# Patient Record
Sex: MALE | Race: WHITE | HISPANIC OR LATINO | Employment: FULL TIME | URBAN - METROPOLITAN AREA
[De-identification: names, ages, dates, MRNs, and addresses within clinical notes are randomized per-mention and may not be internally consistent; named-entity substitution may affect disease eponyms.]

---

## 2017-12-28 ENCOUNTER — APPOINTMENT (OUTPATIENT)
Dept: ADMISSIONS | Facility: MEDICAL CENTER | Age: 52
End: 2017-12-28
Attending: ORTHOPAEDIC SURGERY
Payer: COMMERCIAL

## 2017-12-28 RX ORDER — IBUPROFEN 200 MG
200 TABLET ORAL EVERY 6 HOURS PRN
Status: ON HOLD | COMMUNITY
End: 2018-01-08

## 2017-12-28 RX ORDER — CHLORAL HYDRATE 500 MG
1000 CAPSULE ORAL DAILY
COMMUNITY

## 2017-12-28 RX ORDER — ACETAMINOPHEN 325 MG/1
650 TABLET ORAL EVERY 4 HOURS PRN
Status: ON HOLD | COMMUNITY
End: 2018-01-08

## 2018-01-08 ENCOUNTER — HOSPITAL ENCOUNTER (OUTPATIENT)
Facility: MEDICAL CENTER | Age: 53
End: 2018-01-08
Attending: ORTHOPAEDIC SURGERY | Admitting: ORTHOPAEDIC SURGERY
Payer: COMMERCIAL

## 2018-01-08 VITALS
HEART RATE: 72 BPM | WEIGHT: 211.86 LBS | TEMPERATURE: 97.2 F | OXYGEN SATURATION: 90 % | RESPIRATION RATE: 14 BRPM | BODY MASS INDEX: 28.7 KG/M2 | HEIGHT: 72 IN

## 2018-01-08 PROCEDURE — 700111 HCHG RX REV CODE 636 W/ 250 OVERRIDE (IP)

## 2018-01-08 PROCEDURE — 160029 HCHG SURGERY MINUTES - 1ST 30 MINS LEVEL 4: Performed by: ORTHOPAEDIC SURGERY

## 2018-01-08 PROCEDURE — 160025 RECOVERY II MINUTES (STATS): Performed by: ORTHOPAEDIC SURGERY

## 2018-01-08 PROCEDURE — 502581 HCHG PACK, SHOULDER ARTHROSCOPY: Performed by: ORTHOPAEDIC SURGERY

## 2018-01-08 PROCEDURE — 160009 HCHG ANES TIME/MIN: Performed by: ORTHOPAEDIC SURGERY

## 2018-01-08 PROCEDURE — 700105 HCHG RX REV CODE 258: Performed by: ORTHOPAEDIC SURGERY

## 2018-01-08 PROCEDURE — 501162 HCHG PROBE, VULCAN: Performed by: ORTHOPAEDIC SURGERY

## 2018-01-08 PROCEDURE — 160002 HCHG RECOVERY MINUTES (STAT): Performed by: ORTHOPAEDIC SURGERY

## 2018-01-08 PROCEDURE — 700102 HCHG RX REV CODE 250 W/ 637 OVERRIDE(OP)

## 2018-01-08 PROCEDURE — 502000 HCHG MISC OR IMPLANTS RC 0278: Performed by: ORTHOPAEDIC SURGERY

## 2018-01-08 PROCEDURE — 160048 HCHG OR STATISTICAL LEVEL 1-5: Performed by: ORTHOPAEDIC SURGERY

## 2018-01-08 PROCEDURE — 700101 HCHG RX REV CODE 250

## 2018-01-08 PROCEDURE — 501838 HCHG SUTURE GENERAL: Performed by: ORTHOPAEDIC SURGERY

## 2018-01-08 PROCEDURE — C1713 ANCHOR/SCREW BN/BN,TIS/BN: HCPCS | Performed by: ORTHOPAEDIC SURGERY

## 2018-01-08 PROCEDURE — 500152 HCHG CANNULA, TIB TUNNEL: Performed by: ORTHOPAEDIC SURGERY

## 2018-01-08 PROCEDURE — A9270 NON-COVERED ITEM OR SERVICE: HCPCS | Performed by: ORTHOPAEDIC SURGERY

## 2018-01-08 PROCEDURE — 500862 HCHG NEEDLE, SCORPION: Performed by: ORTHOPAEDIC SURGERY

## 2018-01-08 PROCEDURE — 160046 HCHG PACU - 1ST 60 MINS PHASE II: Performed by: ORTHOPAEDIC SURGERY

## 2018-01-08 PROCEDURE — 700102 HCHG RX REV CODE 250 W/ 637 OVERRIDE(OP): Performed by: ORTHOPAEDIC SURGERY

## 2018-01-08 PROCEDURE — 160036 HCHG PACU - EA ADDL 30 MINS PHASE I: Performed by: ORTHOPAEDIC SURGERY

## 2018-01-08 PROCEDURE — 700111 HCHG RX REV CODE 636 W/ 250 OVERRIDE (IP): Performed by: ORTHOPAEDIC SURGERY

## 2018-01-08 PROCEDURE — 302135 SEQUENTIAL COMPRESSION MACHINE: Performed by: ORTHOPAEDIC SURGERY

## 2018-01-08 PROCEDURE — A9270 NON-COVERED ITEM OR SERVICE: HCPCS

## 2018-01-08 PROCEDURE — 160035 HCHG PACU - 1ST 60 MINS PHASE I: Performed by: ORTHOPAEDIC SURGERY

## 2018-01-08 PROCEDURE — 160041 HCHG SURGERY MINUTES - EA ADDL 1 MIN LEVEL 4: Performed by: ORTHOPAEDIC SURGERY

## 2018-01-08 DEVICE — ANCHOR SUTURE ICONIX 2 WITH #2 FORCE FIBER 2.3MM (5EA/BX): Type: IMPLANTABLE DEVICE | Status: FUNCTIONAL

## 2018-01-08 DEVICE — ANCHOR KNOTLESS REELX STT 4.5MM (5EA/BX): Type: IMPLANTABLE DEVICE | Status: FUNCTIONAL

## 2018-01-08 DEVICE — ANCHOR ICONIX 1-#2 FORCEFIBER 1.4MM (5EA/BX): Type: IMPLANTABLE DEVICE | Status: FUNCTIONAL

## 2018-01-08 RX ORDER — DIPHENHYDRAMINE HYDROCHLORIDE 50 MG/ML
25 INJECTION INTRAMUSCULAR; INTRAVENOUS EVERY 6 HOURS PRN
Status: DISCONTINUED | OUTPATIENT
Start: 2018-01-08 | End: 2018-01-08 | Stop reason: HOSPADM

## 2018-01-08 RX ORDER — OXYCODONE HYDROCHLORIDE AND ACETAMINOPHEN 5; 325 MG/1; MG/1
1 TABLET ORAL EVERY 4 HOURS PRN
Status: DISCONTINUED | OUTPATIENT
Start: 2018-01-08 | End: 2018-01-08 | Stop reason: HOSPADM

## 2018-01-08 RX ORDER — SCOLOPAMINE TRANSDERMAL SYSTEM 1 MG/1
1 PATCH, EXTENDED RELEASE TRANSDERMAL
Status: DISCONTINUED | OUTPATIENT
Start: 2018-01-08 | End: 2018-01-08 | Stop reason: HOSPADM

## 2018-01-08 RX ORDER — ONDANSETRON 2 MG/ML
4 INJECTION INTRAMUSCULAR; INTRAVENOUS EVERY 4 HOURS PRN
Status: DISCONTINUED | OUTPATIENT
Start: 2018-01-08 | End: 2018-01-08 | Stop reason: HOSPADM

## 2018-01-08 RX ORDER — CEFAZOLIN SODIUM 1 G/3ML
INJECTION, POWDER, FOR SOLUTION INTRAMUSCULAR; INTRAVENOUS
Status: DISCONTINUED | OUTPATIENT
Start: 2018-01-08 | End: 2018-01-08 | Stop reason: HOSPADM

## 2018-01-08 RX ORDER — EPINEPHRINE 0.1 MG/ML
SYRINGE (ML) INJECTION
Status: DISCONTINUED | OUTPATIENT
Start: 2018-01-08 | End: 2018-01-08 | Stop reason: HOSPADM

## 2018-01-08 RX ORDER — OXYCODONE HCL 5 MG/5 ML
SOLUTION, ORAL ORAL
Status: COMPLETED
Start: 2018-01-08 | End: 2018-01-08

## 2018-01-08 RX ORDER — OXYCODONE HYDROCHLORIDE AND ACETAMINOPHEN 5; 325 MG/1; MG/1
0.5 TABLET ORAL EVERY 4 HOURS PRN
Status: DISCONTINUED | OUTPATIENT
Start: 2018-01-08 | End: 2018-01-08 | Stop reason: HOSPADM

## 2018-01-08 RX ORDER — HALOPERIDOL 5 MG/ML
1 INJECTION INTRAMUSCULAR EVERY 6 HOURS PRN
Status: DISCONTINUED | OUTPATIENT
Start: 2018-01-08 | End: 2018-01-08 | Stop reason: HOSPADM

## 2018-01-08 RX ORDER — DEXAMETHASONE SODIUM PHOSPHATE 4 MG/ML
4 INJECTION, SOLUTION INTRA-ARTICULAR; INTRALESIONAL; INTRAMUSCULAR; INTRAVENOUS; SOFT TISSUE
Status: DISCONTINUED | OUTPATIENT
Start: 2018-01-08 | End: 2018-01-08 | Stop reason: HOSPADM

## 2018-01-08 RX ORDER — SODIUM CHLORIDE, SODIUM LACTATE, POTASSIUM CHLORIDE, CALCIUM CHLORIDE 600; 310; 30; 20 MG/100ML; MG/100ML; MG/100ML; MG/100ML
1000 INJECTION, SOLUTION INTRAVENOUS
Status: DISCONTINUED | OUTPATIENT
Start: 2018-01-08 | End: 2018-01-08 | Stop reason: HOSPADM

## 2018-01-08 RX ADMIN — FENTANYL CITRATE 25 MCG: 50 INJECTION, SOLUTION INTRAMUSCULAR; INTRAVENOUS at 12:18

## 2018-01-08 RX ADMIN — SODIUM CHLORIDE, POTASSIUM CHLORIDE, SODIUM LACTATE AND CALCIUM CHLORIDE 1000 ML: 600; 310; 30; 20 INJECTION, SOLUTION INTRAVENOUS at 08:20

## 2018-01-08 RX ADMIN — OXYCODONE HYDROCHLORIDE 10 MG: 5 SOLUTION ORAL at 12:19

## 2018-01-08 RX ADMIN — FENTANYL CITRATE 25 MCG: 50 INJECTION, SOLUTION INTRAMUSCULAR; INTRAVENOUS at 12:42

## 2018-01-08 RX ADMIN — FENTANYL CITRATE 50 MCG: 50 INJECTION, SOLUTION INTRAMUSCULAR; INTRAVENOUS at 12:55

## 2018-01-08 ASSESSMENT — PAIN SCALES - GENERAL
PAINLEVEL_OUTOF10: 6
PAINLEVEL_OUTOF10: 3
PAINLEVEL_OUTOF10: 5
PAINLEVEL_OUTOF10: 6
PAINLEVEL_OUTOF10: 3
PAINLEVEL_OUTOF10: 3

## 2018-01-08 NOTE — OR NURSING
1350- Pt in stage 2, getting dressed with assist.  When pt was sat up, bloody drainage leaked from anterior axilla of R shoulder dressing.  Reinforced dressing with abd and tape under arm.  Dr Martinez notified, no new orders received.  Shadowing on shoulder dressing noted and marked in pacu. Report to Malena CLAYTON.

## 2018-01-08 NOTE — OR SURGEON
Immediate Post OP Note    PreOp Diagnosis: R RCT, biceps lesion    PostOp Diagnosis: same    Procedure(s):  SHOULDER ARTHROSCOPY W/ ROTATOR CUFF REPAIR- WITH EXTENSIVE DEBRIDEMENT - Wound Class: Clean  SHOULDER DECOMPRESSION ARTHROSCOPIC- SUBACROMIAL - Wound Class: Clean  SHOULDER ARTHROSCOPY W/ BICIPITAL TENODESIS REPAIR - Wound Class: Clean    Surgeon(s):  Rl Martinez M.D.    Anesthesiologist/Type of Anesthesia:  Anesthesiologist: Eduardo Jennings M.D./General    Surgical Staff:  Circulator: Elizabeth Aj R.N.  Relief Circulator: Reva Chavira R.N.  Relief Scrub: Collins Avila  Scrub Person: Adilene Robles Reyes First Assist: Mildred Rivas R.N.    Specimens:  * No specimens in log *    Estimated Blood Loss: min    Findings: above    Complications: none        1/8/2018 11:51 AM Rl Martinez

## 2018-01-08 NOTE — OR NURSING
1350 pt cares assumed.  VSS. Spouse in,  DC instr reviewed. All questions answered.  Pt c/o R thumb pain.  Is able to move right thumb easily. Cap refill wnl to nail beds.  1425 Dr. Martinez in to see pt & address shoulder dressing & pt's c/o R thumb pain.  Pt states pain level to thumb is tolerable at 3/10. DC'd to home in good cond at 1430.

## 2018-01-08 NOTE — DISCHARGE INSTRUCTIONS
ACTIVITY: Rest and take it easy for the first 24 hours.  A responsible adult is recommended to remain with you during that time.  It is normal to feel sleepy.  We encourage you to not do anything that requires balance, judgment or coordination.    MILD FLU-LIKE SYMPTOMS ARE NORMAL. YOU MAY EXPERIENCE GENERALIZED MUSCLE ACHES, THROAT IRRITATION, HEADACHE AND/OR SOME NAUSEA.    FOR 24 HOURS DO NOT:  Drive, operate machinery or run household appliances.  Drink beer or alcoholic beverages.   Make important decisions or sign legal documents.    SPECIAL INSTRUCTIONS: *Keep arm in immobilizer as instructed by Dr Martinez. Ice 20 mins on, 20 mins off. Hand wrist and fingers several times per hour. Sleep upright, greater then 30 degrees with pillow under right arm for comfort.,  **    DIET: To avoid nausea, slowly advance diet as tolerated, avoiding spicy or greasy foods for the first day.  Add more substantial food to your diet according to your physician's instructions.  Babies can be fed formula or breast milk as soon as they are hungry.  INCREASE FLUIDS AND FIBER TO AVOID CONSTIPATION.    SURGICAL DRESSING/BATHING: *Reinforce as needed. Do not bath until instructed per physician.**    FOLLOW-UP APPOINTMENT:  A follow-up appointment was arranged with your doctor on the Wednesday the 10th and on the 19th.**    You should CALL YOUR PHYSICIAN if you develop:  Fever greater than 101 degrees F.  Pain not relieved by medication, or persistent nausea or vomiting.  Excessive bleeding (blood soaking through dressing) or unexpected drainage from the wound.  Extreme redness or swelling around the incision site, drainage of pus or foul smelling drainage.  Inability to urinate or empty your bladder within 8 hours.  Problems with breathing or chest pain.    You should call 911 if you develop problems with breathing or chest pain.  If you are unable to contact your doctor or surgical center, you should go to the nearest emergency room or  urgent care center.  Physician's telephone #: *[phone]**    If any questions arise, call your doctor.  If your doctor is not available, please feel free to call the Surgical Center at {Surgical Dept Numbers:[id number]}.  The Center is open Monday through Friday from 7AM to 7PM.  You can also call the HEALTH HOTLINE open 24 hours/day, 7 days/week and speak to a nurse at [phone], or toll free at [phone].    A registered nurse may call you a few days after your surgery to see how you are doing after your procedure.    MEDICATIONS: Resume taking daily medication.  Take prescribed pain medication with food.  If no medication is prescribed, you may take non-aspirin pain medication if needed.  PAIN MEDICATION CAN BE VERY CONSTIPATING.  Take a stool softener or laxative such as senokot, pericolace, or milk of magnesia if needed.    Prescription given for *and will be picked up from pharmacy **.  Last pain medication given at *1220**.    If your physician has prescribed pain medication that includes Acetaminophen (Tylenol), do not take additional Acetaminophen (Tylenol) while taking the prescribed medication.    Depression / Suicide Risk    As you are discharged from this [facility] facility, it is important to learn how to keep safe from harming yourself.    Recognize the warning signs:  · Abrupt changes in personality, positive or negative- including increase in energy   · Giving away possessions  · Change in eating patterns- significant weight changes-  positive or negative  · Change in sleeping patterns- unable to sleep or sleeping all the time   · Unwillingness or inability to communicate  · Depression  · Unusual sadness, discouragement and loneliness  · Talk of wanting to die  · Neglect of personal appearance   · Rebelliousness- reckless behavior  · Withdrawal from people/activities they love  · Confusion- inability to concentrate     If you or a loved one observes any of these behaviors or has  concerns about self-harm, here's what you can do:  · Talk about it- your feelings and reasons for harming yourself  · Remove any means that you might use to hurt yourself (examples: pills, rope, extension cords, firearm)  · Get professional help from the community (Mental Health, Substance Abuse, psychological counseling)  · Do not be alone:Call your Safe Contact- someone whom you trust who will be there for you.  · Call your local CRISIS HOTLINE 229-9234 or 827-924-0526  · Call your local Children's Mobile Crisis Response Team [place] (133) 852-5469 or www."[employer]  · Call the toll free National Suicide Prevention Hotlines   · National Suicide Prevention Lifeline 025-171-YOAD (8404)  [place] [employer] Line Network 800-SUICIDE (084-8563)    Peripheral Nerve Block Discharge Instructions from Same Day Surgery and Inpatient :    ·   What to Expect - Upper Extremity  · You may experience numbness and weakness in {ARM LOCATION PNB:550173}  on the same side as your surgery  · This is normal. For some people, this may be an unpleasant sensation. Be very careful with your numb limb  · Ask for help when you need it  Shoulder Surgery Side Effects  · In addition to numbness and weakness you may experience other symptoms  · Other nerves that are close to those nerves injected can also be affected by local anesthesia  · You may experience a hoarseness in your voice  · Your breathing may feel different  · You may also notice drooping of your eyelid, pupil constriction, and decreased sweating, on the side of your surgery  · All of these side effects are normal and will resolve when the local anesthetic wears off   Prevent Injury  · Protect the limb like a baby  · Beware of exposing your limb to extreme heat or cold or trauma  · The limb may be injured without you noticing because it is numb  · Keep the limb elevated whenever possible  · Do not sleep on the limb  · Change the position of the limb regularly  · Avoid  "putting pressure on your surgical limb  Pain Control  · The initial block on the day of surgery will make your extremity feel \"numb\"  · Any consecutive injection including prior to discharge from the hospital will make your extremity feel \"numb\"  · You may feel an aching or burning when the local anesthesia starts to wear off  · Take pain pills as prescribed by your surgeon  · Call your surgeon or anesthesiologist if you do not have adequate pain control  ·   "

## 2018-01-08 NOTE — OR NURSING
1300 Additional medication given per MAR. Pt reports slight discomfort to anterior aspect of shoulder. Tolerating sips of water without nausea. 1320 Pain tolerable. Pt meets criteria and transfer to stage 2. 1233 Pt transported at this time.

## 2018-01-08 NOTE — OP REPORT
DATE OF SERVICE:  01/08/2018    PREOPERATIVE DIAGNOSIS:  Right rotator cuff tear.    POSTOPERATIVE DIAGNOSES:  1.  Right large rotator cuff tear.  2.  Partial biceps tendon tear.    PROCEDURE:  1.  Right shoulder arthroscopy with extensive debridement of the anterior,   superior and posterior labrum and synovium.  2.  Arthroscopic rotator cuff repair of subscapularis, supraspinatus and   infraspinatus.  3.  Arthroscopic biceps tenodesis.  4.  Arthroscopic subacromial decompression.    ASSISTANT:  Wally Gibson MD    ANESTHESIA:  General.    COMPLICATIONS:  None.    BLOOD LOSS:  Minimal.    INDICATION:  The patient is a 52-year-old gentleman who sustained a right   shoulder injury a few months ago.  He has had persistent pain and an MRI is   showing large rotator cuff tear and superior labral lesion.  He is indicated   for arthroscopic management.    FINDINGS:  Exam under anesthesia revealed full range of motion with no   evidence of instability.  Arthroscopic examination of the glenohumeral joint   revealed the articular surfaces to be intact.  There was a retracted tear of   the superior so a portion of the subscapularis.  There was a rotator cuff tear   propagating from the lesser tuberosity and the anterior greater tuberosity   into the substance of the rotator cuff back into the infraspinatus.  There was   partial tearing of the biceps tendon.  There was anterior, superior and   posterior synovitis.  Examination of the subacromial space revealed a large   rotator cuff tear.  There was a focal anterior acromial spur.    DESCRIPTION OF PROCEDURE:  Following placement of a regional block and general   anesthesia, time-out was performed confirming patient, site, and procedure.    Two grams of Ancef IV were ordered and administered.  The patient was   positioned in the lateral decubitus position with the right shoulder up.    Right shoulder and upper extremity were prepped and draped in the usual   fashion and  suspended from 8 pounds longitudinal traction.  Standard   posterior, anterior, and lateral portals were established and diagnostic   arthroscopy was performed with findings as above.  A shaver was used to   debride the anterior, superior and posterior synovitis.  Fraying of the labrum   was debrided.  Partial tearing of the biceps tendon was debrided.  Next,   subacromial space was entered and bursa was debrided with the shaver.  Viewing   was then done looking down the anterior subacromial space from the anterior   portal and a working cannula was placed anterolaterally.  Bursa was debrided   to expose the pectoralis tendon and the biceps groove.  The biceps groove was   opened with the shaver and the floor of the groove was freshened with a rasp   and shaver.  A small atonic anchor was placed in the biceps groove just at the   superior edge of the pectoralis tendon.  One limb was passed in a cinch   stitch through the biceps tendon and the other was passed through the   pectoralis tendon.  A second anchor was placed just proximal to this in the   biceps groove and from the other side, one limb was passed in the cinch stitch   through the tendon.  Both sutures were tied completing the fixation of the   biceps tendon.  The joint was reentered and the biceps was transected at its   attachment to the superior labrum.  Again, viewing from the subacromial space,   the biceps was transected above the level fixation and the biceps stump was   removed.    Next, the joint was reentered and the footprint on the superior lesser   tuberosity was debrided down to bleeding bone.  A working canula was placed   anteriorly slingshot was used to pass the tape through the superior   subscapularis, tissue and this was repaired anatomically to the superior   lesser tuberosity with a ReelX anchor.    Next, the subacromial space was entered.  A working cannula was placed   anterolaterally at the base of the tear site the ReelX anchor  double loop of   suture was placed at the medial footprint at the anterior greater tuberosity   and one limb of each tape was passed through the anterior leaf of the cuff and   the other through the posterolateral leaf.  The sutures were tied, repairing   the cuff to the footprint on the anterior greater tuberosity.  This left a   side tear extending obliquely posteriorly into the infraspinatus.  This was   repaired side with 2 small tapes which were passed in simple fashion with the   scorpion and slingshot these were tied then completed a good solid repair.    Next, the coracoacromial ligament was released from the anterior acromion.    The anterior acromial spur was resected with the shaver leaving a smooth flat   undersurface of the acromion.  Remaining debris and bursa was debrided with   the shaver.  The arthroscope was withdrawn.  Portals were closed with nylon   sutures.  Sterile dressing was applied followed by an UltraSling.  The patient   was awakened and extubated in the operating room and transferred to recovery   room in stable condition.       ____________________________________     MD ROWAN Sarkar / BAILEY    DD:  01/08/2018 11:49:16  DT:  01/08/2018 12:39:37    D#:  [id number]  Job#:  [id number]

## 2018-01-08 NOTE — OR NURSING
1151 PT RECEIVED IN PACU, REPORT RECEIVED.  VSS, RESP SPONT, EVEN, NON LABORED.   1212 PT REPORTS RIGHT SHOULDER PAIN 6/10. MEDICATE FOR PAIN SEE MAR. 1242 VSS. PT REPORTS PAIN 6/10, MEDICATE FOR PAIN SEE MAR. 1255 1255 VSS. REPORT GIVEN TO EVAN CLAYTON TO ASSUME CARE OF THIS PT.

## 2020-01-16 ENCOUNTER — APPOINTMENT (RX ONLY)
Dept: URBAN - METROPOLITAN AREA CLINIC 20 | Facility: CLINIC | Age: 55
Setting detail: DERMATOLOGY
End: 2020-01-16

## 2020-01-16 DIAGNOSIS — L81.4 OTHER MELANIN HYPERPIGMENTATION: ICD-10-CM

## 2020-01-16 DIAGNOSIS — Z85.828 PERSONAL HISTORY OF OTHER MALIGNANT NEOPLASM OF SKIN: ICD-10-CM

## 2020-01-16 DIAGNOSIS — L82.1 OTHER SEBORRHEIC KERATOSIS: ICD-10-CM

## 2020-01-16 DIAGNOSIS — L82.0 INFLAMED SEBORRHEIC KERATOSIS: ICD-10-CM

## 2020-01-16 DIAGNOSIS — D18.0 HEMANGIOMA: ICD-10-CM

## 2020-01-16 DIAGNOSIS — D22 MELANOCYTIC NEVI: ICD-10-CM

## 2020-01-16 DIAGNOSIS — Z71.89 OTHER SPECIFIED COUNSELING: ICD-10-CM

## 2020-01-16 DIAGNOSIS — L57.0 ACTINIC KERATOSIS: ICD-10-CM

## 2020-01-16 PROBLEM — D22.5 MELANOCYTIC NEVI OF TRUNK: Status: ACTIVE | Noted: 2020-01-16

## 2020-01-16 PROBLEM — D18.01 HEMANGIOMA OF SKIN AND SUBCUTANEOUS TISSUE: Status: ACTIVE | Noted: 2020-01-16

## 2020-01-16 PROCEDURE — ? DEFER

## 2020-01-16 PROCEDURE — ? DIAGNOSIS COMMENT

## 2020-01-16 PROCEDURE — ? COUNSELING

## 2020-01-16 PROCEDURE — 17110 DESTRUCTION B9 LES UP TO 14: CPT

## 2020-01-16 PROCEDURE — ? LIQUID NITROGEN

## 2020-01-16 PROCEDURE — 17000 DESTRUCT PREMALG LESION: CPT | Mod: 59

## 2020-01-16 PROCEDURE — 99202 OFFICE O/P NEW SF 15 MIN: CPT | Mod: 25

## 2020-01-16 ASSESSMENT — LOCATION SIMPLE DESCRIPTION DERM
LOCATION SIMPLE: RIGHT UPPER BACK
LOCATION SIMPLE: SCALP
LOCATION SIMPLE: RIGHT ANTERIOR NECK
LOCATION SIMPLE: CHEST

## 2020-01-16 ASSESSMENT — LOCATION DETAILED DESCRIPTION DERM
LOCATION DETAILED: RIGHT SUPERIOR PARIETAL SCALP
LOCATION DETAILED: RIGHT SUPERIOR UPPER BACK
LOCATION DETAILED: RIGHT SUPERIOR MEDIAL UPPER BACK
LOCATION DETAILED: RIGHT MEDIAL SUPERIOR CHEST
LOCATION DETAILED: RIGHT CENTRAL PARIETAL SCALP
LOCATION DETAILED: RIGHT CLAVICULAR NECK

## 2020-01-16 ASSESSMENT — LOCATION ZONE DERM
LOCATION ZONE: TRUNK
LOCATION ZONE: NECK
LOCATION ZONE: SCALP

## 2020-01-16 NOTE — PROCEDURE: DIAGNOSIS COMMENT
Detail Level: Simple
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

## 2020-01-16 NOTE — HPI: SKIN LESION
Is This A New Presentation, Or A Follow-Up?: Skin Lesion
What Type Of Note Output Would You Prefer (Optional)?: Standard Output
How Severe Is Your Skin Lesion?: mild
Has Your Skin Lesion Been Treated?: been treated
Additional History: Patient saw Dr. Sheehan and he froze spots off scalp on 01/14/2020. Patient still came in to get spots evaluated
When Was It Treated?: 01/14/2020

## 2020-01-16 NOTE — PROCEDURE: DEFER
Detail Level: Detailed
Instructions (Optional): Discussed redlight for AKs on scalp. Gave patient handout about redlight. Will obtain prior auth with insurance and will contact patient to schedule once we receive prior authorization.
Introduction Text (Please End With A Colon): The following procedure was deferred:
Procedure To Be Performed At Next Visit: PDT Red Light

## 2020-01-16 NOTE — PROCEDURE: COUNSELING
Detail Level: Zone
Detail Level: Detailed
Patient Specific Counseling (Will Not Stick From Patient To Patient): Discussed with patient the importance of moisturizers.

## 2020-01-17 ENCOUNTER — HOSPITAL ENCOUNTER (OUTPATIENT)
Dept: HOSPITAL 8 - RAD | Age: 55
Discharge: HOME | End: 2020-01-17
Attending: FAMILY MEDICINE
Payer: COMMERCIAL

## 2020-01-17 DIAGNOSIS — I72.9: Primary | ICD-10-CM

## 2020-01-17 DIAGNOSIS — R51: ICD-10-CM

## 2020-01-17 PROCEDURE — A9575 INJ GADOTERATE MEGLUMI 0.1ML: HCPCS

## 2020-01-17 PROCEDURE — 70544 MR ANGIOGRAPHY HEAD W/O DYE: CPT

## 2020-01-17 PROCEDURE — 70553 MRI BRAIN STEM W/O & W/DYE: CPT

## 2020-06-23 ENCOUNTER — APPOINTMENT (RX ONLY)
Dept: URBAN - METROPOLITAN AREA CLINIC 22 | Facility: CLINIC | Age: 55
Setting detail: DERMATOLOGY
End: 2020-06-23

## 2020-06-23 DIAGNOSIS — L81.4 OTHER MELANIN HYPERPIGMENTATION: ICD-10-CM

## 2020-06-23 DIAGNOSIS — L90.5 SCAR CONDITIONS AND FIBROSIS OF SKIN: ICD-10-CM

## 2020-06-23 DIAGNOSIS — D22 MELANOCYTIC NEVI: ICD-10-CM

## 2020-06-23 DIAGNOSIS — L57.0 ACTINIC KERATOSIS: ICD-10-CM

## 2020-06-23 DIAGNOSIS — Z85.828 PERSONAL HISTORY OF OTHER MALIGNANT NEOPLASM OF SKIN: ICD-10-CM

## 2020-06-23 PROBLEM — L30.9 DERMATITIS, UNSPECIFIED: Status: ACTIVE | Noted: 2020-06-23

## 2020-06-23 PROBLEM — D22.5 MELANOCYTIC NEVI OF TRUNK: Status: ACTIVE | Noted: 2020-06-23

## 2020-06-23 PROCEDURE — 99214 OFFICE O/P EST MOD 30 MIN: CPT | Mod: 25

## 2020-06-23 PROCEDURE — ? ADDITIONAL NOTES

## 2020-06-23 PROCEDURE — ? LIQUID NITROGEN

## 2020-06-23 PROCEDURE — ? PHOTO-DOCUMENTATION

## 2020-06-23 PROCEDURE — 17003 DESTRUCT PREMALG LES 2-14: CPT

## 2020-06-23 PROCEDURE — 17000 DESTRUCT PREMALG LESION: CPT

## 2020-06-23 PROCEDURE — ? COUNSELING

## 2020-06-23 ASSESSMENT — LOCATION SIMPLE DESCRIPTION DERM
LOCATION SIMPLE: RIGHT FOREHEAD
LOCATION SIMPLE: RIGHT UPPER BACK
LOCATION SIMPLE: RIGHT SCALP
LOCATION SIMPLE: LEFT SCALP
LOCATION SIMPLE: LEFT BREAST
LOCATION SIMPLE: RIGHT ANTERIOR NECK
LOCATION SIMPLE: SCALP
LOCATION SIMPLE: LEFT EAR

## 2020-06-23 ASSESSMENT — LOCATION ZONE DERM
LOCATION ZONE: EAR
LOCATION ZONE: NECK
LOCATION ZONE: TRUNK
LOCATION ZONE: FACE
LOCATION ZONE: SCALP

## 2020-06-23 ASSESSMENT — LOCATION DETAILED DESCRIPTION DERM
LOCATION DETAILED: LEFT SUPERIOR HELIX
LOCATION DETAILED: LEFT INFRAMAMMARY CREASE (INNER QUADRANT)
LOCATION DETAILED: RIGHT CENTRAL FRONTAL SCALP
LOCATION DETAILED: RIGHT CLAVICULAR NECK
LOCATION DETAILED: LEFT SUPERIOR PARIETAL SCALP
LOCATION DETAILED: LEFT MEDIAL FRONTAL SCALP
LOCATION DETAILED: RIGHT INFERIOR FOREHEAD
LOCATION DETAILED: RIGHT CENTRAL PARIETAL SCALP
LOCATION DETAILED: RIGHT SUPERIOR UPPER BACK

## 2020-06-23 NOTE — PROCEDURE: ADDITIONAL NOTES
Detail Level: Detailed
Additional Notes: If does not resolve within 3-4 weeks will re evaluate for possible biospy

## 2021-01-05 ENCOUNTER — APPOINTMENT (RX ONLY)
Dept: URBAN - METROPOLITAN AREA CLINIC 22 | Facility: CLINIC | Age: 56
Setting detail: DERMATOLOGY
End: 2021-01-05

## 2021-01-05 DIAGNOSIS — Z85.828 PERSONAL HISTORY OF OTHER MALIGNANT NEOPLASM OF SKIN: ICD-10-CM

## 2021-01-05 DIAGNOSIS — L81.4 OTHER MELANIN HYPERPIGMENTATION: ICD-10-CM

## 2021-01-05 DIAGNOSIS — L82.1 OTHER SEBORRHEIC KERATOSIS: ICD-10-CM

## 2021-01-05 DIAGNOSIS — D22 MELANOCYTIC NEVI: ICD-10-CM

## 2021-01-05 PROBLEM — D48.5 NEOPLASM OF UNCERTAIN BEHAVIOR OF SKIN: Status: ACTIVE | Noted: 2021-01-05

## 2021-01-05 PROBLEM — D22.5 MELANOCYTIC NEVI OF TRUNK: Status: ACTIVE | Noted: 2021-01-05

## 2021-01-05 PROCEDURE — 11102 TANGNTL BX SKIN SINGLE LES: CPT

## 2021-01-05 PROCEDURE — 99213 OFFICE O/P EST LOW 20 MIN: CPT | Mod: 25

## 2021-01-05 PROCEDURE — ? SUNSCREEN TREATMENT REGIMEN

## 2021-01-05 PROCEDURE — ? BIOPSY BY SHAVE METHOD

## 2021-01-05 PROCEDURE — ? COUNSELING

## 2021-01-05 ASSESSMENT — LOCATION SIMPLE DESCRIPTION DERM
LOCATION SIMPLE: UPPER BACK
LOCATION SIMPLE: RIGHT UPPER BACK
LOCATION SIMPLE: RIGHT ANTERIOR NECK
LOCATION SIMPLE: LEFT FOREARM
LOCATION SIMPLE: LEFT FOREHEAD
LOCATION SIMPLE: RIGHT FOREARM
LOCATION SIMPLE: CHEST

## 2021-01-05 ASSESSMENT — LOCATION DETAILED DESCRIPTION DERM
LOCATION DETAILED: LEFT VENTRAL PROXIMAL FOREARM
LOCATION DETAILED: LEFT INFERIOR MEDIAL FOREHEAD
LOCATION DETAILED: RIGHT CLAVICULAR NECK
LOCATION DETAILED: RIGHT VENTRAL PROXIMAL FOREARM
LOCATION DETAILED: RIGHT MEDIAL UPPER BACK
LOCATION DETAILED: STERNAL NOTCH
LOCATION DETAILED: SUPERIOR THORACIC SPINE

## 2021-01-05 ASSESSMENT — LOCATION ZONE DERM
LOCATION ZONE: FACE
LOCATION ZONE: NECK
LOCATION ZONE: TRUNK
LOCATION ZONE: ARM

## 2021-07-06 ENCOUNTER — APPOINTMENT (RX ONLY)
Dept: URBAN - METROPOLITAN AREA CLINIC 22 | Facility: CLINIC | Age: 56
Setting detail: DERMATOLOGY
End: 2021-07-06

## 2021-07-06 DIAGNOSIS — L57.0 ACTINIC KERATOSIS: ICD-10-CM

## 2021-07-06 DIAGNOSIS — L73.9 FOLLICULAR DISORDER, UNSPECIFIED: ICD-10-CM

## 2021-07-06 DIAGNOSIS — Z85.828 PERSONAL HISTORY OF OTHER MALIGNANT NEOPLASM OF SKIN: ICD-10-CM

## 2021-07-06 DIAGNOSIS — L738 OTHER SPECIFIED DISEASES OF HAIR AND HAIR FOLLICLES: ICD-10-CM

## 2021-07-06 DIAGNOSIS — D22 MELANOCYTIC NEVI: ICD-10-CM

## 2021-07-06 DIAGNOSIS — L663 OTHER SPECIFIED DISEASES OF HAIR AND HAIR FOLLICLES: ICD-10-CM

## 2021-07-06 DIAGNOSIS — L81.4 OTHER MELANIN HYPERPIGMENTATION: ICD-10-CM

## 2021-07-06 PROBLEM — D22.4 MELANOCYTIC NEVI OF SCALP AND NECK: Status: ACTIVE | Noted: 2021-07-06

## 2021-07-06 PROBLEM — D22.5 MELANOCYTIC NEVI OF TRUNK: Status: ACTIVE | Noted: 2021-07-06

## 2021-07-06 PROBLEM — L02.821 FURUNCLE OF HEAD [ANY PART, EXCEPT FACE]: Status: ACTIVE | Noted: 2021-07-06

## 2021-07-06 PROCEDURE — ? SUNSCREEN TREATMENT REGIMEN

## 2021-07-06 PROCEDURE — ? COUNSELING

## 2021-07-06 PROCEDURE — ? TREATMENT REGIMEN

## 2021-07-06 PROCEDURE — ? LIQUID NITROGEN

## 2021-07-06 PROCEDURE — ? BIOPSY BY SHAVE METHOD

## 2021-07-06 PROCEDURE — 99213 OFFICE O/P EST LOW 20 MIN: CPT | Mod: 25

## 2021-07-06 PROCEDURE — 17000 DESTRUCT PREMALG LESION: CPT | Mod: 59

## 2021-07-06 PROCEDURE — 11102 TANGNTL BX SKIN SINGLE LES: CPT

## 2021-07-06 PROCEDURE — 17003 DESTRUCT PREMALG LES 2-14: CPT

## 2021-07-06 ASSESSMENT — LOCATION DETAILED DESCRIPTION DERM
LOCATION DETAILED: STERNAL NOTCH
LOCATION DETAILED: LEFT INFERIOR POSTERIOR NECK
LOCATION DETAILED: LEFT MEDIAL FRONTAL SCALP
LOCATION DETAILED: LEFT INFERIOR POSTERIOR NECK
LOCATION DETAILED: RIGHT CLAVICULAR NECK
LOCATION DETAILED: MID-FRONTAL SCALP
LOCATION DETAILED: RIGHT VENTRAL PROXIMAL FOREARM
LOCATION DETAILED: LEFT INFERIOR MEDIAL FOREHEAD
LOCATION DETAILED: SUPERIOR THORACIC SPINE
LOCATION DETAILED: RIGHT SUPERIOR PARIETAL SCALP
LOCATION DETAILED: LEFT VENTRAL PROXIMAL FOREARM
LOCATION DETAILED: LEFT SUPERIOR PARIETAL SCALP

## 2021-07-06 ASSESSMENT — LOCATION SIMPLE DESCRIPTION DERM
LOCATION SIMPLE: RIGHT FOREARM
LOCATION SIMPLE: LEFT FOREARM
LOCATION SIMPLE: ANTERIOR SCALP
LOCATION SIMPLE: RIGHT ANTERIOR NECK
LOCATION SIMPLE: UPPER BACK
LOCATION SIMPLE: LEFT SCALP
LOCATION SIMPLE: LEFT FOREHEAD
LOCATION SIMPLE: CHEST
LOCATION SIMPLE: SCALP
LOCATION SIMPLE: POSTERIOR NECK
LOCATION SIMPLE: POSTERIOR NECK

## 2021-07-06 ASSESSMENT — LOCATION ZONE DERM
LOCATION ZONE: ARM
LOCATION ZONE: NECK
LOCATION ZONE: TRUNK
LOCATION ZONE: FACE
LOCATION ZONE: SCALP
LOCATION ZONE: NECK

## 2021-07-06 NOTE — PROCEDURE: LIQUID NITROGEN
Render Note In Bullet Format When Appropriate: No
Detail Level: Detailed
Post-Care Instructions: I reviewed with the patient in detail post-care instructions. Patient is to wear sunprotection, and avoid picking at any of the treated lesions. Pt may apply Vaseline to crusted or scabbing areas.
Number Of Freeze-Thaw Cycles: 2 freeze-thaw cycles
Consent: The patient's consent was obtained including but not limited to risks of crusting, scabbing, blistering, scarring, darker or lighter pigmentary change, recurrence, incomplete removal and infection.
Duration Of Freeze Thaw-Cycle (Seconds): 0
normal...

## 2021-07-06 NOTE — HPI: EVALUATION OF SKIN LESION(S)
What Type Of Note Output Would You Prefer (Optional)?: Standard Output
Have Your Spot(S) Been Treated In The Past?: has not been treated
Hpi Title: Evaluation of Skin Lesions
Additional History: Patient interested in discussing removal of two moles on back of scalp/neck.

## 2022-03-15 ENCOUNTER — APPOINTMENT (RX ONLY)
Dept: URBAN - METROPOLITAN AREA CLINIC 22 | Facility: CLINIC | Age: 57
Setting detail: DERMATOLOGY
End: 2022-03-15

## 2022-03-15 DIAGNOSIS — L82.1 OTHER SEBORRHEIC KERATOSIS: ICD-10-CM

## 2022-03-15 DIAGNOSIS — D22 MELANOCYTIC NEVI: ICD-10-CM

## 2022-03-15 DIAGNOSIS — Z85.828 PERSONAL HISTORY OF OTHER MALIGNANT NEOPLASM OF SKIN: ICD-10-CM

## 2022-03-15 DIAGNOSIS — L81.4 OTHER MELANIN HYPERPIGMENTATION: ICD-10-CM

## 2022-03-15 PROBLEM — D22.5 MELANOCYTIC NEVI OF TRUNK: Status: ACTIVE | Noted: 2022-03-15

## 2022-03-15 PROCEDURE — 99213 OFFICE O/P EST LOW 20 MIN: CPT

## 2022-03-15 PROCEDURE — ? COUNSELING

## 2022-03-15 PROCEDURE — ? SUNSCREEN TREATMENT REGIMEN

## 2022-03-15 ASSESSMENT — LOCATION DETAILED DESCRIPTION DERM
LOCATION DETAILED: RIGHT CLAVICULAR SKIN
LOCATION DETAILED: RIGHT POSTERIOR SHOULDER
LOCATION DETAILED: SUPERIOR THORACIC SPINE
LOCATION DETAILED: RIGHT MEDIAL UPPER BACK
LOCATION DETAILED: RIGHT CLAVICULAR NECK
LOCATION DETAILED: LEFT LATERAL TRAPEZIAL NECK
LOCATION DETAILED: LEFT VENTRAL PROXIMAL FOREARM
LOCATION DETAILED: RIGHT VENTRAL PROXIMAL FOREARM
LOCATION DETAILED: STERNAL NOTCH
LOCATION DETAILED: LEFT INFERIOR MEDIAL FOREHEAD

## 2022-03-15 ASSESSMENT — LOCATION ZONE DERM
LOCATION ZONE: NECK
LOCATION ZONE: TRUNK
LOCATION ZONE: ARM
LOCATION ZONE: FACE

## 2022-03-15 ASSESSMENT — LOCATION SIMPLE DESCRIPTION DERM
LOCATION SIMPLE: RIGHT ANTERIOR NECK
LOCATION SIMPLE: RIGHT CLAVICULAR SKIN
LOCATION SIMPLE: CHEST
LOCATION SIMPLE: UPPER BACK
LOCATION SIMPLE: RIGHT FOREARM
LOCATION SIMPLE: POSTERIOR NECK
LOCATION SIMPLE: LEFT FOREHEAD
LOCATION SIMPLE: RIGHT SHOULDER
LOCATION SIMPLE: LEFT FOREARM
LOCATION SIMPLE: RIGHT UPPER BACK

## 2022-06-26 ENCOUNTER — APPOINTMENT (OUTPATIENT)
Dept: RADIOLOGY | Facility: MEDICAL CENTER | Age: 57
DRG: 023 | End: 2022-06-26
Attending: EMERGENCY MEDICINE
Payer: COMMERCIAL

## 2022-06-26 ENCOUNTER — HOSPITAL ENCOUNTER (INPATIENT)
Facility: MEDICAL CENTER | Age: 57
LOS: 11 days | DRG: 023 | End: 2022-07-08
Attending: EMERGENCY MEDICINE | Admitting: STUDENT IN AN ORGANIZED HEALTH CARE EDUCATION/TRAINING PROGRAM
Payer: COMMERCIAL

## 2022-06-26 DIAGNOSIS — R42 VERTIGO: ICD-10-CM

## 2022-06-26 DIAGNOSIS — R11.0 NAUSEA: ICD-10-CM

## 2022-06-26 DIAGNOSIS — I63.9 CEREBROVASCULAR ACCIDENT (CVA), UNSPECIFIED MECHANISM (HCC): ICD-10-CM

## 2022-06-26 DIAGNOSIS — I63.541 ACUTE STROKE DUE TO OCCLUSION OF RIGHT CEREBELLAR ARTERY (HCC): ICD-10-CM

## 2022-06-26 LAB
ABO GROUP BLD: NORMAL
ALBUMIN SERPL BCP-MCNC: 4.3 G/DL (ref 3.2–4.9)
ALBUMIN/GLOB SERPL: 1.7 G/DL
ALP SERPL-CCNC: 70 U/L (ref 30–99)
ALT SERPL-CCNC: 18 U/L (ref 2–50)
ANION GAP SERPL CALC-SCNC: 14 MMOL/L (ref 7–16)
APTT PPP: 22.8 SEC (ref 24.7–36)
AST SERPL-CCNC: 16 U/L (ref 12–45)
BASOPHILS # BLD AUTO: 0.5 % (ref 0–1.8)
BASOPHILS # BLD: 0.03 K/UL (ref 0–0.12)
BILIRUB SERPL-MCNC: 0.2 MG/DL (ref 0.1–1.5)
BLD GP AB SCN SERPL QL: NORMAL
BUN SERPL-MCNC: 25 MG/DL (ref 8–22)
CALCIUM SERPL-MCNC: 8.7 MG/DL (ref 8.5–10.5)
CHLORIDE SERPL-SCNC: 110 MMOL/L (ref 96–112)
CO2 SERPL-SCNC: 20 MMOL/L (ref 20–33)
CREAT SERPL-MCNC: 0.97 MG/DL (ref 0.5–1.4)
EKG IMPRESSION: NORMAL
EOSINOPHIL # BLD AUTO: 0.1 K/UL (ref 0–0.51)
EOSINOPHIL NFR BLD: 1.7 % (ref 0–6.9)
ERYTHROCYTE [DISTWIDTH] IN BLOOD BY AUTOMATED COUNT: 43.3 FL (ref 35.9–50)
GFR SERPLBLD CREATININE-BSD FMLA CKD-EPI: 92 ML/MIN/1.73 M 2
GLOBULIN SER CALC-MCNC: 2.5 G/DL (ref 1.9–3.5)
GLUCOSE SERPL-MCNC: 125 MG/DL (ref 65–99)
HCT VFR BLD AUTO: 46.3 % (ref 42–52)
HGB BLD-MCNC: 15.2 G/DL (ref 14–18)
IMM GRANULOCYTES # BLD AUTO: 0.02 K/UL (ref 0–0.11)
IMM GRANULOCYTES NFR BLD AUTO: 0.3 % (ref 0–0.9)
INR PPP: 1.03 (ref 0.87–1.13)
LYMPHOCYTES # BLD AUTO: 2.24 K/UL (ref 1–4.8)
LYMPHOCYTES NFR BLD: 37.1 % (ref 22–41)
MCH RBC QN AUTO: 26.6 PG (ref 27–33)
MCHC RBC AUTO-ENTMCNC: 32.8 G/DL (ref 33.7–35.3)
MCV RBC AUTO: 80.9 FL (ref 81.4–97.8)
MONOCYTES # BLD AUTO: 0.71 K/UL (ref 0–0.85)
MONOCYTES NFR BLD AUTO: 11.8 % (ref 0–13.4)
NEUTROPHILS # BLD AUTO: 2.93 K/UL (ref 1.82–7.42)
NEUTROPHILS NFR BLD: 48.6 % (ref 44–72)
NRBC # BLD AUTO: 0 K/UL
NRBC BLD-RTO: 0 /100 WBC
PLATELET # BLD AUTO: 261 K/UL (ref 164–446)
PMV BLD AUTO: 9.9 FL (ref 9–12.9)
POTASSIUM SERPL-SCNC: 3.5 MMOL/L (ref 3.6–5.5)
PROT SERPL-MCNC: 6.8 G/DL (ref 6–8.2)
PROTHROMBIN TIME: 13.2 SEC (ref 12–14.6)
RBC # BLD AUTO: 5.72 M/UL (ref 4.7–6.1)
RH BLD: NORMAL
SODIUM SERPL-SCNC: 144 MMOL/L (ref 135–145)
TROPONIN T SERPL-MCNC: <6 NG/L (ref 6–19)
WBC # BLD AUTO: 6 K/UL (ref 4.8–10.8)

## 2022-06-26 PROCEDURE — 700102 HCHG RX REV CODE 250 W/ 637 OVERRIDE(OP): Performed by: STUDENT IN AN ORGANIZED HEALTH CARE EDUCATION/TRAINING PROGRAM

## 2022-06-26 PROCEDURE — A9270 NON-COVERED ITEM OR SERVICE: HCPCS | Performed by: STUDENT IN AN ORGANIZED HEALTH CARE EDUCATION/TRAINING PROGRAM

## 2022-06-26 PROCEDURE — 86850 RBC ANTIBODY SCREEN: CPT

## 2022-06-26 PROCEDURE — 85610 PROTHROMBIN TIME: CPT

## 2022-06-26 PROCEDURE — 36415 COLL VENOUS BLD VENIPUNCTURE: CPT

## 2022-06-26 PROCEDURE — 700105 HCHG RX REV CODE 258: Performed by: EMERGENCY MEDICINE

## 2022-06-26 PROCEDURE — 85025 COMPLETE CBC W/AUTO DIFF WBC: CPT

## 2022-06-26 PROCEDURE — 94760 N-INVAS EAR/PLS OXIMETRY 1: CPT

## 2022-06-26 PROCEDURE — 99220 PR INITIAL OBSERVATION CARE,LEVL III: CPT | Performed by: STUDENT IN AN ORGANIZED HEALTH CARE EDUCATION/TRAINING PROGRAM

## 2022-06-26 PROCEDURE — 96374 THER/PROPH/DIAG INJ IV PUSH: CPT

## 2022-06-26 PROCEDURE — 86901 BLOOD TYPING SEROLOGIC RH(D): CPT

## 2022-06-26 PROCEDURE — 700111 HCHG RX REV CODE 636 W/ 250 OVERRIDE (IP): Performed by: STUDENT IN AN ORGANIZED HEALTH CARE EDUCATION/TRAINING PROGRAM

## 2022-06-26 PROCEDURE — 70450 CT HEAD/BRAIN W/O DYE: CPT

## 2022-06-26 PROCEDURE — 84484 ASSAY OF TROPONIN QUANT: CPT

## 2022-06-26 PROCEDURE — 80053 COMPREHEN METABOLIC PANEL: CPT

## 2022-06-26 PROCEDURE — 93005 ELECTROCARDIOGRAM TRACING: CPT | Performed by: EMERGENCY MEDICINE

## 2022-06-26 PROCEDURE — 700105 HCHG RX REV CODE 258: Performed by: STUDENT IN AN ORGANIZED HEALTH CARE EDUCATION/TRAINING PROGRAM

## 2022-06-26 PROCEDURE — 96375 TX/PRO/DX INJ NEW DRUG ADDON: CPT

## 2022-06-26 PROCEDURE — 700111 HCHG RX REV CODE 636 W/ 250 OVERRIDE (IP): Performed by: EMERGENCY MEDICINE

## 2022-06-26 PROCEDURE — 99285 EMERGENCY DEPT VISIT HI MDM: CPT

## 2022-06-26 PROCEDURE — 86900 BLOOD TYPING SEROLOGIC ABO: CPT

## 2022-06-26 PROCEDURE — G0378 HOSPITAL OBSERVATION PER HR: HCPCS

## 2022-06-26 PROCEDURE — 85730 THROMBOPLASTIN TIME PARTIAL: CPT

## 2022-06-26 RX ORDER — PROMETHAZINE HYDROCHLORIDE 25 MG/1
12.5-25 SUPPOSITORY RECTAL EVERY 4 HOURS PRN
Status: DISCONTINUED | OUTPATIENT
Start: 2022-06-26 | End: 2022-06-26

## 2022-06-26 RX ORDER — DIAZEPAM 5 MG/ML
5 INJECTION, SOLUTION INTRAMUSCULAR; INTRAVENOUS ONCE
Status: COMPLETED | OUTPATIENT
Start: 2022-06-26 | End: 2022-06-26

## 2022-06-26 RX ORDER — PROCHLORPERAZINE EDISYLATE 5 MG/ML
5-10 INJECTION INTRAMUSCULAR; INTRAVENOUS EVERY 4 HOURS PRN
Status: DISCONTINUED | OUTPATIENT
Start: 2022-06-26 | End: 2022-07-08 | Stop reason: HOSPADM

## 2022-06-26 RX ORDER — ONDANSETRON 2 MG/ML
4 INJECTION INTRAMUSCULAR; INTRAVENOUS ONCE
Status: COMPLETED | OUTPATIENT
Start: 2022-06-26 | End: 2022-06-26

## 2022-06-26 RX ORDER — ONDANSETRON 2 MG/ML
4 INJECTION INTRAMUSCULAR; INTRAVENOUS EVERY 4 HOURS PRN
Status: DISCONTINUED | OUTPATIENT
Start: 2022-06-26 | End: 2022-07-08 | Stop reason: HOSPADM

## 2022-06-26 RX ORDER — GINSENG 100 MG
50 CAPSULE ORAL DAILY
COMMUNITY
End: 2022-08-29

## 2022-06-26 RX ORDER — BISACODYL 10 MG
10 SUPPOSITORY, RECTAL RECTAL
Status: DISCONTINUED | OUTPATIENT
Start: 2022-06-26 | End: 2022-06-27

## 2022-06-26 RX ORDER — SODIUM CHLORIDE 9 MG/ML
INJECTION, SOLUTION INTRAVENOUS CONTINUOUS
Status: DISCONTINUED | OUTPATIENT
Start: 2022-06-26 | End: 2022-06-27

## 2022-06-26 RX ORDER — ONDANSETRON 4 MG/1
4 TABLET, ORALLY DISINTEGRATING ORAL EVERY 4 HOURS PRN
Status: DISCONTINUED | OUTPATIENT
Start: 2022-06-26 | End: 2022-06-27

## 2022-06-26 RX ORDER — PROMETHAZINE HYDROCHLORIDE 25 MG/1
12.5-25 TABLET ORAL EVERY 4 HOURS PRN
Status: DISCONTINUED | OUTPATIENT
Start: 2022-06-26 | End: 2022-06-26

## 2022-06-26 RX ORDER — AMOXICILLIN 250 MG
2 CAPSULE ORAL 2 TIMES DAILY
Status: DISCONTINUED | OUTPATIENT
Start: 2022-06-27 | End: 2022-06-27

## 2022-06-26 RX ORDER — SODIUM CHLORIDE 9 MG/ML
1000 INJECTION, SOLUTION INTRAVENOUS ONCE
Status: COMPLETED | OUTPATIENT
Start: 2022-06-26 | End: 2022-06-26

## 2022-06-26 RX ORDER — MECLIZINE HYDROCHLORIDE 25 MG/1
25 TABLET ORAL ONCE
Status: COMPLETED | OUTPATIENT
Start: 2022-06-26 | End: 2022-06-26

## 2022-06-26 RX ORDER — POLYETHYLENE GLYCOL 3350 17 G/17G
1 POWDER, FOR SOLUTION ORAL
Status: DISCONTINUED | OUTPATIENT
Start: 2022-06-26 | End: 2022-06-27

## 2022-06-26 RX ORDER — CALCIUM POLYCARBOPHIL 625 MG 625 MG/1
625 TABLET ORAL DAILY
COMMUNITY
End: 2022-08-29

## 2022-06-26 RX ORDER — ACETAMINOPHEN 325 MG/1
650 TABLET ORAL EVERY 6 HOURS PRN
Status: DISCONTINUED | OUTPATIENT
Start: 2022-06-26 | End: 2022-06-27

## 2022-06-26 RX ADMIN — DIAZEPAM 5 MG: 5 INJECTION, SOLUTION INTRAMUSCULAR; INTRAVENOUS at 16:00

## 2022-06-26 RX ADMIN — MECLIZINE HYDROCHLORIDE 25 MG: 25 TABLET ORAL at 21:46

## 2022-06-26 RX ADMIN — PROCHLORPERAZINE EDISYLATE 10 MG: 5 INJECTION INTRAMUSCULAR; INTRAVENOUS at 20:07

## 2022-06-26 RX ADMIN — ONDANSETRON HYDROCHLORIDE 4 MG: 2 SOLUTION INTRAMUSCULAR; INTRAVENOUS at 17:13

## 2022-06-26 RX ADMIN — SODIUM CHLORIDE 1000 ML: 9 INJECTION, SOLUTION INTRAVENOUS at 15:44

## 2022-06-26 RX ADMIN — SODIUM CHLORIDE 1000 ML: 9 INJECTION, SOLUTION INTRAVENOUS at 17:45

## 2022-06-26 RX ADMIN — SODIUM CHLORIDE: 9 INJECTION, SOLUTION INTRAVENOUS at 19:27

## 2022-06-26 ASSESSMENT — ENCOUNTER SYMPTOMS
SHORTNESS OF BREATH: 0
WEIGHT LOSS: 0
SENSORY CHANGE: 0
NAUSEA: 1
FALLS: 0
DIZZINESS: 1
DIAPHORESIS: 0
EYE PAIN: 0
CHILLS: 0
WEAKNESS: 1
BACK PAIN: 0
FEVER: 0
FOCAL WEAKNESS: 0
HEADACHES: 0
SEIZURES: 0
PALPITATIONS: 0
ABDOMINAL PAIN: 0
LOSS OF CONSCIOUSNESS: 0
VOMITING: 1
BLURRED VISION: 0
INSOMNIA: 0
COUGH: 0

## 2022-06-26 ASSESSMENT — LIFESTYLE VARIABLES: SUBSTANCE_ABUSE: 1

## 2022-06-26 NOTE — ED TRIAGE NOTES
"Chief Complaint   Patient presents with   • Dizziness     Onset today at 1430   • Weakness   • Headache     Onset today at 1430    • Numbness     Numbness and tingling present to REKHA arms and legs   • Nausea     Pt BIB [facility] from home. At approx. 1430 today, pt stood up from chair and the room began spinning so severely pt had to sit back into chair. Pt then developed 9/10 head and neck pain and REKHA upper and lower extremity numbness and tingling. Pt was nauseas enroute and received 4 mg IV zofran. Per EMS, pt has REKHA nystagmus. Pt is able to move all extremities, 5/5 strength, no facial droop noted. Blood glucose en route was 94.    BP (!) 180/91   Pulse 78   Temp 36.1 °C (97 °F) (Temporal)   Resp 15   Ht 1.803 m (5' 11\")   Wt 95.3 kg (210 lb)   SpO2 95%   BMI 29.29 kg/m²       "

## 2022-06-26 NOTE — ED PROVIDER NOTES
"ED Provider Note    CHIEF COMPLAINT  Chief Complaint   Patient presents with   • Dizziness     Onset today at 1430   • Weakness   • Headache     Onset today at 1430    • Numbness     Numbness and tingling present to REKHA arms and legs   • Nausea       HPI  Alistair Putnam is a 57 y.o. male who presents for evaluation of an acute severe headache associated with dizziness described as a spinning-like sensation.  He has been nauseated but denies any vomiting yet.  The patient states that symptoms are worsened with head movement and improved if he lies still.  He attempted to stand up, he became so dizzy that he had to quickly sit back down.  No chest pain or shortness of breath.  No medical problems.  He has never experienced symptoms like this in the past.  No blood thinners.  No focal weakness numbness or tingling but describes generalized tingling across the entire body.    REVIEW OF SYSTEMS  Negative for fever, rash, chest pain, dyspnea, abdominal pain, nausea, vomiting, diarrhea, focal weakness, focal numbness, focal tingling, back pain. All other systems are negative.     PAST MEDICAL HISTORY  Past Medical History:   Diagnosis Date   • Heart murmur 12/2017    \"As a child\"   • Pain 12/2017    right shoulder pain       FAMILY HISTORY  No family history on file.    SOCIAL HISTORY  Social History     Tobacco Use   • Smoking status: Never Smoker   • Smokeless tobacco: Never Used   Substance Use Topics   • Alcohol use: Yes     Comment: 2 per month   • Drug use: No       SURGICAL HISTORY  Past Surgical History:   Procedure Laterality Date   • SHOULDER ARTHROSCOPY W/ ROTATOR CUFF REPAIR Right 1/8/2018    Procedure: SHOULDER ARTHROSCOPY W/ ROTATOR CUFF REPAIR- WITH EXTENSIVE DEBRIDEMENT;  Surgeon: Rl Martinez M.D.;  Location: SURGERY [facility];  Service: Orthopedics   • SHOULDER DECOMPRESSION ARTHROSCOPIC Right 1/8/2018    Procedure: SHOULDER DECOMPRESSION ARTHROSCOPIC- SUBACROMIAL;  Surgeon: Rl REBOLLAR" "MIGUEL Martinez;  Location: SURGERY [facility];  Service: Orthopedics   • SHOULDER ARTHROSCOPY W/ BICIPITAL TENODESIS REPAIR Right 1/8/2018    Procedure: SHOULDER ARTHROSCOPY W/ BICIPITAL TENODESIS REPAIR;  Surgeon: Rl Martinez M.D.;  Location: SURGERY [facility];  Service: Orthopedics   • SINUS OBLITERATION FRONTAL  2010   • KNEE ARTHROSCOPY Right 1989   • TONSILLECTOMY  1976   • TIBIA ORIF Right 1976    with skin graft       CURRENT MEDICATIONS  I personally reviewed the medication list in the charting documentation.     ALLERGIES  No Known Allergies    MEDICAL RECORD  I have reviewed patient's medical record and pertinent results are listed above.      PHYSICAL EXAM  VITAL SIGNS: BP (!) 180/91   Pulse 78   Temp 36.1 °C (97 °F) (Temporal)   Resp 15   Ht 1.803 m (5' 11\")   Wt 95.3 kg (210 lb)   SpO2 95%   BMI 29.29 kg/m²    Constitutional: Well appearing patient in no acute distress.  Awake and alert, not toxic nor ill in appearance.  HENT: Normocephalic, no obvious evidence of acute trauma.  Eyes: No scleral icterus. Normal conjunctiva.  bilateral fatigable nystagmus.  Pupils are equal and reactive.  Neck: Comfortable movement without any obvious restriction in the range of motion.  Cardiovascular: Upon ascultation I appreciate a regular heart rhythm and a normal rate.   Thorax & Lungs: Normal nonlabored respirations.  Upon application of the stethoscope for auscultation I find there to be no associated chest wall tenderness.  I appreciate no wheezing, rhonchi or rales. There is normal air movement.    Abdomen: The abdomen is not visibly distended. Upon palpation, I find it to be without tenderness.  No mass appreciated.  Skin: The exposed portions of skin reveal no obvious rash or other abnormalities.  Extremities/Musculoskeletal: No obvious sign of acute trauma. No asymmetric calf tenderness or edema.   Neurologic: Alert & oriented.  Normal and symmetric upper and lower extremity motor and " sensory function bilaterally.  Psychiatric: Normal affect appropriate for the clinical situation.    DIAGNOSTIC STUDIES / PROCEDURES    LABS/EKGs  Results for orders placed or performed during the hospital encounter of 06/26/22   CBC WITH DIFFERENTIAL   Result Value Ref Range    WBC 6.0 4.8 - 10.8 K/uL    RBC 5.72 4.70 - 6.10 M/uL    Hemoglobin 15.2 14.0 - 18.0 g/dL    Hematocrit 46.3 42.0 - 52.0 %    MCV 80.9 (L) 81.4 - 97.8 fL    MCH 26.6 (L) 27.0 - 33.0 pg    MCHC 32.8 (L) 33.7 - 35.3 g/dL    RDW 43.3 35.9 - 50.0 fL    Platelet Count 261 164 - 446 K/uL    MPV 9.9 9.0 - 12.9 fL    Neutrophils-Polys 48.60 44.00 - 72.00 %    Lymphocytes 37.10 22.00 - 41.00 %    Monocytes 11.80 0.00 - 13.40 %    Eosinophils 1.70 0.00 - 6.90 %    Basophils 0.50 0.00 - 1.80 %    Immature Granulocytes 0.30 0.00 - 0.90 %    Nucleated RBC 0.00 /100 WBC    Neutrophils (Absolute) 2.93 1.82 - 7.42 K/uL    Lymphs (Absolute) 2.24 1.00 - 4.80 K/uL    Monos (Absolute) 0.71 0.00 - 0.85 K/uL    Eos (Absolute) 0.10 0.00 - 0.51 K/uL    Baso (Absolute) 0.03 0.00 - 0.12 K/uL    Immature Granulocytes (abs) 0.02 0.00 - 0.11 K/uL    NRBC (Absolute) 0.00 K/uL   COMP METABOLIC PANEL   Result Value Ref Range    Sodium 144 135 - 145 mmol/L    Potassium 3.5 (L) 3.6 - 5.5 mmol/L    Chloride 110 96 - 112 mmol/L    Co2 20 20 - 33 mmol/L    Anion Gap 14.0 7.0 - 16.0    Glucose 125 (H) 65 - 99 mg/dL    Bun 25 (H) 8 - 22 mg/dL    Creatinine 0.97 0.50 - 1.40 mg/dL    Calcium 8.7 8.5 - 10.5 mg/dL    AST(SGOT) 16 12 - 45 U/L    ALT(SGPT) 18 2 - 50 U/L    Alkaline Phosphatase 70 30 - 99 U/L    Total Bilirubin 0.2 0.1 - 1.5 mg/dL    Albumin 4.3 3.2 - 4.9 g/dL    Total Protein 6.8 6.0 - 8.2 g/dL    Globulin 2.5 1.9 - 3.5 g/dL    A-G Ratio 1.7 g/dL   PROTHROMBIN TIME   Result Value Ref Range    PT 13.2 12.0 - 14.6 sec    INR 1.03 0.87 - 1.13   APTT   Result Value Ref Range    APTT 22.8 (L) 24.7 - 36.0 sec   COD (ADULT)   Result Value Ref Range    ABO Grouping Only O      Rh Grouping Only POS     Antibody Screen-Cod NEG    TROPONIN   Result Value Ref Range    Troponin T <6 6 - 19 ng/L   ESTIMATED GFR   Result Value Ref Range    GFR (CKD-EPI) 92 >60 mL/min/1.73 m 2   EKG (NOW)   Result Value Ref Range    Report       [facility] Emergency Dept.    Test Date:  2022  Pt Name:    DESTINEE LATIF             Department: ER  MRN:        [id number]                      Room:        16  Gender:     Male                         Technician: [id number]  :        1965                   Requested By:SHABBIR ROSADO  Order #:    [id number]                    Reading MD: SHABBIR ROSADO MD    Measurements  Intervals                                Axis  Rate:       72                           P:          17  ID:         [id number]                          QRS:        -2  QRSD:       91                           T:          22  QT:         427  QTc:        457    Interpretive Statements  12 Lead EKG interpreted by me to show: -- Rate 72 -- Rhythm: Normal sinus  rhythm  -- Axis: Normal -- ID and QRS Intervals: Normal -- T waves: No acute changes  --  ST segments: No acute changes -- Ectopy: None. My impression of this EKG:  Does  not indicate acute ischemia at this time.  Electronically Sig keyonna On 2022 17:33:00 PDT by SHABBIR ROSADO MD          RADIOLOGY  CT-HEAD W/O   Final Result      Head CT without contrast within normal limits. No evidence of acute cerebral infarction, hemorrhage or mass lesion.               COURSE & MEDICAL DECISION MAKING  I have reviewed any medical record information, laboratory studies and radiographic results as noted above.    Encounter Summary: This is a very pleasant 57 y.o. male who unfortunately required evaluation in the emergency department today with acute onset of headache associated with vertigo and nystagmus on exam, no other focal neurologic complaints or findings on exam.  Given the history of acute onset of dizziness with  exacerbation with any sort of movement I do have a high suspicion for peripheral vertigo however given the onset of headache I will obtain a head CT to exclude intracranial hemorrhage.  Blood work will be obtained to evaluate for electrolyte abnormalities and anemia as well as coagulopathy.  He is will be medicated with Valium and IV fluids.  He will be reevaluated ------- upon reevaluation he is not feeling any better.  Head CT is unremarkable.  Blood work is unremarkable.  At this point, without any improvement I am concerned about the possibility of a central etiology of his vertigo he will be admitted to the hospital for further evaluation and care.      DISPOSITION: Admit in guarded condition      FINAL IMPRESSION  1. Vertigo    2. Nausea           This dictation was created using voice recognition software. The accuracy of the dictation is limited to the abilities of the software. I expect there may be some errors of grammar and possibly content. The nursing notes were reviewed and certain aspects of this information were incorporated into this note.    Electronically signed by: Mamadou Reese M.D., 6/26/2022 3:58 PM

## 2022-06-26 NOTE — ED NOTES
Pt is A&Ox4 and awake in bed. Pt placed on cardiac monitor, pulse ox, and automatic BP. Pt sustaining belove 87% on RA. Placed pt on 2L NC. SR in the 70s.

## 2022-06-26 NOTE — ED NOTES
ERP paged overhead for possible stroke. Dr. Reese to bedside to assess patient. No stroke activated.

## 2022-06-27 ENCOUNTER — APPOINTMENT (OUTPATIENT)
Dept: RADIOLOGY | Facility: MEDICAL CENTER | Age: 57
DRG: 023 | End: 2022-06-27
Attending: STUDENT IN AN ORGANIZED HEALTH CARE EDUCATION/TRAINING PROGRAM
Payer: COMMERCIAL

## 2022-06-27 ENCOUNTER — APPOINTMENT (OUTPATIENT)
Dept: CARDIOLOGY | Facility: MEDICAL CENTER | Age: 57
DRG: 023 | End: 2022-06-27
Attending: STUDENT IN AN ORGANIZED HEALTH CARE EDUCATION/TRAINING PROGRAM
Payer: COMMERCIAL

## 2022-06-27 ENCOUNTER — APPOINTMENT (OUTPATIENT)
Dept: RADIOLOGY | Facility: MEDICAL CENTER | Age: 57
DRG: 023 | End: 2022-06-27
Attending: INTERNAL MEDICINE
Payer: COMMERCIAL

## 2022-06-27 PROBLEM — I63.9 STROKE OF UNKNOWN ETIOLOGY (HCC): Status: ACTIVE | Noted: 2022-06-27

## 2022-06-27 PROBLEM — E87.6 HYPOKALEMIA: Status: ACTIVE | Noted: 2022-06-27

## 2022-06-27 PROBLEM — I63.541 ACUTE STROKE DUE TO OCCLUSION OF RIGHT CEREBELLAR ARTERY (HCC): Status: ACTIVE | Noted: 2022-06-27

## 2022-06-27 LAB
CHOLEST SERPL-MCNC: 186 MG/DL (ref 100–199)
EST. AVERAGE GLUCOSE BLD GHB EST-MCNC: 131 MG/DL
HBA1C MFR BLD: 6.2 % (ref 4–5.6)
HDLC SERPL-MCNC: 41 MG/DL
LDLC SERPL CALC-MCNC: 129 MG/DL
SODIUM SERPL-SCNC: 138 MMOL/L (ref 135–145)
TRIGL SERPL-MCNC: 79 MG/DL (ref 0–149)

## 2022-06-27 PROCEDURE — 36556 INSERT NON-TUNNEL CV CATH: CPT | Mod: RT | Performed by: INTERNAL MEDICINE

## 2022-06-27 PROCEDURE — 84295 ASSAY OF SERUM SODIUM: CPT | Mod: 91

## 2022-06-27 PROCEDURE — 700105 HCHG RX REV CODE 258: Performed by: INTERNAL MEDICINE

## 2022-06-27 PROCEDURE — 700111 HCHG RX REV CODE 636 W/ 250 OVERRIDE (IP): Performed by: STUDENT IN AN ORGANIZED HEALTH CARE EDUCATION/TRAINING PROGRAM

## 2022-06-27 PROCEDURE — 83036 HEMOGLOBIN GLYCOSYLATED A1C: CPT

## 2022-06-27 PROCEDURE — 61107 TDH PNXR IMPLT VENTR CATH: CPT

## 2022-06-27 PROCEDURE — 770022 HCHG ROOM/CARE - ICU (200)

## 2022-06-27 PROCEDURE — 71045 X-RAY EXAM CHEST 1 VIEW: CPT

## 2022-06-27 PROCEDURE — 700117 HCHG RX CONTRAST REV CODE 255: Performed by: STUDENT IN AN ORGANIZED HEALTH CARE EDUCATION/TRAINING PROGRAM

## 2022-06-27 PROCEDURE — 80061 LIPID PANEL: CPT

## 2022-06-27 PROCEDURE — 70496 CT ANGIOGRAPHY HEAD: CPT

## 2022-06-27 PROCEDURE — A9270 NON-COVERED ITEM OR SERVICE: HCPCS | Performed by: STUDENT IN AN ORGANIZED HEALTH CARE EDUCATION/TRAINING PROGRAM

## 2022-06-27 PROCEDURE — 99292 CRITICAL CARE ADDL 30 MIN: CPT | Mod: 25 | Performed by: INTERNAL MEDICINE

## 2022-06-27 PROCEDURE — 99226 PR SUBSEQUENT OBSERVATION CARE,LEVEL III: CPT | Performed by: STUDENT IN AN ORGANIZED HEALTH CARE EDUCATION/TRAINING PROGRAM

## 2022-06-27 PROCEDURE — 02HV33Z INSERTION OF INFUSION DEVICE INTO SUPERIOR VENA CAVA, PERCUTANEOUS APPROACH: ICD-10-PCS | Performed by: INTERNAL MEDICINE

## 2022-06-27 PROCEDURE — 99221 1ST HOSP IP/OBS SF/LOW 40: CPT | Mod: FS | Performed by: NURSE PRACTITIONER

## 2022-06-27 PROCEDURE — 70498 CT ANGIOGRAPHY NECK: CPT

## 2022-06-27 PROCEDURE — 700101 HCHG RX REV CODE 250: Performed by: NURSE PRACTITIONER

## 2022-06-27 PROCEDURE — 700102 HCHG RX REV CODE 250 W/ 637 OVERRIDE(OP): Performed by: STUDENT IN AN ORGANIZED HEALTH CARE EDUCATION/TRAINING PROGRAM

## 2022-06-27 PROCEDURE — 700105 HCHG RX REV CODE 258: Performed by: STUDENT IN AN ORGANIZED HEALTH CARE EDUCATION/TRAINING PROGRAM

## 2022-06-27 PROCEDURE — 96376 TX/PRO/DX INJ SAME DRUG ADON: CPT

## 2022-06-27 PROCEDURE — 70551 MRI BRAIN STEM W/O DYE: CPT

## 2022-06-27 PROCEDURE — 700111 HCHG RX REV CODE 636 W/ 250 OVERRIDE (IP): Performed by: INTERNAL MEDICINE

## 2022-06-27 PROCEDURE — C1729 CATH, DRAINAGE: HCPCS

## 2022-06-27 PROCEDURE — 86140 C-REACTIVE PROTEIN: CPT

## 2022-06-27 PROCEDURE — 99291 CRITICAL CARE FIRST HOUR: CPT | Mod: 25 | Performed by: INTERNAL MEDICINE

## 2022-06-27 PROCEDURE — 009630Z DRAINAGE OF CEREBRAL VENTRICLE WITH DRAINAGE DEVICE, PERCUTANEOUS APPROACH: ICD-10-PCS | Performed by: NEUROLOGICAL SURGERY

## 2022-06-27 RX ORDER — HYDROMORPHONE HYDROCHLORIDE 1 MG/ML
0.5 INJECTION, SOLUTION INTRAMUSCULAR; INTRAVENOUS; SUBCUTANEOUS
Status: DISCONTINUED | OUTPATIENT
Start: 2022-06-27 | End: 2022-07-05

## 2022-06-27 RX ORDER — ASPIRIN 81 MG/1
324 TABLET, CHEWABLE ORAL DAILY
Status: DISCONTINUED | OUTPATIENT
Start: 2022-06-27 | End: 2022-06-27

## 2022-06-27 RX ORDER — 3% SODIUM CHLORIDE 3 G/100ML
INJECTION, SOLUTION INTRAVENOUS CONTINUOUS
Status: DISCONTINUED | OUTPATIENT
Start: 2022-06-27 | End: 2022-06-28

## 2022-06-27 RX ORDER — HYDRALAZINE HYDROCHLORIDE 20 MG/ML
20 INJECTION INTRAMUSCULAR; INTRAVENOUS EVERY 4 HOURS PRN
Status: DISCONTINUED | OUTPATIENT
Start: 2022-06-27 | End: 2022-06-28

## 2022-06-27 RX ORDER — LABETALOL HYDROCHLORIDE 5 MG/ML
10-20 INJECTION, SOLUTION INTRAVENOUS
Status: DISCONTINUED | OUTPATIENT
Start: 2022-06-27 | End: 2022-06-28

## 2022-06-27 RX ORDER — ASPIRIN 300 MG/1
300 SUPPOSITORY RECTAL DAILY
Status: DISCONTINUED | OUTPATIENT
Start: 2022-06-27 | End: 2022-06-27

## 2022-06-27 RX ORDER — POLYETHYLENE GLYCOL 3350 17 G/17G
1 POWDER, FOR SOLUTION ORAL
Status: DISCONTINUED | OUTPATIENT
Start: 2022-06-27 | End: 2022-07-01

## 2022-06-27 RX ORDER — ASPIRIN 325 MG
325 TABLET ORAL DAILY
Status: DISCONTINUED | OUTPATIENT
Start: 2022-06-27 | End: 2022-06-27

## 2022-06-27 RX ORDER — ONDANSETRON 4 MG/1
4 TABLET, ORALLY DISINTEGRATING ORAL EVERY 4 HOURS PRN
Status: DISCONTINUED | OUTPATIENT
Start: 2022-06-27 | End: 2022-07-01

## 2022-06-27 RX ORDER — MIDAZOLAM HYDROCHLORIDE 1 MG/ML
INJECTION INTRAMUSCULAR; INTRAVENOUS
Status: ACTIVE
Start: 2022-06-27 | End: 2022-06-28

## 2022-06-27 RX ORDER — BISACODYL 10 MG
10 SUPPOSITORY, RECTAL RECTAL
Status: DISCONTINUED | OUTPATIENT
Start: 2022-06-27 | End: 2022-07-01

## 2022-06-27 RX ORDER — AMOXICILLIN 250 MG
2 CAPSULE ORAL 2 TIMES DAILY
Status: DISCONTINUED | OUTPATIENT
Start: 2022-06-28 | End: 2022-07-01

## 2022-06-27 RX ORDER — ACETAMINOPHEN 325 MG/1
650 TABLET ORAL EVERY 6 HOURS PRN
Status: DISCONTINUED | OUTPATIENT
Start: 2022-06-27 | End: 2022-07-01

## 2022-06-27 RX ADMIN — SODIUM CHLORIDE: 3 INJECTION, SOLUTION INTRAVENOUS at 16:20

## 2022-06-27 RX ADMIN — SODIUM CHLORIDE 200 MEQ: 4 INJECTION, SOLUTION, CONCENTRATE INTRAVENOUS at 20:11

## 2022-06-27 RX ADMIN — HYDRALAZINE HYDROCHLORIDE 20 MG: 20 INJECTION INTRAMUSCULAR; INTRAVENOUS at 18:01

## 2022-06-27 RX ADMIN — ACETAMINOPHEN 650 MG: 325 TABLET ORAL at 13:39

## 2022-06-27 RX ADMIN — SODIUM CHLORIDE: 9 INJECTION, SOLUTION INTRAVENOUS at 13:40

## 2022-06-27 RX ADMIN — IOHEXOL 100 ML: 350 INJECTION, SOLUTION INTRAVENOUS at 16:15

## 2022-06-27 RX ADMIN — PROCHLORPERAZINE EDISYLATE 5 MG: 5 INJECTION INTRAMUSCULAR; INTRAVENOUS at 10:34

## 2022-06-27 RX ADMIN — ACETAMINOPHEN 650 MG: 325 TABLET ORAL at 05:28

## 2022-06-27 RX ADMIN — HYDROMORPHONE HYDROCHLORIDE 0.5 MG: 1 INJECTION, SOLUTION INTRAMUSCULAR; INTRAVENOUS; SUBCUTANEOUS at 18:05

## 2022-06-27 ASSESSMENT — ENCOUNTER SYMPTOMS
FEVER: 0
CHILLS: 0
PHOTOPHOBIA: 0
BACK PAIN: 0
SORE THROAT: 0
FALLS: 0
INSOMNIA: 0
BLURRED VISION: 0
FOCAL WEAKNESS: 0
BRUISES/BLEEDS EASILY: 0
EYE PAIN: 0
SPEECH CHANGE: 0
COUGH: 0
SHORTNESS OF BREATH: 0
PALPITATIONS: 0
NAUSEA: 1
DOUBLE VISION: 0
DEPRESSION: 0
DIZZINESS: 1
SENSORY CHANGE: 0
NERVOUS/ANXIOUS: 0
DIARRHEA: 0
ABDOMINAL PAIN: 0
VOMITING: 1
LOSS OF CONSCIOUSNESS: 0
NECK PAIN: 1
MYALGIAS: 0
HEADACHES: 1
SEIZURES: 0

## 2022-06-27 ASSESSMENT — LIFESTYLE VARIABLES
AVERAGE NUMBER OF DAYS PER WEEK YOU HAVE A DRINK CONTAINING ALCOHOL: 1
EVER FELT BAD OR GUILTY ABOUT YOUR DRINKING: NO
ALCOHOL_USE: YES
HAVE PEOPLE ANNOYED YOU BY CRITICIZING YOUR DRINKING: NO
DOES PATIENT WANT TO STOP DRINKING: NO
TOTAL SCORE: 0
EVER HAD A DRINK FIRST THING IN THE MORNING TO STEADY YOUR NERVES TO GET RID OF A HANGOVER: NO
CONSUMPTION TOTAL: NEGATIVE
SUBSTANCE_ABUSE: 0
TOTAL SCORE: 0
ON A TYPICAL DAY WHEN YOU DRINK ALCOHOL HOW MANY DRINKS DO YOU HAVE: 1
HOW MANY TIMES IN THE PAST YEAR HAVE YOU HAD 5 OR MORE DRINKS IN A DAY: 0
TOTAL SCORE: 0
HAVE YOU EVER FELT YOU SHOULD CUT DOWN ON YOUR DRINKING: NO

## 2022-06-27 ASSESSMENT — PATIENT HEALTH QUESTIONNAIRE - PHQ9
SUM OF ALL RESPONSES TO PHQ9 QUESTIONS 1 AND 2: 0
SUM OF ALL RESPONSES TO PHQ9 QUESTIONS 1 AND 2: 0
1. LITTLE INTEREST OR PLEASURE IN DOING THINGS: NOT AT ALL
2. FEELING DOWN, DEPRESSED, IRRITABLE, OR HOPELESS: NOT AT ALL
1. LITTLE INTEREST OR PLEASURE IN DOING THINGS: NOT AT ALL

## 2022-06-27 NOTE — ED NOTES
Pt states nausea has gotten slightly better and is now ready to take oral medication. Pt medicated as per MAR.

## 2022-06-27 NOTE — PROGRESS NOTES
[facility] Medicine Daily Progress Note    Date of Service  6/27/2022    Chief Complaint  Alistair Putnam is a 57 y.o. male admitted 6/26/2022 with dizziness.    Hospital Course  Alistair Putnam is a 57 y.o. male who presented 6/26/2022 with dizziness. Patient presented with dizziness onset this afternoon. Patient reports he was getting up from chair, then drank water, and had sudden onset of dizziness. Patient felt room was spinning, and patient had imbalance and had to sit down. He reports nausea and some mild vomiting. Family at bedside reports patient was having horizontal nystagmus to the right for about 15 minutes, which had improved when EMS arrived. Patient denies ever having dizziness like this before. Denies recent illness. He and family had COVID 1 month ago with resolved symptoms. He had physical last week which was normal other than mildly elevated blood pressure. Patient denies alcohol and illicit drug use. He smoked marijuana last night, smokes <2 times per month, never had any dizziness symptoms. Patient reports some generalized weakness, otherwise denies any focal neurologic deficits such as weakness, numbness/tingling, vision changes.  In the ED, BP initially 180/91, improved to 130's at bedside with IV fluids, zofran, and valium. CBC and CMP fairly unremarkable. K 3.5. CT head shows no acute abnormalities.    Interval Problem Update  Admitted last night for dizziness.  Suspected benign positional vertigo.  Patient reports his dizziness continues, mostly when lying flat now. Given persistent dizziness, nausea, will order MRI to rule out structural etiology of symptoms. No other neurologic deficits noted.  MRI brain ordered, dispo pending.  If MRI brain is negative for acute abnormalities, anticipate discharge home when symptoms under control, today vs next 24 hours.    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  Neurology consulted. Recommend transfer to ICU for q2h neurochecks and hyperosmolar Na, goal Na >150. Can stop all anticoagulation, no permissive HTN needed. CTA head and neck ordered. Discussed with ICU intensivist Dr Elizabeth Valdivia who will see patient expeditiously.      I have discussed this patient's plan of care and discharge plan at IDT rounds today with Case Management, Nursing, Nursing leadership, and other members of the IDT team.    Consultants/Specialty  none    Code Status  Full Code    Disposition  Patient is not medically cleared for discharge.   Anticipate discharge to to home with close outpatient follow-up.  I have placed the appropriate orders for post-discharge needs.    Review of Systems  Review of Systems   Constitutional: Negative for chills and fever.   Eyes: Negative for blurred vision and pain.   Respiratory: Negative for cough and shortness of breath.    Cardiovascular: Negative for chest pain, palpitations and leg swelling.   Gastrointestinal: Positive for nausea and vomiting. Negative for abdominal pain.   Genitourinary: Negative for dysuria and urgency.   Musculoskeletal: Negative for back pain and falls.   Skin: Negative for itching and rash.   Neurological: Positive for dizziness and headaches. Negative for sensory change, focal weakness, seizures and loss of consciousness.   Psychiatric/Behavioral: Negative for substance abuse. The patient does not have insomnia.         Physical Exam  Temp:  [36.1 °C (97 °F)-37.4 °C (99.3 °F)] 36.8 °C (98.2 °F)  Pulse:  [65-93] 85  Resp:  [12-22] 16  BP: (128-180)/(58-91) 143/58  SpO2:  [90 %-98 %] 95 %    Physical Exam  Constitutional:       General: He is not in acute distress.     Appearance: He is diaphoretic. He is not ill-appearing.   HENT:      Head: Normocephalic and atraumatic.      Right Ear: External ear normal.      Left Ear: External ear normal.      Mouth/Throat:      Pharynx: No oropharyngeal exudate or posterior oropharyngeal  erythema.   Eyes:      Extraocular Movements: Extraocular movements intact.      Pupils: Pupils are equal, round, and reactive to light.   Cardiovascular:      Rate and Rhythm: Normal rate and regular rhythm.      Pulses: Normal pulses.      Heart sounds: Normal heart sounds.   Pulmonary:      Effort: Pulmonary effort is normal. No respiratory distress.      Breath sounds: Normal breath sounds. No wheezing or rales.   Abdominal:      General: Bowel sounds are normal. There is no distension.      Palpations: Abdomen is soft.      Tenderness: There is no abdominal tenderness. There is no guarding or rebound.   Musculoskeletal:         General: No swelling or tenderness.      Cervical back: Normal range of motion and neck supple.   Skin:     General: Skin is warm.   Neurological:      General: No focal deficit present.      Mental Status: He is oriented to person, place, and time.      Cranial Nerves: No cranial nerve deficit.      Sensory: No sensory deficit.      Motor: No weakness.      Coordination: Coordination normal.   Psychiatric:         Mood and Affect: Mood normal.         Behavior: Behavior normal.         Fluids  No intake or output data in the 24 hours ending 06/27/22 1207    Laboratory  Recent Labs     06/26/22  1535   WBC 6.0   RBC 5.72   HEMOGLOBIN 15.2   HEMATOCRIT 46.3   MCV 80.9*   MCH 26.6*   MCHC 32.8*   RDW 43.3   PLATELETCT 261   MPV 9.9     Recent Labs     06/26/22  1535   SODIUM 144   POTASSIUM 3.5*   CHLORIDE 110   CO2 20   GLUCOSE 125*   BUN 25*   CREATININE 0.97   CALCIUM 8.7     Recent Labs     06/26/22  1535   APTT 22.8*   INR 1.03               Imaging  CT-HEAD W/O   Final Result      Head CT without contrast within normal limits. No evidence of acute cerebral infarction, hemorrhage or mass lesion.         MR-BRAIN-W/O    (Results Pending)        Assessment/Plan  * Dizziness- (present on admission)  Assessment & Plan  Presents for dizziness, room spinning sensation  Likely related to  benign paroxysmal positional vertigo  Dizziness elicited with head movement to left  CT head normal  Supportive treatment, IV fluids, avoid trigger of left facing head position  Can try Epley's maneuver    Patient with persistent dizziness despite overnight supportive treatment  MRI brain ordered       VTE prophylaxis: SCDs/TEDs    I have performed a physical exam and reviewed and updated ROS and Plan today (6/27/2022). In review of yesterday's note (6/26/2022), there are no changes except as documented above.

## 2022-06-27 NOTE — ED NOTES
Assumed care of the patient. Patient is calm and in room with wife at bedside. Respirations even and unlabored. He appears in no acute distress. Hospital bed requested for patient comfort. Pt to be admitted. Awaiting bed placement.

## 2022-06-27 NOTE — ED NOTES
Pt medicated per MAR for headache  Resting back in bed, NAD, wife and pt deny any other needs at this time

## 2022-06-27 NOTE — ED NOTES
Pt in Rt lateral position, A&Ox4, resp even & unlabored, speech clear,  Denies dizziness, currently.  Reports nausea & HA.  IV 18G LT wrist; NS infusing at 125mL via dial-a-flow; site patent.  Per spouse, sx started yesterday afternoon while getting ready for family backyard swimming & dinner event.  Spouse at BS.

## 2022-06-27 NOTE — ASSESSMENT & PLAN NOTE
Was admitted for work-up of this  Due to stroke  Now largely improved other than when patient stands or walks

## 2022-06-27 NOTE — PROGRESS NOTES
4 Eyes Skin Assessment Completed by FORREST Chavis and FORREST Hall.    Head WDL  Ears WDL  Nose WDL  Mouth WDL  Neck WDL  Breast/Chest WDL  Shoulder Blades WDL  Spine WDL  (R) Arm/Elbow/Hand WDL  (L) Arm/Elbow/Hand WDL  Abdomen WDL  Groin WDL  Scrotum/Coccyx/Buttocks WDL  (R) Leg WDL  (L) Leg WDL  (R) Heel/Foot/Toe WDL  (L) Heel/Foot/Toe WDL          Devices In Places Pulse Ox      Interventions In Place N/A    Possible Skin Injury No    Pictures Uploaded Into Epic N/A  Wound Consult Placed N/A  RN Wound Prevention Protocol Ordered No

## 2022-06-27 NOTE — ED NOTES
Med rec updated and complete. Allergies reviewed.  Confirmed name and date of birth.      Interviewed family ( wife ) at bedside.      Home pharmacy [facility] [phone]

## 2022-06-27 NOTE — ED NOTES
Patient remains calm and in room with wife at bedside. He appears to be resting. Respirations even and unlabored. He appears in no acute distress Pt to be admitted He continues to await bed placement.

## 2022-06-27 NOTE — ED NOTES
Pt resting, provided some water and new urinal  Continues to report dizziness, no changes from prior

## 2022-06-27 NOTE — CONSULTS
"Critical Care Consultation    Date of consult: 6/27/2022    Referring Physician  Steve Miguel M.D.    Reason for Consultation  Critical care management for right cerebellar stroke with developing obstructive hydrocephalus    History of Presenting Illness  Mr. Putnam is a 57 year old male with no past medical history and is not a smoker who presented to the ER yesterday with complaints of sudden onset of dizziness yesterday afternoon.  The patient reports that he was in his usual state of health when he got up to get a glass of water when he had sudden onset of dizziness as if the \"room was spinning\".  He reported that he had balance issues and had to sit down.  He had nausea and vomiting.  No tinnitus.  No unilateral numbness, tingling, or weakness to extremities.  Family witness that the patient had horizontal nystagmus for about 15 minutes.  EMS witnessed this as well.  He was brought to the ER and admitted.  He underwent an MRI brain today which revealed a large right cerebellar stroke and was transferred to the ICU for ongoing care.    Code Status  Full Code    Review of Systems  Review of Systems   Constitutional: Positive for malaise/fatigue. Negative for chills and fever.   HENT: Negative for congestion and sore throat.    Eyes: Negative for blurred vision, double vision and photophobia.   Respiratory: Negative for cough and shortness of breath.    Cardiovascular: Negative for chest pain and leg swelling.   Gastrointestinal: Positive for nausea and vomiting. Negative for abdominal pain and diarrhea.   Genitourinary: Negative for frequency and urgency.   Musculoskeletal: Positive for neck pain. Negative for back pain and myalgias.   Skin: Negative for rash.   Neurological: Positive for dizziness and headaches. Negative for sensory change, speech change and focal weakness.   Endo/Heme/Allergies: Does not bruise/bleed easily.   Psychiatric/Behavioral: Negative for depression. The patient is not " nervous/anxious.        Past Medical History   has a past medical history of Heart murmur (12/2017) and Pain (12/2017).    Surgical History   has a past surgical history that includes knee arthroscopy (Right, 1989); tonsillectomy (1976); orif, fracture, tibia (Right, 1976); sinus obliteration frontal (2010); shoulder arthroscopy w/ rotator cuff repair (Right, 1/8/2018); shoulder decompression arthroscopic (Right, 1/8/2018); and shoulder arthroscopy w/ bicipital tenodesis repair (Right, 1/8/2018).    Family History  family history is not on file.    Social History   reports that he has never smoked. He has never used smokeless tobacco. He reports current alcohol use. He reports that he does not use drugs.    Medications  Home Medications     Reviewed by Shayan Kay (Pharmacy Tech) on 06/26/22 at 5026  Med List Status: Complete   Medication Last Dose Status   calcium polycarbophil (FIBERCON) 625 MG Tab 6/26/2022 Active   multivitamin (THERAGRAN) Tab 6/26/2022 Active   Omega-3 Fatty Acids (FISH OIL) 1000 MG Cap capsule 6/26/2022 Active   Zinc 50 MG Tab 6/26/2022 Active              Current Facility-Administered Medications   Medication Dose Route Frequency Provider Last Rate Last Admin   • senna-docusate (PERICOLACE or SENOKOT S) 8.6-50 MG per tablet 2 Tablet  2 Tablet Oral BID Steve Miguel M.D.        And   • polyethylene glycol/lytes (MIRALAX) PACKET 1 Packet  1 Packet Oral QDAY PRN Steve Miguel M.D.        And   • magnesium hydroxide (MILK OF MAGNESIA) suspension 30 mL  30 mL Oral QDAY PRN Steve Miguel M.D.        And   • bisacodyl (DULCOLAX) suppository 10 mg  10 mg Rectal QDAY PRN Steve Miguel M.D.       • acetaminophen (Tylenol) tablet 650 mg  650 mg Oral Q6HRS PRN Steve Miguel M.D.   650 mg at 06/27/22 1339   • ondansetron (ZOFRAN) syringe/vial injection 4 mg  4 mg Intravenous Q4HRS PRN Steve Miguel M.D.       • ondansetron (ZOFRAN ODT) dispertab 4 mg  4 mg Oral Q4HRS PRN Steve Miguel M.D.       •  prochlorperazine (COMPAZINE) injection 5-10 mg  5-10 mg Intravenous Q4HRS PRN Steve Miguel M.D.   5 mg at 06/27/22 1034   • NS infusion   Intravenous Continuous Steve Miguel M.D. 125 mL/hr at 06/27/22 1340 New Bag at 06/27/22 1340       Allergies  No Known Allergies    Vital Signs last 24 hours  Temp:  [36.1 °C (97 °F)-37.4 °C (99.3 °F)] 36.8 °C (98.2 °F)  Pulse:  [65-93] 85  Resp:  [12-22] 16  BP: (128-180)/(58-91) 143/58  SpO2:  [90 %-98 %] 95 %    Physical Exam  Physical Exam  Vitals and nursing note reviewed.   Constitutional:       Appearance: He is ill-appearing. He is not toxic-appearing.   HENT:      Head: Normocephalic and atraumatic.      Right Ear: External ear normal.      Left Ear: External ear normal.      Nose: Nose normal. No rhinorrhea.      Mouth/Throat:      Mouth: Mucous membranes are moist.      Pharynx: Oropharynx is clear. No oropharyngeal exudate.   Eyes:      General: No scleral icterus.     Conjunctiva/sclera: Conjunctivae normal.      Pupils: Pupils are equal, round, and reactive to light.      Comments: (+)nystagmus   Cardiovascular:      Rate and Rhythm: Normal rate and regular rhythm.      Pulses: Normal pulses.      Heart sounds: Normal heart sounds. No murmur heard.  Pulmonary:      Breath sounds: Normal breath sounds. No wheezing.   Chest:      Chest wall: No tenderness.   Abdominal:      General: Bowel sounds are normal. There is no distension.      Palpations: Abdomen is soft.      Tenderness: There is no abdominal tenderness. There is no guarding or rebound.   Musculoskeletal:         General: Normal range of motion.      Cervical back: Normal range of motion and neck supple.      Right lower leg: No edema.      Left lower leg: No edema.   Lymphadenopathy:      Cervical: No cervical adenopathy.   Skin:     General: Skin is warm and dry.      Capillary Refill: Capillary refill takes less than 2 seconds.      Findings: No rash.   Neurological:      Mental Status: He is alert and  oriented to person, place, and time.      Cranial Nerves: No cranial nerve deficit.      Sensory: No sensory deficit.      Motor: No weakness.   Psychiatric:         Mood and Affect: Mood normal.         Thought Content: Thought content normal.      Comments: Appears quite ill due to feeling poorly         Fluids  No intake or output data in the 24 hours ending 06/27/22 1436    Laboratory  Recent Results (from the past 48 hour(s))   CBC WITH DIFFERENTIAL    Collection Time: 06/26/22  3:35 PM   Result Value Ref Range    WBC 6.0 4.8 - 10.8 K/uL    RBC 5.72 4.70 - 6.10 M/uL    Hemoglobin 15.2 14.0 - 18.0 g/dL    Hematocrit 46.3 42.0 - 52.0 %    MCV 80.9 (L) 81.4 - 97.8 fL    MCH 26.6 (L) 27.0 - 33.0 pg    MCHC 32.8 (L) 33.7 - 35.3 g/dL    RDW 43.3 35.9 - 50.0 fL    Platelet Count 261 164 - 446 K/uL    MPV 9.9 9.0 - 12.9 fL    Neutrophils-Polys 48.60 44.00 - 72.00 %    Lymphocytes 37.10 22.00 - 41.00 %    Monocytes 11.80 0.00 - 13.40 %    Eosinophils 1.70 0.00 - 6.90 %    Basophils 0.50 0.00 - 1.80 %    Immature Granulocytes 0.30 0.00 - 0.90 %    Nucleated RBC 0.00 /100 WBC    Neutrophils (Absolute) 2.93 1.82 - 7.42 K/uL    Lymphs (Absolute) 2.24 1.00 - 4.80 K/uL    Monos (Absolute) 0.71 0.00 - 0.85 K/uL    Eos (Absolute) 0.10 0.00 - 0.51 K/uL    Baso (Absolute) 0.03 0.00 - 0.12 K/uL    Immature Granulocytes (abs) 0.02 0.00 - 0.11 K/uL    NRBC (Absolute) 0.00 K/uL   COMP METABOLIC PANEL    Collection Time: 06/26/22  3:35 PM   Result Value Ref Range    Sodium 144 135 - 145 mmol/L    Potassium 3.5 (L) 3.6 - 5.5 mmol/L    Chloride 110 96 - 112 mmol/L    Co2 20 20 - 33 mmol/L    Anion Gap 14.0 7.0 - 16.0    Glucose 125 (H) 65 - 99 mg/dL    Bun 25 (H) 8 - 22 mg/dL    Creatinine 0.97 0.50 - 1.40 mg/dL    Calcium 8.7 8.5 - 10.5 mg/dL    AST(SGOT) 16 12 - 45 U/L    ALT(SGPT) 18 2 - 50 U/L    Alkaline Phosphatase 70 30 - 99 U/L    Total Bilirubin 0.2 0.1 - 1.5 mg/dL    Albumin 4.3 3.2 - 4.9 g/dL    Total Protein 6.8 6.0 - 8.2  g/dL    Globulin 2.5 1.9 - 3.5 g/dL    A-G Ratio 1.7 g/dL   PROTHROMBIN TIME    Collection Time: 22  3:35 PM   Result Value Ref Range    PT 13.2 12.0 - 14.6 sec    INR 1.03 0.87 - 1.13   APTT    Collection Time: 22  3:35 PM   Result Value Ref Range    APTT 22.8 (L) 24.7 - 36.0 sec   COD (ADULT)    Collection Time: 22  3:35 PM   Result Value Ref Range    ABO Grouping Only O     Rh Grouping Only POS     Antibody Screen-Cod NEG    TROPONIN    Collection Time: 22  3:35 PM   Result Value Ref Range    Troponin T <6 6 - 19 ng/L   ESTIMATED GFR    Collection Time: 22  3:35 PM   Result Value Ref Range    GFR (CKD-EPI) 92 >60 mL/min/1.73 m 2   EKG (NOW)    Collection Time: 22  4:31 PM   Result Value Ref Range    Report       [facility] Emergency Dept.    Test Date:  2022  Pt Name:    DESTINEE LATIF             Department: ER  MRN:        [id number]                      Room:       [facility]  Gender:     Male                         Technician: [id number]  :        1965                   Requested By:SHABBIR ROSADO  Order #:    [id number]                    Reading MD: SHABBIR ROSADO MD    Measurements  Intervals                                Axis  Rate:       72                           P:          17  ME:         157                          QRS:        -2  QRSD:       91                           T:          22  QT:         427  QTc:        457    Interpretive Statements  12 Lead EKG interpreted by me to show: -- Rate 72 -- Rhythm: Normal sinus  rhythm  -- Axis: Normal -- ME and QRS Intervals: Normal -- T waves: No acute changes  --  ST segments: No acute changes -- Ectopy: None. My impression of this EKG:  Does  not indicate acute ischemia at this time.  Electronically Sig keyonna On 2022 17:33:00 PDT by SHABBIR ROSADO MD         Imaging  MRI brain:  1.  Large acute right posterior inferior cerebellar artery territory infarct with associated  edema/swelling and localized mass effect, partially effacing the fourth ventricle. The lateral ventricles are now mildly dilated suggesting developing obstructive hydrocephalus.  2.  Small area of petechial hemorrhage within the infarct without other significant acute intracranial hemorrhage.  3.  FLAIR hyperintensity in the right vertebral artery suggesting sluggish flow, less likely thrombus formation.    CT head:  Head CT without contrast within normal limits. No evidence of acute cerebral infarction, hemorrhage or mass lesion.     CTA head:  1. No hemodynamically significant narrowing of the major intracranial vessels.     CTA neck:  1. No evidence of flow-limiting stenosis in the cervical carotid or cervical vertebral arteries.    Assessment/Plan  Hypokalemia- (present on admission)  Assessment & Plan  Replete to > 4    Acute stroke due to occlusion of right cerebellar artery (HCC)- (present on admission)  Assessment & Plan  Transfer to ICU for neuro checks every 2 hours   23% bolus and cont 3% saline at 30cc/hr with goal Na > 150  Neurology following  High intensity statin  PT/OT/SLP  SBP goals < 160  NSG consult for developing obstructive hydrocephalus       Discussed patient condition and risk of morbidity and/or mortality with Hospitalist, Family, RN, RT, Pharmacy, Charge nurse / hot rounds, Patient and neurology.      The patient remains critically ill requiring neurological checks every 2 hours as well as monitoring mental status with need for urgent intubation.  I have assessed and reassessed the blood pressure, hemodynamics, cardiovascular status. This patient remains at high risk for worsening cardiopulmonary dysfunction and death without the above critical care interventions.    Critical care time = 105 minutes in directly providing and coordinating critical care and extensive data review.  No time overlap and excludes procedures.

## 2022-06-27 NOTE — ASSESSMENT & PLAN NOTE
Presented with vertigo, found to have large right PICA stroke with partial effacement of fourth ventricle   S/P EVD placement on 6/27/2022  SBP < 140  Salt tabs + Florinef, off hypertonic saline now  EVD clamped today, neurosurgery following   Statin  ASA

## 2022-06-27 NOTE — H&P
Hospital Medicine History & Physical Note    Date of Service  6/26/2022    Primary Care Physician  Rl Musa M.D.      Code Status  Full Code    Chief Complaint  Chief Complaint   Patient presents with   • Dizziness     Onset today at 1430   • Weakness   • Headache     Onset today at 1430    • Numbness     Numbness and tingling present to REKHA arms and legs   • Nausea       History of Presenting Illness  Alistair Putnam is a 57 y.o. male who presented 6/26/2022 with dizziness. Patient presented with dizziness onset this afternoon. Patient reports he was getting up from chair, then drank water, and had sudden onset of dizziness. Patient felt room was spinning, and patient had imbalance and had to sit down. He reports nausea and some mild vomiting. Family at bedside reports patient was having horizontal nystagmus to the right for about 15 minutes, which had improved when EMS arrived. Patient denies ever having dizziness like this before. Denies recent illness. He and family had COVID 1 month ago with resolved symptoms. He had physical last week which was normal other than mildly elevated blood pressure. Patient denies alcohol and illicit drug use. He smoked marijuana last night, smokes <2 times per month, never had any dizziness symptoms. Patient reports some generalized weakness, otherwise denies any focal neurologic deficits such as weakness, numbness/tingling, vision changes.  In the ED, BP initially 180/91, improved to 130's at bedside with IV fluids, zofran, and valium. CBC and CMP fairly unremarkable. K 3.5. CT head shows no acute abnormalities.    I discussed the plan of care with patient and family.    Review of Systems  Review of Systems   Constitutional: Negative for chills, diaphoresis, fever, malaise/fatigue and weight loss.   Eyes: Negative for blurred vision and pain.   Respiratory: Negative for cough and shortness of breath.    Cardiovascular: Negative for chest pain, palpitations and leg  swelling.   Gastrointestinal: Positive for nausea and vomiting. Negative for abdominal pain.   Genitourinary: Negative for dysuria and urgency.   Musculoskeletal: Negative for back pain and falls.   Skin: Negative for itching and rash.   Neurological: Positive for dizziness and weakness. Negative for sensory change, focal weakness, seizures, loss of consciousness and headaches.   Psychiatric/Behavioral: Positive for substance abuse. The patient does not have insomnia.        Past Medical History   has a past medical history of Heart murmur (12/2017) and Pain (12/2017).    Surgical History   has a past surgical history that includes knee arthroscopy (Right, 1989); tonsillectomy (1976); orif, fracture, tibia (Right, 1976); sinus obliteration frontal (2010); shoulder arthroscopy w/ rotator cuff repair (Right, 1/8/2018); shoulder decompression arthroscopic (Right, 1/8/2018); and shoulder arthroscopy w/ bicipital tenodesis repair (Right, 1/8/2018).     Family History  family history is not on file.   Family history reviewed with patient. There is no family history that is pertinent to the chief complaint.     Social History   reports that he has never smoked. He has never used smokeless tobacco. He reports current alcohol use. He reports that he does not use drugs.    Allergies  No Known Allergies    Medications  Prior to Admission Medications   Prescriptions Last Dose Informant Patient Reported? Taking?   Omega-3 Fatty Acids (FISH OIL) 1000 MG Cap capsule 6/26/2022 at 0900 Patient Yes No   Sig: Take 1,000 mg by mouth every day.   Zinc 50 MG Tab 6/26/2022 at 0900 Patient Yes Yes   Sig: Take 50 mg by mouth every day.   calcium polycarbophil (FIBERCON) 625 MG Tab 6/26/2022 at 0900 Patient Yes Yes   Sig: Take 625 mg by mouth every day.   multivitamin (THERAGRAN) Tab 6/26/2022 at 0900 Patient Yes Yes   Sig: Take 1 Tablet by mouth every day.      Facility-Administered Medications: None       Physical Exam  Temp:  [36.1 °C (97  °F)] 36.1 °C (97 °F)  Pulse:  [68-78] 75  Resp:  [14-16] 15  BP: (153-180)/(72-91) 156/79  SpO2:  [92 %-95 %] 94 %  Blood Pressure: (!) 156/79   Temperature: 36.1 °C (97 °F)   Pulse: 75   Respiration: 15   Pulse Oximetry: 94 %       Physical Exam  Constitutional:       General: He is not in acute distress.     Appearance: He is not ill-appearing.   HENT:      Head: Normocephalic and atraumatic.      Right Ear: External ear normal.      Left Ear: External ear normal.      Mouth/Throat:      Pharynx: No oropharyngeal exudate or posterior oropharyngeal erythema.   Eyes:      Extraocular Movements: Extraocular movements intact.      Pupils: Pupils are equal, round, and reactive to light.   Cardiovascular:      Rate and Rhythm: Normal rate and regular rhythm.      Pulses: Normal pulses.      Heart sounds: Normal heart sounds.   Pulmonary:      Effort: Pulmonary effort is normal. No respiratory distress.      Breath sounds: Normal breath sounds. No wheezing or rales.   Abdominal:      General: Bowel sounds are normal. There is no distension.      Palpations: Abdomen is soft.      Tenderness: There is no abdominal tenderness. There is no guarding or rebound.   Musculoskeletal:         General: No swelling or tenderness.      Cervical back: Normal range of motion and neck supple.   Skin:     General: Skin is warm and dry.   Neurological:      General: No focal deficit present.      Mental Status: He is oriented to person, place, and time.      Cranial Nerves: No cranial nerve deficit.      Sensory: No sensory deficit.      Motor: No weakness.      Coordination: Coordination normal.      Comments: Reports dizziness when turned from head in right leaning position to center/left position   Psychiatric:         Mood and Affect: Mood normal.         Behavior: Behavior normal.         Laboratory:  Recent Labs     06/26/22  1535   WBC 6.0   RBC 5.72   HEMOGLOBIN 15.2   HEMATOCRIT 46.3   MCV 80.9*   MCH 26.6*   MCHC 32.8*   RDW  43.3   PLATELETCT 261   MPV 9.9     Recent Labs     06/26/22  1535   SODIUM 144   POTASSIUM 3.5*   CHLORIDE 110   CO2 20   GLUCOSE 125*   BUN 25*   CREATININE 0.97   CALCIUM 8.7     Recent Labs     06/26/22  1535   ALTSGPT 18   ASTSGOT 16   ALKPHOSPHAT 70   TBILIRUBIN 0.2   GLUCOSE 125*     Recent Labs     06/26/22  1535   APTT 22.8*   INR 1.03     No results for input(s): NTPROBNP in the last 72 hours.      Recent Labs     06/26/22  1535   TROPONINT <6       Imaging:  CT-HEAD W/O   Final Result      Head CT without contrast within normal limits. No evidence of acute cerebral infarction, hemorrhage or mass lesion.             EKG:  I have personally reviewed the images and compared with prior images.    Assessment/Plan:  Justification for Admission Status  I anticipate this patient is appropriate for observation status at this time because treatment of likely benign positional vertigo will likely take less than 2 midnights.    * Dizziness- (present on admission)  Assessment & Plan  Onset this afternoon, room spinning sensation  Likely related to benign paroxysmal positional vertigo  Dizziness ilicited with head movement to left  CT head normal  Supportive treatment, IV fluids, avoid trigger of left facing head position  Can try Epley's maneuver  Admit to observation, continue neuro checks q4h  If new neurologic deficits occur or dizziness does not dissipate, can consider MRI brain to rule out structural etiology  Anticipate discharge home tomorrow morning if stable      VTE prophylaxis: SCDs/TEDs

## 2022-06-27 NOTE — DISCHARGE PLANNING
[facility] Transitional Care Coordination    Referral from:  Dr. Miguel  Insurance Provider on Facesheet: [employer]  Potential Rehab Diagnosis:  R/O Stroke    Chart review indicates patient may have on going medical management and may have therapy needs to possibly meet inpatient rehab facility criteria with the goal of returning to community.    D/C support:  Spouse    Physiatry consultation pended while waiting for additional information.  Concern for stroke.  Work-up ongoing, MR brain, therapy evals pending.  TCC will monitor.  Please reach out sooner if PMR consult requested for medical management.      Last Covid test:       Thank you for the referral.

## 2022-06-28 ENCOUNTER — APPOINTMENT (OUTPATIENT)
Dept: RADIOLOGY | Facility: MEDICAL CENTER | Age: 57
DRG: 023 | End: 2022-06-28
Attending: NURSE PRACTITIONER
Payer: COMMERCIAL

## 2022-06-28 ENCOUNTER — APPOINTMENT (OUTPATIENT)
Dept: CARDIOLOGY | Facility: MEDICAL CENTER | Age: 57
DRG: 023 | End: 2022-06-28
Attending: STUDENT IN AN ORGANIZED HEALTH CARE EDUCATION/TRAINING PROGRAM
Payer: COMMERCIAL

## 2022-06-28 LAB
ABO + RH BLD: NORMAL
ANION GAP SERPL CALC-SCNC: 10 MMOL/L (ref 7–16)
BASOPHILS # BLD AUTO: 0.1 % (ref 0–1.8)
BASOPHILS # BLD: 0.01 K/UL (ref 0–0.12)
BUN SERPL-MCNC: 15 MG/DL (ref 8–22)
CALCIUM SERPL-MCNC: 8.3 MG/DL (ref 8.5–10.5)
CHLORIDE SERPL-SCNC: 107 MMOL/L (ref 96–112)
CO2 SERPL-SCNC: 23 MMOL/L (ref 20–33)
CREAT SERPL-MCNC: 0.61 MG/DL (ref 0.5–1.4)
CRP SERPL HS-MCNC: 0.56 MG/DL (ref 0–0.75)
CRP SERPL HS-MCNC: 1.37 MG/DL (ref 0–0.75)
EOSINOPHIL # BLD AUTO: 0 K/UL (ref 0–0.51)
EOSINOPHIL NFR BLD: 0 % (ref 0–6.9)
ERYTHROCYTE [DISTWIDTH] IN BLOOD BY AUTOMATED COUNT: 45.5 FL (ref 35.9–50)
GFR SERPLBLD CREATININE-BSD FMLA CKD-EPI: 113 ML/MIN/1.73 M 2
GLUCOSE SERPL-MCNC: 116 MG/DL (ref 65–99)
HCT VFR BLD AUTO: 44.2 % (ref 42–52)
HGB BLD-MCNC: 14.4 G/DL (ref 14–18)
IMM GRANULOCYTES # BLD AUTO: 0.08 K/UL (ref 0–0.11)
IMM GRANULOCYTES NFR BLD AUTO: 0.6 % (ref 0–0.9)
LV EJECT FRACT  99904: 65
LV EJECT FRACT MOD 2C 99903: 67.05
LV EJECT FRACT MOD 4C 99902: 73.56
LV EJECT FRACT MOD BP 99901: 68.69
LYMPHOCYTES # BLD AUTO: 0.9 K/UL (ref 1–4.8)
LYMPHOCYTES NFR BLD: 7 % (ref 22–41)
MCH RBC QN AUTO: 26.5 PG (ref 27–33)
MCHC RBC AUTO-ENTMCNC: 32.6 G/DL (ref 33.7–35.3)
MCV RBC AUTO: 81.3 FL (ref 81.4–97.8)
MONOCYTES # BLD AUTO: 1.14 K/UL (ref 0–0.85)
MONOCYTES NFR BLD AUTO: 8.9 % (ref 0–13.4)
NEUTROPHILS # BLD AUTO: 10.65 K/UL (ref 1.82–7.42)
NEUTROPHILS NFR BLD: 83.4 % (ref 44–72)
NRBC # BLD AUTO: 0 K/UL
NRBC BLD-RTO: 0 /100 WBC
PLATELET # BLD AUTO: 273 K/UL (ref 164–446)
PMV BLD AUTO: 9.4 FL (ref 9–12.9)
POTASSIUM SERPL-SCNC: 3.6 MMOL/L (ref 3.6–5.5)
PREALB SERPL-MCNC: 21.2 MG/DL (ref 18–38)
RBC # BLD AUTO: 5.44 M/UL (ref 4.7–6.1)
SODIUM SERPL-SCNC: 140 MMOL/L (ref 135–145)
SODIUM SERPL-SCNC: 140 MMOL/L (ref 135–145)
SODIUM SERPL-SCNC: 144 MMOL/L (ref 135–145)
SODIUM SERPL-SCNC: 144 MMOL/L (ref 135–145)
WBC # BLD AUTO: 12.8 K/UL (ref 4.8–10.8)

## 2022-06-28 PROCEDURE — 700101 HCHG RX REV CODE 250: Performed by: NURSE PRACTITIONER

## 2022-06-28 PROCEDURE — 700105 HCHG RX REV CODE 258: Performed by: STUDENT IN AN ORGANIZED HEALTH CARE EDUCATION/TRAINING PROGRAM

## 2022-06-28 PROCEDURE — 700102 HCHG RX REV CODE 250 W/ 637 OVERRIDE(OP): Performed by: INTERNAL MEDICINE

## 2022-06-28 PROCEDURE — 99233 SBSQ HOSP IP/OBS HIGH 50: CPT | Performed by: NURSE PRACTITIONER

## 2022-06-28 PROCEDURE — 84134 ASSAY OF PREALBUMIN: CPT

## 2022-06-28 PROCEDURE — 93306 TTE W/DOPPLER COMPLETE: CPT | Mod: 26 | Performed by: INTERNAL MEDICINE

## 2022-06-28 PROCEDURE — 700111 HCHG RX REV CODE 636 W/ 250 OVERRIDE (IP): Performed by: INTERNAL MEDICINE

## 2022-06-28 PROCEDURE — 70450 CT HEAD/BRAIN W/O DYE: CPT

## 2022-06-28 PROCEDURE — A9270 NON-COVERED ITEM OR SERVICE: HCPCS | Performed by: INTERNAL MEDICINE

## 2022-06-28 PROCEDURE — 700102 HCHG RX REV CODE 250 W/ 637 OVERRIDE(OP): Performed by: STUDENT IN AN ORGANIZED HEALTH CARE EDUCATION/TRAINING PROGRAM

## 2022-06-28 PROCEDURE — 700105 HCHG RX REV CODE 258: Performed by: INTERNAL MEDICINE

## 2022-06-28 PROCEDURE — A9270 NON-COVERED ITEM OR SERVICE: HCPCS | Performed by: NURSE PRACTITIONER

## 2022-06-28 PROCEDURE — 99291 CRITICAL CARE FIRST HOUR: CPT | Performed by: INTERNAL MEDICINE

## 2022-06-28 PROCEDURE — 86140 C-REACTIVE PROTEIN: CPT

## 2022-06-28 PROCEDURE — 93306 TTE W/DOPPLER COMPLETE: CPT

## 2022-06-28 PROCEDURE — 92610 EVALUATE SWALLOWING FUNCTION: CPT

## 2022-06-28 PROCEDURE — A9270 NON-COVERED ITEM OR SERVICE: HCPCS | Performed by: STUDENT IN AN ORGANIZED HEALTH CARE EDUCATION/TRAINING PROGRAM

## 2022-06-28 PROCEDURE — 84295 ASSAY OF SERUM SODIUM: CPT | Mod: 91

## 2022-06-28 PROCEDURE — 700102 HCHG RX REV CODE 250 W/ 637 OVERRIDE(OP): Performed by: NURSE PRACTITIONER

## 2022-06-28 PROCEDURE — 92612 ENDOSCOPY SWALLOW (FEES) VID: CPT

## 2022-06-28 PROCEDURE — 80048 BASIC METABOLIC PNL TOTAL CA: CPT

## 2022-06-28 PROCEDURE — 700101 HCHG RX REV CODE 250: Performed by: INTERNAL MEDICINE

## 2022-06-28 PROCEDURE — 85025 COMPLETE CBC W/AUTO DIFF WBC: CPT

## 2022-06-28 PROCEDURE — 302136 NUTRITION PUMP: Performed by: INTERNAL MEDICINE

## 2022-06-28 PROCEDURE — 770022 HCHG ROOM/CARE - ICU (200)

## 2022-06-28 RX ORDER — AMLODIPINE BESYLATE 5 MG/1
5 TABLET ORAL
Status: DISCONTINUED | OUTPATIENT
Start: 2022-06-28 | End: 2022-06-29

## 2022-06-28 RX ORDER — HYDRALAZINE HYDROCHLORIDE 20 MG/ML
20 INJECTION INTRAMUSCULAR; INTRAVENOUS EVERY 4 HOURS PRN
Status: DISCONTINUED | OUTPATIENT
Start: 2022-06-28 | End: 2022-07-08 | Stop reason: HOSPADM

## 2022-06-28 RX ORDER — SODIUM CHLORIDE 1 G/1
1 TABLET ORAL
Status: DISCONTINUED | OUTPATIENT
Start: 2022-06-28 | End: 2022-06-29

## 2022-06-28 RX ORDER — ATORVASTATIN CALCIUM 40 MG/1
80 TABLET, FILM COATED ORAL EVERY EVENING
Status: DISCONTINUED | OUTPATIENT
Start: 2022-06-28 | End: 2022-07-01

## 2022-06-28 RX ORDER — 3% SODIUM CHLORIDE 3 G/100ML
500 INJECTION, SOLUTION INTRAVENOUS CONTINUOUS
Status: DISCONTINUED | OUTPATIENT
Start: 2022-06-28 | End: 2022-07-01

## 2022-06-28 RX ORDER — LABETALOL HYDROCHLORIDE 5 MG/ML
10-20 INJECTION, SOLUTION INTRAVENOUS
Status: DISCONTINUED | OUTPATIENT
Start: 2022-06-28 | End: 2022-07-08 | Stop reason: HOSPADM

## 2022-06-28 RX ADMIN — NICARDIPINE HYDROCHLORIDE 5 MG/HR: 25 INJECTION, SOLUTION INTRAVENOUS at 18:38

## 2022-06-28 RX ADMIN — HYDRALAZINE HYDROCHLORIDE 20 MG: 20 INJECTION INTRAMUSCULAR; INTRAVENOUS at 13:04

## 2022-06-28 RX ADMIN — AMLODIPINE BESYLATE 5 MG: 5 TABLET ORAL at 17:11

## 2022-06-28 RX ADMIN — SODIUM CHLORIDE 1 G: 1 TABLET ORAL at 17:12

## 2022-06-28 RX ADMIN — SODIUM CHLORIDE 200 MEQ: 4 INJECTION, SOLUTION, CONCENTRATE INTRAVENOUS at 08:05

## 2022-06-28 RX ADMIN — HYDRALAZINE HYDROCHLORIDE 20 MG: 20 INJECTION INTRAMUSCULAR; INTRAVENOUS at 08:19

## 2022-06-28 RX ADMIN — LABETALOL HYDROCHLORIDE 10 MG: 5 INJECTION, SOLUTION INTRAVENOUS at 16:34

## 2022-06-28 RX ADMIN — SENNOSIDES AND DOCUSATE SODIUM 2 TABLET: 50; 8.6 TABLET ORAL at 17:11

## 2022-06-28 RX ADMIN — SODIUM CHLORIDE 500 ML: 3 INJECTION, SOLUTION INTRAVENOUS at 08:09

## 2022-06-28 RX ADMIN — NICARDIPINE HYDROCHLORIDE 7.5 MG/HR: 25 INJECTION, SOLUTION INTRAVENOUS at 22:00

## 2022-06-28 RX ADMIN — ACETAMINOPHEN 650 MG: 325 TABLET ORAL at 11:23

## 2022-06-28 RX ADMIN — LABETALOL HYDROCHLORIDE 10 MG: 5 INJECTION, SOLUTION INTRAVENOUS at 11:43

## 2022-06-28 RX ADMIN — ATORVASTATIN CALCIUM 80 MG: 40 TABLET, FILM COATED ORAL at 17:11

## 2022-06-28 RX ADMIN — LABETALOL HYDROCHLORIDE 10 MG: 5 INJECTION, SOLUTION INTRAVENOUS at 15:43

## 2022-06-28 RX ADMIN — SODIUM CHLORIDE 1 G: 1 TABLET ORAL at 11:24

## 2022-06-28 RX ADMIN — POTASSIUM BICARBONATE 50 MEQ: 978 TABLET, EFFERVESCENT ORAL at 11:24

## 2022-06-28 RX ADMIN — SODIUM CHLORIDE 500 ML: 3 INJECTION, SOLUTION INTRAVENOUS at 18:31

## 2022-06-28 ASSESSMENT — ENCOUNTER SYMPTOMS
NAUSEA: 0
HEMOPTYSIS: 0
VOMITING: 0
FEVER: 0
CHILLS: 0
DEPRESSION: 0
BLURRED VISION: 0
MYALGIAS: 0
SPUTUM PRODUCTION: 0
NECK PAIN: 0
DOUBLE VISION: 0
FOCAL WEAKNESS: 0
ORTHOPNEA: 0
PALPITATIONS: 0
SPEECH CHANGE: 0
ABDOMINAL PAIN: 0
COUGH: 0
PHOTOPHOBIA: 0
SENSORY CHANGE: 0

## 2022-06-28 NOTE — PROGRESS NOTES
Cortrak Placement    Tube Team verified patient name and medical record number prior to tube placement.  Cortrak tube (43 inches, 10 Croatian) placed at 60 cm in left nare.  Per Cortrak picture, tube appears to be in the stomach.  Nursing Instructions: Awaiting KUB to confirm placement before use for medications or feeding. Once placement confirmed, flush tube with 30 ml of water, and then remove and save stylet, in patient medication drawer.

## 2022-06-28 NOTE — PROGRESS NOTES
[facility] Progress Note      Admit Date: 6/26/2022    Resident(s): Trung Gonzalez M.D.   Attending:  KEVYN COVINGTON/ Dr. Victor     Patient ID:    Name:  Alistair Putnam   YOB: 1965  Age:  57 y.o.  male   MRN:  [id number]    Hospital Course (carried forward and updated):  Alistair Putnam is a 57 y.o. male with no significant past medical history (on no medication at home) who presented to the emergency department on 6/26/2022 with vertigo.  CT head on presentation was unremarkable.  Patient was initially admitted to the floor, was transferred to ICU after MRI showing  large right PICA Stroke with partial effacement of fourth ventricle. Patient was evaluated by neurosurgery and EVD was placed on 6/27 due to concern for early obstructive hydrocephalus.    Consultants:  Critical Care  Neurosurgery  Neurology    Interval Events:    No overnight events  Alert and oriented x2, able to follow commands, dysarthric.Denies dizziness, numbness or weakness.    EVD at 0,draining 5-10 cc/hr.  Afebrile, HR 80-90s, -160, on 2 L O2  Blood work notable for mild leukocytosis, no clinical signs of infection.  CMP with mild hypokalemia -repleted A1c 6.2, LDL to 129.  Continue nightly neuro checks per neuro surgery recommendation.  Goal SBP <140 currently on prn antihypertensives.  Will consider starting nicardipine ggt  if blood pressure is elevated above goal.  No medication at home.  Consider starting calcium channel blocker.  Goal sodium 150-155, on 3% hypertonic saline. Na 140 s/p 23% bolus x2.  Start salt tablets.  Echocardiogram pending  Monitor and replete electrolytes as indicated.  Neurosurgery following,           Vitals Range last 24h:  Temp:  [37 °C (98.6 °F)-37.3 °C (99.2 °F)] 37 °C (98.6 °F)  Pulse:  [] 77  Resp:  [15-44] 20  BP: (123-181)/(60-93) 162/85  SpO2:  [92 %-97 %] 96 %      Intake/Output Summary (Last 24 hours) at 6/28/2022 1148  Last data filed at 6/28/2022 0900  Gross per  24 hour   Intake 545 ml   Output 582 ml   Net -37 ml        Review of Systems   Constitutional: Positive for malaise/fatigue. Negative for chills and fever.   HENT: Negative for congestion, ear discharge and nosebleeds.    Eyes: Negative for blurred vision, double vision and photophobia.   Respiratory: Negative for cough, hemoptysis and sputum production.    Cardiovascular: Negative for chest pain, palpitations and orthopnea.   Gastrointestinal: Negative for abdominal pain, nausea and vomiting.   Genitourinary: Negative for dysuria, frequency and urgency.   Musculoskeletal: Negative for myalgias and neck pain.   Neurological: Negative for sensory change, speech change and focal weakness.   Psychiatric/Behavioral: Negative for depression.       PHYSICAL EXAM:  Vitals:    06/28/22 0600 06/28/22 0700 06/28/22 0800 06/28/22 0818   BP: (!) 161/93 (!) 145/81 (!) 160/82 (!) 162/85   Pulse: 80 83 77 77   Resp: (!) 21 15 19 20   Temp:       TempSrc:       SpO2: 94% 96% 96% 96%   Weight:       Height:        Body mass index is 29.29 kg/m².    O2 therapy: Pulse Oximetry: 96 %, O2 (LPM): 2, O2 Delivery Device: Silicone Nasal Cannula    Date 06/28/22 0700 - 06/29/22 0659   Shift 9144-0711 9715-0690 0693-0451 24 Hour Total   INTAKE   I.V. 40   40     Volume (mL) (3% sodium chloride (HYPERTONIC SALINE) 500mL infusion 500 mL) 40   40   NG/GT 0   0     Intake (mL) (Enteral Tube 06/27/22 Left nare) 0   0   Shift Total 40   40   OUTPUT   Urine 200   200     Urine Void (mL) 200   200   Drains 28   28     Output (mL) (ICP/Ventriculostomy Right Other (Comment)) 28   28   Shift Total 228   228   NET -188   -188        Physical Exam  Constitutional:       General: He is not in acute distress.     Appearance: He is not ill-appearing.   HENT:      Head:      Comments: EVD in place     Nose: Nose normal.      Mouth/Throat:      Mouth: Mucous membranes are moist.   Eyes:      Extraocular Movements: Extraocular movements intact.    Cardiovascular:      Rate and Rhythm: Normal rate and regular rhythm.      Pulses: Normal pulses.      Heart sounds: Normal heart sounds. No murmur heard.    No friction rub. No gallop.   Pulmonary:      Effort: Pulmonary effort is normal. No respiratory distress.      Breath sounds: No wheezing.   Abdominal:      General: Abdomen is flat. Bowel sounds are normal. There is no distension.      Palpations: Abdomen is soft.      Tenderness: There is no abdominal tenderness.   Musculoskeletal:      Right lower leg: No edema.      Left lower leg: No edema.   Skin:     General: Skin is warm.   Neurological:      Mental Status: He is alert.      Comments: Dysarthric, facial droop   Motor strength 5/5, sensation intact              Recent Labs     06/26/22  1535 06/27/22  1503 06/27/22  2347 06/28/22  0600   SODIUM 144 138 140 140   POTASSIUM 3.5*  --   --  3.6   CHLORIDE 110  --   --  107   CO2 20  --   --  23   BUN 25*  --   --  15   CREATININE 0.97  --   --  0.61   CALCIUM 8.7  --   --  8.3*     Recent Labs     06/26/22  1535 06/28/22  0600   ALTSGPT 18  --    ASTSGOT 16  --    ALKPHOSPHAT 70  --    TBILIRUBIN 0.2  --    PREALBUMIN  --  21.2   GLUCOSE 125* 116*     Recent Labs     06/26/22  1535 06/28/22  0600   RBC 5.72 5.44   HEMOGLOBIN 15.2 14.4   HEMATOCRIT 46.3 44.2   PLATELETCT 261 273   PROTHROMBTM 13.2  --    APTT 22.8*  --    INR 1.03  --      Recent Labs     06/26/22  1535 06/28/22  0600   WBC 6.0 12.8*   NEUTSPOLYS 48.60 83.40*   LYMPHOCYTES 37.10 7.00*   MONOCYTES 11.80 8.90   EOSINOPHILS 1.70 0.00   BASOPHILS 0.50 0.10   ASTSGOT 16  --    ALTSGPT 18  --    ALKPHOSPHAT 70  --    TBILIRUBIN 0.2  --        Meds:  • 3% sodium chloride  500 mL 500 mL (06/28/22 0809)   • atorvastatin  80 mg     • hydrALAZINE  20 mg     • labetalol  10-20 mg     • MD Alert...Adult ICU Electrolyte Replacement per Pharmacy       • sodium chloride  1 g     • HYDROmorphone  0.5 mg     • Pharmacy  1 Each     • acetaminophen  650 mg      • magnesium hydroxide  30 mL      And   • senna-docusate  2 Tablet      And   • polyethylene glycol/lytes  1 Packet      And   • bisacodyl  10 mg     • ondansetron  4 mg     • ondansetron  4 mg     • prochlorperazine  5-10 mg          Procedures:  EVD placement on 6/27     Imaging:  CT-HEAD W/O   Final Result         1.  Postprocedural changes of ventriculostomy placement with minimal hyperdense hemorrhage tracking along the ventriculostomy tract.   2.  Small anterior foci of right pneumocephalus.   3.  Low-density evolving stroke within the right cerebellar hemisphere effacing the fourth ventricle.   4.  Atherosclerosis         DX-ABDOMEN FOR TUBE PLACEMENT   Final Result      1.  Feeding tube tip overlies the proximal stomach.      DX-CHEST-LIMITED (1 VIEW)   Final Result      1.  Right IJ catheter tip projects over the cavoatrial junction without pneumothorax.   2.  Enlarged cardiac silhouette with mild vascular congestion.   3.  There is bibasilar atelectasis.      CT-CTA HEAD WITH & W/O-POST PROCESS   Final Result         1. No hemodynamically significant narrowing of the major intracranial vessels.      CT-CTA NECK WITH & W/O-POST PROCESSING   Final Result      1. No evidence of flow-limiting stenosis in the cervical carotid or cervical vertebral arteries.      MR-BRAIN-W/O   Final Result      1.  Large acute right posterior inferior cerebellar artery territory infarct with associated edema/swelling and localized mass effect, partially effacing the fourth ventricle. The lateral ventricles are now mildly dilated suggesting developing    obstructive hydrocephalus.   2.  Small area of petechial hemorrhage within the infarct without other significant acute intracranial hemorrhage.   3.  FLAIR hyperintensity in the right vertebral artery suggesting sluggish flow, less likely thrombus formation.      These findings were discussed with MONIQUE KNOX on 6/27/2022 1:49 PM.      CT-HEAD W/O   Final Result      Head CT  without contrast within normal limits. No evidence of acute cerebral infarction, hemorrhage or mass lesion.         EC-ECHOCARDIOGRAM COMPLETE W/O CONT    (Results Pending)       ASSESSEMENT and PLAN:    Hypokalemia- (present on admission)  Assessment & Plan  Replete to > 4    Acute stroke due to occlusion of right cerebellar artery (HCC)- (present on admission)  Assessment & Plan  Presented with vertigo, found to have large right PICA stroke with partial effacement of fourth ventricle   Status post EVD placement on 6/27/2022   Neurology and neurosurgery following   Continue frequent neuro checks  SBP goal less than 160, continue as needed labetalol/hydralazine  s/p 23% bolus, cont 3% saline with goal Na > 150, start salt tablets  High intensity statin  PT/OT/SLP              CODE STATUS: Full code  Quality Measures:  Feeding: Diet per SLP  Analgesia: Dilaudid  Sedation: None Tylenol,  Thromboprophylaxis: Held/SCD  Head of bed: >30 degrees  Ulcer prophylaxis: Not indicated  Glycemic control: Not indicated   bowel care: bowel regimen as needed  Indwelling lines: Right IJ, PIV   deescalation of antibiotics: None      Trung Gonzalez M.D.

## 2022-06-28 NOTE — PROGRESS NOTES
"Neurology Progress Note  [facility]    Referring Physician: Elizabeth Valdivia M.D.    Chief Complaint   Patient presents with   • Dizziness     Onset today at 1430   • Weakness   • Headache     Onset today at 1430    • Numbness     Numbness and tingling present to REKHA arms and legs   • Nausea       HPI: Refer to initial documented Neurology H&P, as detailed in the patient's chart.    Interval History 6/28/2022: Dr. Nielsen, Neurosurgery, consulted yesterday and placed a right frontal EVD for ICP monitoring and drainage given obstructive hydrocephalus. ICP 1-6 mm Hg, EVD output 5-10ml/hr with level at 0 mm Hg. Hypertonic therapy with 23% NaCl bolus given yesterday evening and 3% NaCl infusion started for Na goal 150-155, Na 140 this AM. -160s overnight. Patient is currenlty sittiing up in bed, awake, alert, fully oriented, and following commands. Persistent dysarthria, nystagmus, and RUE ataxia noted with report of improved nausea, vertigo, and headache now 4/10. Denies vision loss, aphasia, abnormal movements, LOC, memory loss, or confusion.     Past Medical History:   Past Medical History:   Diagnosis Date   • Heart murmur 12/2017    \"As a child\"   • Pain 12/2017    right shoulder pain        FHx:  Family History   Problem Relation Age of Onset   • Cancer Mother         SHx:  Social History     Socioeconomic History   • Marital status:      Spouse name: Not on file   • Number of children: Not on file   • Years of education: Not on file   • Highest education level: Not on file   Occupational History   • Not on file   Tobacco Use   • Smoking status: Never Smoker   • Smokeless tobacco: Never Used   Substance and Sexual Activity   • Alcohol use: Yes     Comment: 2 per month   • Drug use: Yes     Types: Marijuana, Inhaled     Comment: Infrequently, approximately 1-2 times per month   • Sexual activity: Not on file   Other Topics Concern   • Not on file   Social History Narrative   • Not on file "     Social Determinants of Health     Financial Resource Strain: Not on file   Food Insecurity: Not on file   Transportation Needs: Not on file   Physical Activity: Not on file   Stress: Not on file   Social Connections: Not on file   Intimate Partner Violence: Not on file   Housing Stability: Not on file        Medications:    Current Facility-Administered Medications:   •  3% sodium chloride (HYPERTONIC SALINE) 500mL infusion 500 mL, 500 mL, Intravenous, Continuous, Wally Alvarez M.D., Last Rate: 50 mL/hr at 06/28/22 0809, 500 mL at 06/28/22 0809  •  atorvastatin (LIPITOR) tablet 80 mg, 80 mg, Enteral Tube, Q EVENING, DAIJA Arguelles  •  hydrALAZINE (APRESOLINE) injection 20 mg, 20 mg, Intravenous, Q4HRS PRN, Steve Victor M.D.  •  labetalol (NORMODYNE/TRANDATE) injection 10-20 mg, 10-20 mg, Intravenous, Q2HRS PRN, Steve Victor M.D.  •  MD Alert...ICU Electrolyte Replacement per Pharmacy, , Other, PHARMACY TO DOSE, Steve Victor M.D.  •  potassium bicarbonate (KLYTE) effervescent tablet 50 mEq, 50 mEq, Enteral Tube, Once, Steve Victor M.D.  •  sodium chloride (SALT) tablet 1 g, 1 g, Oral, TID WITH MEALS, Trung Gonzalez M.D.  •  HYDROmorphone (Dilaudid) injection 0.5 mg, 0.5 mg, Intravenous, Q3HRS PRN, Elizabeth Valdivia M.D., 0.5 mg at 06/27/22 1805  •  Pharmacy Consult: Enteral tube insertion - review meds/change route/product selection, 1 Each, Other, PHARMACY TO DOSE, Elizabeth Valdivia M.D.  •  acetaminophen (Tylenol) tablet 650 mg, 650 mg, Enteral Tube, Q6HRS PRN, Elizabeth Valdivia M.D.  •  senna-docusate (PERICOLACE or SENOKOT S) 8.6-50 MG per tablet 2 Tablet, 2 Tablet, Enteral Tube, BID **AND** polyethylene glycol/lytes (MIRALAX) PACKET 1 Packet, 1 Packet, Enteral Tube, QDAY PRN **AND** magnesium hydroxide (MILK OF MAGNESIA) suspension 30 mL, 30 mL, Enteral Tube, QDAY PRN **AND** bisacodyl (DULCOLAX) suppository 10 mg, 10 mg, Rectal, QDAY PRN, Elizabeth Valdivia M.D.  •   ondansetron (ZOFRAN ODT) dispertab 4 mg, 4 mg, Enteral Tube, Q4HRS PRN, Elizabeth Valdivia M.D.  •  ondansetron (ZOFRAN) syringe/vial injection 4 mg, 4 mg, Intravenous, Q4HRS PRN, Steve Miguel M.D.  •  prochlorperazine (COMPAZINE) injection 5-10 mg, 5-10 mg, Intravenous, Q4HRS PRN, Steve Miguel M.D., 5 mg at 06/27/22 1034    Allergies:  No Known Allergies     Review of systems: In addition to what is detailed in the interval history above, all other systems reviewed and are negative.    Physical Examination:   Vitals:    06/28/22 0600 06/28/22 0700 06/28/22 0800 06/28/22 0818   BP: (!) 161/93 (!) 145/81 (!) 160/82 (!) 162/85   Pulse: 80 83 77 77   Resp: (!) 21 15 19 20   Temp:       TempSrc:       SpO2: 94% 96% 96% 96%   Weight:       Height:         General: Patient in no acute distress, pleasant and cooperative.  HEENT: Normocephalic, no signs of acute trauma.   Neck: Supple, no meningeal signs or carotid bruits. There is normal range of motion. No tenderness on exam.   Chest: Regular and unlabored breaths on RA. No cough.   CV: RRR.   Skin: No signs of acute rashes or trauma.   Musculoskeletal: Joints exhibit full range of motion, without any pain to palpation. There are no signs of joint or muscle swelling. There is no tenderness to deep palpation of muscles.   Psychiatric: No hallucinatory behavior. Denies symptoms of depression or suicidal ideation. Mood and affect appear normal on exam.     NEUROLOGICAL EXAM:  Mental status, orientation: Awake, alert, following commands, and fully oriented.   Speech and language: Speech is mildly dysarthric and fluent. The patient is able to name, repeat, and comprehend.   Memory: There is intact recollection of recent and remote events.   Cranial nerve exam: Pupils are equal, 3-4 mm and briskly reactive to light. Visual fields are full with no report of diplopia or vertigo. There is bidirectional horizontal and vertical nystagmus on primary or secondary gaze. Intact full  EOM in all directions of gaze. Face appears asymmetric with minor right nasolabial fold flattening. Sensation in the face is intact to light touch. Uvula is midline. Palate elevates symmetrically. Tongue is midline and without any signs of tongue biting or fasciculations. Sternocleidomastoid muscles exhibit is normal strength bilaterally. Shoulder shrug is intact bilaterally.   Motor exam: Strength is 5/5 in all extremities. Tone is normal. No abnormal movements were seen on exam.   Sensory exam Reveals normal sense of light touch and pinprick in all extremities.   Deep tendon reflexes:  Plantar responses are flexor. There is no clonus.   Coordination: Ataxia noted to RUE with finger-nose-finger. Normal heel-down-shin tests to BLE.  Gait: Deferred per patient preference.      NIH Stroke Scale  6/28/22 0845     1a. Level of Consciousness (Alert, drowsy, etc): 0= Alert     1b. LOC Questions (Month, age): 0= Answers both correctly     1c. LOC Commands (Open/close eyes make fist/let go): 0= Obeys both correctly     2.   Best Gaze (Eyes open - patient follows examiner's finger on face): 0= Normal     3.   Visual Fields (introduce visual stimulus/threat to patient's field quadrants): 0= No visual loss     4.   Facial Paresis (Show teeth, raise eyebrows and squeeze eyes shut): 1= Minor               5a. Motor Arm - Left (Elevate arm to 90 degrees if patient is sitting, 45 degrees if  supine): 0= No drift     5b. Motor Arm - Right (Elevate arm to 90 degrees if patient is sitting, 45 degrees if supine): 0= No drift     6a. Motor Leg - Left (Elevate leg 30 degrees with patient supine): 0= No drift     6b. Motor Leg - Right  (Elevate leg 30 degrees with patient supine): 0= No drift     7.   Limb Ataxia (Finger-nose, heel down shin): 1= Present in 1 limb     8.   Sensory (Pin prick to face, arm, trunk and leg - compare side to side): 0= Normal     9.  Best Language (Name item, describe a picture and read sentences): 0= No  aphasia     10. Dysarthria (Evaluate speech clarity by patient repeating listed words): 1= Mild to moderate slurring     11. Extinction and Inattention (Use information from prior testing to identify neglect or  double simultaneous stimuli testing): 0= No neglect     Total NIH Score: 3      Ancillary Data Reviewed:    Labs:  Lab Results   Component Value Date/Time    PROTHROMBTM 13.2 06/26/2022 03:35 PM    INR 1.03 06/26/2022 03:35 PM      Lab Results   Component Value Date/Time    WBC 12.8 (H) 06/28/2022 06:00 AM    RBC 5.44 06/28/2022 06:00 AM    HEMOGLOBIN 14.4 06/28/2022 06:00 AM    HEMATOCRIT 44.2 06/28/2022 06:00 AM    MCV 81.3 (L) 06/28/2022 06:00 AM    MCH 26.5 (L) 06/28/2022 06:00 AM    MCHC 32.6 (L) 06/28/2022 06:00 AM    MPV 9.4 06/28/2022 06:00 AM    NEUTSPOLYS 83.40 (H) 06/28/2022 06:00 AM    LYMPHOCYTES 7.00 (L) 06/28/2022 06:00 AM    MONOCYTES 8.90 06/28/2022 06:00 AM    EOSINOPHILS 0.00 06/28/2022 06:00 AM    BASOPHILS 0.10 06/28/2022 06:00 AM      Lab Results   Component Value Date/Time    SODIUM 140 06/28/2022 06:00 AM    POTASSIUM 3.6 06/28/2022 06:00 AM    CHLORIDE 107 06/28/2022 06:00 AM    CO2 23 06/28/2022 06:00 AM    GLUCOSE 116 (H) 06/28/2022 06:00 AM    BUN 15 06/28/2022 06:00 AM    CREATININE 0.61 06/28/2022 06:00 AM      Lab Results   Component Value Date/Time    CHOLSTRLTOT 186 06/27/2022 04:26 PM     (H) 06/27/2022 04:26 PM    HDL 41 06/27/2022 04:26 PM    TRIGLYCERIDE 79 06/27/2022 04:26 PM       Lab Results   Component Value Date/Time    ALKPHOSPHAT 70 06/26/2022 03:35 PM    ASTSGOT 16 06/26/2022 03:35 PM    ALTSGPT 18 06/26/2022 03:35 PM    TBILIRUBIN 0.2 06/26/2022 03:35 PM        Imaging/Testing:    I interpreted and/or reviewed the patient's neuroimaging    CT-HEAD W/O   Final Result         1.  Postprocedural changes of ventriculostomy placement with minimal hyperdense hemorrhage tracking along the ventriculostomy tract.   2.  Small anterior foci of right pneumocephalus.    3.  Low-density evolving stroke within the right cerebellar hemisphere effacing the fourth ventricle.   4.  Atherosclerosis         DX-ABDOMEN FOR TUBE PLACEMENT   Final Result      1.  Feeding tube tip overlies the proximal stomach.      DX-CHEST-LIMITED (1 VIEW)   Final Result      1.  Right IJ catheter tip projects over the cavoatrial junction without pneumothorax.   2.  Enlarged cardiac silhouette with mild vascular congestion.   3.  There is bibasilar atelectasis.      CT-CTA HEAD WITH & W/O-POST PROCESS   Final Result         1. No hemodynamically significant narrowing of the major intracranial vessels.      CT-CTA NECK WITH & W/O-POST PROCESSING   Final Result      1. No evidence of flow-limiting stenosis in the cervical carotid or cervical vertebral arteries.      MR-BRAIN-W/O   Final Result      1.  Large acute right posterior inferior cerebellar artery territory infarct with associated edema/swelling and localized mass effect, partially effacing the fourth ventricle. The lateral ventricles are now mildly dilated suggesting developing    obstructive hydrocephalus.   2.  Small area of petechial hemorrhage within the infarct without other significant acute intracranial hemorrhage.   3.  FLAIR hyperintensity in the right vertebral artery suggesting sluggish flow, less likely thrombus formation.      These findings were discussed with MONIQUE KNOX on 6/27/2022 1:49 PM.      CT-HEAD W/O   Final Result      Head CT without contrast within normal limits. No evidence of acute cerebral infarction, hemorrhage or mass lesion.         EC-ECHOCARDIOGRAM COMPLETE W/O CONT    (Results Pending)       Assessment and Plan:    Alistair Putnam is a 57 y.o. male with relevant history of hyperlipidemia, hypertension, heart murmur (details limited) who presented to [facility] on 6/26/22 for dizziness, vertigo, headache, and nausea. CT head wo contrast 6/26/22 showed no acute hemorrhage, no large territory  infarct, and no mass. MRI brain wo contrast 6/27/28 revealed a large acute infarct of the right posterior inferior cerebellar artery (PICA) territory with associated cytotoxic edema and mass effect with partial effacement of the fourth ventricle with mild lateral ventricle dilation suggestive of obstructive hydrocephalus and small petechial hemorrhagic transformation. Neurology was consulted 6/27/22 for acute stroke management. CTA head and neck showed no high grade stenosis or large vessel occlusion.      Etiology at this time is presumably emboli to the right PICA with suspicion of cardioembolic given his relatively clear cervical vasculature, pending further stroke diagnostics with TTE and cardiac event monitoring. The patient remains in the ICU for close neurological monitoring and hypertonic therapy as he is at high risk for decompensation from malignant cerebral edema given his large cerebellar infarct with cytotoxic edema and mass effect causing effacement of the fourth ventricle with obstructive hydrocephalus. Dr. Nielsen, Neurosurgery, consulted yesterday and placed a right frontal EVD and following for possible need for decompressive suboccipital craniectomy. CT head this AM appears stable with expected post-op changes from EVD placement. Neurological exam appears somewhat improved with patient now alert with resolution of vertigo and diplopia, but with persistent dysarthria, ataxia of the RUE, nystagmus (NIHSS 3).     Plan:     -Q2h and PRN neuro assessment. VS per nursing/unit protocol.   -Normotensive BP goal 110-140/60-80. Antihypertensives per primary team.   -Hypertonic therapy for sodium goal 150-155. Q6hr serum sodium. Na 140 this morning s/p 23% bolus yesterday and 3% at 50ml/hr vishalenlty              -Recommend a 2nd 23% bolus STAT- given 08:05   -Continued hypertonic 3% NaCl infusion per protocol   -Telemetry; currently SR. Screen for Afib/arrhythmia.   -Obtain TTE with bubble study  -Outpatient  extended cardiac event monitoring for Afib (eg. Zio patch). Placement prior to discharge.   -Continue to hold ASA given potential need for decompressive suboccipital hemicrani  -Atorvastatin 80 mg PO q HS for LDL goal <70.    -BG goal . Note hemoglobin A1c 6.2, within goal <7%   -PT/OT/SLP eval and treat.   -Counseled patient at length regarding life style and risk factor modification for secondary stroke prevention.   -DVT PPX: SCDs     The evaluation of the patient, and recommended management, was discussed with Dr. Sanz, Dr. Gonzalez, and bedside RN. I have performed a physical exam and reviewed and updated ROS and Plan today (6/28/2022). In review of yesterday's note (6/27/2022), there are no changes except as documented above.    Bayron Salgado, MSN [facility]  Nurse Practitioner  [facility]  (t) [phone]

## 2022-06-28 NOTE — CONSULTS
"Neurology Initial Consult H&P  [facility]rology    Referring Physician: Elizabeth Valdivia M.D.    Chief Complaint   Patient presents with   • Dizziness     Onset today at 1430   • Weakness   • Headache     Onset today at 1430    • Numbness     Numbness and tingling present to REKHA arms and legs   • Nausea       History of Present Illness: Alistair Putnam is a 57 y.o. male with history of hyperlipidemia, hypertension, heart murmur (details limited) who presented to [facility] on 6/26/22 for dizziness, vertigo, headache, and nausea and consulted for acute stroke. The patient was in his normal state of health yesterday when at 14:00 he became pale and had acute onset of dizziness, vertigo, and headache while talking with his wife. He attempted to stand but felt imbalanced so he sat back down. His daughter noted he had nystagmus as well. EMS was notified and the patient was brought to [facility] ER for evaluation. In the ER, BP was initially elevated 180/91 and FSBG 94 mg/dL. CT head wo contrast 6/26/22 showed no acute hemorrhage, no large territory infarct, and no mass.     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 Neurology was consulted today for acute stroke management. The patient was transferred to ICU for serial neurological assessments and hypertonic therapy. Patient reports bifrontal headache 8/10 in severity, double vision, persistent vertigo and dizziness, and slurred speech. Otherwise denies vision loss,     Past medical history:   Past Medical History:   Diagnosis Date   • Heart murmur 12/2017    \"As a child\"   • Pain 12/2017    right shoulder pain       Past surgical history: "   Past Surgical History:   Procedure Laterality Date   • SHOULDER ARTHROSCOPY W/ ROTATOR CUFF REPAIR Right 1/8/2018    Procedure: SHOULDER ARTHROSCOPY W/ ROTATOR CUFF REPAIR- WITH EXTENSIVE DEBRIDEMENT;  Surgeon: Rl Martinez M.D.;  Location: SURGERY [facility];  Service: Orthopedics   • SHOULDER DECOMPRESSION ARTHROSCOPIC Right 1/8/2018    Procedure: SHOULDER DECOMPRESSION ARTHROSCOPIC- SUBACROMIAL;  Surgeon: Rl Martinez M.D.;  Location: SURGERY [facility];  Service: Orthopedics   • SHOULDER ARTHROSCOPY W/ BICIPITAL TENODESIS REPAIR Right 1/8/2018    Procedure: SHOULDER ARTHROSCOPY W/ BICIPITAL TENODESIS REPAIR;  Surgeon: Rl Martinez M.D.;  Location: SURGERY [facility];  Service: Orthopedics   • SINUS OBLITERATION FRONTAL  2010   • KNEE ARTHROSCOPY Right 1989   • TONSILLECTOMY  1976   • TIBIA ORIF Right 1976    with skin graft       Family history:   No family history on file.    Social history:   Social History     Socioeconomic History   • Marital status:      Spouse name: Not on file   • Number of children: Not on file   • Years of education: Not on file   • Highest education level: Not on file   Occupational History   • Not on file   Tobacco Use   • Smoking status: Never Smoker   • Smokeless tobacco: Never Used   Substance and Sexual Activity   • Alcohol use: Yes     Comment: 2 per month   • Drug use: No   • Sexual activity: Not on file   Other Topics Concern   • Not on file   Social History Narrative   • Not on file     Social Determinants of Health     Financial Resource Strain: Not on file   Food Insecurity: Not on file   Transportation Needs: Not on file   Physical Activity: Not on file   Stress: Not on file   Social Connections: Not on file   Intimate Partner Violence: Not on file   Housing Stability: Not on file       Allergies:  No Known Allergies    Medications:    Current Facility-Administered Medications:   •  3% sodium chloride (HYPERTONIC SALINE) 500mL infusion, ,  Intravenous, Continuous, Elizabeth Valdivia M.D., Last Rate: 30 mL/hr at 06/27/22 1620, New Bag at 06/27/22 1620  •  senna-docusate (PERICOLACE or SENOKOT S) 8.6-50 MG per tablet 2 Tablet, 2 Tablet, Oral, BID **AND** polyethylene glycol/lytes (MIRALAX) PACKET 1 Packet, 1 Packet, Oral, QDAY PRN **AND** magnesium hydroxide (MILK OF MAGNESIA) suspension 30 mL, 30 mL, Oral, QDAY PRN **AND** bisacodyl (DULCOLAX) suppository 10 mg, 10 mg, Rectal, QDAY PRN, Steve Miguel M.D.  •  acetaminophen (Tylenol) tablet 650 mg, 650 mg, Oral, Q6HRS PRN, Steve Miguel M.D., 650 mg at 06/27/22 1339  •  ondansetron (ZOFRAN) syringe/vial injection 4 mg, 4 mg, Intravenous, Q4HRS PRN, Steve Miguel M.D.  •  ondansetron (ZOFRAN ODT) dispertab 4 mg, 4 mg, Oral, Q4HRS PRN, Steve Miguel M.D.  •  prochlorperazine (COMPAZINE) injection 5-10 mg, 5-10 mg, Intravenous, Q4HRS PRN, Steve Miguel M.D., 5 mg at 06/27/22 1034    Review of systems: In addition to what is detailed in the HPI above, all other systems reviewed and are negative.      Physical Examination:     Vitals:    06/27/22 1050 06/27/22 1458 06/27/22 1605 06/27/22 1609   BP: (!) 143/58 (!) 158/73 (!) 180/84 (!) 181/86   Pulse: 85 82 86 87   Resp: 16 16 (!) 31 (!) 25   Temp: 36.8 °C (98.2 °F) 37.3 °C (99.2 °F)     TempSrc: Temporal Temporal     SpO2: 95% 92% 97% 95%   Weight:       Height:           General: Patient in no acute distress, pleasant and cooperative.  HEENT: Normocephalic, no signs of acute trauma.   Neck: Supple, no meningeal signs or carotid bruits. There is normal range of motion. No tenderness on exam.   Chest: Regular and unlabored breaths on RA. No cough.   CV: RRR.   Skin: No signs of acute rashes or trauma.   Musculoskeletal: Joints exhibit full range of motion, without any pain to palpation. There are no signs of joint or muscle swelling. There is no tenderness to deep palpation of muscles.   Psychiatric: No hallucinatory behavior. Denies symptoms of depression or  suicidal ideation. Mood and affect appear normal on exam.     NEUROLOGICAL EXAM:  Mental status, orientation: Awake, alert, following commands, and fully oriented.   Speech and language: Speech is moderately dysarthric and fluent. The patient is able to name, repeat, and comprehend.   Memory: There is intact recollection of recent and remote events.   Cranial nerve exam: Pupils are unequal with right 4mm and left 3 mm, briskly reactive to light. Visual fields are intact by confrontation. There is bidirectional horizontal and vertical nystagmus on primary or secondary gaze. Intact full EOM in all directions of gaze. Face appears asymmetric with minor right nasolabial fold flattening. Sensation in the face is intact to light touch. Uvula is midline. Palate elevates symmetrically. Tongue is midline and without any signs of tongue biting or fasciculations. Sternocleidomastoid muscles exhibit is normal strength bilaterally. Shoulder shrug is intact bilaterally.   Motor exam: Strength is 5/5 in all extremities. Tone is normal. No abnormal movements were seen on exam.   Sensory exam Reveals normal sense of light touch and pinprick in all extremities.   Deep tendon reflexes:  Plantar responses are flexor. There is no clonus.   Coordination: Ataxia noted to RUE with finger-nose-finger Normal heel-down-shin tests to BLE.  Gait: Deferred per patient preference.     NIH Stroke Scale  6/27/22 1700    1a. Level of Consciousness (Alert, drowsy, etc): 0= Alert    1b. LOC Questions (Month, age): 0= Answers both correctly    1c. LOC Commands (Open/close eyes make fist/let go): 0= Obeys both correctly    2.   Best Gaze (Eyes open - patient follows examiner's finger on face): 0= Normal    3.   Visual Fields (introduce visual stimulus/threat to patient's field quadrants): 0= No visual loss    4.   Facial Paresis (Show teeth, raise eyebrows and squeeze eyes shut): 1= Minor     5a. Motor Arm - Left (Elevate arm to 90 degrees if patient is  sitting, 45 degrees if  supine): 0= No drift    5b. Motor Arm - Right (Elevate arm to 90 degrees if patient is sitting, 45 degrees if supine): 0= No drift    6a. Motor Leg - Left (Elevate leg 30 degrees with patient supine): 0= No drift    6b. Motor Leg - Right  (Elevate leg 30 degrees with patient supine): 0= No drift    7.   Limb Ataxia (Finger-nose, heel down shin): 1= Present in 1 limb    8.   Sensory (Pin prick to face, arm, trunk and leg - compare side to side): 0= Normal    9.  Best Language (Name item, describe a picture and read sentences): 0= No aphasia    10. Dysarthria (Evaluate speech clarity by patient repeating listed words): 1= Mild to moderate slurring    11. Extinction and Inattention (Use information from prior testing to identify neglect or  double simultaneous stimuli testing): 0= No neglect    Total NIH Score: 3      Pre-stroke Modified Ross Scale (MRS): 0 = No symptoms        ANCILLARY DATA REVIEWED:     Labs:  Lab Results   Component Value Date/Time    PROTHROMBTM 13.2 06/26/2022 03:35 PM    INR 1.03 06/26/2022 03:35 PM      Lab Results   Component Value Date/Time    WBC 6.0 06/26/2022 03:35 PM    RBC 5.72 06/26/2022 03:35 PM    HEMOGLOBIN 15.2 06/26/2022 03:35 PM    HEMATOCRIT 46.3 06/26/2022 03:35 PM    MCV 80.9 (L) 06/26/2022 03:35 PM    MCH 26.6 (L) 06/26/2022 03:35 PM    MCHC 32.8 (L) 06/26/2022 03:35 PM    MPV 9.9 06/26/2022 03:35 PM    NEUTSPOLYS 48.60 06/26/2022 03:35 PM    LYMPHOCYTES 37.10 06/26/2022 03:35 PM    MONOCYTES 11.80 06/26/2022 03:35 PM    EOSINOPHILS 1.70 06/26/2022 03:35 PM    BASOPHILS 0.50 06/26/2022 03:35 PM      Lab Results   Component Value Date/Time    SODIUM 138 06/27/2022 03:03 PM    POTASSIUM 3.5 (L) 06/26/2022 03:35 PM    CHLORIDE 110 06/26/2022 03:35 PM    CO2 20 06/26/2022 03:35 PM    GLUCOSE 125 (H) 06/26/2022 03:35 PM    BUN 25 (H) 06/26/2022 03:35 PM    CREATININE 0.97 06/26/2022 03:35 PM      No results found for: CHOLSTRLTOT, LDL, HDL, TRIGLYCERIDE     Lab Results   Component Value Date/Time    ALKPHOSPHAT 70 06/26/2022 03:35 PM    ASTSGOT 16 06/26/2022 03:35 PM    ALTSGPT 18 06/26/2022 03:35 PM    TBILIRUBIN 0.2 06/26/2022 03:35 PM        Imaging/Testing:    I interpreted and/or reviewed the patient's neuroimaging    CT-CTA HEAD WITH & W/O-POST PROCESS   Final Result         1. No hemodynamically significant narrowing of the major intracranial vessels.      CT-CTA NECK WITH & W/O-POST PROCESSING   Final Result      1. No evidence of flow-limiting stenosis in the cervical carotid or cervical vertebral arteries.      MR-BRAIN-W/O   Final Result      1.  Large acute right posterior inferior cerebellar artery territory infarct with associated edema/swelling and localized mass effect, partially effacing the fourth ventricle. The lateral ventricles are now mildly dilated suggesting developing    obstructive hydrocephalus.   2.  Small area of petechial hemorrhage within the infarct without other significant acute intracranial hemorrhage.   3.  FLAIR hyperintensity in the right vertebral artery suggesting sluggish flow, less likely thrombus formation.      These findings were discussed with MONIQUE KNOX on 6/27/2022 1:49 PM.      CT-HEAD W/O   Final Result      Head CT without contrast within normal limits. No evidence of acute cerebral infarction, hemorrhage or mass lesion.         EC-ECHOCARDIOGRAM COMPLETE W/O CONT    (Results Pending)     Presumed Mechanism by TOAST:  Embolic    Assessment and Plan:    Alistair Putnam is a 57 y.o. male with relevant history of hyperlipidemia, hypertension, heart murmur (details limited) who presented to [facility] on 6/26/22 for dizziness, vertigo, headache, and nausea. CT head wo contrast 6/26/22 showed no acute hemorrhage, no large territory infarct, and no mass. MRI brain wo contrast completed today revealed a large acute infarct of the right posterior inferior cerebellar artery (PICA) territory with associated  cytotoxic edema and mass effect with partial effacement of the fourth ventricle with mild lateral ventricle dilation suggestive of obstructive hydrocephalus and small petechial hemorrhagic transformation. Neurology was consulted today for acute stroke management. Neurological exam is significant for dysarthria, ataxia of the RUE, nystagmus, vertigo, diplopia, and nausea; however, patient remains alert at this time (NIHSS 3). CTA head and neck showed no high grade stenosis or large vessel occlusion.     Etiology at this time is presumably emboli to the right PICA with suspicion of cardioembolic given his relatively clear cervical vasculature.    The patient was transferred to the ICU for closer neurological monitoring and hypertonic saline as he is at high risk for decompensation from malignant cerebral edema given his large cerebellar infarct with cytotoxic edema and mass effect causing effacement of the fourth ventricle with obstructive hydrocephalus.     Plan:    -Q2h and PRN neuro assessment. VS per nursing/unit protocol.   -Permissive HTN not indicated given no LVO or high grade stenosis. Normotensive BP goal 110-130/60-80.    -Antihypertensives per primary team.   -Hypertonic 3% saline for sodium goal 150-155   -Recommend CVC placement for 23% bolus and continued hypertonic 3% infusion per protocol   -Telemetry; currently SR. Screen for Afib/arrhythmia.   -Obtain TTE with bubble study  -Outpatient extended cardiac event monitoring for Afib (eg. Zio patch). Placement prior to discharge.   -Hold ASA today given petechial hemorrhagic transformation and hemicrani watch for possible suboccipital crani  -Atorvastatin 40 mg PO q HS for LDL goal <70. Check lipid panel.   -BG goal . Note hemoglobin A1c 6.2, within goal <7%   -PT/OT/SLP eval and treat.   -Counseled patient at length regarding life style and risk factor modification for secondary stroke prevention.   -DVT PPX: SCDs     The evaluation of the patient,  and recommended management, was discussed with Dr. Sanz, Dr. Valdivia, and bedside RN.     Bayron Salgado, MSN [facility]  Nurse Practitioner  [facility]  t) [phone]

## 2022-06-28 NOTE — DISCHARGE PLANNING
Stroke, s/p EVD placement. Physiatry consult pended, awaiting therapy evals when appropriate. TCC will follow.

## 2022-06-28 NOTE — OP REPORT
DATE OF SERVICE:  06/27/2022     PREOPERATIVE DIAGNOSIS:  Early hydrocephalus, right PICA stroke.     POSTOPERATIVE DIAGNOSIS:  Early hydrocephalus, right PICA stroke.     PROCEDURE PERFORMED:  Right frontal external ventricular drain.     SURGEON:  Ned Nielsen III, MD     ASSISTANT:  None.     ESTIMATED BLOOD LOSS:  Less than 5 mL.     FINDINGS:  ICP less than 12, clear CSF noted.     COMPLICATIONS:  None.     DRAINS:  Left EVD at 0, drain less than 10 mL per hour to prevent upward   herniation, close neuro checks.     DISPOSITION:  To remain in the ICU.     HISTORY OF PRESENT ILLNESS:  This 57-year-old man who is 16 or 17 hours out   from a right PICA stroke with dysarthria.  He has intermittent somnolence.    There is slight increase in the size of the ventricles on a repeat CAT scan   without hemorrhagic transformation.  I deemed him a candidate for EVD   placement.  I explained the risks to the family including pain, infection,   bleeding, CSF leak, pericardial symptoms, neurologic deficit and the need for   shunt in the future.  He understood the risks and agreed to consent.     SUMMARY OF OPERATIVE PROCEDURE:  The patient was already in the ICU and had   been given sedation earlier for procedure and so, it was well controlled.  We   did local anesthesia as well to the scalp, we flaco standard incision 9 cm back   from the glabella in the midpupillary line on the right.  Preoperative   antibiotics were given.  Proper timeout was performed.  The patient was   prepped and draped in sterile fashion.     A small short linear incision was made and held back with self-retaining   retractor.  We did twist drill ann hole, punctured the dura and then passed   an antibiotic-impregnated catheter to 6.5 cm at the skull.  It was tunneled   out laterally and secured to skin with stitch.  Clear CSF was noted, pressure   about 12.  This was secured on the skin at multiple points.  We closed the   wound after irrigating it  out with a running 3-0 locking nylon.  Sterile   dressings were applied.     There were no complications.  Needle and sponge count correct at the end of   the case.        ______________________________  MD KOBE Vazquez III/COLE    DD:  06/27/2022 21:53  DT:  06/27/2022 22:30    Job#:  [id number]

## 2022-06-28 NOTE — THERAPY
Speech Language Pathology   Clinical Swallow Evaluation     Patient Name: Alistair Putnam  AGE:  57 y.o., SEX:  male  Medical Record #: [id number]  Today's Date: 6/28/2022     Precautions  Precautions: Swallow Precautions ( See Comments), Nasogastric Tube  Comments: EVD, dysarthric    HPI:  Alistair Putnam is a 57 y.o. male with relevant history of hyperlipidemia, hypertension, heart murmur (details limited) who presented to [facility] on 6/26/22 for dizziness, vertigo, headache, and nausea. CT head wo contrast 6/26/22 showed no acute hemorrhage, no large territory infarct, and no mass. 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 Neurology was consulted today for acute stroke management. Neurological exam is significant for dysarthria, ataxia of the RUE, nystagmus, vertigo, diplopia, and nausea; however, patient remains alert at this time (NIHSS 3). CTA head and neck showed no high grade stenosis or large vessel occlusion.      Etiology at this time is presumably emboli to the right PICA with suspicion of cardioembolic given his relatively clear cervical vasculature.    The patient was transferred to the ICU for closer neurological monitoring and hypertonic saline as he is at high risk for decompensation from malignant cerebral edema given his large cerebellar infarct with cytotoxic edema and mass effect causing effacement of the fourth ventricle with obstructive hydrocephalus.        PMHx:    SE EMR. No prior SLP per EMR.     Level of Consciousness: Alert  Affect/Behavior: Appropriate, Calm  Follows Directives: Yes  Orientation: Self, Situation  Hearing: Functional hearing  Vision:  dble  "vision that improves when pt closes when of his eyes.       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 Pt is indep.     Oral Mechanism Evaluation  Facial Symmetry: slight asymmetry with right< left. Per neuro APN notes right mildly weaker.  Facial Sensation: Equal  Labial Observations: WFL  Lingual Observations: Midline  Dentition: Good  Comments:      Voice  Quality: muffled and mildly hoarse quality.   Intensity: Appropriate  Cough: Perceptually weak  Comments:      Current Method of Nutrition   NPO until cleared by speech pathology      Subjective  Pt stating no intake x2 days and that he is hungry. Pt and family state frequent coughing during intake premorbidly r/t mucous and increased since nasal septum surgery.        Assessment  Positioning: Pearl's (60-90 degrees)  Bolus Administration: SLP and Patient  Oxygen Requirements: nasal canula  Factor(s) Affecting Performance: Pt has EVD drain that nurs needs to adjust when pt's position is changed.     Swallowing Trials  Ice: WFL  Thin Liquid (TN0): Impaired  Mildly Thick Liquid (MT2): Not tested  Liquidised (LQ3): WFL      Comments:  Pt awake and alert. Per nurs, pt passed nurs swallowing screen and diet was ordered but not started r/t changes in medical status. EVD placed. NG placed but tube feeding not started with improved speech and wakefulness this AM. Moderate dysarthria with imprecise articulation and hoarse and muffled voice. Pt with coughing episode post sips of thin water from the cup. Pt denying any swallowing difficulty on the first swallow but stating second swallow difficult because \"there isn't anything to swallow.\"       Clinical Impressions  Signs and symptoms of possible penetration and or aspiration with cup sip thins. Pt also with weak cued cough. 6/27 chest demonstrating mild cardiovascular congestion and bibasilar atelectasis. Per EMR, pt with large right acute posterior cerebellum artery " infarct. Related to pt's current medical status and s/s of dysarthria and coughing post sips thins, FEES is recommended and will be completed this PM.        Recommendations  1.  NPO  2.  Instrumentation: FEES-to be completed at approx 1:15 today.      06/28/22 1217   Patient / Family Goals   Goal #1 Outcome Goal not met   Short Term Goals   Short Term Goal # 1 Pt will follow swallow directives during prefeeding and FEES with min repetition.   Goal Outcome # 1 Progressing as expected

## 2022-06-28 NOTE — PROGRESS NOTES
"[facility] Critical Care Medicine Progress Note    Brief HPI/problem list:  Mr. Putnam is a 57 year old male with no past medical history and is not a smoker who presented to the ER yesterday with complaints of sudden onset of dizziness yesterday afternoon.  The patient reports that he was in his usual state of health when he got up to get a glass of water when he had sudden onset of dizziness as if the \"room was spinning\".  He reported that he had balance issues and had to sit down.  He had nausea and vomiting.  No tinnitus.  No unilateral numbness, tingling, or weakness to extremities.  Family witness that the patient had horizontal nystagmus for about 15 minutes.  EMS witnessed this as well.  He was brought to the ER and admitted.  He underwent an MRI brain today which revealed a large right cerebellar stroke and was transferred to the ICU for ongoing care. (Dr Valdivia HPI 6/27)      6/27 - admit, s/p EVD at night  6/28 - Neuro better, HA    Daily exam:  A&O 3-4, some dysarthria, brainstem reflexes intact, fairly equal strength bilaterally, NH 3, lungs clear, RRR, abdomen benign    Daily data review:  Na 140,  Follow-up CT head with EVD and changes consistent with evolution of right cerebellar hemisphere stroke with effacement of the fourth ventricle    Case reviewed with speech therapist, patient partially failing simple swallow eval, FEES study pending for later today    TEAM Rounds:  Neuro: ICP 1-6, output 5-10, CCP , 3% saline 50cc/hr  HR: SR, 80-90s  SBP: 130-150s  Tmax: 99.2, WBC 12.8  GI: NPO, swallow eval pending  UOP: adequate  Lines: R IJ CVC, PIVs  Resp: 2 lpm NC, CXR 6/27 clear  Vte: SCDs, no heparins with EVD  PPI/H2: none  Antibx: NA    Assessment:  CVA right cerebellum with fourth ventricle effacement, R PICA  Hydrocephalus status post EVD 6/27  Swallow dysfunction  Hypokalemia  History of heart murmur  History of multiple orthopedic procedures    Plan of care:  Actively titrating 3% saline " infusion  S/p 23% bolus 200 mEq x2 repeat as needed  Salt tabs, start 1 g 3 times daily  Consider Florinef  Swallow eval-F EES  ECHO pending  IS mobilize when okay with neurosurgery  Statin high-dose  Optimize electrolytes    Case reviewed with RN, Charge RN, RT, Clinical pharmacist, speech therapy, neurology and [employer] Gold Resident    Critical care time provided was 40 minutes. This excludes all separate billable procedures.     Please see [facility]/NP notes for additional documentation    Steve Victor MD  RCC

## 2022-06-28 NOTE — THERAPY
Speech Language Pathology   Fiberoptic Endoscopic Evaluation of Swallow     Patient Name: Alistair Putnam  AGE:  57 y.o., SEX:  male  Medical Record #: [id number]  Today's Date: 6/28/2022     Precautions  Precautions: Swallow Precautions ( See Comments), Nasogastric Tube  Comments: EVD, dysarthric  HPI:  Alistair Putnam is a 57 y.o. male with relevant history of hyperlipidemia, hypertension, heart murmur (details limited) who presented to [facility] on 6/26/22 for dizziness, vertigo, headache, and nausea. CT head wo contrast 6/26/22 showed no acute hemorrhage, no large territory infarct, and no mass. 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 Neurology was consulted today for acute stroke management. Neurological exam is significant for dysarthria, ataxia of the RUE, nystagmus, vertigo, diplopia, and nausea; however, patient remains alert at this time (NIHSS 3). CTA head and neck showed no high grade stenosis or large vessel occlusion.      Etiology at this time is presumably emboli to the right PICA with suspicion of cardioembolic given his relatively clear cervical vasculature.    The patient was transferred to the ICU for closer neurological monitoring and hypertonic saline as he is at high risk for decompensation from malignant cerebral edema given his large cerebellar infarct with cytotoxic edema and mass effect causing effacement of the fourth ventricle with obstructive hydrocephalus.            Current Method of Nutrition   NPO until cleared by speech pathology      Pertinent Information:  Affect/Behavior: Appropriate, Calm  Oxygen Requirements:    L Nasal Cannula  Cortrak: in situ  to L nare  Dentition: Good  Factor(s) Affecting Performance: Has EVD drain that needs to be positioned when pt moves.     Discussed the risks, benefits, and alternatives of the FEES procedure. Patient/family acknowledged and agreed to proceed.     Assessment  Flexible Endoscopic Evaluation of Swallowing (FEES) completed at bedside today. The endoscope was passed transnasally via left nare to evaluate the anatomy and physiology of swallowing. Pt tolerated the procedure with no apparent distress.    Anatomic Findings: mild amount of thick diffuse white secretions. L arytenoid moving greater than right  Vocal Fold Motion: Bilateral movement  Secretion Management: Excess secretions-mild. Pt with weak cued cough that does not clear secretions.   PO trials: ice chips, thin liquids, mildly thick liquids, liquidized, puree      Consistency PAS Score Timing Comments   Ice Chips 1 N/A    Thin Liquid 2 During swallow Deep penetration to right aeryepiglottic fold silent small amount.   Mildly Thick Liquid 2 During swallow High silent penetration to mid to upper epiglottis    Liquidized 2 During swallow High silent penetration to upper epiglottis small amount   Puree 1 N/A                              1     No contrast enters airway  2     Contrast enters the airway, remains above the vocal folds, and is ejected from the airway (not seen in the airway at the end of the swallow).  3     Contrast enters the airway, remains above the vocal folds, and is not ejected from the airway (is seen in the airway after the swallow).  4     Contrast enters the airway, contacts the vocal folds, and is ejected from the airway.  5     Contrast enters the airway, contacts the vocal folds, and is not ejected from the airway  6     Contrast enters the airway, crosses the plane of the vocal folds, and is ejected from the airway.  7     Contrast enters the airway, crosses the plane of the vocal folds, and is not ejected from the airway despite  effort.  8     Contrast enters the airway, crosses the plane of the vocal folds, is not ejected from the airway and there is no response to aspiration.        Oral phase:  Suspect loss of bolus control with MT2 with additional MT2 to vallecula after the first swallow.       Pharyngeal phase:  Intermittent silent high, mid, and low silent penetration, slight amount, on all textures. Deep penetration with thin milk from the cup. Decreased clearance of mild amount of thick secretions following ice and thin liquids. Pt with mild to mild-moderate pharyngeal residue on all assessed textures with decreased clearance with second and effortful swallow. Increased residue with MT2 when compared to thins and pudding when compared to applesauce. Pt demonstrating weak tongue base retraction, weak pharyngeal contraction, and decreased sensation when penetration occurs. Intermittent slight to mild PE segment residue with slight silent penetration.         Clinical Impressions  The pt presents with a mild-moderate oropharyngeal swallow, likely acute related to acute CVA. Swallow safety is impaired; swallow efficiency is impaired. Risk for aspiration PNA is moderate. Risk for malnutrition/dehydration is moderate. Non-oral nutrition is indicated at this time.. Swallow prognosis is excellent given pt follows all directives and is motivated to restart PO. Pt also has a supportive family. Pt appears to be a excellent candidate for exercise-based and behavioral swallow rehabilitation.         Recommendations  1. NPO and Cortrack feeding to start. Pt at the level for 5-10 single ice chips with RN every hour as tolerated with dble swallow and sitting at 90 degrees.   2.  Swallowing Instructions & Precautions:   Supervision: 1:1 feeding with constant supervision for limited ice  Positionin degrees for limited ice.  Medication: Non Oral   Strategies: Small bites/sips, Alternate bites and sips, Slow rate of intake, Liquids by teaspoon  only=WITH SLP ONLY DURING PREFEEDING.   Oral Care: Q4h     06/28/22 1401   Short Term Goals   Goal Outcome  # 4 Goal not met

## 2022-06-28 NOTE — PROGRESS NOTES
Exam even more alert this am, dysarthric but symmetrically follows commands. EVD at 0, draining 5-10cc/hr. CT this am shows slightly improved vent size. Will follow exam closely, will discuss patient with my partner Dr Zaragoza who is covering for me for the next two days.

## 2022-06-28 NOTE — THERAPY
Physical Therapy Contact Note    Patient Name: Alistair Putnam  Age:  57 y.o., Sex:  male  Medical Record #: [id number]  Today's Date: 6/28/2022 06/28/22 0832   Interdisciplinary Plan of Care Collaboration   Collaboration Comments PT consult received.  Per chart review pt with new EVD placed last night.  RN and neurology APRN recommend HOLD today due to EVD.  Will follow up as able/appropriate when pt cleared for OOB mobility     Karey Harris, PT, DPT

## 2022-06-28 NOTE — CARE PLAN
Problem: Pain - Standard  Goal: Alleviation of pain or a reduction in pain to the patient’s comfort goal  Outcome: Progressing     Problem: Knowledge Deficit - Standard  Goal: Patient and family/care givers will demonstrate understanding of plan of care, disease process/condition, diagnostic tests and medications  Outcome: Progressing     Problem: Fall Risk  Goal: Patient will remain free from falls  Outcome: Progressing     Problem: Optimal Care of the Stroke Patient  Goal: Optimal emergency care for the stroke patient  Outcome: Progressing  Goal: Optimal acute care for the stroke patient  Outcome: Progressing     Problem: Knowledge Deficit - Stroke Education  Goal: Patient's knowledge of stroke and risk factors will improve  Outcome: Progressing     Problem: Psychosocial - Patient Condition  Goal: Patient's ability to verbalize feelings about condition will improve  Outcome: Progressing  Goal: Patient's ability to re-evaluate and adapt role responsibilities will improve  Outcome: Progressing     Problem: Discharge Planning - Stroke  Goal: Ensure Stroke Core Measures are met prior to discharge  Outcome: Progressing  Goal: Patient’s continuum of care needs will be met  Outcome: Progressing     Problem: Neuro Status  Goal: Neuro status will remain stable or improve  Outcome: Progressing     Problem: Hemodynamic Monitoring  Goal: Patient's hemodynamics, fluid balance and neurologic status will be stable or improve  Outcome: Progressing     Problem: Respiratory - Stroke Patient  Goal: Patient will achieve/maintain optimum respiratory rate/effort  Outcome: Progressing     Problem: Dysphagia  Goal: Dysphagia will improve  Outcome: Progressing     Problem: Risk for Aspiration  Goal: Patient's risk for aspiration will be absent or decrease  Outcome: Progressing     Problem: Urinary Elimination  Goal: Establish and maintain regular urinary output  Outcome: Progressing     Problem: Bowel Elimination  Goal: Establish and  maintain regular bowel function  Outcome: Progressing     Problem: Mobility - Stroke  Goal: Patient's capacity to carry out activities will improve  Outcome: Progressing  Goal: Spasticity will be prevented or improved  Outcome: Progressing  Goal: Subluxation will be prevented or improved  Outcome: Progressing     Problem: Self Care  Goal: Patient will have the ability to perform ADLs independently or with assistance (bathe, groom, dress, toilet and feed)  Outcome: Progressing   The patient is Watcher - Medium risk of patient condition declining or worsening    Shift Goals  Clinical Goals: stable/ improved neuro  Patient Goals: rest, feel better    Progress made toward(s) clinical / shift goals:  .      Patient is not progressing towards the following goals:

## 2022-06-28 NOTE — CONSULTS
DATE OF SERVICE:  06/27/2022        NEUROSURGERY INPATIENT CONSULTATION     CHIEF COMPLAINT:  Right PICA stroke, early hydrocephalus.     HISTORY OF PRESENT ILLNESS:  A 57-year-old right-handed man who about 16 hours   ago had developed dysarthria and dizziness.  He became dysarthric.  He has   been moving all 4 extremities with intermittent sleepiness.  He is admitted   with a right PICA stroke.  It was not intervened on. No aspirin has been   started.  An MRI of the brain shows a right PICA territory, some effacement of   the fourth ventricle.  The CTA obtained later showed a slight increase in the   size of the ventricles indicating perhaps early obstructive hydrocephalus.    He is mostly maintained on his exam.  He had a core tract placed about 2 to   2-1/2 hours ago, some Dilaudid given, a little bit sleepy.       PAST MEDICAL HISTORY:  Is otherwise negative except for heart murmur.  He had   an elevated blood pressure.  It is controlled with nicardipine, is currently   139/40.     PAST SURGICAL HISTORY:  Include knee surgery, frontal sinus surgery, shoulder   arthroscopy and tendon repair.     SOCIAL HISTORY:  He is a nonsmoker, social drinker.  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  His family is at his bedside.  No allergies.     PHYSICAL EXAMINATION:    GENERAL:  He is sleepy, but he opens his eyes to voice.  He follows commands.    There is nystagmus bilaterally, more to the right. There may be a slight left   gaze preference.  His pupils are symmetric.  He knows his name, he knows   where he is.  He knows the year.  He knows his wife's name.  NEUROLOGIC:  He moves his arms symmetrically.  Follows commands briskly with   good sensation of face, arms, legs.     ASSESSMENT AND PLAN:  The patient has right PICA stroke.  It does occupy   significant amount of territory.  There is fullness of the cerebellum.  He   does have mild prominence of the ventricles.  I placed a right frontal   external ventricular drain.  We  will continue to watch him very closely.  He   may eventually need a posterior fossa craniectomy, but he is maintaining his   exam.  Family was asked to continue to watch him rather than go right to   surgery.  We did discuss that possibility and that may occur over the next 1-3   days. The risks and benefits of the EVD were explained including pain,   infection, bleeding, CSF leak, pericardial symptoms, need for other procedures   and a shunt eventually. It is unlikely that he will need a shunt, he does not   have any intraventricular decompression or hemorrhagic transformation at this   point.  I will expertise his care, I placed EVD and watch him very closely.              ______________________________  Ned Nielsen III, MD    ECP/SUP    DD:  06/27/2022 21:51  DT:  06/27/2022 23:29    Job#:  [id number]

## 2022-06-28 NOTE — OR SURGEON
Immediate Post OP Note    PreOp Diagnosis: R PICA stroke, early hydrocephalus      PostOp Diagnosis: same    R Frontal EVD      Anesthesiologist/Type of Anesthesia:  Anesthesia staff cannot be found from this context./* No surgery found *    Surgical Staff:  DIEGO Nielsen    Specimens removed if any:  None    Estimated Blood Loss: < 5 cc    Findings: ICP < 12 on insertion, clear    Complications: none        6/27/2022 9:46 PM Ned Nielsen III, M.D.

## 2022-06-28 NOTE — PROGRESS NOTES
Dr. Nielsen called at change of shift. Notified RN that he will be placing an EVD. Wife called and updated. Will come back to Unit to sign consent.

## 2022-06-28 NOTE — PROCEDURES
Central Line Insertion    Date/Time: 6/27/2022 6:34 PM  Performed by: Elizabeth Valdivia M.D.  Authorized by: Elizabeth Valdivia M.D.     Consent:     Consent obtained:  Verbal    Consent given by:  Patient    Risks discussed:  Incorrect placement and pneumothorax    Alternatives discussed:  No treatment  Pre-procedure details:     Hand hygiene: Hand hygiene performed prior to insertion      Sterile barrier technique: All elements of maximal sterile technique followed      Skin preparation:  ChloraPrep    Skin preparation agent: Skin preparation agent completely dried prior to procedure    Sedation:     Sedation type:  Anxiolysis (Dilaudid 0.5mg IV)  Anesthesia:     Anesthesia method:  Local infiltration    Local anesthetic:  Lidocaine 1% w/o epi  Procedure details:     Location:  R internal jugular    Patient position:  Flat    Procedural supplies:  Triple lumen    Catheter size:  7.5 Fr    Landmarks identified: no      Ultrasound guidance: yes      Sterile ultrasound techniques: Sterile gel and sterile probe covers were used      Number of attempts:  1    Successful placement: yes    Post-procedure details:     Post-procedure:  Dressing applied and line sutured    Assessment:  Blood return through all ports, no pneumothorax on x-ray and placement verified by x-ray    Patient tolerance of procedure:  Tolerated well, no immediate complications  Comments:      Wire removal: After insertion of the catheter via Seldinger technique the guidewire was removed intact and confirmed by the assistant in the room.

## 2022-06-28 NOTE — PROGRESS NOTES
Mild enlargement of vents after R PICA stroke, sleepy but follows commands and oriented x 3 with dysarthria. EVD to protect him, close neuro checks. He is at 18 hours since his stroke, may eventually need posterior fossa craniectomy over the next several days but he is maintaining exam currently. Agree with HOB up, EVD drainage < 10cc/hr, 3% sodium, head Ct in am.

## 2022-06-28 NOTE — PROGRESS NOTES
Cortrak Placement    Tube Team verified patient name and medical record number prior to tube placement.  Cortrak tube (43 inches, 10 Ukrainian) placed at 60 cm in left nare.  Per Cortrak picture, tube appears to be in the stomach.  Nursing Instructions: Awaiting KUB to confirm placement before use for medications or feeding. Once placement confirmed, flush tube with 30 ml of water, and then remove and save stylet, in patient medication drawer.

## 2022-06-28 NOTE — DIETARY
"Nutrition Support Assessment:  Day 1 of admit.  Alistair Putnam is a 57 y.o. male with admitting DX of dizziness and stroke.     Current problem list:  1. Stroke of unknown etiology  2. Acute stroke d/t occlusion of R cerebellar artery  3. Hypokalemia  4. Dizziness     Assessment:  Estimated Nutritional Needs based on:   Height: 180.3 cm (5' 11\")  Weight: 95.3 kg (210 lb) - other healthcare, in ER  Weight to Use in Calculations: 95.3 kg (210 lb 1.6 oz)  Body mass index is 29.29 kg/m²., BMI classification: overweight    Calculation/Equation: MSJ x 1.2 = 2168 kcals  Total Calories / day: 2100 - 2400  (Calories / k - 25)  Total Grams Protein / day: 95 - 114  (Grams Protein / k - 1.2)     Evaluation:   1. Consult received for TF.  2. Gastric cortrak placed and confirmed on KUB for enteral access.  3. Noted pt is A/O x 2.  4. SLP saw earlier today, unknown outcome at this time  5. Skin: No wounds or edema present.  6. Labs: glucose 116, Ca 8.3, A1C 6.2,   7. Meds: lipitor, colace, salt tabs, tylenol prn, zofran prn, bowel protocol, 3% NaCl  8. Last BM: PTA  9. Standard formula indicated.     Malnutrition Risk: Unable to fully assess, pt observed adequately nourished.     Recommendations/Plan:  1. Start TF Fibersource HN at 25 ml/hr and advance per protocol to goal rate of 75 ml/hr to provide 2160 kcals, 97 gm protien, and 1458 ml free water per day.  2. Fluids per MD.  3. Diet upgrades per SLP.     RD following.              "

## 2022-06-29 ENCOUNTER — HOSPITAL ENCOUNTER (INPATIENT)
Facility: REHABILITATION | Age: 57
End: 2022-06-29
Attending: PHYSICAL MEDICINE & REHABILITATION | Admitting: PHYSICAL MEDICINE & REHABILITATION
Payer: OTHER MISCELLANEOUS

## 2022-06-29 LAB
ANION GAP SERPL CALC-SCNC: 10 MMOL/L (ref 7–16)
BASOPHILS # BLD AUTO: 0.2 % (ref 0–1.8)
BASOPHILS # BLD: 0.02 K/UL (ref 0–0.12)
BUN SERPL-MCNC: 15 MG/DL (ref 8–22)
CALCIUM SERPL-MCNC: 8.4 MG/DL (ref 8.5–10.5)
CHLORIDE SERPL-SCNC: 112 MMOL/L (ref 96–112)
CO2 SERPL-SCNC: 22 MMOL/L (ref 20–33)
CREAT SERPL-MCNC: 0.59 MG/DL (ref 0.5–1.4)
EOSINOPHIL # BLD AUTO: 0 K/UL (ref 0–0.51)
EOSINOPHIL NFR BLD: 0 % (ref 0–6.9)
ERYTHROCYTE [DISTWIDTH] IN BLOOD BY AUTOMATED COUNT: 48.6 FL (ref 35.9–50)
GFR SERPLBLD CREATININE-BSD FMLA CKD-EPI: 114 ML/MIN/1.73 M 2
GLUCOSE SERPL-MCNC: 143 MG/DL (ref 65–99)
HCT VFR BLD AUTO: 44.3 % (ref 42–52)
HGB BLD-MCNC: 14.2 G/DL (ref 14–18)
IMM GRANULOCYTES # BLD AUTO: 0.04 K/UL (ref 0–0.11)
IMM GRANULOCYTES NFR BLD AUTO: 0.4 % (ref 0–0.9)
LYMPHOCYTES # BLD AUTO: 1.23 K/UL (ref 1–4.8)
LYMPHOCYTES NFR BLD: 12.1 % (ref 22–41)
MCH RBC QN AUTO: 26.3 PG (ref 27–33)
MCHC RBC AUTO-ENTMCNC: 32.1 G/DL (ref 33.7–35.3)
MCV RBC AUTO: 82.2 FL (ref 81.4–97.8)
MONOCYTES # BLD AUTO: 1.07 K/UL (ref 0–0.85)
MONOCYTES NFR BLD AUTO: 10.6 % (ref 0–13.4)
NEUTROPHILS # BLD AUTO: 7.78 K/UL (ref 1.82–7.42)
NEUTROPHILS NFR BLD: 76.7 % (ref 44–72)
NRBC # BLD AUTO: 0 K/UL
NRBC BLD-RTO: 0 /100 WBC
PLATELET # BLD AUTO: 251 K/UL (ref 164–446)
PMV BLD AUTO: 9.5 FL (ref 9–12.9)
POTASSIUM SERPL-SCNC: 3.4 MMOL/L (ref 3.6–5.5)
RBC # BLD AUTO: 5.39 M/UL (ref 4.7–6.1)
SODIUM SERPL-SCNC: 144 MMOL/L (ref 135–145)
SODIUM SERPL-SCNC: 144 MMOL/L (ref 135–145)
SODIUM SERPL-SCNC: 148 MMOL/L (ref 135–145)
SODIUM SERPL-SCNC: 150 MMOL/L (ref 135–145)
WBC # BLD AUTO: 10.1 K/UL (ref 4.8–10.8)

## 2022-06-29 PROCEDURE — 700102 HCHG RX REV CODE 250 W/ 637 OVERRIDE(OP): Performed by: INTERNAL MEDICINE

## 2022-06-29 PROCEDURE — 700105 HCHG RX REV CODE 258: Performed by: STUDENT IN AN ORGANIZED HEALTH CARE EDUCATION/TRAINING PROGRAM

## 2022-06-29 PROCEDURE — 97163 PT EVAL HIGH COMPLEX 45 MIN: CPT

## 2022-06-29 PROCEDURE — 700101 HCHG RX REV CODE 250: Performed by: INTERNAL MEDICINE

## 2022-06-29 PROCEDURE — 700102 HCHG RX REV CODE 250 W/ 637 OVERRIDE(OP): Performed by: STUDENT IN AN ORGANIZED HEALTH CARE EDUCATION/TRAINING PROGRAM

## 2022-06-29 PROCEDURE — 84295 ASSAY OF SERUM SODIUM: CPT | Mod: 91

## 2022-06-29 PROCEDURE — 700101 HCHG RX REV CODE 250: Performed by: NURSE PRACTITIONER

## 2022-06-29 PROCEDURE — 770022 HCHG ROOM/CARE - ICU (200)

## 2022-06-29 PROCEDURE — 80048 BASIC METABOLIC PNL TOTAL CA: CPT

## 2022-06-29 PROCEDURE — 99291 CRITICAL CARE FIRST HOUR: CPT | Performed by: INTERNAL MEDICINE

## 2022-06-29 PROCEDURE — A9270 NON-COVERED ITEM OR SERVICE: HCPCS | Performed by: STUDENT IN AN ORGANIZED HEALTH CARE EDUCATION/TRAINING PROGRAM

## 2022-06-29 PROCEDURE — 700102 HCHG RX REV CODE 250 W/ 637 OVERRIDE(OP): Performed by: NURSE PRACTITIONER

## 2022-06-29 PROCEDURE — A9270 NON-COVERED ITEM OR SERVICE: HCPCS | Performed by: NURSE PRACTITIONER

## 2022-06-29 PROCEDURE — A9270 NON-COVERED ITEM OR SERVICE: HCPCS | Performed by: INTERNAL MEDICINE

## 2022-06-29 PROCEDURE — 85025 COMPLETE CBC W/AUTO DIFF WBC: CPT

## 2022-06-29 PROCEDURE — 92526 ORAL FUNCTION THERAPY: CPT

## 2022-06-29 PROCEDURE — 700105 HCHG RX REV CODE 258: Performed by: INTERNAL MEDICINE

## 2022-06-29 PROCEDURE — 97166 OT EVAL MOD COMPLEX 45 MIN: CPT

## 2022-06-29 PROCEDURE — 99233 SBSQ HOSP IP/OBS HIGH 50: CPT | Performed by: NURSE PRACTITIONER

## 2022-06-29 RX ORDER — SODIUM CHLORIDE 1 G/1
2 TABLET ORAL
Status: DISCONTINUED | OUTPATIENT
Start: 2022-06-29 | End: 2022-07-01

## 2022-06-29 RX ORDER — FLUDROCORTISONE ACETATE 0.1 MG/1
0.1 TABLET ORAL DAILY
Status: DISCONTINUED | OUTPATIENT
Start: 2022-06-29 | End: 2022-07-01

## 2022-06-29 RX ORDER — AMLODIPINE BESYLATE 10 MG/1
10 TABLET ORAL
Status: DISCONTINUED | OUTPATIENT
Start: 2022-06-30 | End: 2022-07-01

## 2022-06-29 RX ORDER — AMLODIPINE BESYLATE 5 MG/1
5 TABLET ORAL ONCE
Status: COMPLETED | OUTPATIENT
Start: 2022-06-29 | End: 2022-06-29

## 2022-06-29 RX ADMIN — SENNOSIDES AND DOCUSATE SODIUM 2 TABLET: 50; 8.6 TABLET ORAL at 18:04

## 2022-06-29 RX ADMIN — SENNOSIDES AND DOCUSATE SODIUM 2 TABLET: 50; 8.6 TABLET ORAL at 06:12

## 2022-06-29 RX ADMIN — FLUDROCORTISONE ACETATE 0.1 MG: 0.1 TABLET ORAL at 12:26

## 2022-06-29 RX ADMIN — SODIUM CHLORIDE 2 G: 1 TABLET ORAL at 18:04

## 2022-06-29 RX ADMIN — SODIUM CHLORIDE 2 G: 1 TABLET ORAL at 12:14

## 2022-06-29 RX ADMIN — SODIUM CHLORIDE 1 G: 1 TABLET ORAL at 06:12

## 2022-06-29 RX ADMIN — ATORVASTATIN CALCIUM 80 MG: 40 TABLET, FILM COATED ORAL at 18:04

## 2022-06-29 RX ADMIN — AMLODIPINE BESYLATE 5 MG: 5 TABLET ORAL at 09:56

## 2022-06-29 RX ADMIN — SODIUM CHLORIDE 200 MEQ: 4 INJECTION, SOLUTION, CONCENTRATE INTRAVENOUS at 09:09

## 2022-06-29 RX ADMIN — NICARDIPINE HYDROCHLORIDE 5 MG/HR: 25 INJECTION, SOLUTION INTRAVENOUS at 10:27

## 2022-06-29 RX ADMIN — SODIUM CHLORIDE 500 ML: 3 INJECTION, SOLUTION INTRAVENOUS at 20:53

## 2022-06-29 RX ADMIN — NICARDIPINE HYDROCHLORIDE 6 MG/HR: 25 INJECTION, SOLUTION INTRAVENOUS at 01:37

## 2022-06-29 RX ADMIN — AMLODIPINE BESYLATE 5 MG: 5 TABLET ORAL at 06:12

## 2022-06-29 RX ADMIN — NICARDIPINE HYDROCHLORIDE 5 MG/HR: 25 INJECTION, SOLUTION INTRAVENOUS at 21:39

## 2022-06-29 RX ADMIN — NICARDIPINE HYDROCHLORIDE 5 MG/HR: 25 INJECTION, SOLUTION INTRAVENOUS at 16:09

## 2022-06-29 RX ADMIN — POTASSIUM BICARBONATE 50 MEQ: 978 TABLET, EFFERVESCENT ORAL at 09:56

## 2022-06-29 RX ADMIN — SODIUM CHLORIDE 500 ML: 3 INJECTION, SOLUTION INTRAVENOUS at 03:25

## 2022-06-29 RX ADMIN — SODIUM CHLORIDE 500 ML: 3 INJECTION, SOLUTION INTRAVENOUS at 12:11

## 2022-06-29 RX ADMIN — NICARDIPINE HYDROCHLORIDE 6 MG/HR: 25 INJECTION, SOLUTION INTRAVENOUS at 05:37

## 2022-06-29 ASSESSMENT — COGNITIVE AND FUNCTIONAL STATUS - GENERAL
CLIMB 3 TO 5 STEPS WITH RAILING: A LITTLE
DAILY ACTIVITIY SCORE: 19
SUGGESTED CMS G CODE MODIFIER DAILY ACTIVITY: CK
TOILETING: A LITTLE
PERSONAL GROOMING: A LITTLE
TURNING FROM BACK TO SIDE WHILE IN FLAT BAD: A LITTLE
STANDING UP FROM CHAIR USING ARMS: A LITTLE
MOBILITY SCORE: 17
SUGGESTED CMS G CODE MODIFIER MOBILITY: CK
MOVING TO AND FROM BED TO CHAIR: A LOT
DRESSING REGULAR LOWER BODY CLOTHING: A LITTLE
MOVING FROM LYING ON BACK TO SITTING ON SIDE OF FLAT BED: A LITTLE
DRESSING REGULAR UPPER BODY CLOTHING: A LITTLE
HELP NEEDED FOR BATHING: A LITTLE
WALKING IN HOSPITAL ROOM: A LITTLE

## 2022-06-29 ASSESSMENT — ENCOUNTER SYMPTOMS
BLURRED VISION: 0
NAUSEA: 0
FOCAL WEAKNESS: 0
SENSORY CHANGE: 0
ORTHOPNEA: 0
FEVER: 0
PALPITATIONS: 0
HEMOPTYSIS: 0
NECK PAIN: 0
PHOTOPHOBIA: 0
VOMITING: 0
SPUTUM PRODUCTION: 0
DOUBLE VISION: 0
ABDOMINAL PAIN: 0
DEPRESSION: 0
COUGH: 0
CHILLS: 0
SPEECH CHANGE: 0
MYALGIAS: 0

## 2022-06-29 ASSESSMENT — ACTIVITIES OF DAILY LIVING (ADL): TOILETING: INDEPENDENT

## 2022-06-29 ASSESSMENT — GAIT ASSESSMENTS
GAIT LEVEL OF ASSIST: CONTACT GUARD ASSIST
DISTANCE (FEET): 2
DEVIATION: BRADYKINETIC

## 2022-06-29 NOTE — THERAPY
Speech Language Pathology  Daily Treatment     Patient Name: Alistair Putnam  Age:  57 y.o., Sex:  male  Medical Record #: [id number]  Today's Date: 6/29/2022     Precautions  Precautions: Swallow Precautions ( See Comments), Nasogastric Tube  Comments: EVD, dysarthria,    Assessment    Pt up to fabian chair and eager for swallow tx. Speech with mild dysarthria with good vocal intensity with mild hoarse quality. Pt able to complete consistent double swallow during intake of 6 single ice chips and 10-1/2 tsp sips of thin water. Written and verbal educ regarding tongue base retraction and pharyngeal exercises completed. Pt able to complete 3 cycles of 10 repetitions each of falsetto. Pt with 5 cycles of 2 Mary exercises. Pt completed 12 effortful swallows. Per nurs in the late afternoon, pt has been consuming greater than 10 single ice chips each hour. SLP will provide additional educ and determine pt's preference for ice chips at next tx.    Plan  NPO and Cortrack. Single ice chips 10 each hour with staff with double swallow. Prefeeding with SLP with dble swallow and 1/2 tsp amounts of thin water by spoon and 1/2 tsp amounts of applesauce. Re-educ pt and family regarding limited ice chips and determine pt/family preference regarding amount of ice chips.   Continue current treatment plan.    Discharge Recommendations: Recommend post-acute placement for additional speech therapy services prior to discharge home       06/29/22 0946   Charge Group   SLP Swallowing Dysfunction Treatment Swallowing Dysfunction Treatment   Total Treatment Time   SLP Time Spent Yes   Precautions   Precautions Swallow Precautions ( See Comments);Nasogastric Tube   Comments EVD, dysarthria,   Pain 0 - 10 Group   Therapist Pain Assessment Nurse Notified;0   Speech / Dysarthria   Comments mild dysarthria   Voice   Comments mild hoarse quality   Dysphagia    Dysphagia X   Positioning / Behavior Modification Modulate Rate or Bite Size;Multiple  "Swallows;Other (see Comments);Effortful Swallow  (sitting at 90 degrees)   Diet / Liquid Recommendation Pre-Feeding Trials with SLP Only   Nutritional Liquid Intake Rating Scale Nothing by mouth   Nutritional Food Intake Rating Scale Nothing by mouth   Nursing Communication Swallow Precaution Sign Posted at Head of Bed  (for limited ice)   Skilled Intervention Compensatory Strategies;Verbal Cueing;Other (See Comments)  (written and verbal educ regarding swallowing exercises)   Recommended Route of Medication Administration   Medication Administration  Via Gastric Tube   Patient / Family Goals   Patient / Family Goal #1 \"I haven't eaten in 48 hours.\"   Goal #1 Outcome Goal not met   Short Term Goals   Short Term Goal # 2 Pt will consume 5-10 single ice chips with nursing at 90 degrees and dble swallow and without s/s of difficulty.   Goal Outcome # 2  Progressing as expected   Short Term Goal # 3 Pt will complete dble swallow during prefeeding with single ice chips, 1/2 tsp amounts of water, and 1/2 tsp amounts of applesauce with  mod cues and without s/s of difficulty during swallowing.   Goal Outcome  # 3 Progressing as expected   Short Term Goal # 4 Pt will complete effortful swallow, tongue base retraction, and pharyngeal contraction exercises with written instructions and mod cues.   Goal Outcome  # 4 Progressing as expected     "

## 2022-06-29 NOTE — CARE PLAN
Problem: Pain - Standard  Goal: Alleviation of pain or a reduction in pain to the patient’s comfort goal  Outcome: Progressing     Problem: Knowledge Deficit - Standard  Goal: Patient and family/care givers will demonstrate understanding of plan of care, disease process/condition, diagnostic tests and medications  Outcome: Progressing     Problem: Fall Risk  Goal: Patient will remain free from falls  Outcome: Progressing     Problem: Optimal Care of the Stroke Patient  Goal: Optimal emergency care for the stroke patient  Outcome: Progressing  Goal: Optimal acute care for the stroke patient  Outcome: Progressing     Problem: Knowledge Deficit - Stroke Education  Goal: Patient's knowledge of stroke and risk factors will improve  Outcome: Progressing     Problem: Psychosocial - Patient Condition  Goal: Patient's ability to verbalize feelings about condition will improve  Outcome: Progressing  Goal: Patient's ability to re-evaluate and adapt role responsibilities will improve  Outcome: Progressing     Problem: Discharge Planning - Stroke  Goal: Ensure Stroke Core Measures are met prior to discharge  Outcome: Progressing  Goal: Patient’s continuum of care needs will be met  Outcome: Progressing     Problem: Neuro Status  Goal: Neuro status will remain stable or improve  Outcome: Progressing     Problem: Hemodynamic Monitoring  Goal: Patient's hemodynamics, fluid balance and neurologic status will be stable or improve  Outcome: Progressing     Problem: Respiratory - Stroke Patient  Goal: Patient will achieve/maintain optimum respiratory rate/effort  Outcome: Progressing     Problem: Dysphagia  Goal: Dysphagia will improve  Outcome: Progressing     Problem: Risk for Aspiration  Goal: Patient's risk for aspiration will be absent or decrease  Outcome: Progressing     Problem: Urinary Elimination  Goal: Establish and maintain regular urinary output  Outcome: Progressing     Problem: Bowel Elimination  Goal: Establish and  maintain regular bowel function  Outcome: Progressing     Problem: Mobility - Stroke  Goal: Patient's capacity to carry out activities will improve  Outcome: Progressing  Goal: Spasticity will be prevented or improved  Outcome: Progressing  Goal: Subluxation will be prevented or improved  Outcome: Progressing     Problem: Self Care  Goal: Patient will have the ability to perform ADLs independently or with assistance (bathe, groom, dress, toilet and feed)  Outcome: Progressing   The patient is Watcher - Medium risk of patient condition declining or worsening    Shift Goals  Clinical Goals: stable/ improved neuro  Patient Goals: pass FEES    Progress made toward(s) clinical / shift goals:  .      Patient is not progressing towards the following goals:

## 2022-06-29 NOTE — THERAPY
"Occupational Therapy   Initial Evaluation     Patient Name: Alistair Putnam  Age:  57 y.o., Sex:  male  Medical Record #: [id number]  Today's Date: 6/29/2022     Precautions  Precautions: Swallow Precautions ( See Comments), Nasogastric Tube  Comments: EVD, dysarthria,    Assessment  Patient is 57 y.o. male who presents to acute w/ c/o dizziness, found to have large R PICA stroke. POD #2 R frontal EVD placement. PMH includes HLD, HTN, and heart murmur. Pt required CGA/Min A for BADLs and mobility today and demo'd impaired balance, functional mobility, and activity tolerance impacting functional independence. Will continue to follow while in house. Anticipate quick progression while in house.     Plan    Recommend Occupational Therapy 4 times per week until therapy goals are met for the following treatments:  Adaptive Equipment, Neuro Re-Education / Balance, Self Care/Activities of Daily Living, Therapeutic Activities, and Therapeutic Exercises.    DC Equipment Recommendations: (P) Unable to determine at this time  Discharge Recommendations: (P) Recommend post-acute placement for additional occupational therapy services prior to discharge home (however, anticipate quick progression while in house)     Subjective    \"I am a  for Dr[employer]\"     Objective       06/29/22 0851   Charge Group   OT Evaluation OT Evaluation Mod   Total Time Spent   OT Time Spent Yes   OT Evaluation (Minutes) 30   Initial Contact Note    Initial Contact Note Order Received and Verified, Occupational Therapy Evaluation in Progress with Full Report to Follow.   Prior Living Situation   Prior Services Home-Independent   Housing / Facility 2 Story House   Bathroom Set up Bathtub / Shower Combination   Equipment Owned Front-Wheel Walker  (knee scooter)   Lives with - Patient's Self Care Capacity Spouse;Adult Children;Child Less than 18 Years of Age   Comments Reports he has a 15 y/o and 25 y/o dtr at home. 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. Lives in [place]   Prior Level of ADL Function   Self Feeding Independent   Grooming / Hygiene Independent   Bathing Independent   Dressing Independent   Toileting Independent   Prior Level of IADL Function   Medication Management Independent   Laundry Independent   Kitchen Mobility Independent   Finances Independent   Home Management Independent   Shopping Independent   Prior Level Of Mobility Independent Without Device in Community;Independent Without Device in Home   Driving / Transportation Driving Independent   Precautions   Precautions Fall Risk;Swallow Precautions ( See Comments);Nasogastric Tube   Comments EVD, dysarthria   Vitals   O2 (LPM) 2   O2 Delivery Device Silicone Nasal Cannula   Pain 0 - 10 Group   Therapist Pain Assessment Post Activity Pain Same as Prior to Activity;Nurse Notified  (no c/o pain during session)   Cognition    Cognition / Consciousness X   Speech/ Communication Dysarthric;Delayed Responses   Level of Consciousness Alert   Comments pleasent and cooperative, flat affect   Active ROM Upper Body   Active ROM Upper Body  WDL   Strength Upper Body   Upper Body Strength  WDL   Coordination Upper Body   Coordination WDL   Balance Assessment   Sitting Balance (Static) Fair +   Sitting Balance (Dynamic) Fair +   Standing Balance (Static) Fair   Standing Balance (Dynamic) Fair -   Weight Shift Sitting Good   Weight Shift Standing Fair   Comments no AD, no overt LOB   Bed Mobility    Supine to Sit Minimal Assist   Sit to Supine   (NT in chair post)   Scooting Standby Assist   ADL Assessment   Grooming Supervision;Seated   Upper Body Dressing Minimal Assist   Toileting Contact Guard Assist  (standing voiding in urinal)   How much help from another person does the patient currently need...   Putting on and taking off regular lower body clothing? 3   Bathing (including washing, rinsing, and drying)? 3   Toileting, which includes using a toilet, bedpan, or urinal? 3    Putting on and taking off regular upper body clothing? 3   Taking care of personal grooming such as brushing teeth? 3   Eating meals? 4   6 Clicks Daily Activity Score 19   Modified [place] (mRS)   Modified [place] Score 2   Functional Mobility   Sit to Stand Contact Guard Assist   Bed, Chair, Wheelchair Transfer Contact Guard Assist   Transfer Method Stand Step   Mobility Stand step from EOB > Chair w/ no AD   ICU Target Mobility Level   ICU Mobility - Targeted Level Level 3B   Activity Tolerance   Sitting in Chair 10+ min (up post)   Sitting Edge of Bed 10 min   Standing 5 min total   Comments no overt s/s of fatigue   Problem List   Problem List Decreased Active Daily Living Skills;Decreased Homemaking Skills;Decreased Functional Mobility;Decreased Activity Tolerance;Impaired Postural Control / Balance   Anticipated Discharge Equipment and Recommendations   DC Equipment Recommendations Unable to determine at this time   Discharge Recommendations Recommend post-acute placement for additional occupational therapy services prior to discharge home  (however, anticipate quick progression while in house)   Interdisciplinary Plan of Care Collaboration   IDT Collaboration with  Nursing;Physical Therapist   Patient Position at End of Therapy Seated;Chair Alarm On;Tray Table within Reach;Call Light within Reach;Phone within Reach   Collaboration Comments report given   Session Information   Date / Session Number  6/29, 1 (1/4, 7/5)   Priority 3

## 2022-06-29 NOTE — PROGRESS NOTES
"[facility] Critical Care Medicine Progress Note    Brief HPI/problem list:  Mr. Putnam is a 57 year old male with no past medical history and is not a smoker who presented to the ER yesterday with complaints of sudden onset of dizziness yesterday afternoon.  The patient reports that he was in his usual state of health when he got up to get a glass of water when he had sudden onset of dizziness as if the \"room was spinning\".  He reported that he had balance issues and had to sit down.  He had nausea and vomiting.  No tinnitus.  No unilateral numbness, tingling, or weakness to extremities.  Family witness that the patient had horizontal nystagmus for about 15 minutes.  EMS witnessed this as well.  He was brought to the ER and admitted.  He underwent an MRI brain today which revealed a large right cerebellar stroke and was transferred to the ICU for ongoing care. (Dr Valdivia HPI 6/27)    6/27 - admit, s/p EVD at night  6/28 - Neuro better, HA, ECHO ok, failed FEES  6/29 - Speech/vision better, NIH 2-3, EVD pressures/output ok, Nicardipine 5, 3% 60cc    Case reviewed with neurology at length at the bedside    Daily exam: More alert, less dysarthria, oriented x4, brainstem reflexes intact, strength improved, lungs clear, RRR, abdomen benign, EVD in place    Updated family at length at the bedside      TEAM Rounds:  Neuro: ICP 1-6, output 5-10, CCP , 3% saline 50cc/hr  HR: SR, 80s, Cardene 5  SBP: 120-140s  Tmax: 99.0, WBC 10.1  GI: NPO, FEES  UOP: adequate  Lines: R IJ CVC, PIVs  Resp: 2 lpm NC, CXR 6/27 clear, IS 1000  Vte: SCDs, no heparins with EVD  PPI/H2: none  Antibx: NA    Assessment:  CVA right cerebellum with fourth ventricle effacement, R PICA improved  Hydrocephalus status post EVD 6/27-controlled with drainage  Swallow dysfunction-speech following  Hypokalemia  History of heart murmur  History of multiple orthopedic procedures    Plan of care:  Acutely titrating nicardipine for hypertension, BP goal less " than 140  Continue amlodipine to 10  Actively titrating 3% saline infusion  Bolus 23% bolus repeat  Increase salt tabs, start to g 3 times daily  Add Florinef  Follow-up swallow eval when ready  ECHO grossly normal  IS mobilize when okay with neurosurgery  Statin high-dose  Optimize electrolytes  Reduce neurochecks to Q2H    Case reviewed with RN, Charge RN, RT, Clinical pharmacist, speech therapy, neurology and [facility] Gold Resident    Critical care time provided was 40 minutes. This excludes all separate billable procedures.     Please see [facility]/NP notes for additional documentation    Steve Victor MD  [facility]

## 2022-06-29 NOTE — PROGRESS NOTES
"Neurology Progress Note  [facility]    Referring Physician: Steve Victor M.D.    Chief Complaint   Patient presents with   • Dizziness     Onset today at 1430   • Weakness   • Headache     Onset today at 1430    • Numbness     Numbness and tingling present to REKHA arms and legs   • Nausea       HPI: Refer to initial documented Neurology H&P, as detailed in the patient's chart.    Interval History 6/28/2022: Dr. Nielsen, Neurosurgery, consulted yesterday and placed a right frontal EVD for ICP monitoring and drainage given obstructive hydrocephalus. ICP 1-6 mm Hg, EVD output 5-10ml/hr with level at 0 mm Hg. Hypertonic therapy with 23% NaCl bolus given yesterday evening and 3% NaCl infusion started for Na goal 150-155, Na 140 this AM. -160s overnight. Patient is currenlty sittiing up in bed, awake, alert, fully oriented, and following commands. Persistent dysarthria, nystagmus, and RUE ataxia noted with report of improved nausea, vertigo, and headache now 4/10. Denies vision loss, aphasia, abnormal movements, LOC, memory loss, or confusion.     Interval History 6/29/2022: No acute events overnight. Patient is currently sittiing up in chair, awake, alert, fully oriented, and following commands. Persistent mild dysarthria, nystagmus, intermittent diplopia, and RUE ataxia noted with no complaints of nausea or vertigo.  Denies headache, vision loss, aphasia, abnormal movements, LOC, memory loss, or confusion. EVD remains level at 0 mm Hg, ICP 1-11 mm Hg and output 5-14ml/hr overnight. Na 144 despite 3% at 60ml/hr and sodium 1g TID and 23% boluses x2 (6/27/22 and 6/28/22).     Past Medical History:   Past Medical History:   Diagnosis Date   • Heart murmur 12/2017    \"As a child\"   • Pain 12/2017    right shoulder pain        FHx:  Family History   Problem Relation Age of Onset   • Cancer Mother         SHx:  Social History     Socioeconomic History   • Marital status:      Spouse name: Not on " file   • Number of children: Not on file   • Years of education: Not on file   • Highest education level: Not on file   Occupational History   • Not on file   Tobacco Use   • Smoking status: Never Smoker   • Smokeless tobacco: Never Used   Substance and Sexual Activity   • Alcohol use: Yes     Comment: 2 per month   • Drug use: Yes     Types: Marijuana, Inhaled     Comment: Infrequently, approximately 1-2 times per month   • Sexual activity: Not on file   Other Topics Concern   • Not on file   Social History Narrative   • Not on file     Social Determinants of Health     Financial Resource Strain: Not on file   Food Insecurity: Not on file   Transportation Needs: Not on file   Physical Activity: Not on file   Stress: Not on file   Social Connections: Not on file   Intimate Partner Violence: Not on file   Housing Stability: Not on file        Medications:    Current Facility-Administered Medications:   •  [START ON 6/30/2022] amLODIPine (NORVASC) tablet 10 mg, 10 mg, Enteral Tube, Q DAY, Steve Victor M.D.  •  sodium chloride (SALT) tablet 2 g, 2 g, Enteral Tube, TID WITH MEALS, Steve Victor M.D.  •  3% sodium chloride (HYPERTONIC SALINE) 500mL infusion 500 mL, 500 mL, Intravenous, Continuous, Wally Alvarez M.D., Last Rate: 60 mL/hr at 06/29/22 0325, 500 mL at 06/29/22 0325  •  atorvastatin (LIPITOR) tablet 80 mg, 80 mg, Enteral Tube, Q EVENING, CHANEL ArguellesP.RJENNY, 80 mg at 06/28/22 1711  •  hydrALAZINE (APRESOLINE) injection 20 mg, 20 mg, Intravenous, Q4HRS PRN, Steve Victor M.D., 20 mg at 06/28/22 1304  •  labetalol (NORMODYNE/TRANDATE) injection 10-20 mg, 10-20 mg, Intravenous, Q2HRS PRN, Steve Victor M.D., 10 mg at 06/28/22 1634  •  MD Alert...ICU Electrolyte Replacement per Pharmacy, , Other, PHARMACY TO DOSE, Steve Victor M.D.  •  niCARdipine (CARDENE) 25 mg in  mL Infusion, 0-15 mg/hr, Intravenous, Continuous, Steve B Richeson, M.D., Last Rate: 50 mL/hr at 06/29/22  1027, 5 mg/hr at 06/29/22 1027  •  HYDROmorphone (Dilaudid) injection 0.5 mg, 0.5 mg, Intravenous, Q3HRS PRN, Elizabeth Valdivia M.D., 0.5 mg at 06/27/22 1805  •  Pharmacy Consult: Enteral tube insertion - review meds/change route/product selection, 1 Each, Other, PHARMACY TO DOSE, Elizabeth Valdivia M.D.  •  acetaminophen (Tylenol) tablet 650 mg, 650 mg, Enteral Tube, Q6HRS PRN, Elizabeth Valdivia M.D., 650 mg at 06/28/22 1123  •  senna-docusate (PERICOLACE or SENOKOT S) 8.6-50 MG per tablet 2 Tablet, 2 Tablet, Enteral Tube, BID, 2 Tablet at 06/29/22 0612 **AND** polyethylene glycol/lytes (MIRALAX) PACKET 1 Packet, 1 Packet, Enteral Tube, QDAY PRN **AND** magnesium hydroxide (MILK OF MAGNESIA) suspension 30 mL, 30 mL, Enteral Tube, QDAY PRN **AND** bisacodyl (DULCOLAX) suppository 10 mg, 10 mg, Rectal, QDAY PRN, Elizabeth Valdivia M.D.  •  ondansetron (ZOFRAN ODT) dispertab 4 mg, 4 mg, Enteral Tube, Q4HRS PRN, Elizabeth Valdivia M.D.  •  ondansetron (ZOFRAN) syringe/vial injection 4 mg, 4 mg, Intravenous, Q4HRS PRN, Steve Miguel M.D.  •  prochlorperazine (COMPAZINE) injection 5-10 mg, 5-10 mg, Intravenous, Q4HRS PRN, Steve Miguel M.D., 5 mg at 06/27/22 1034    Allergies:  No Known Allergies     Review of systems: In addition to what is detailed in the interval history above, all other systems reviewed and are negative.     Physical Examination:   Vitals:    06/29/22 0630 06/29/22 0645 06/29/22 0700 06/29/22 0715   BP: 119/57 123/59 117/57    Pulse: 85 88 89 92   Resp: (!) 21 (!) 21 20 (!) 32   Temp:       TempSrc:       SpO2: 95% 94% 93% 92%   Weight:       Height:         General: Patient in no acute distress, pleasant and cooperative.  HEENT: Normocephalic, no signs of acute trauma.   Neck: Supple, no meningeal signs or carotid bruits. There is normal range of motion. No tenderness on exam.   Chest: Regular and unlabored breaths on RA. No cough.   CV: RRR.   Skin: No signs of acute rashes or trauma.    Musculoskeletal: Joints exhibit full range of motion, without any pain to palpation. There are no signs of joint or muscle swelling. There is no tenderness to deep palpation of muscles.   Psychiatric: No hallucinatory behavior. Denies symptoms of depression or suicidal ideation. Mood and affect appear normal on exam.      NEUROLOGICAL EXAM:  Mental status, orientation: Awake, alert, following commands, and fully oriented.   Speech and language: Speech is mildly dysarthric and fluent. The patient is able to name, repeat, and comprehend.   Memory: There is intact recollection of recent and remote events.   Cranial nerve exam: Pupils are equal, 3-4 mm and briskly reactive to light. Visual fields are full with report of intermittent diplopia but no vertigo. There is bidirectional horizontal nystagmus on primary or secondary gaze. Intact full EOM in all directions of gaze. Face appears asymmetric with minor right nasolabial fold flattening. Sensation in the face is intact to light touch. Uvula is midline. Palate elevates symmetrically. Tongue is midline and without any signs of tongue biting or fasciculations. Sternocleidomastoid muscles exhibit is normal strength bilaterally. Shoulder shrug is intact bilaterally.   Motor exam: Strength is 5/5 in all extremities. Tone is normal. No abnormal movements were seen on exam.   Sensory exam Reveals normal sense of light touch and pinprick in all extremities.   Deep tendon reflexes:  Plantar responses are flexor. There is no clonus.   Coordination: Ataxia noted to RUE with finger-nose-finger. Normal heel-down-shin tests to BLE.  Gait: Deferred per patient preference.      NIH Stroke Scale  6/29/22 0920     1a. Level of Consciousness (Alert, drowsy, etc): 0= Alert     1b. LOC Questions (Month, age): 0= Answers both correctly     1c. LOC Commands (Open/close eyes make fist/let go): 0= Obeys both correctly     2.   Best Gaze (Eyes open - patient follows examiner's finger on  face): 0= Normal     3.   Visual Fields (introduce visual stimulus/threat to patient's field quadrants): 0= No visual loss     4.   Facial Paresis (Show teeth, raise eyebrows and squeeze eyes shut): 1= Minor               5a. Motor Arm - Left (Elevate arm to 90 degrees if patient is sitting, 45 degrees if  supine): 0= No drift     5b. Motor Arm - Right (Elevate arm to 90 degrees if patient is sitting, 45 degrees if supine): 0= No drift     6a. Motor Leg - Left (Elevate leg 30 degrees with patient supine): 0= No drift     6b. Motor Leg - Right  (Elevate leg 30 degrees with patient supine): 0= No drift     7.   Limb Ataxia (Finger-nose, heel down shin): 1= Present in 1 limb     8.   Sensory (Pin prick to face, arm, trunk and leg - compare side to side): 0= Normal     9.  Best Language (Name item, describe a picture and read sentences): 0= No aphasia     10. Dysarthria (Evaluate speech clarity by patient repeating listed words): 1= Mild to moderate slurring     11. Extinction and Inattention (Use information from prior testing to identify neglect or  double simultaneous stimuli testing): 0= No neglect     Total NIH Score: 3      Ancillary Data Reviewed:    Labs:  Lab Results   Component Value Date/Time    PROTHROMBTM 13.2 06/26/2022 03:35 PM    INR 1.03 06/26/2022 03:35 PM      Lab Results   Component Value Date/Time    WBC 10.1 06/29/2022 05:46 AM    RBC 5.39 06/29/2022 05:46 AM    HEMOGLOBIN 14.2 06/29/2022 05:46 AM    HEMATOCRIT 44.3 06/29/2022 05:46 AM    MCV 82.2 06/29/2022 05:46 AM    MCH 26.3 (L) 06/29/2022 05:46 AM    MCHC 32.1 (L) 06/29/2022 05:46 AM    MPV 9.5 06/29/2022 05:46 AM    NEUTSPOLYS 76.70 (H) 06/29/2022 05:46 AM    LYMPHOCYTES 12.10 (L) 06/29/2022 05:46 AM    MONOCYTES 10.60 06/29/2022 05:46 AM    EOSINOPHILS 0.00 06/29/2022 05:46 AM    BASOPHILS 0.20 06/29/2022 05:46 AM      Lab Results   Component Value Date/Time    SODIUM 144 06/29/2022 05:46 AM    POTASSIUM 3.4 (L) 06/29/2022 05:46 AM     CHLORIDE 112 06/29/2022 05:46 AM    CO2 22 06/29/2022 05:46 AM    GLUCOSE 143 (H) 06/29/2022 05:46 AM    BUN 15 06/29/2022 05:46 AM    CREATININE 0.59 06/29/2022 05:46 AM      Lab Results   Component Value Date/Time    CHOLSTRLTOT 186 06/27/2022 04:26 PM     (H) 06/27/2022 04:26 PM    HDL 41 06/27/2022 04:26 PM    TRIGLYCERIDE 79 06/27/2022 04:26 PM       Lab Results   Component Value Date/Time    ALKPHOSPHAT 70 06/26/2022 03:35 PM    ASTSGOT 16 06/26/2022 03:35 PM    ALTSGPT 18 06/26/2022 03:35 PM    TBILIRUBIN 0.2 06/26/2022 03:35 PM        Imaging/Testing:    I interpreted and/or reviewed the patient's neuroimaging    EC-ECHOCARDIOGRAM COMPLETE W/O CONT   Final Result      CT-HEAD W/O   Final Result         1.  Postprocedural changes of ventriculostomy placement with minimal hyperdense hemorrhage tracking along the ventriculostomy tract.   2.  Small anterior foci of right pneumocephalus.   3.  Low-density evolving stroke within the right cerebellar hemisphere effacing the fourth ventricle.   4.  Atherosclerosis         DX-ABDOMEN FOR TUBE PLACEMENT   Final Result      1.  Feeding tube tip overlies the proximal stomach.      DX-CHEST-LIMITED (1 VIEW)   Final Result      1.  Right IJ catheter tip projects over the cavoatrial junction without pneumothorax.   2.  Enlarged cardiac silhouette with mild vascular congestion.   3.  There is bibasilar atelectasis.      CT-CTA HEAD WITH & W/O-POST PROCESS   Final Result         1. No hemodynamically significant narrowing of the major intracranial vessels.      CT-CTA NECK WITH & W/O-POST PROCESSING   Final Result      1. No evidence of flow-limiting stenosis in the cervical carotid or cervical vertebral arteries.      MR-BRAIN-W/O   Final Result      1.  Large acute right posterior inferior cerebellar artery territory infarct with associated edema/swelling and localized mass effect, partially effacing the fourth ventricle. The lateral ventricles are now mildly  dilated suggesting developing    obstructive hydrocephalus.   2.  Small area of petechial hemorrhage within the infarct without other significant acute intracranial hemorrhage.   3.  FLAIR hyperintensity in the right vertebral artery suggesting sluggish flow, less likely thrombus formation.      These findings were discussed with MONIQUE KNOX on 6/27/2022 1:49 PM.      CT-HEAD W/O   Final Result      Head CT without contrast within normal limits. No evidence of acute cerebral infarction, hemorrhage or mass lesion.             Assessment and Plan:    Alistair Putnam is a 57 y.o. male with relevant history of hyperlipidemia, hypertension, heart murmur (details limited) who presented to [facility] on 6/26/22 for dizziness, vertigo, headache, and nausea. CT head wo contrast 6/26/22 showed no acute hemorrhage, no large territory infarct, and no mass. MRI brain wo contrast 6/27/28 revealed a large acute infarct of the right posterior inferior cerebellar artery (PICA) territory with associated cytotoxic edema and mass effect with partial effacement of the fourth ventricle with mild lateral ventricle dilation suggestive of obstructive hydrocephalus and small petechial hemorrhagic transformation. Neurology was consulted 6/27/22 for acute stroke management. CTA head and neck showed no high grade stenosis or large vessel occlusion. Neurosurgery placed right frontal EVD on 6/27/22 and continue to follow if patient develops worsening hydrocephalus requiring a decompressive suboccipital craniectomy.     Etiology at this time is presumably emboli to the right PICA with suspicion of cardioembolic given his relatively clear cervical vasculature thus will continued telemetry and cardiac event monitoring outpatient. TTE with bubble study appears normal with EF 65%, normal left atrial size/volume index, and no shunt. The patient remains in the ICU for close neurological monitoring and hypertonic therapy as he is at high  risk for decompensation from malignant cerebral edema given his large cerebellar infarct with cytotoxic edema and mass effect causing effacement of the fourth ventricle with obstructive hydrocephalus. Neurological exam is stable with patient now alert with resolution of vertigo, but with persistent dysarthria, intermittent diplopia, ataxia of the RUE, nystagmus (NIHSS 3).     Plan:     -Q2h and PRN neuro assessment. VS per nursing/unit protocol.   -Normotensive BP goal 110-140/60-80. Antihypertensives per primary team.   -Hypertonic therapy for sodium goal 150-155. Q6hr serum sodium. Na 144 this morning s/p 23% bolus yesterday and 3% at 60ml/hr currenlt              -Recommend a 3nd 23% bolus STAT- given 09:09              -Continued hypertonic 3% NaCl infusion per protocol   -NaCl tablets increased to 2g TID PO. Plan to add florinef  -Telemetry; currently SR. Screen for Afib/arrhythmia.   -Outpatient extended cardiac event monitoring for Afib (eg. Zio patch). Placement prior to discharge.   -Continue to hold ASA given potential need for decompressive suboccipital hemicrani  -Atorvastatin 80 mg PO q HS for LDL goal <70.    -BG goal . Note hemoglobin A1c 6.2, within goal <7%   -PT/OT/SLP eval and treat.   -Counseled patient at length regarding life style and risk factor modification for secondary stroke prevention.   -DVT PPX: SCDs only    The evaluation of the patient, and recommended management, was discussed with Dr. Sanz, Dr. Victor, and bedside RN I have performed a physical exam and reviewed and updated ROS and Plan today (6/29/2022). In review of yesterday's note (6/28/2022), there are no changes except as documented above.    Bayron Salgado, MSN [facility]-BC  Nurse Practitioner  [facility]  (t) [phone]

## 2022-06-29 NOTE — DIETARY
Nutrition Services Brief Update:    Problem: Nutritional:  Goal: Nutrition support tolerated and meeting greater than 85% of estimated needs  Outcome: MET    TF Fibersource HN is running at 75 ml/hr, goal rate.

## 2022-06-29 NOTE — THERAPY
"Physical Therapy   Initial Evaluation     Patient Name: Alistair Putnam  Age:  57 y.o., Sex:  male  Medical Record #: [id number]  Today's Date: 6/29/2022     Precautions  Precautions: Swallow Precautions ( See Comments);Nasogastric Tube  Comments: EVD, dysarthria    Assessment  Patient is 57 y.o. 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  Patient now POD #2 R frontal EVD placement.  PMH includes hyperlipidemia, HTN, & heart murmur.  Today patient presented with impaired balance, higher level coordination, activity tolerance, and overall functional mobility.  Patient mobilized with CGA-min A today, required extra time and rest breaks due to headache & feeling \"off.\"  Will continue to follow to progress strength, activity tolerance, and functional mobility.  At this time recommend post acute placement however patient will likely progress to DC with home health with continued mobility and therapy.    Plan    Recommend Physical Therapy 4 times per week until therapy goals are met for the following treatments:  Bed Mobility, Equipment, Gait Training, Neuro Re-Education / Balance, Self Care/Home Evaluation, Stair Training, Therapeutic Activities and Therapeutic Exercises    DC Equipment Recommendations: (TBD with further mobility, likely no AD needed)  Discharge Recommendations: Recommend post-acute placement for additional physical therapy services prior to discharge home (Will likely progress to home with home health with continued therapy & mobility)     Objective     06/29/22 0850   Precautions   Precautions Swallow Precautions ( See Comments);Nasogastric Tube   Comments EVD, dysarthria   Prior Living Situation   Prior Services Home-Independent   Housing / Facility 2 Story House   Steps Into Home 3   Steps In Home (FOS)   Rail Left Rail (Steps in Home)   Equipment Owned Front-Wheel Walker (knee scooter)   Lives with - Patient's Self Care Capacity Spouse;Adult Children;Child Less " than 18 Years of Age   Comments Pt reported he can stay on 1st level of home if needed, no full bathroom on 1st floor   Prior Level of Functional Mobility   Bed Mobility Independent   Transfer Status Independent   Ambulation Independent   Distance Ambulation (Feet) (community distances)   Assistive Devices Used None   Stairs Independent   Cognition    Cognition / Consciousness X   Speech/ Communication Dysarthric   Level of Consciousness Alert   Comments Pleasant & cooperative, flat affect - possibly baseline.  Making jokes with therapists   Active ROM Lower Body    Active ROM Lower Body  WDL   Strength Lower Body   Lower Body Strength  WDL   Comments B LE grossly 4+/5   Sensation Lower Body   Lower Extremity Sensation   WDL   Comments Denied N/T   Coordination Lower Body    Comments Slight high level coordination deficits   Balance Assessment   Sitting Balance (Static) Fair +   Sitting Balance (Dynamic) Fair +   Standing Balance (Static) Fair   Standing Balance (Dynamic) Fair -   Weight Shift Sitting Good   Weight Shift Standing Fair   Comments no AD   Gait Analysis   Gait Level Of Assist Contact Guard Assist   Assistive Device None   Distance (Feet) 2   # of Times Distance was Traveled 1   Deviation Bradykinetic   # of Stairs Climbed 0   Weight Bearing Status No restrictions   Comments steps from EOB > chair   Bed Mobility    Supine to Sit Minimal Assist   Sit to Supine (NT, left up in chair)   Scooting Standby Assist (seated EOB)   Functional Mobility   Sit to Stand Contact Guard Assist   Bed, Chair, Wheelchair Transfer Contact Guard Assist   Transfer Method Stand Step   Mobility supine > EOB > STS > chair   Activity Tolerance   Sitting in Chair Post session   Sitting Edge of Bed 8 min   Standing 5 min   Short Term Goals    Short Term Goal # 1 Pt will perform supine <> sit without bed features with SPV within 6 visits in order to progress toward PLOF   Short Term Goal # 2 Pt will perform STS/functional transfers  with SPV within 6 visits in order to progress OOB mobility   Short Term Goal # 3 Pt will ambulate 200 ft with SPV within 6 visits in order   Short Term Goal # 4 Pt will negotiate FOS with single rail & SPV within 6 visits in order to access home   Anticipated Discharge Equipment and Recommendations   DC Equipment Recommendations (TBD with further mobility, likely no AD needed)   Discharge Recommendations Recommend post-acute placement for additional physical therapy services prior to discharge home  (Will likely progress to home with home health with continued therapy & mobility)

## 2022-06-29 NOTE — PROGRESS NOTES
Neurosurgery Progress Note    Subjective:  57 yr old male presented with CVA.  POD#2 R EVD placement     No acute events overnight  His ICPs have been stable, 4-5  EVD draining 7-14mL/hr    Exam:  A&O x3  PERRL, symmetric  Face symmetric   Cns II-VII grossly intact  Moving all extremities, follows commands  Sensation intact   No drift    BP  Min: 106/56  Max: 166/81  Pulse  Av.4  Min: 85  Max: 100  Resp  Av.4  Min: 8  Max: 32  Temp  Av.1 °C (98.7 °F)  Min: 36.9 °C (98.4 °F)  Max: 37.2 °C (99 °F)  SpO2  Av.7 %  Min: 90 %  Max: 96 %    ICP  Av.9 MM HG  Min: -3 MM HG  Max: 12 MM HG    Recent Labs     22  1535 22  0600 22  0546   WBC 6.0 12.8* 10.1   RBC 5.72 5.44 5.39   HEMOGLOBIN 15.2 14.4 14.2   HEMATOCRIT 46.3 44.2 44.3   MCV 80.9* 81.3* 82.2   MCH 26.6* 26.5* 26.3*   MCHC 32.8* 32.6* 32.1*   RDW 43.3 45.5 48.6   PLATELETCT 261 273 251   MPV 9.9 9.4 9.5     Recent Labs     22  1535 22  1503 22  0600 22  1307 22  1800 22  0100 22  0546   SODIUM 144   < > 140   < > 144 144 144   POTASSIUM 3.5*  --  3.6  --   --   --  3.4*   CHLORIDE 110  --  107  --   --   --  112   CO2 20  --  23  --   --   --  22   GLUCOSE 125*  --  116*  --   --   --  143*   BUN 25*  --  15  --   --   --  15   CREATININE 0.97  --  0.61  --   --   --  0.59   CALCIUM 8.7  --  8.3*  --   --   --  8.4*    < > = values in this interval not displayed.     Recent Labs     22  1535   APTT 22.8*   INR 1.03           Intake/Output                       22 0700 - 22 0659 22 0700 - 2259     [id number] Total 2255-0561 7404-3600 Total                 Intake    I.V.  586.2  1474.8 2060.9  120  -- 120    Cardene Volume -- 765.4 765.4 60 -- 60    Volume (mL) (3% sodium chloride (HYPERTONIC SALINE) 500mL infusion 500 mL) 586.2 709.3 1295.5 60 -- 60    NG/GT  90  500 590  75  -- 75    Intake (mL) (Enteral Tube 22 Left nare) 90 500 590 75  -- 75    Total Intake 676.2 1974.8 2650.9 195 -- 195       Output    Urine  925  1309 2234  --  -- --    Urine Void (mL) 925 1309 2234 -- -- --    Drains  108  84 192  14  -- 14    Output (mL) (ICP/Ventriculostomy Right Other (Comment)) 108 84 192 14 -- 14    Total Output 1033 1393 2426 14 -- 14       Net I/O     -356.8 581.8 224.9 181 -- 181            Intake/Output Summary (Last 24 hours) at 6/29/2022 0939  Last data filed at 6/29/2022 0700  Gross per 24 hour   Intake 2805.92 ml   Output 2212 ml   Net 593.92 ml            • potassium bicarbonate  50 mEq Once   • [START ON 6/30/2022] amLODIPine  10 mg Q DAY   • amLODIPine  5 mg Once   • sodium chloride  2 g TID WITH MEALS   • 3% sodium chloride  500 mL Continuous   • atorvastatin  80 mg Q EVENING   • hydrALAZINE  20 mg Q4HRS PRN   • labetalol  10-20 mg Q2HRS PRN   • MD Alert...Adult ICU Electrolyte Replacement per Pharmacy   PHARMACY TO DOSE   • niCARdipine infusion  0-15 mg/hr Continuous   • HYDROmorphone  0.5 mg Q3HRS PRN   • Pharmacy  1 Each PHARMACY TO DOSE   • acetaminophen  650 mg Q6HRS PRN   • magnesium hydroxide  30 mL QDAY PRN    And   • senna-docusate  2 Tablet BID    And   • polyethylene glycol/lytes  1 Packet QDAY PRN    And   • bisacodyl  10 mg QDAY PRN   • ondansetron  4 mg Q4HRS PRN   • ondansetron  4 mg Q4HRS PRN   • prochlorperazine  5-10 mg Q4HRS PRN       Assessment and Plan:  Hospital day # 2  POD#2 R EVD placement  Continue with EVD at current level  Agree with therapies  Will continue to follow      Chemical prophylactic DVT therapy: Yes - Lovenox 40mg/qd    Start date/time: today

## 2022-06-29 NOTE — DISCHARGE PLANNING
Case Management Discharge Planning    Admission Date: 6/26/2022  GMLOS: 7.1  ALOS: 2    6-Clicks ADL Score: 19  6-Clicks Mobility Score: 17  PT and/or OT Eval ordered: Yes  Post-acute Referrals Ordered: No, PT recommending post acute rehab, but likely home with PT.  Post-acute Choice Obtained: No  Has referral(s) been sent to post-acute provider:  No      Anticipated Discharge Dispo: Discharge Disposition: Discharged to home/self care (01)    DME Needed: No, defer to PT. Patient owns 4WW    Action(s) Taken: Updated Provider/Nurse on Discharge Plan    Escalations Completed: None    Medically Clear: No    Next Steps: CM will continue to assist with discharge planning and barriers.    Barriers to Discharge: Medical clearance, Pending Placement, Pending PT Evaluation and Pending Insurance Authorization    Is the patient up for discharge tomorrow: No

## 2022-06-29 NOTE — PROGRESS NOTES
[facility] ICU Progress Note      Admit Date: 6/26/2022    Resident(s): Jose Peterson M.D.   Attending:  KEVYN COVINGTON/ Dr. Victor     Patient ID:    Name:  Alistair Putnam   YOB: 1965  Age:  57 y.o.  male   MRN:  [id number]    Hospital Course (carried forward and updated):  Alistair Putnam is a 57 y.o. male with no significant past medical history (on no medication at home) who presented to the emergency department on 6/26/2022 with vertigo.  CT head on presentation was unremarkable.  Patient was initially admitted to the floor, was transferred to ICU after MRI showing  large right PICA Stroke with partial effacement of fourth ventricle. Patient was evaluated by neurosurgery and EVD was placed on 6/27 due to concern for early obstructive hydrocephalus.    Consultants:  Critical Care  Neurosurgery  Neurology    Interval Events:    No overnight events  Alert and oriented x2, able to follow commands, dysarthric.Denies dizziness, numbness or weakness.    EVD drained 7-10cc. Did not clamp overnight.   CVP between < 10.   Afebrile, HR 80-90s, -160, on 2 L O2  Continue nightly neuro checks per neuro surgery recommendation.  Goal SBP <140 currently on Cardene gtt. Will start Amlodipine 10mg daily   Goal sodium 150-155, on 3% hypertonic saline. Max rate @ 60cc/hr. Also on Salt tabs. Sodium has been stable at 144. Patient received push of 23% hypertonic saline. Will double dose of salt tabs  Echocardiogram: EF: 65-70%  Monitor and replete electrolytes as indicated.  TF currently at goal; FEES failed yesterday  Neurosurgery following,           Vitals Range last 24h:  Temp:  [36.9 °C (98.4 °F)-37.2 °C (99 °F)] 36.9 °C (98.4 °F)  Pulse:  [] 92  Resp:  [8-32] 32  BP: (106-166)/(55-83) 117/57  SpO2:  [90 %-96 %] 92 %      Intake/Output Summary (Last 24 hours) at 6/29/2022 1105  Last data filed at 6/29/2022 0700  Gross per 24 hour   Intake 2659.75 ml   Output 1943 ml   Net 716.75 ml         Review of Systems   Constitutional: Positive for malaise/fatigue. Negative for chills and fever.   HENT: Negative for congestion, ear discharge and nosebleeds.    Eyes: Negative for blurred vision, double vision and photophobia.   Respiratory: Negative for cough, hemoptysis and sputum production.    Cardiovascular: Negative for chest pain, palpitations and orthopnea.   Gastrointestinal: Negative for abdominal pain, nausea and vomiting.   Genitourinary: Negative for dysuria, frequency and urgency.   Musculoskeletal: Negative for myalgias and neck pain.   Neurological: Negative for sensory change, speech change and focal weakness.   Psychiatric/Behavioral: Negative for depression.       PHYSICAL EXAM:  Vitals:    06/29/22 0630 06/29/22 0645 06/29/22 0700 06/29/22 0715   BP: 119/57 123/59 117/57    Pulse: 85 88 89 92   Resp: (!) 21 (!) 21 20 (!) 32   Temp:       TempSrc:       SpO2: 95% 94% 93% 92%   Weight:       Height:        Body mass index is 29.29 kg/m².    O2 therapy: Pulse Oximetry: 92 %, O2 (LPM): 2, O2 Delivery Device: Silicone Nasal Cannula    Date 06/29/22 0700 - 06/30/22 0659   Shift 9216-8458 1056-8401 0978-6429 24 Hour Total   INTAKE   I.V. 120   120     Cardene Volume 60   60     Volume (mL) (3% sodium chloride (HYPERTONIC SALINE) 500mL infusion 500 mL) 60   60   NG/GT 75   75     Intake (mL) (Enteral Tube 06/27/22 Left nare) 75   75   Shift Total 195   195   OUTPUT   Drains 14   14     Output (mL) (ICP/Ventriculostomy Right Other (Comment)) 14   14   Shift Total 14   14      181        Physical Exam  Constitutional:       General: He is not in acute distress.     Appearance: He is not ill-appearing.   HENT:      Head:      Comments: EVD in place     Nose: Nose normal.      Mouth/Throat:      Mouth: Mucous membranes are moist.   Eyes:      Extraocular Movements: Extraocular movements intact.   Cardiovascular:      Rate and Rhythm: Normal rate and regular rhythm.      Pulses: Normal pulses.       Heart sounds: Normal heart sounds. No murmur heard.    No friction rub. No gallop.   Pulmonary:      Effort: Pulmonary effort is normal. No respiratory distress.      Breath sounds: No wheezing.   Abdominal:      General: Abdomen is flat. Bowel sounds are normal. There is no distension.      Palpations: Abdomen is soft.      Tenderness: There is no abdominal tenderness.   Musculoskeletal:      Right lower leg: No edema.      Left lower leg: No edema.   Skin:     General: Skin is warm.   Neurological:      Mental Status: He is alert.      Comments: Dysarthric, facial droop   Motor strength 5/5, sensation intact              Recent Labs     06/26/22  1535 06/27/22  1503 06/28/22  0600 06/28/22  1307 06/28/22  1800 06/29/22  0100 06/29/22  0546   SODIUM 144   < > 140   < > 144 144 144   POTASSIUM 3.5*  --  3.6  --   --   --  3.4*   CHLORIDE 110  --  107  --   --   --  112   CO2 20  --  23  --   --   --  22   BUN 25*  --  15  --   --   --  15   CREATININE 0.97  --  0.61  --   --   --  0.59   CALCIUM 8.7  --  8.3*  --   --   --  8.4*    < > = values in this interval not displayed.     Recent Labs     06/26/22 1535 06/28/22 0600 06/29/22  0546   ALTSGPT 18  --   --    ASTSGOT 16  --   --    ALKPHOSPHAT 70  --   --    TBILIRUBIN 0.2  --   --    PREALBUMIN  --  21.2  --    GLUCOSE 125* 116* 143*     Recent Labs     06/26/22 1535 06/28/22 0600 06/29/22  0546   RBC 5.72 5.44 5.39   HEMOGLOBIN 15.2 14.4 14.2   HEMATOCRIT 46.3 44.2 44.3   PLATELETCT 261 273 251   PROTHROMBTM 13.2  --   --    APTT 22.8*  --   --    INR 1.03  --   --      Recent Labs     06/26/22 1535 06/28/22 0600 06/29/22  0546   WBC 6.0 12.8* 10.1   NEUTSPOLYS 48.60 83.40* 76.70*   LYMPHOCYTES 37.10 7.00* 12.10*   MONOCYTES 11.80 8.90 10.60   EOSINOPHILS 1.70 0.00 0.00   BASOPHILS 0.50 0.10 0.20   ASTSGOT 16  --   --    ALTSGPT 18  --   --    ALKPHOSPHAT 70  --   --    TBILIRUBIN 0.2  --   --        Meds:  • [START ON 6/30/2022] amLODIPine  10 mg     •  sodium chloride  2 g     • 3% sodium chloride  500 mL 500 mL (06/29/22 9885)   • atorvastatin  80 mg     • hydrALAZINE  20 mg     • labetalol  10-20 mg     • MD Alert...Adult ICU Electrolyte Replacement per Pharmacy       • niCARdipine infusion  0-15 mg/hr 5 mg/hr (06/29/22 1027)   • HYDROmorphone  0.5 mg     • Pharmacy  1 Each     • acetaminophen  650 mg     • magnesium hydroxide  30 mL      And   • senna-docusate  2 Tablet      And   • polyethylene glycol/lytes  1 Packet      And   • bisacodyl  10 mg     • ondansetron  4 mg     • ondansetron  4 mg     • prochlorperazine  5-10 mg          Procedures:  EVD placement on 6/27     Imaging:  EC-ECHOCARDIOGRAM COMPLETE W/O CONT   Final Result      CT-HEAD W/O   Final Result         1.  Postprocedural changes of ventriculostomy placement with minimal hyperdense hemorrhage tracking along the ventriculostomy tract.   2.  Small anterior foci of right pneumocephalus.   3.  Low-density evolving stroke within the right cerebellar hemisphere effacing the fourth ventricle.   4.  Atherosclerosis         DX-ABDOMEN FOR TUBE PLACEMENT   Final Result      1.  Feeding tube tip overlies the proximal stomach.      DX-CHEST-LIMITED (1 VIEW)   Final Result      1.  Right IJ catheter tip projects over the cavoatrial junction without pneumothorax.   2.  Enlarged cardiac silhouette with mild vascular congestion.   3.  There is bibasilar atelectasis.      CT-CTA HEAD WITH & W/O-POST PROCESS   Final Result         1. No hemodynamically significant narrowing of the major intracranial vessels.      CT-CTA NECK WITH & W/O-POST PROCESSING   Final Result      1. No evidence of flow-limiting stenosis in the cervical carotid or cervical vertebral arteries.      MR-BRAIN-W/O   Final Result      1.  Large acute right posterior inferior cerebellar artery territory infarct with associated edema/swelling and localized mass effect, partially effacing the fourth ventricle. The lateral ventricles are now  mildly dilated suggesting developing    obstructive hydrocephalus.   2.  Small area of petechial hemorrhage within the infarct without other significant acute intracranial hemorrhage.   3.  FLAIR hyperintensity in the right vertebral artery suggesting sluggish flow, less likely thrombus formation.      These findings were discussed with MONIQUE KNOX on 6/27/2022 1:49 PM.      CT-HEAD W/O   Final Result      Head CT without contrast within normal limits. No evidence of acute cerebral infarction, hemorrhage or mass lesion.             ASSESSEMENT and PLAN:    Dizziness  Presents for dizziness, room spinning sensation  Likely related to benign paroxysmal positional vertigo  Dizziness elicited with head movement to left  CT head normal  Supportive treatment, IV fluids, avoid trigger of left facing head position  Can try Epley's maneuver    Patient with persistent dizziness and nausea despite overnight supportive treatment  MRI brain ordered    Acute stroke due to occlusion of right cerebellar artery (HCC)  Presented with vertigo, found to have large right PICA stroke with partial effacement of fourth ventricle   Status post EVD placement on 6/27/2022   Neurology and neurosurgery following   Continue frequent neuro checks  SBP goal less than 160, continue as needed labetalol/hydralazine  Scheduled Amlodipine 10mg daily  Wean off Cardene gtt   s/p 23% bolus today , cont 3% saline with goal Na > 150, start salt tablets  Increase salt tab dose to 2G TID.   High intensity statin  PT/OT/SLP        Hypokalemia  Replete to > 4          CODE STATUS: Full code  Quality Measures:  Feeding: Diet per SLP  Analgesia: Dilaudid  Sedation: None Tylenol,  Thromboprophylaxis: Held/SCD  Head of bed: >30 degrees  Ulcer prophylaxis: Not indicated  Glycemic control: Not indicated   bowel care: bowel regimen as needed  Indwelling lines: Right IJ, PIV   deescalation of antibiotics: None      Jose Peterson M.D.

## 2022-06-30 LAB
ANION GAP SERPL CALC-SCNC: 9 MMOL/L (ref 7–16)
BASOPHILS # BLD AUTO: 0.4 % (ref 0–1.8)
BASOPHILS # BLD: 0.03 K/UL (ref 0–0.12)
BUN SERPL-MCNC: 13 MG/DL (ref 8–22)
CALCIUM SERPL-MCNC: 8.4 MG/DL (ref 8.5–10.5)
CHLORIDE SERPL-SCNC: 116 MMOL/L (ref 96–112)
CO2 SERPL-SCNC: 23 MMOL/L (ref 20–33)
CREAT SERPL-MCNC: 0.57 MG/DL (ref 0.5–1.4)
EOSINOPHIL # BLD AUTO: 0.03 K/UL (ref 0–0.51)
EOSINOPHIL NFR BLD: 0.4 % (ref 0–6.9)
ERYTHROCYTE [DISTWIDTH] IN BLOOD BY AUTOMATED COUNT: 49.1 FL (ref 35.9–50)
GFR SERPLBLD CREATININE-BSD FMLA CKD-EPI: 115 ML/MIN/1.73 M 2
GLUCOSE SERPL-MCNC: 140 MG/DL (ref 65–99)
HCT VFR BLD AUTO: 43.2 % (ref 42–52)
HGB BLD-MCNC: 13.7 G/DL (ref 14–18)
IMM GRANULOCYTES # BLD AUTO: 0.03 K/UL (ref 0–0.11)
IMM GRANULOCYTES NFR BLD AUTO: 0.4 % (ref 0–0.9)
LYMPHOCYTES # BLD AUTO: 1.34 K/UL (ref 1–4.8)
LYMPHOCYTES NFR BLD: 17.2 % (ref 22–41)
MCH RBC QN AUTO: 26.1 PG (ref 27–33)
MCHC RBC AUTO-ENTMCNC: 31.7 G/DL (ref 33.7–35.3)
MCV RBC AUTO: 82.3 FL (ref 81.4–97.8)
MONOCYTES # BLD AUTO: 0.85 K/UL (ref 0–0.85)
MONOCYTES NFR BLD AUTO: 10.9 % (ref 0–13.4)
NEUTROPHILS # BLD AUTO: 5.53 K/UL (ref 1.82–7.42)
NEUTROPHILS NFR BLD: 70.7 % (ref 44–72)
NRBC # BLD AUTO: 0 K/UL
NRBC BLD-RTO: 0 /100 WBC
PLATELET # BLD AUTO: 256 K/UL (ref 164–446)
PMV BLD AUTO: 9.4 FL (ref 9–12.9)
POTASSIUM SERPL-SCNC: 3.6 MMOL/L (ref 3.6–5.5)
RBC # BLD AUTO: 5.25 M/UL (ref 4.7–6.1)
SODIUM SERPL-SCNC: 146 MMOL/L (ref 135–145)
SODIUM SERPL-SCNC: 148 MMOL/L (ref 135–145)
SODIUM SERPL-SCNC: 148 MMOL/L (ref 135–145)
SODIUM SERPL-SCNC: 149 MMOL/L (ref 135–145)
WBC # BLD AUTO: 7.8 K/UL (ref 4.8–10.8)

## 2022-06-30 PROCEDURE — A9270 NON-COVERED ITEM OR SERVICE: HCPCS | Performed by: STUDENT IN AN ORGANIZED HEALTH CARE EDUCATION/TRAINING PROGRAM

## 2022-06-30 PROCEDURE — 80048 BASIC METABOLIC PNL TOTAL CA: CPT

## 2022-06-30 PROCEDURE — 700111 HCHG RX REV CODE 636 W/ 250 OVERRIDE (IP): Performed by: INTERNAL MEDICINE

## 2022-06-30 PROCEDURE — 770022 HCHG ROOM/CARE - ICU (200)

## 2022-06-30 PROCEDURE — 700101 HCHG RX REV CODE 250: Performed by: INTERNAL MEDICINE

## 2022-06-30 PROCEDURE — 700102 HCHG RX REV CODE 250 W/ 637 OVERRIDE(OP): Performed by: INTERNAL MEDICINE

## 2022-06-30 PROCEDURE — A9270 NON-COVERED ITEM OR SERVICE: HCPCS | Performed by: INTERNAL MEDICINE

## 2022-06-30 PROCEDURE — 85025 COMPLETE CBC W/AUTO DIFF WBC: CPT

## 2022-06-30 PROCEDURE — 700102 HCHG RX REV CODE 250 W/ 637 OVERRIDE(OP): Performed by: STUDENT IN AN ORGANIZED HEALTH CARE EDUCATION/TRAINING PROGRAM

## 2022-06-30 PROCEDURE — 700105 HCHG RX REV CODE 258: Performed by: STUDENT IN AN ORGANIZED HEALTH CARE EDUCATION/TRAINING PROGRAM

## 2022-06-30 PROCEDURE — 700102 HCHG RX REV CODE 250 W/ 637 OVERRIDE(OP): Performed by: NURSE PRACTITIONER

## 2022-06-30 PROCEDURE — 84295 ASSAY OF SERUM SODIUM: CPT | Mod: 91

## 2022-06-30 PROCEDURE — 99233 SBSQ HOSP IP/OBS HIGH 50: CPT | Performed by: NURSE PRACTITIONER

## 2022-06-30 PROCEDURE — 700105 HCHG RX REV CODE 258: Performed by: INTERNAL MEDICINE

## 2022-06-30 PROCEDURE — 99291 CRITICAL CARE FIRST HOUR: CPT | Performed by: INTERNAL MEDICINE

## 2022-06-30 PROCEDURE — 99223 1ST HOSP IP/OBS HIGH 75: CPT | Performed by: PHYSICAL MEDICINE & REHABILITATION

## 2022-06-30 PROCEDURE — A9270 NON-COVERED ITEM OR SERVICE: HCPCS | Performed by: NURSE PRACTITIONER

## 2022-06-30 RX ORDER — LISINOPRIL 10 MG/1
10 TABLET ORAL
Status: DISCONTINUED | OUTPATIENT
Start: 2022-06-30 | End: 2022-07-01

## 2022-06-30 RX ORDER — ENOXAPARIN SODIUM 100 MG/ML
40 INJECTION SUBCUTANEOUS DAILY
Status: DISCONTINUED | OUTPATIENT
Start: 2022-06-30 | End: 2022-06-30

## 2022-06-30 RX ADMIN — SODIUM CHLORIDE 500 ML: 3 INJECTION, SOLUTION INTRAVENOUS at 22:41

## 2022-06-30 RX ADMIN — NICARDIPINE HYDROCHLORIDE 7.5 MG/HR: 25 INJECTION, SOLUTION INTRAVENOUS at 13:46

## 2022-06-30 RX ADMIN — NICARDIPINE HYDROCHLORIDE 5 MG/HR: 25 INJECTION, SOLUTION INTRAVENOUS at 03:34

## 2022-06-30 RX ADMIN — HYDRALAZINE HYDROCHLORIDE 20 MG: 20 INJECTION INTRAMUSCULAR; INTRAVENOUS at 16:24

## 2022-06-30 RX ADMIN — FLUDROCORTISONE ACETATE 0.1 MG: 0.1 TABLET ORAL at 05:13

## 2022-06-30 RX ADMIN — LISINOPRIL 10 MG: 10 TABLET ORAL at 10:13

## 2022-06-30 RX ADMIN — SODIUM CHLORIDE 2 G: 1 TABLET ORAL at 12:28

## 2022-06-30 RX ADMIN — POTASSIUM BICARBONATE 50 MEQ: 978 TABLET, EFFERVESCENT ORAL at 10:13

## 2022-06-30 RX ADMIN — SENNOSIDES AND DOCUSATE SODIUM 2 TABLET: 50; 8.6 TABLET ORAL at 17:15

## 2022-06-30 RX ADMIN — AMLODIPINE BESYLATE 10 MG: 10 TABLET ORAL at 05:13

## 2022-06-30 RX ADMIN — SODIUM CHLORIDE 2 G: 1 TABLET ORAL at 05:16

## 2022-06-30 RX ADMIN — ATORVASTATIN CALCIUM 80 MG: 40 TABLET, FILM COATED ORAL at 17:15

## 2022-06-30 RX ADMIN — NICARDIPINE HYDROCHLORIDE 7.5 MG/HR: 25 INJECTION, SOLUTION INTRAVENOUS at 16:40

## 2022-06-30 RX ADMIN — SODIUM CHLORIDE 500 ML: 3 INJECTION, SOLUTION INTRAVENOUS at 05:28

## 2022-06-30 RX ADMIN — SODIUM CHLORIDE 2 G: 1 TABLET ORAL at 17:14

## 2022-06-30 RX ADMIN — NICARDIPINE HYDROCHLORIDE 5 MG/HR: 25 INJECTION, SOLUTION INTRAVENOUS at 07:59

## 2022-06-30 RX ADMIN — NICARDIPINE HYDROCHLORIDE 5 MG/HR: 25 INJECTION, SOLUTION INTRAVENOUS at 22:40

## 2022-06-30 RX ADMIN — SODIUM CHLORIDE 500 ML: 3 INJECTION, SOLUTION INTRAVENOUS at 13:55

## 2022-06-30 RX ADMIN — SENNOSIDES AND DOCUSATE SODIUM 2 TABLET: 50; 8.6 TABLET ORAL at 05:13

## 2022-06-30 ASSESSMENT — ENCOUNTER SYMPTOMS
ABDOMINAL PAIN: 0
ORTHOPNEA: 0
FEVER: 0
BLURRED VISION: 0
PALPITATIONS: 0
NAUSEA: 0
DOUBLE VISION: 0
HEMOPTYSIS: 0
VOMITING: 0
CHILLS: 0
MYALGIAS: 0
NECK PAIN: 0
PHOTOPHOBIA: 0
FOCAL WEAKNESS: 0
SPEECH CHANGE: 0
DEPRESSION: 0
COUGH: 0
SENSORY CHANGE: 0
SPUTUM PRODUCTION: 0

## 2022-06-30 NOTE — PROGRESS NOTES
[facility] Progress Note      Admit Date: 6/26/2022    Resident(s): Jose Peterson M.D.   Attending:  KEVYN COVINGTON/ Dr. Victor     Patient ID:    Name:  Alistair Putnam   YOB: 1965  Age:  57 y.o.  male   MRN:  [id number]    Hospital Course (carried forward and updated):  Alistair Putnam is a 57 y.o. male with no significant past medical history (on no medication at home) who presented to the emergency department on 6/26/2022 with vertigo.  CT head on presentation was unremarkable.  Patient was initially admitted to the floor, was transferred to ICU after MRI showing  large right PICA Stroke with partial effacement of fourth ventricle. Patient was evaluated by neurosurgery and EVD was placed on 6/27 due to concern for early obstructive hydrocephalus.    Consultants:  Critical Care  Neurosurgery  Neurology    Interval Events:    No overnight events  Alert and oriented x3, able to follow commands,   EVD drained 4-16cc. Did not clamp overnight.   CVP between 1-8.   Afebrile, HR 80-90s, 's, on 3 L O2  Continue nightly neuro checks per neuro surgery recommendation.  Goal SBP <140 currently on Cardene gtt. Continue Amlodipine 10mg daily and start Lisinopril.   Goal sodium 150-155, on 3% hypertonic saline. Max rate @ 60cc/hr. Also on Salt tabs. Sodium has been stable at 148. Neurology to decide whether to push 23% hypertonic saline. Patient is nearly outside the window for cerebral edema.   Neurosurgery examined patient today; EVD raised to 10cm  TF currently at goal. Will have speech reevaluate patieth today.    Patient received push of 23% hypertonic saline. Will double dose of salt tabs  Echocardiogram: EF: 65-70%  Monitor and replete electrolytes as indicated.  TF currently at goal; FEES failed yesterday            Vitals Range last 24h:  Temp:  [37.2 °C (98.9 °F)-37.6 °C (99.6 °F)] 37.3 °C (99.2 °F)  Pulse:  [] 92  Resp:  [10-49] 25  BP: (120-159)/(62-88) 141/72  SpO2:  [91  %-96 %] 94 %      Intake/Output Summary (Last 24 hours) at 6/30/2022 1043  Last data filed at 6/30/2022 0800  Gross per 24 hour   Intake 3960 ml   Output 3844 ml   Net 116 ml        Review of Systems   Constitutional: Positive for malaise/fatigue. Negative for chills and fever.   HENT: Negative for congestion, ear discharge and nosebleeds.    Eyes: Negative for blurred vision, double vision and photophobia.   Respiratory: Negative for cough, hemoptysis and sputum production.    Cardiovascular: Negative for chest pain, palpitations and orthopnea.   Gastrointestinal: Negative for abdominal pain, nausea and vomiting.   Genitourinary: Negative for dysuria, frequency and urgency.   Musculoskeletal: Negative for myalgias and neck pain.   Neurological: Negative for sensory change, speech change and focal weakness.   Psychiatric/Behavioral: Negative for depression.       PHYSICAL EXAM:  Vitals:    06/30/22 0745 06/30/22 0800 06/30/22 0815 06/30/22 0830   BP: 134/69 (!) 144/76 136/73 (!) 141/72   Pulse: 100 99 89 92   Resp: (!) 22 (!) 28 18 (!) 25   Temp:  37.3 °C (99.2 °F)     TempSrc:  Temporal     SpO2: 93% 94% 93% 94%   Weight:       Height:        Body mass index is 29.29 kg/m².    O2 therapy: Pulse Oximetry: 94 %, O2 (LPM): 2, O2 Delivery Device: Nasal Cannula    Date 06/30/22 0700 - 07/01/22 0659   Shift 5388-5977 2987-8930 5603-3477 24 Hour Total   INTAKE   I.V. 110   110     Cardene Volume 50   50     Volume (mL) (3% sodium chloride (HYPERTONIC SALINE) 500mL infusion 500 mL) 60   60   NG/GT 75   75     Intake (mL) (Enteral Tube 06/27/22 Left nare) 75   75   Shift Total 185   185   OUTPUT   Urine 400   400     Number of Times Voided 1 x   1 x     Urine Void (mL) 400   400   Drains 23   23     Output (mL) (ICP/Ventriculostomy Right Other (Comment)) 23   23   Shift Total 423   423   NET -238   -238        Physical Exam  Constitutional:       General: He is not in acute distress.     Appearance: He is not ill-appearing.    HENT:      Head:      Comments: EVD in place     Nose: Nose normal.      Mouth/Throat:      Mouth: Mucous membranes are moist.   Eyes:      Extraocular Movements: Extraocular movements intact.   Cardiovascular:      Rate and Rhythm: Normal rate and regular rhythm.      Pulses: Normal pulses.      Heart sounds: Normal heart sounds. No murmur heard.    No friction rub. No gallop.   Pulmonary:      Effort: Pulmonary effort is normal. No respiratory distress.      Breath sounds: No wheezing.   Abdominal:      General: Abdomen is flat. Bowel sounds are normal. There is no distension.      Palpations: Abdomen is soft.      Tenderness: There is no abdominal tenderness.   Musculoskeletal:      Right lower leg: No edema.      Left lower leg: No edema.   Skin:     General: Skin is warm.   Neurological:      Mental Status: He is alert.      Comments: Motor strength 5/5, sensation intact              Recent Labs     06/28/22  0600 06/28/22  1307 06/29/22  0546 06/29/22  1221 06/29/22  1810 06/30/22  0034 06/30/22  0519   SODIUM 140   < > 144   < > 150* 148* 148*   POTASSIUM 3.6  --  3.4*  --   --   --  3.6   CHLORIDE 107  --  112  --   --   --  116*   CO2 23  --  22  --   --   --  23   BUN 15  --  15  --   --   --  13   CREATININE 0.61  --  0.59  --   --   --  0.57   CALCIUM 8.3*  --  8.4*  --   --   --  8.4*    < > = values in this interval not displayed.     Recent Labs     06/28/22 0600 06/29/22  0546 06/30/22  0519   PREALBUMIN 21.2  --   --    GLUCOSE 116* 143* 140*     Recent Labs     06/28/22 0600 06/29/22  0546 06/30/22  0519   RBC 5.44 5.39 5.25   HEMOGLOBIN 14.4 14.2 13.7*   HEMATOCRIT 44.2 44.3 43.2   PLATELETCT 273 251 256     Recent Labs     06/28/22 0600 06/29/22  0546 06/30/22  0519   WBC 12.8* 10.1 7.8   NEUTSPOLYS 83.40* 76.70* 70.70   LYMPHOCYTES 7.00* 12.10* 17.20*   MONOCYTES 8.90 10.60 10.90   EOSINOPHILS 0.00 0.00 0.40   BASOPHILS 0.10 0.20 0.40       Meds:  • lisinopril  10 mg     • amLODIPine  10 mg      • sodium chloride  2 g     • fludrocortisone  0.1 mg     • 3% sodium chloride  500 mL 500 mL (06/30/22 0528)   • atorvastatin  80 mg     • hydrALAZINE  20 mg     • labetalol  10-20 mg     • MD Alert...Adult ICU Electrolyte Replacement per Pharmacy       • niCARdipine infusion  0-15 mg/hr 2.5 mg/hr (06/30/22 0902)   • HYDROmorphone  0.5 mg     • Pharmacy  1 Each     • acetaminophen  650 mg     • magnesium hydroxide  30 mL      And   • senna-docusate  2 Tablet      And   • polyethylene glycol/lytes  1 Packet      And   • bisacodyl  10 mg     • ondansetron  4 mg     • ondansetron  4 mg     • prochlorperazine  5-10 mg          Procedures:  EVD placement on 6/27     Imaging:  EC-ECHOCARDIOGRAM COMPLETE W/O CONT   Final Result      CT-HEAD W/O   Final Result         1.  Postprocedural changes of ventriculostomy placement with minimal hyperdense hemorrhage tracking along the ventriculostomy tract.   2.  Small anterior foci of right pneumocephalus.   3.  Low-density evolving stroke within the right cerebellar hemisphere effacing the fourth ventricle.   4.  Atherosclerosis         DX-ABDOMEN FOR TUBE PLACEMENT   Final Result      1.  Feeding tube tip overlies the proximal stomach.      DX-CHEST-LIMITED (1 VIEW)   Final Result      1.  Right IJ catheter tip projects over the cavoatrial junction without pneumothorax.   2.  Enlarged cardiac silhouette with mild vascular congestion.   3.  There is bibasilar atelectasis.      CT-CTA HEAD WITH & W/O-POST PROCESS   Final Result         1. No hemodynamically significant narrowing of the major intracranial vessels.      CT-CTA NECK WITH & W/O-POST PROCESSING   Final Result      1. No evidence of flow-limiting stenosis in the cervical carotid or cervical vertebral arteries.      MR-BRAIN-W/O   Final Result      1.  Large acute right posterior inferior cerebellar artery territory infarct with associated edema/swelling and localized mass effect, partially effacing the fourth  ventricle. The lateral ventricles are now mildly dilated suggesting developing    obstructive hydrocephalus.   2.  Small area of petechial hemorrhage within the infarct without other significant acute intracranial hemorrhage.   3.  FLAIR hyperintensity in the right vertebral artery suggesting sluggish flow, less likely thrombus formation.      These findings were discussed with MONIQUE KNOX on 6/27/2022 1:49 PM.      CT-HEAD W/O   Final Result      Head CT without contrast within normal limits. No evidence of acute cerebral infarction, hemorrhage or mass lesion.             ASSESSEMENT and PLAN:    Dizziness  Presents for dizziness, room spinning sensation  Likely related to benign paroxysmal positional vertigo  Dizziness elicited with head movement to left  CT head normal  Supportive treatment, IV fluids, avoid trigger of left facing head position  Can try Epley's maneuver    Patient with persistent dizziness and nausea despite overnight supportive treatment  MRI brain ordered    Acute stroke due to occlusion of right cerebellar artery (HCC)  Presented with vertigo, found to have large right PICA stroke with partial effacement of fourth ventricle   Status post EVD placement on 6/27/2022   Neurology and neurosurgery following   Continue frequent neuro checks  SBP goal less than 160, continue as needed labetalol/hydralazine  Scheduled Amlodipine 10mg daily, started on Lisinopril today   Wean off Cardene gtt   Sodium nearly at goal. Continue salt tabs and 3% normal saline at 60cc/hour  High intensity statin  PT/OT/SLP        Hypokalemia  Replete to > 4          CODE STATUS: Full code  Quality Measures:  Feeding: Diet per SLP  Analgesia: Dilaudid  Sedation: None Tylenol,  Thromboprophylaxis: Held/SCD  Head of bed: >30 degrees  Ulcer prophylaxis: Not indicated  Glycemic control: Not indicated   bowel care: bowel regimen as needed  Indwelling lines: Right IJ, PIV   deescalation of antibiotics: None      Jose Peterson  M.D.

## 2022-06-30 NOTE — PROGRESS NOTES
"[facility] Critical Care Medicine Progress Note    Brief HPI/problem list:  Mr. Putnam is a 57 year old male with no past medical history and is not a smoker who presented to the ER yesterday with complaints of sudden onset of dizziness yesterday afternoon.  The patient reports that he was in his usual state of health when he got up to get a glass of water when he had sudden onset of dizziness as if the \"room was spinning\".  He reported that he had balance issues and had to sit down.  He had nausea and vomiting.  No tinnitus.  No unilateral numbness, tingling, or weakness to extremities.  Family witness that the patient had horizontal nystagmus for about 15 minutes.  EMS witnessed this as well.  He was brought to the ER and admitted.  He underwent an MRI brain today which revealed a large right cerebellar stroke and was transferred to the ICU for ongoing care. (Dr Valdivia HPI 6/27)    6/27 - admit, s/p EVD at night  6/28 - Neuro better, HA, ECHO ok, failed FEES  6/29 - Speech/vision better, NIH 2-3, EVD pressures/output ok, Nicardipine 5, 3% 60cc  6/30 - Neuro no change, EVD output low, 3% saline 60, neurosurgery raising EVD level to 10 cm today    Case reviewed with neurology at length at the bedside    Daily exam: A&O x4, EVD, mostly NFE      TEAM Rounds:  Neuro: ICP 1-6, output 5-10, CCP , 3% saline 60cc/hr  HR: SR, 80s, Cardene 2.5  SBP:  80-90s  Tmax: 37.6  GI: NPO, FEES today?  UOP: great  Lines: R IJ CVC, PIVs  Resp: 2 lpm NC, CXR 6/27 clear, IS 1700  Vte: SCDs, no heparins with EVD  PPI/H2: none  Antibx: NA    Assessment:  CVA right cerebellum with fourth ventricle effacement, R PICA improved  Hydrocephalus status post EVD 6/27-controlled with drainage  Swallow dysfunction-speech following  Hypokalemia  History of heart murmur  History of multiple orthopedic procedures    Plan of care:   Acutely titrating nicardipine for hypertension, BP goal less than 140  Continue amlodipine to 10  Actively titrating " 3% saline infusion  Bolus 23% bolus repeat needed  Cont salt tabs, start to 2g 3 times daily  Cont Florinef  Add ACE  Follow-up swallow eval when ready  ECHO grossly normal  I-S/mobilize  Statin high-dose  Optimize electrolytes  Continue neurochecks to Q2H    Past peak swelling point hopefully, consider discontinuing 3% or tapering it off tomorrow.  EVD drain raised to 10 cm by neurosurgery    Case reviewed with RN, Charge RN, RT, Clinical pharmacist, speech therapy, neurology and [facility] Resident    Critical care time provided was 40 minutes. This excludes all separate billable procedures.     Please see [facility] notes for additional documentation    Steve Victor MD  [facility]

## 2022-06-30 NOTE — CARE PLAN
"The patient is Watcher - Medium risk of patient condition declining or worsening    Shift Goals  Clinical Goals: Stable or improved neurological status, up to chair  Patient Goals: \"Eat ice chips\"    Progress made toward(s) clinical / shift goals:  Patient maintained intact neurological status and tolerated increased height of drain. Pt tolerated chair position x 8 hours and tolerated well.     Problem: Pain - Standard  Goal: Alleviation of pain or a reduction in pain to the patient’s comfort goal  Outcome: Progressing     Problem: Knowledge Deficit - Standard  Goal: Patient and family/care givers will demonstrate understanding of plan of care, disease process/condition, diagnostic tests and medications  Outcome: Progressing     Problem: Fall Risk  Goal: Patient will remain free from falls  Outcome: Progressing     Problem: Optimal Care of the Stroke Patient  Goal: Optimal emergency care for the stroke patient  Outcome: Progressing  Goal: Optimal acute care for the stroke patient  Outcome: Progressing     Problem: Knowledge Deficit - Stroke Education  Goal: Patient's knowledge of stroke and risk factors will improve  Outcome: Progressing     Problem: Psychosocial - Patient Condition  Goal: Patient's ability to verbalize feelings about condition will improve  Outcome: Progressing  Goal: Patient's ability to re-evaluate and adapt role responsibilities will improve  Outcome: Progressing     Problem: Discharge Planning - Stroke  Goal: Ensure Stroke Core Measures are met prior to discharge  Outcome: Progressing  Goal: Patient’s continuum of care needs will be met  Outcome: Progressing     Problem: Neuro Status  Goal: Neuro status will remain stable or improve  Outcome: Progressing     Problem: Hemodynamic Monitoring  Goal: Patient's hemodynamics, fluid balance and neurologic status will be stable or improve  Outcome: Progressing     Problem: Respiratory - Stroke Patient  Goal: Patient will achieve/maintain optimum " respiratory rate/effort  Outcome: Progressing     Problem: Dysphagia  Goal: Dysphagia will improve  Outcome: Progressing     Problem: Risk for Aspiration  Goal: Patient's risk for aspiration will be absent or decrease  Outcome: Progressing     Problem: Urinary Elimination  Goal: Establish and maintain regular urinary output  Outcome: Progressing     Problem: Bowel Elimination  Goal: Establish and maintain regular bowel function  Outcome: Progressing     Problem: Mobility - Stroke  Goal: Patient's capacity to carry out activities will improve  Outcome: Progressing  Goal: Spasticity will be prevented or improved  Outcome: Progressing  Goal: Subluxation will be prevented or improved  Outcome: Progressing     Problem: Self Care  Goal: Patient will have the ability to perform ADLs independently or with assistance (bathe, groom, dress, toilet and feed)  Outcome: Progressing       Patient is not progressing towards the following goals: Patient continues to require nicardipine for SBP >140 with uptitration of medication. Pt needs reinforcement of supervised consumption of ice chips. Unable to receive speech pathology evaluation today.

## 2022-06-30 NOTE — CARE PLAN
Problem: Pain - Standard  Goal: Alleviation of pain or a reduction in pain to the patient’s comfort goal  Outcome: Progressing     Problem: Knowledge Deficit - Standard  Goal: Patient and family/care givers will demonstrate understanding of plan of care, disease process/condition, diagnostic tests and medications  Outcome: Progressing     Problem: Fall Risk  Goal: Patient will remain free from falls  Outcome: Progressing     Problem: Optimal Care of the Stroke Patient  Goal: Optimal emergency care for the stroke patient  Outcome: Progressing  Goal: Optimal acute care for the stroke patient  Outcome: Progressing     Problem: Knowledge Deficit - Stroke Education  Goal: Patient's knowledge of stroke and risk factors will improve  Outcome: Progressing     Problem: Psychosocial - Patient Condition  Goal: Patient's ability to verbalize feelings about condition will improve  Outcome: Progressing  Goal: Patient's ability to re-evaluate and adapt role responsibilities will improve  Outcome: Progressing     Problem: Discharge Planning - Stroke  Goal: Ensure Stroke Core Measures are met prior to discharge  Outcome: Progressing  Goal: Patient’s continuum of care needs will be met  Outcome: Progressing     Problem: Neuro Status  Goal: Neuro status will remain stable or improve  Outcome: Progressing     Problem: Hemodynamic Monitoring  Goal: Patient's hemodynamics, fluid balance and neurologic status will be stable or improve  Outcome: Progressing     Problem: Respiratory - Stroke Patient  Goal: Patient will achieve/maintain optimum respiratory rate/effort  Outcome: Progressing     Problem: Dysphagia  Goal: Dysphagia will improve  Outcome: Progressing     Problem: Risk for Aspiration  Goal: Patient's risk for aspiration will be absent or decrease  Outcome: Progressing     Problem: Urinary Elimination  Goal: Establish and maintain regular urinary output  Outcome: Progressing     Problem: Bowel Elimination  Goal: Establish and  maintain regular bowel function  Outcome: Progressing     Problem: Mobility - Stroke  Goal: Patient's capacity to carry out activities will improve  Outcome: Progressing  Goal: Spasticity will be prevented or improved  Outcome: Progressing  Goal: Subluxation will be prevented or improved  Outcome: Progressing     Problem: Self Care  Goal: Patient will have the ability to perform ADLs independently or with assistance (bathe, groom, dress, toilet and feed)  Outcome: Progressing   The patient is Watcher - Medium risk of patient condition declining or worsening    Shift Goals  Clinical Goals: stable neuro/  Patient Goals: pass FEES    Progress made toward(s) clinical / shift goals:  .      Patient is not progressing towards the following goals:

## 2022-06-30 NOTE — PROGRESS NOTES
Chief Complaint   Patient presents with   • Dizziness     Onset today at 1430   • Weakness   • Headache     Onset today at 1430    • Numbness     Numbness and tingling present to REKHA arms and legs   • Nausea       Problem List Items Addressed This Visit     * (Principal) Acute stroke due to occlusion of right cerebellar artery (HCC)     Presented with vertigo, found to have large right PICA stroke with partial effacement of fourth ventricle   Status post EVD placement on 6/27/2022   Neurology and neurosurgery following   Continue frequent neuro checks  SBP goal less than 160, continue as needed labetalol/hydralazine  Scheduled Amlodipine 10mg daily  Wean off Cardene gtt   s/p 23% bolus today , cont 3% saline with goal Na > 150, start salt tablets  Increase salt tab dose to 2G TID.   High intensity statin  PT/OT/SLP               Relevant Medications    atorvastatin (LIPITOR) tablet 80 mg    hydrALAZINE (APRESOLINE) injection 20 mg    labetalol (NORMODYNE/TRANDATE) injection 10-20 mg    niCARdipine (CARDENE) 25 mg in  mL Infusion    amLODIPine (NORVASC) tablet 10 mg    Other Relevant Orders    Central Line Insertion (Completed)      Other Visit Diagnoses     Vertigo        Nausea        Cerebrovascular accident (CVA), unspecified mechanism (HCC)        Relevant Medications    atorvastatin (LIPITOR) tablet 80 mg    hydrALAZINE (APRESOLINE) injection 20 mg    labetalol (NORMODYNE/TRANDATE) injection 10-20 mg    niCARdipine (CARDENE) 25 mg in  mL Infusion    amLODIPine (NORVASC) tablet 10 mg    amLODIPine (NORVASC) tablet 5 mg (Completed)    Other Relevant Orders    Referral to Physiatry (PMR)    Referral to Neurology        Neurology Progress Note    Brief History of present illness:  57 y.o. male with relevant history of hyperlipidemia, hypertension, heart murmur (details limited) who presented to [facility] on 6/26/22 for dizziness, vertigo, headache, and nausea. CT head wo contrast 6/26/22 showed  "no acute hemorrhage, no large territory infarct, and no mass. MRI brain wo contrast 6/27/28 revealed a large acute infarct of the right posterior inferior cerebellar artery (PICA) territory with associated cytotoxic edema and mass effect with partial effacement of the fourth ventricle with mild lateral ventricle dilation suggestive of obstructive hydrocephalus and small petechial hemorrhagic transformation. CTA head and neck showed no high grade stenosis or large vessel occlusion. Etiology thus far remains occult. Neurosurgery placed right frontal EVD on 6/27/22 and continue to follow if patient develops worsening hydrocephalus requiring a decompressive suboccipital craniectomy.    Interval, 6/30/22:  Patient sitting up in bed; awake and alert. States that he feels well. Moves all extremities; follows commands; minimal dysarthria. No events overnight per nursing. SBP 130s-140s overnight; EVD with ICPs < 10, 178 mL output over 24 hours. Na goal 150-155; currently 148; 23% NaCl boluses given as needed to achieve goal.     No changes to HPI as was previously documented.     Past medical history:   Past Medical History:   Diagnosis Date   • Heart murmur 12/2017    \"As a child\"   • Pain 12/2017    right shoulder pain       Past surgical history:   Past Surgical History:   Procedure Laterality Date   • SHOULDER ARTHROSCOPY W/ ROTATOR CUFF REPAIR Right 1/8/2018    Procedure: SHOULDER ARTHROSCOPY W/ ROTATOR CUFF REPAIR- WITH EXTENSIVE DEBRIDEMENT;  Surgeon: Rl Martinez M.D.;  Location: [facility];  Service: Orthopedics   • SHOULDER DECOMPRESSION ARTHROSCOPIC Right 1/8/2018    Procedure: SHOULDER DECOMPRESSION ARTHROSCOPIC- SUBACROMIAL;  Surgeon: Rl Martinez M.D.;  Location: [facility];  Service: Orthopedics   • SHOULDER ARTHROSCOPY W/ BICIPITAL TENODESIS REPAIR Right 1/8/2018    Procedure: SHOULDER ARTHROSCOPY W/ BICIPITAL TENODESIS REPAIR;  Surgeon: Rl Martinez M.D.;  Location: " SURG[facility];  Service: Orthopedics   • SINUS OBLITERATION FRONTAL  2010   • KNEE ARTHROSCOPY Right 1989   • TONSILLECTOMY  1976   • ORIF, FRACTURE, TIBIA Right 1976    with skin graft       Family history:   Family History   Problem Relation Age of Onset   • Cancer Mother        Social history:   Social History     Socioeconomic History   • Marital status:      Spouse name: Not on file   • Number of children: Not on file   • Years of education: Not on file   • Highest education level: Not on file   Occupational History   • Not on file   Tobacco Use   • Smoking status: Never Smoker   • Smokeless tobacco: Never Used   Substance and Sexual Activity   • Alcohol use: Yes     Comment: 2 per month   • Drug use: Yes     Types: Marijuana, Inhaled     Comment: Infrequently, approximately 1-2 times per month   • Sexual activity: Not on file   Other Topics Concern   • Not on file   Social History Narrative   • Not on file     Social Determinants of Health     Financial Resource Strain: Not on file   Food Insecurity: Not on file   Transportation Needs: Not on file   Physical Activity: Not on file   Stress: Not on file   Social Connections: Not on file   Intimate Partner Violence: Not on file   Housing Stability: Not on file       Current medications:   Current Facility-Administered Medications   Medication Dose   • potassium bicarbonate (KLYTE) effervescent tablet 50 mEq  50 mEq   • amLODIPine (NORVASC) tablet 10 mg  10 mg   • sodium chloride (SALT) tablet 2 g  2 g   • fludrocortisone (FLORINEF) tablet 0.1 mg  0.1 mg   • 3% sodium chloride (HYPERTONIC SALINE) 500mL infusion 500 mL  500 mL   • atorvastatin (LIPITOR) tablet 80 mg  80 mg   • hydrALAZINE (APRESOLINE) injection 20 mg  20 mg   • labetalol (NORMODYNE/TRANDATE) injection 10-20 mg  10-20 mg   • MD Alert...ICU Electrolyte Replacement per Pharmacy     • niCARdipine (CARDENE) 25 mg in  mL Infusion  0-15 mg/hr   • HYDROmorphone (Dilaudid) injection  0.5 mg  0.5 mg   • Pharmacy Consult: Enteral tube insertion - review meds/change route/product selection  1 Each   • acetaminophen (Tylenol) tablet 650 mg  650 mg   • magnesium hydroxide (MILK OF MAGNESIA) suspension 30 mL  30 mL    And   • senna-docusate (PERICOLACE or SENOKOT S) 8.6-50 MG per tablet 2 Tablet  2 Tablet    And   • polyethylene glycol/lytes (MIRALAX) PACKET 1 Packet  1 Packet    And   • bisacodyl (DULCOLAX) suppository 10 mg  10 mg   • ondansetron (ZOFRAN ODT) dispertab 4 mg  4 mg   • ondansetron (ZOFRAN) syringe/vial injection 4 mg  4 mg   • prochlorperazine (COMPAZINE) injection 5-10 mg  5-10 mg       Medication Allergy:  No Known Allergies      Review of systems:   Constitutional: denies fever, night sweats, weight loss.   Eyes: denies acute vision change, eye pain or secretion.   Ears, Nose, Mouth, Throat: denies nasal secretion, nasal bleeding, difficulty swallowing, hearing loss, tinnitus, vertigo, ear pain, acute dental problems, oral ulcers or lesions.   Endocrine: denies recent weight changes, heat or cold intolerance, polyuria, polydypsia, polyphagia,abnormal hair growth.  Cardiovascular: denies new onset of chest pain, palpitations, syncope, or dyspnea of exertion.  Pulmonary: denies shortness of breath, new onset of cough, hemoptysis, wheezing, chest pain or flu-like symptoms.   GI: denies nausea, vomiting, diarrhea, GI bleeding, change in appetite, abdominal pain, and change in bowel habits.  : denies dysuria, urinary incontinence, hematuria.  Heme/oncology: denies history of easy bruising or bleeding. No history of cancer, DVTor PE.  Allergy/immunology: denies hives/urticaria, or itching.   Dermatologic: denies new rash, or new skin lesions.  Musculoskeletal:denies joint swelling or pain, muscle pain, neck and back pain.   Neurologic: As noted in detail above.   Psychiatric: denies symptoms of depression, anxiety, hallucinations, mood swings or changes, suicidal or homicidal thoughts.        Physical examination:   Vitals:    06/30/22 0745 06/30/22 0800 06/30/22 0815 06/30/22 0830   BP: 134/69 (!) 144/76 136/73 (!) 141/72   Pulse: 100 99 89 92   Resp: (!) 22 (!) 28 18 (!) 25   Temp:  37.3 °C (99.2 °F)     TempSrc:  Temporal     SpO2: 93% 94% 93% 94%   Weight:       Height:           General: Patient in no acute distress  HEENT: Normocephalic, no signs of acute trauma.   Neck: supple, no meningeal signs or carotid bruits. There is normal range of motion. No tenderness on exam.   Chest: clear to auscultation. No cough.   CV: RRR, no murmurs.   Skin: no signs of acute rashes or trauma.   Musculoskeletal: joints exhibit full range of motion, without any pain to palpation. There are no signs of joint or muscle swelling. There is no tenderness to deep palpation of muscles.   Psychiatric: No hallucinatory behavior.       NEUROLOGICAL EXAM:   Mental status, orientation: Awake, alert and fully oriented.   Speech and language: speech is clear and fluent; perhaps very mildly dysarthric. The patient is able to name, repeat and comprehend.   Cranial nerve exam: Pupils are 3-4 mm bilaterally and equally reactive to light. Visual fields are intact by confrontation.There is no nystagmus on primary or secondary gaze. Intact full EOM in all directions of gaze. Slight Left lower facial weakness appreciated. Sensation in the face is intact to light touch. Uvula is midline. Palate elevates symmetrically. Tongue is midline and without any signs of tongue biting or fasciculations. Shoulder shrug is intact bilaterally.   Motor exam: Strength is 5/5 in all extremities. Tone is normal. No abnormal movements were seen on exam.   Sensory exam reveals normal sense of light touch and pinprick in all extremities.   Deep tendon reflexes: Plantar responses are flexor. There is no clonus.   Coordination: shows a normal finger-nose-finger. LL heel shin with mild ataxia.    Gait: Not assessed at this time as patient is a fall  risk.      NIH Stroke Scale    1a Level of Consciousness   1b Orientation Questions   1c Response to Commands   2 Gaze   3 Visual Fields   4 Facial Movement 1  5 Motor Function (arm)   a Left   b Right   6 Motor Function (leg)   a Left   b Right   7 Limb Ataxia 1  8 Sensory   9 Language   10 Articulation 1  11 Extinction/Inattention     Score: 3      ANCILLARY DATA REVIEWED:     Lab Data Review:  Recent Results (from the past 24 hour(s))   SODIUM SERUM (NA)    Collection Time: 06/29/22 12:21 PM   Result Value Ref Range    Sodium 148 (H) 135 - 145 mmol/L   SODIUM SERUM (NA)    Collection Time: 06/29/22  6:10 PM   Result Value Ref Range    Sodium 150 (H) 135 - 145 mmol/L   SODIUM SERUM (NA)    Collection Time: 06/30/22 12:34 AM   Result Value Ref Range    Sodium 148 (H) 135 - 145 mmol/L   CBC WITH DIFFERENTIAL    Collection Time: 06/30/22  5:19 AM   Result Value Ref Range    WBC 7.8 4.8 - 10.8 K/uL    RBC 5.25 4.70 - 6.10 M/uL    Hemoglobin 13.7 (L) 14.0 - 18.0 g/dL    Hematocrit 43.2 42.0 - 52.0 %    MCV 82.3 81.4 - 97.8 fL    MCH 26.1 (L) 27.0 - 33.0 pg    MCHC 31.7 (L) 33.7 - 35.3 g/dL    RDW 49.1 35.9 - 50.0 fL    Platelet Count 256 164 - 446 K/uL    MPV 9.4 9.0 - 12.9 fL    Neutrophils-Polys 70.70 44.00 - 72.00 %    Lymphocytes 17.20 (L) 22.00 - 41.00 %    Monocytes 10.90 0.00 - 13.40 %    Eosinophils 0.40 0.00 - 6.90 %    Basophils 0.40 0.00 - 1.80 %    Immature Granulocytes 0.40 0.00 - 0.90 %    Nucleated RBC 0.00 /100 WBC    Neutrophils (Absolute) 5.53 1.82 - 7.42 K/uL    Lymphs (Absolute) 1.34 1.00 - 4.80 K/uL    Monos (Absolute) 0.85 0.00 - 0.85 K/uL    Eos (Absolute) 0.03 0.00 - 0.51 K/uL    Baso (Absolute) 0.03 0.00 - 0.12 K/uL    Immature Granulocytes (abs) 0.03 0.00 - 0.11 K/uL    NRBC (Absolute) 0.00 K/uL   Basic Metabolic Panel    Collection Time: 06/30/22  5:19 AM   Result Value Ref Range    Sodium 148 (H) 135 - 145 mmol/L    Potassium 3.6 3.6 - 5.5 mmol/L    Chloride 116 (H) 96 - 112 mmol/L    Co2 23  20 - 33 mmol/L    Glucose 140 (H) 65 - 99 mg/dL    Bun 13 8 - 22 mg/dL    Creatinine 0.57 0.50 - 1.40 mg/dL    Calcium 8.4 (L) 8.5 - 10.5 mg/dL    Anion Gap 9.0 7.0 - 16.0   ESTIMATED GFR    Collection Time: 06/30/22  5:19 AM   Result Value Ref Range    GFR (CKD-EPI) 115 >60 mL/min/1.73 m 2       Labs reviewed by me.         Imaging reviewed by me:     EC-ECHOCARDIOGRAM COMPLETE W/O CONT   Final Result      CT-HEAD W/O   Final Result         1.  Postprocedural changes of ventriculostomy placement with minimal hyperdense hemorrhage tracking along the ventriculostomy tract.   2.  Small anterior foci of right pneumocephalus.   3.  Low-density evolving stroke within the right cerebellar hemisphere effacing the fourth ventricle.   4.  Atherosclerosis         DX-ABDOMEN FOR TUBE PLACEMENT   Final Result      1.  Feeding tube tip overlies the proximal stomach.      DX-CHEST-LIMITED (1 VIEW)   Final Result      1.  Right IJ catheter tip projects over the cavoatrial junction without pneumothorax.   2.  Enlarged cardiac silhouette with mild vascular congestion.   3.  There is bibasilar atelectasis.      CT-CTA HEAD WITH & W/O-POST PROCESS   Final Result         1. No hemodynamically significant narrowing of the major intracranial vessels.      CT-CTA NECK WITH & W/O-POST PROCESSING   Final Result      1. No evidence of flow-limiting stenosis in the cervical carotid or cervical vertebral arteries.      MR-BRAIN-W/O   Final Result      1.  Large acute right posterior inferior cerebellar artery territory infarct with associated edema/swelling and localized mass effect, partially effacing the fourth ventricle. The lateral ventricles are now mildly dilated suggesting developing    obstructive hydrocephalus.   2.  Small area of petechial hemorrhage within the infarct without other significant acute intracranial hemorrhage.   3.  FLAIR hyperintensity in the right vertebral artery suggesting sluggish flow, less likely thrombus  formation.      These findings were discussed with MONIQUE KNOX on 6/27/2022 1:49 PM.      CT-HEAD W/O   Final Result      Head CT without contrast within normal limits. No evidence of acute cerebral infarction, hemorrhage or mass lesion.               Presumed mechanism by TOAST:  __Large Artery Atherosclerosis  __Small Vessel (Lacunar)  __Cardioembolic  __Other (Sickle Cell, Vasculitis, Hypercoagulable)  _X_Unknown    Modified Guadalupe Scale (MRS): 0 = No symptoms      ASSESSMENT AND PLAN:  57 y.o. male with relevant history of hyperlipidemia, hypertension, heart murmur (details limited) who presented to [facility] on 6/26/22 for dizziness, vertigo, headache, and nausea; imaging has revealed acute Right PICA ischemic infarct of unclear etiology; CTA head/neck with no LVO nor hemodynamically significant stenosis nor dissection. NIHSS 3. Patient now post-stroke day #4; within peak edema window thus remains on SOC watch with q2h neuro checks; patient has EVD for fluid diversion given effacement of 4th ventricle; reasonable/non excessive CSF output and normal ICPs.     Recommendations/Plan:    -q2h and PRN neuro assessment. VS per nursing/unit protocol. BP goal is normotension, 100-130/60-80. Antihypertensives per primary team. Please notify neurology with changes in exam.    -Telemetry; currently SR. Screen for Afib/arrhythmia. Note TTE with EF 65%; no gross structural abnormalities. If cardiac work up here is unrevealing, will recommend outpatient cardiac monitoring with Zio patch at time of discharge.   -Patient receiving Hypertonic 3% and PO NaCl tabs for goal 150-155; current Na 148; plan to liberalize/normalize Na in coming 1-2 days.   -ASA 81 mg held for now given possibility of OR; tentative plan to restart 7/1/22 or 7/2/22. -Atorvastatin 80 mg PO q HS. Note LDL is 129; goal < 70.   -Recommend aggressive BG management per primary team. Avoid IVF with Dextrose. BG goal 140-180. hemoglobin A1c  6.2.  -PT/OT/SLP eval and treat. Physiatry consult.   -Counseled patient at length regarding life style and risk factor modification for secondary stroke prevention.   -All other medical management per primary team.   -DVT PPX: SCDs. Chemo ppx held for now.      The plan of care above has been discussed with Dr Gideon Sanz. Please call with questions.     ALESSANDRA [email or web address].  [facility]

## 2022-06-30 NOTE — PROGRESS NOTES
Neurosurgery Progress Note    Subjective:  57 yr old male presented with CVA.  POD#3 R EVD placement     No acute events overnight  His ICPs have been stable, 1-8  EVD draining 4-14mL/hr    Exam:  A&O x3  PERRL, symmetric  Face symmetric   Cns II-VII grossly intact  Moving all extremities, follows commands  Sensation intact   No drift    BP  Min: 120/68  Max: 159/88  Pulse  Av.9  Min: 85  Max: 101  Resp  Av.6  Min: 10  Max: 49  Temp  Av.3 °C (99.2 °F)  Min: 37.2 °C (98.9 °F)  Max: 37.6 °C (99.6 °F)  SpO2  Av.3 %  Min: 91 %  Max: 96 %    ICP  Av.7 MM HG  Min: 1 MM HG  Max: 11 MM HG    Recent Labs     22  0600 22  0546 22  0519   WBC 12.8* 10.1 7.8   RBC 5.44 5.39 5.25   HEMOGLOBIN 14.4 14.2 13.7*   HEMATOCRIT 44.2 44.3 43.2   MCV 81.3* 82.2 82.3   MCH 26.5* 26.3* 26.1*   MCHC 32.6* 32.1* 31.7*   RDW 45.5 48.6 49.1   PLATELETCT 273 251 256   MPV 9.4 9.5 9.4     Recent Labs     22  0600 22  1307 22  0546 22  1221 22  1810 22  0034 22  0519   SODIUM 140   < > 144   < > 150* 148* 148*   POTASSIUM 3.6  --  3.4*  --   --   --  3.6   CHLORIDE 107  --  112  --   --   --  116*   CO2   --   --   --  23   GLUCOSE 116*  --  143*  --   --   --  140*   BUN 15  --  15  --   --   --  13   CREATININE 0.61  --  0.59  --   --   --  0.57   CALCIUM 8.3*  --  8.4*  --   --   --  8.4*    < > = values in this interval not displayed.               Intake/Output                       22 - 22 0659 22 - 22 Total  Total                 Intake    I.V.  1332.5  1320 2652.5  110  -- 110    Cardene Volume 612.5 600 1212.5 50 -- 50    Volume (mL) (3% sodium chloride (HYPERTONIC SALINE) 500mL infusion 500 mL)  60 -- 60    NG/GT  975  900 1875  75  -- 75    Intake (mL) (Enteral Tube 22 Left nare)  75 -- 75    IV Piggyback  50  0 50  --  -- --     Volume (mL) (sodium chloride 200 mEq in empty bag 50 mL ivpb) 50 0 50 -- -- --    Enteral  --  75 75  --  -- --    Free Water / Tube Flush -- 75 75 -- -- --    Total Intake 2357.5 2295 4652.5 185 -- 185       Output    Urine  1675 2008 3683  400  -- 400    Number of Times Voided -- -- -- 1 x -- 1 x    Urine Void (mL) 1675 2008 3683 400 -- 400    Drains  106  72 178  23  -- 23    Output (mL) (ICP/Ventriculostomy Right Other (Comment)) 106 72 178 23 -- 23    Total Output 1781 2080 3861 423 -- 423       Net I/O     576.5 215 791.5 -238 -- -238            Intake/Output Summary (Last 24 hours) at 6/30/2022 0947  Last data filed at 6/30/2022 0800  Gross per 24 hour   Intake 4330 ml   Output 4029 ml   Net 301 ml            • potassium bicarbonate  50 mEq Once   • lisinopril  10 mg Q DAY   • amLODIPine  10 mg Q DAY   • sodium chloride  2 g TID WITH MEALS   • fludrocortisone  0.1 mg DAILY   • 3% sodium chloride  500 mL Continuous   • atorvastatin  80 mg Q EVENING   • hydrALAZINE  20 mg Q4HRS PRN   • labetalol  10-20 mg Q2HRS PRN   • MD Alert...Adult ICU Electrolyte Replacement per Pharmacy   PHARMACY TO DOSE   • niCARdipine infusion  0-15 mg/hr Continuous   • HYDROmorphone  0.5 mg Q3HRS PRN   • Pharmacy  1 Each PHARMACY TO DOSE   • acetaminophen  650 mg Q6HRS PRN   • magnesium hydroxide  30 mL QDAY PRN    And   • senna-docusate  2 Tablet BID    And   • polyethylene glycol/lytes  1 Packet QDAY PRN    And   • bisacodyl  10 mg QDAY PRN   • ondansetron  4 mg Q4HRS PRN   • ondansetron  4 mg Q4HRS PRN   • prochlorperazine  5-10 mg Q4HRS PRN       Assessment and Plan:  Hospital day #3  POD#3 R EVD placement  Raise EVD to 10cm today  Will continue to follow      Chemical prophylactic DVT therapy: Yes - Lovenox 40mg/qd    Start date/time: today

## 2022-06-30 NOTE — CONSULTS
"    Physical Medicine and Rehabilitation Consultation              Date of initial consultation: 6/30/2022  Requesting provider: Steve Miguel MD   Consulting provider: Evon Pedroza D.O.  Reason for consultation: assess for acute inpatient rehab appropriateness  LOS: 3 Day(s)    Chief complaint: PICA infarct     HPI: The patient is a 57 y.o.  male with a past medical history of HLD, HTN and \"heart murmur\" ;  who presented on 6/26/2022  3:31 PM with acute onset dizziness, vertigo, headache and nausea.  A CT of the head obtained on 6/22 showed no acute hemorrhage no large territory infarct and no mass.  The brain MRI was obtained on 6/27 which revealed a large acute infarct in the right posterior inferior cerebellar artery territory with associated cytotoxic edema and mass-effect with partial effacement of the fourth ventricle and mild lateral ventricle dilation suggestive of obstructive hydrocephalus and small petechial hemorrhage transformation.  Neurology and neurosurgery were consulted.  Neurosurgery placed a right frontal EVD on 6/27 for treatment of patient's hydrocephalus.  Per neurology, etiology at this time is presumed to be an emboli to the right PICA with suspicion of cardioembolic source.  Echocardiogram was obtained which showed an EF of 65% and no shunt.  Patient remains in the ICU for EVD monitoring and hypertonic therapies.  Functionally, patient has been willing to participate with therapy, he is functioning at a min assist level for upper body dressing, min assist for bed mobility, patient continues to be n.p.o. with cortrack in place. EVD in place as of 6/30.     Patient seen and examined at bedside, family at bedside. Patient able to tolerate getting up to chair, seated in recliner. Continues to have some dizziness, improved, but overall feels well. Denies new changes in numbness/tingling/weakness.     Social Hx:  Patient lives with his spouse in a 2story home, family able to provide assistance " "  3 CINTHIA  At prior level of function patient was independent with mobility and ADLs       Employment: sells  [employer]   Tobacco: Denies  Alcohol: Denies  Drugs: Denies     THERAPY:  Restrictions: Fall risk, EVD placement, swallow precautions  PT: Functional mobility    PT note: Contact-guard assist for 2 feet, limited by ABD, contact-guard assist for sit to stand    OT: ADLs   OT note: Min assist upper body dressing    SLP:    SLP note: N.p.o. with core track    IMAGIN/28 echocardiogram EF 65%, no evidence of shunt      CT HEAD   IMPRESSION:        1.  Postprocedural changes of ventriculostomy placement with minimal hyperdense hemorrhage tracking along the ventriculostomy tract.  2.  Small anterior foci of right pneumocephalus.  3.  Low-density evolving stroke within the right cerebellar hemisphere effacing the fourth ventricle.  4.  Atherosclerosis    PROCEDURES:   R frontal EVD placement performed by Dr. Nielsen     PMH:  Past Medical History:   Diagnosis Date   • Heart murmur 2017    \"As a child\"   • Pain 2017    right shoulder pain       PSH:  Past Surgical History:   Procedure Laterality Date   • SHOULDER ARTHROSCOPY W/ ROTATOR CUFF REPAIR Right 2018    Procedure: SHOULDER ARTHROSCOPY W/ ROTATOR CUFF REPAIR- WITH EXTENSIVE DEBRIDEMENT;  Surgeon: Rl Martinez M.D.;  Location: [facility];  Service: Orthopedics   • SHOULDER DECOMPRESSION ARTHROSCOPIC Right 2018    Procedure: SHOULDER DECOMPRESSION ARTHROSCOPIC- SUBACROMIAL;  Surgeon: Rl Martinez M.D.;  Location: [facility];  Service: Orthopedics   • SHOULDER ARTHROSCOPY W/ BICIPITAL TENODESIS REPAIR Right 2018    Procedure: SHOULDER ARTHROSCOPY W/ BICIPITAL TENODESIS REPAIR;  Surgeon: Rl Martinez M.D.;  Location: [facility];  Service: Orthopedics   • SINUS OBLITERATION FRONTAL     • KNEE ARTHROSCOPY Right    • TONSILLECTOMY     • ORIF, FRACTURE, TIBIA " "Right 1976    with skin graft       FHX:  Family History   Problem Relation Age of Onset   • Cancer Mother        Medications:  Current Facility-Administered Medications   Medication Dose   • amLODIPine (NORVASC) tablet 10 mg  10 mg   • sodium chloride (SALT) tablet 2 g  2 g   • fludrocortisone (FLORINEF) tablet 0.1 mg  0.1 mg   • 3% sodium chloride (HYPERTONIC SALINE) 500mL infusion 500 mL  500 mL   • atorvastatin (LIPITOR) tablet 80 mg  80 mg   • hydrALAZINE (APRESOLINE) injection 20 mg  20 mg   • labetalol (NORMODYNE/TRANDATE) injection 10-20 mg  10-20 mg   • MD Alert...ICU Electrolyte Replacement per Pharmacy     • niCARdipine (CARDENE) 25 mg in  mL Infusion  0-15 mg/hr   • HYDROmorphone (Dilaudid) injection 0.5 mg  0.5 mg   • Pharmacy Consult: Enteral tube insertion - review meds/change route/product selection  1 Each   • acetaminophen (Tylenol) tablet 650 mg  650 mg   • magnesium hydroxide (MILK OF MAGNESIA) suspension 30 mL  30 mL    And   • senna-docusate (PERICOLACE or SENOKOT S) 8.6-50 MG per tablet 2 Tablet  2 Tablet    And   • polyethylene glycol/lytes (MIRALAX) PACKET 1 Packet  1 Packet    And   • bisacodyl (DULCOLAX) suppository 10 mg  10 mg   • ondansetron (ZOFRAN ODT) dispertab 4 mg  4 mg   • ondansetron (ZOFRAN) syringe/vial injection 4 mg  4 mg   • prochlorperazine (COMPAZINE) injection 5-10 mg  5-10 mg       Allergies:  No Known Allergies    Physical Exam:  Vitals: /73   Pulse 91   Temp 37.2 °C (99 °F) (Temporal)   Resp 19   Ht 1.803 m (5' 11\")   Wt 95.3 kg (210 lb)   SpO2 94%   Gen: NAD, seated comfortably in recliner, family at side   Head: + EVD in place   Eyes/ Nose/ Mouth: PERRLA, moist mucous membranes, cortrak in place   Cardio: RRR, good distal perfusion, warm extremities  Pulm: normal respiratory effort, no cyanosis , breathing comfortably on RA   Abd: Soft NTND, negative borborygmi   Ext: No peripheral edema. No calf tenderness. No clubbing.    Mental status:  A&Ox4 " (person, place, date, situation) answers questions appropriately follows commands  Speech: fluent, no aphasia or dysarthria    CRANIAL NERVES:  2,3: visual acuity grossly intact, PERRL  3,4,6: EOMI bilaterally, no nystagmus or diplopia  5: sensation intact to light touch bilaterally and symmetric  7: no facial asymmetry        Motor:      Upper Extremity  Myotome R L   Shoulder flexion C5 4/5 4/5   Elbow flexion C5 5/5 5/5   Wrist extension C6 5/5 5/5   Elbow extension C7 5/5 5/5   Finger flexion C8 5/5 5/5   Finger abduction T1 5/5 5/5     Lower Extremity Myotome R L   Hip flexion L2 4/5 4/5   Knee extension L3 5/5 5/5   Ankle dorsiflexion L4 5/5 5/5   Toe extension L5 5/5 5/5   Ankle plantarflexion S1 5/5 5/5     Sensory:   intact to light touch through out      DTRs: 2+ in bilateral  biceps  Negative Cazares b/l     Coordination:    intact fine motor with fingers bilaterally      Labs: Reviewed and significant for   Recent Labs     06/28/22  0600 06/29/22  0546 06/30/22  0519   RBC 5.44 5.39 5.25   HEMOGLOBIN 14.4 14.2 13.7*   HEMATOCRIT 44.2 44.3 43.2   PLATELETCT 273 251 256     Recent Labs     06/28/22  0600 06/28/22  1307 06/29/22  0546 06/29/22  1221 06/29/22  1810 06/30/22  0034 06/30/22  0519   SODIUM 140   < > 144   < > 150* 148* 148*   POTASSIUM 3.6  --  3.4*  --   --   --  3.6   CHLORIDE 107  --  112  --   --   --  116*   CO2 23  --  22  --   --   --  23   GLUCOSE 116*  --  143*  --   --   --  140*   BUN 15  --  15  --   --   --  13   CREATININE 0.61  --  0.59  --   --   --  0.57   CALCIUM 8.3*  --  8.4*  --   --   --  8.4*    < > = values in this interval not displayed.     Recent Results (from the past 24 hour(s))   SODIUM SERUM (NA)    Collection Time: 06/29/22 12:21 PM   Result Value Ref Range    Sodium 148 (H) 135 - 145 mmol/L   SODIUM SERUM (NA)    Collection Time: 06/29/22  6:10 PM   Result Value Ref Range    Sodium 150 (H) 135 - 145 mmol/L   SODIUM SERUM (NA)    Collection Time: 06/30/22 12:34  AM   Result Value Ref Range    Sodium 148 (H) 135 - 145 mmol/L   CBC WITH DIFFERENTIAL    Collection Time: 06/30/22  5:19 AM   Result Value Ref Range    WBC 7.8 4.8 - 10.8 K/uL    RBC 5.25 4.70 - 6.10 M/uL    Hemoglobin 13.7 (L) 14.0 - 18.0 g/dL    Hematocrit 43.2 42.0 - 52.0 %    MCV 82.3 81.4 - 97.8 fL    MCH 26.1 (L) 27.0 - 33.0 pg    MCHC 31.7 (L) 33.7 - 35.3 g/dL    RDW 49.1 35.9 - 50.0 fL    Platelet Count 256 164 - 446 K/uL    MPV 9.4 9.0 - 12.9 fL    Neutrophils-Polys 70.70 44.00 - 72.00 %    Lymphocytes 17.20 (L) 22.00 - 41.00 %    Monocytes 10.90 0.00 - 13.40 %    Eosinophils 0.40 0.00 - 6.90 %    Basophils 0.40 0.00 - 1.80 %    Immature Granulocytes 0.40 0.00 - 0.90 %    Nucleated RBC 0.00 /100 WBC    Neutrophils (Absolute) 5.53 1.82 - 7.42 K/uL    Lymphs (Absolute) 1.34 1.00 - 4.80 K/uL    Monos (Absolute) 0.85 0.00 - 0.85 K/uL    Eos (Absolute) 0.03 0.00 - 0.51 K/uL    Baso (Absolute) 0.03 0.00 - 0.12 K/uL    Immature Granulocytes (abs) 0.03 0.00 - 0.11 K/uL    NRBC (Absolute) 0.00 K/uL   Basic Metabolic Panel    Collection Time: 06/30/22  5:19 AM   Result Value Ref Range    Sodium 148 (H) 135 - 145 mmol/L    Potassium 3.6 3.6 - 5.5 mmol/L    Chloride 116 (H) 96 - 112 mmol/L    Co2 23 20 - 33 mmol/L    Glucose 140 (H) 65 - 99 mg/dL    Bun 13 8 - 22 mg/dL    Creatinine 0.57 0.50 - 1.40 mg/dL    Calcium 8.4 (L) 8.5 - 10.5 mg/dL    Anion Gap 9.0 7.0 - 16.0   ESTIMATED GFR    Collection Time: 06/30/22  5:19 AM   Result Value Ref Range    GFR (CKD-EPI) 115 >60 mL/min/1.73 m 2         ASSESSMENT:  Patient is a 57 y.o. male admitted with dizziness due to R PICA CVA     [facility] Code / Diagnosis to Support: 0001.4 - Stroke: No Paresis    Rehabilitation: Impaired ADLs and mobility  Patient is a moderate candidate for inpatient rehab based on needs for PT, OT, and speech therapy, see dispo details below.     Barriers to transfer include: Insurance authorization, TCCs to verify disposition, medical clearance and bed  availability     Additional Recommendations:  R PICA CVA  Partial effacement of 4th ventricle,hydrocephalus  - greatest deficits are dysphagia, impaired balance, and dizziness   - Neuro and neurosurgery consulted  - s/p EVD placement on 6/27   - continues to be on hypertonic saline   - continue with PT/OT/SLP,    Dysphagia   - current NPO, failed FEES  - patient has improvement in dizziness, however if continues to fail swallow evals recommend PEG    Dizziness  - occurs with repositioning, did not significantly impact therapy on 6/29   - currently well controlled, but if problematic recommend trial of meclizine     Dispo  - patient is currently functioning below his level of baseline,will need on going rehab  - patient's greatest need at this time is SLP for dysphagia and PT for balance   - if patient becomes too high level with PT and OT, and primarily needs SLP, patient would not qualify for IRF  - at this time recommend aggressive SLP as schedule allows  - currently patient would be appropriate for IRF, but may be too high level by the time his EVD is removed  - PM&R to continue to follow peripherally         Medical Complexity:  R PICA CVA   Hydrocephalus   Hypernatremia   Dysphagia   Impaired balance  Dizziness        DVT PPX: SCDs       Thank you for allowing us to participate in the care of this patient.     Patient was seen for 87 minutes on unit/floor of which > 50% of time was spent on counseling and coordination of care regarding the above, including prognosis, risk reduction, benefits of treatment, and options for next stage of care.    Evon Pedroza D.O.   Physical Medicine and Rehabilitation     Please note that this dictation was created using voice recognition software. I have made every reasonable attempt to correct obvious errors, but there may be errors of grammar and possibly content that I did not discover before finalizing the note.

## 2022-07-01 LAB
ANION GAP SERPL CALC-SCNC: 11 MMOL/L (ref 7–16)
BASOPHILS # BLD AUTO: 0.3 % (ref 0–1.8)
BASOPHILS # BLD: 0.02 K/UL (ref 0–0.12)
BUN SERPL-MCNC: 17 MG/DL (ref 8–22)
CALCIUM SERPL-MCNC: 8.6 MG/DL (ref 8.5–10.5)
CHLORIDE SERPL-SCNC: 116 MMOL/L (ref 96–112)
CO2 SERPL-SCNC: 22 MMOL/L (ref 20–33)
CREAT SERPL-MCNC: 0.54 MG/DL (ref 0.5–1.4)
EOSINOPHIL # BLD AUTO: 0.05 K/UL (ref 0–0.51)
EOSINOPHIL NFR BLD: 0.7 % (ref 0–6.9)
ERYTHROCYTE [DISTWIDTH] IN BLOOD BY AUTOMATED COUNT: 48.4 FL (ref 35.9–50)
GFR SERPLBLD CREATININE-BSD FMLA CKD-EPI: 117 ML/MIN/1.73 M 2
GLUCOSE SERPL-MCNC: 121 MG/DL (ref 65–99)
HCT VFR BLD AUTO: 42.9 % (ref 42–52)
HGB BLD-MCNC: 13.9 G/DL (ref 14–18)
IMM GRANULOCYTES # BLD AUTO: 0.06 K/UL (ref 0–0.11)
IMM GRANULOCYTES NFR BLD AUTO: 0.9 % (ref 0–0.9)
LYMPHOCYTES # BLD AUTO: 1.3 K/UL (ref 1–4.8)
LYMPHOCYTES NFR BLD: 18.7 % (ref 22–41)
MCH RBC QN AUTO: 26.8 PG (ref 27–33)
MCHC RBC AUTO-ENTMCNC: 32.4 G/DL (ref 33.7–35.3)
MCV RBC AUTO: 82.7 FL (ref 81.4–97.8)
MONOCYTES # BLD AUTO: 0.79 K/UL (ref 0–0.85)
MONOCYTES NFR BLD AUTO: 11.4 % (ref 0–13.4)
NEUTROPHILS # BLD AUTO: 4.73 K/UL (ref 1.82–7.42)
NEUTROPHILS NFR BLD: 68 % (ref 44–72)
NRBC # BLD AUTO: 0 K/UL
NRBC BLD-RTO: 0 /100 WBC
PLATELET # BLD AUTO: 268 K/UL (ref 164–446)
PMV BLD AUTO: 9.3 FL (ref 9–12.9)
POTASSIUM SERPL-SCNC: 3.6 MMOL/L (ref 3.6–5.5)
RBC # BLD AUTO: 5.19 M/UL (ref 4.7–6.1)
SODIUM SERPL-SCNC: 142 MMOL/L (ref 135–145)
SODIUM SERPL-SCNC: 144 MMOL/L (ref 135–145)
SODIUM SERPL-SCNC: 146 MMOL/L (ref 135–145)
SODIUM SERPL-SCNC: 149 MMOL/L (ref 135–145)
WBC # BLD AUTO: 7 K/UL (ref 4.8–10.8)

## 2022-07-01 PROCEDURE — 700111 HCHG RX REV CODE 636 W/ 250 OVERRIDE (IP): Performed by: INTERNAL MEDICINE

## 2022-07-01 PROCEDURE — 700102 HCHG RX REV CODE 250 W/ 637 OVERRIDE(OP): Performed by: INTERNAL MEDICINE

## 2022-07-01 PROCEDURE — 92612 ENDOSCOPY SWALLOW (FEES) VID: CPT

## 2022-07-01 PROCEDURE — 770022 HCHG ROOM/CARE - ICU (200)

## 2022-07-01 PROCEDURE — 80048 BASIC METABOLIC PNL TOTAL CA: CPT

## 2022-07-01 PROCEDURE — 92526 ORAL FUNCTION THERAPY: CPT

## 2022-07-01 PROCEDURE — 700105 HCHG RX REV CODE 258: Performed by: INTERNAL MEDICINE

## 2022-07-01 PROCEDURE — 99232 SBSQ HOSP IP/OBS MODERATE 35: CPT | Performed by: NURSE PRACTITIONER

## 2022-07-01 PROCEDURE — 700105 HCHG RX REV CODE 258: Performed by: STUDENT IN AN ORGANIZED HEALTH CARE EDUCATION/TRAINING PROGRAM

## 2022-07-01 PROCEDURE — 85025 COMPLETE CBC W/AUTO DIFF WBC: CPT

## 2022-07-01 PROCEDURE — A9270 NON-COVERED ITEM OR SERVICE: HCPCS | Performed by: INTERNAL MEDICINE

## 2022-07-01 PROCEDURE — 84295 ASSAY OF SERUM SODIUM: CPT | Mod: 91

## 2022-07-01 PROCEDURE — A9270 NON-COVERED ITEM OR SERVICE: HCPCS | Performed by: STUDENT IN AN ORGANIZED HEALTH CARE EDUCATION/TRAINING PROGRAM

## 2022-07-01 PROCEDURE — 99291 CRITICAL CARE FIRST HOUR: CPT | Performed by: INTERNAL MEDICINE

## 2022-07-01 PROCEDURE — 700102 HCHG RX REV CODE 250 W/ 637 OVERRIDE(OP): Performed by: STUDENT IN AN ORGANIZED HEALTH CARE EDUCATION/TRAINING PROGRAM

## 2022-07-01 RX ORDER — ASPIRIN 81 MG/1
81 TABLET, CHEWABLE ORAL DAILY
Status: DISCONTINUED | OUTPATIENT
Start: 2022-07-01 | End: 2022-07-01

## 2022-07-01 RX ORDER — AMOXICILLIN 250 MG
2 CAPSULE ORAL 2 TIMES DAILY
Status: DISCONTINUED | OUTPATIENT
Start: 2022-07-01 | End: 2022-07-08 | Stop reason: HOSPADM

## 2022-07-01 RX ORDER — FLUDROCORTISONE ACETATE 0.1 MG/1
0.1 TABLET ORAL DAILY
Status: DISCONTINUED | OUTPATIENT
Start: 2022-07-02 | End: 2022-07-08 | Stop reason: HOSPADM

## 2022-07-01 RX ORDER — ATORVASTATIN CALCIUM 80 MG/1
80 TABLET, FILM COATED ORAL EVERY EVENING
Status: DISCONTINUED | OUTPATIENT
Start: 2022-07-01 | End: 2022-07-08 | Stop reason: HOSPADM

## 2022-07-01 RX ORDER — 3% SODIUM CHLORIDE 3 G/100ML
500 INJECTION, SOLUTION INTRAVENOUS CONTINUOUS
Status: DISCONTINUED | OUTPATIENT
Start: 2022-07-01 | End: 2022-07-02

## 2022-07-01 RX ORDER — POTASSIUM CHLORIDE 29.8 MG/ML
40 INJECTION INTRAVENOUS ONCE
Status: COMPLETED | OUTPATIENT
Start: 2022-07-01 | End: 2022-07-01

## 2022-07-01 RX ORDER — ONDANSETRON 4 MG/1
4 TABLET, ORALLY DISINTEGRATING ORAL EVERY 4 HOURS PRN
Status: DISCONTINUED | OUTPATIENT
Start: 2022-07-01 | End: 2022-07-08 | Stop reason: HOSPADM

## 2022-07-01 RX ORDER — BISACODYL 10 MG
10 SUPPOSITORY, RECTAL RECTAL
Status: DISCONTINUED | OUTPATIENT
Start: 2022-07-01 | End: 2022-07-08 | Stop reason: HOSPADM

## 2022-07-01 RX ORDER — SODIUM CHLORIDE 1 G/1
2 TABLET ORAL
Status: DISCONTINUED | OUTPATIENT
Start: 2022-07-01 | End: 2022-07-08 | Stop reason: HOSPADM

## 2022-07-01 RX ORDER — AMLODIPINE BESYLATE 5 MG/1
10 TABLET ORAL
Status: DISCONTINUED | OUTPATIENT
Start: 2022-07-02 | End: 2022-07-07

## 2022-07-01 RX ORDER — ENOXAPARIN SODIUM 100 MG/ML
40 INJECTION SUBCUTANEOUS DAILY
Status: DISCONTINUED | OUTPATIENT
Start: 2022-07-01 | End: 2022-07-08 | Stop reason: HOSPADM

## 2022-07-01 RX ORDER — ACETAMINOPHEN 325 MG/1
650 TABLET ORAL EVERY 6 HOURS PRN
Status: DISCONTINUED | OUTPATIENT
Start: 2022-07-01 | End: 2022-07-08 | Stop reason: HOSPADM

## 2022-07-01 RX ORDER — 3% SODIUM CHLORIDE 3 G/100ML
500 INJECTION, SOLUTION INTRAVENOUS CONTINUOUS
Status: DISCONTINUED | OUTPATIENT
Start: 2022-07-01 | End: 2022-07-01

## 2022-07-01 RX ORDER — POLYETHYLENE GLYCOL 3350 17 G/17G
1 POWDER, FOR SOLUTION ORAL
Status: DISCONTINUED | OUTPATIENT
Start: 2022-07-01 | End: 2022-07-08 | Stop reason: HOSPADM

## 2022-07-01 RX ORDER — ASPIRIN 81 MG/1
81 TABLET, CHEWABLE ORAL DAILY
Status: DISCONTINUED | OUTPATIENT
Start: 2022-07-02 | End: 2022-07-08 | Stop reason: HOSPADM

## 2022-07-01 RX ORDER — LISINOPRIL 10 MG/1
10 TABLET ORAL
Status: DISCONTINUED | OUTPATIENT
Start: 2022-07-02 | End: 2022-07-08 | Stop reason: HOSPADM

## 2022-07-01 RX ADMIN — SODIUM CHLORIDE 2 G: 1 TABLET ORAL at 12:48

## 2022-07-01 RX ADMIN — POTASSIUM CHLORIDE 40 MEQ: 29.8 INJECTION, SOLUTION INTRAVENOUS at 13:29

## 2022-07-01 RX ADMIN — ATORVASTATIN CALCIUM 80 MG: 80 TABLET, FILM COATED ORAL at 18:01

## 2022-07-01 RX ADMIN — HYDRALAZINE HYDROCHLORIDE 20 MG: 20 INJECTION INTRAMUSCULAR; INTRAVENOUS at 12:40

## 2022-07-01 RX ADMIN — SENNOSIDES AND DOCUSATE SODIUM 2 TABLET: 50; 8.6 TABLET ORAL at 05:30

## 2022-07-01 RX ADMIN — MAGNESIUM HYDROXIDE 30 ML: 400 SUSPENSION ORAL at 18:03

## 2022-07-01 RX ADMIN — SENNOSIDES AND DOCUSATE SODIUM 2 TABLET: 50; 8.6 TABLET ORAL at 18:01

## 2022-07-01 RX ADMIN — LISINOPRIL 10 MG: 10 TABLET ORAL at 05:29

## 2022-07-01 RX ADMIN — ASPIRIN 81 MG: 81 TABLET, CHEWABLE ORAL at 12:48

## 2022-07-01 RX ADMIN — SODIUM CHLORIDE 500 ML: 3 INJECTION, SOLUTION INTRAVENOUS at 17:56

## 2022-07-01 RX ADMIN — HYDRALAZINE HYDROCHLORIDE 20 MG: 20 INJECTION INTRAMUSCULAR; INTRAVENOUS at 18:33

## 2022-07-01 RX ADMIN — FLUDROCORTISONE ACETATE 0.1 MG: 0.1 TABLET ORAL at 05:30

## 2022-07-01 RX ADMIN — SODIUM CHLORIDE 2 G: 1 TABLET ORAL at 18:01

## 2022-07-01 RX ADMIN — AMLODIPINE BESYLATE 10 MG: 10 TABLET ORAL at 05:30

## 2022-07-01 RX ADMIN — SODIUM CHLORIDE 500 ML: 3 INJECTION, SOLUTION INTRAVENOUS at 19:36

## 2022-07-01 RX ADMIN — ENOXAPARIN SODIUM 40 MG: 40 INJECTION SUBCUTANEOUS at 18:01

## 2022-07-01 RX ADMIN — SODIUM CHLORIDE 500 ML: 3 INJECTION, SOLUTION INTRAVENOUS at 07:40

## 2022-07-01 RX ADMIN — SODIUM CHLORIDE 2 G: 1 TABLET ORAL at 07:40

## 2022-07-01 RX ADMIN — HYDRALAZINE HYDROCHLORIDE 20 MG: 20 INJECTION INTRAMUSCULAR; INTRAVENOUS at 08:05

## 2022-07-01 ASSESSMENT — FIBROSIS 4 INDEX: FIB4 SCORE: 0.82

## 2022-07-01 ASSESSMENT — ENCOUNTER SYMPTOMS
FEVER: 0
DEPRESSION: 0
DOUBLE VISION: 0
NECK PAIN: 0
BLURRED VISION: 0
MYALGIAS: 0
CHILLS: 0
NAUSEA: 0
SENSORY CHANGE: 0
FOCAL WEAKNESS: 0
VOMITING: 0
PALPITATIONS: 0
ABDOMINAL PAIN: 0
SPEECH CHANGE: 0
COUGH: 0
SPUTUM PRODUCTION: 0
HEMOPTYSIS: 0
PHOTOPHOBIA: 0
ORTHOPNEA: 0

## 2022-07-01 ASSESSMENT — PAIN DESCRIPTION - PAIN TYPE
TYPE: ACUTE PAIN

## 2022-07-01 NOTE — THERAPY
Speech Language Pathology   Fiberoptic Endoscopic Evaluation of Swallow     Patient Name: Alistair Putnam  AGE:  57 y.o., SEX:  male  Medical Record #: [id number]  Today's Date: 7/1/2022     Precautions  Precautions: (P) Fall Risk, Swallow Precautions ( See Comments), Nasogastric Tube  Comments: (P) EVD, dysarthria    HPI:  57 y.o. male with relevant history of hyperlipidemia, hypertension, heart murmur (details limited) who presented to [facility] on 6/26/22 for dizziness, vertigo, headache, and nausea. CT head wo contrast 6/26/22 showed no acute hemorrhage, no large territory infarct, and no mass. 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     Current Method of Nutrition   NGT/Cortrak      Pertinent Information:  Affect/Behavior: Appropriate, Cooperative  Oxygen Requirements: Room Air  Cortrak: in situ to L nare  Dentition: Fair  Factor(s) Affecting Performance: None    Discussed the risks, benefits, and alternatives of the FEES procedure. Patient/family acknowledged and agreed to proceed.     Procedure conducted under the supervision of KEREN Xiong.    Assessment  Flexible Endoscopic Evaluation of Swallowing (FEES) completed at bedside today. The endoscope was passed transnasally via right nare to evaluate the anatomy and physiology of swallowing. Pt tolerated the procedure with no apparent distress.    Anatomic Findings: Unremarkable  Vocal Fold Motion: Bilateral movement, more L sided VF movement than R  Secretion Management: WNL  PO trials: ice chips, thin liquids, mildly thick liquids, moderately thick liquids, liquidized, puree, soft & bite sized, regular  solids      Consistency PAS Score Timing Comments   Ice Chips 1 N/A    Thin Liquid 5 During swallow    Mildly Thick Liquid 2 During swallow    Liquidized 1 N/A    Puree 1 N/A    Soft & Bite Sized 1 N/A    Regular 1 N/A    Mixed Consistency 3 During swallow      1     No contrast enters airway  2     Contrast enters the airway, remains above the vocal folds, and is ejected from the airway (not seen in the airway at the end of the swallow).  3     Contrast enters the airway, remains above the vocal folds, and is not ejected from the airway (is seen in the airway after the swallow).  4     Contrast enters the airway, contacts the vocal folds, and is ejected from the airway.  5     Contrast enters the airway, contacts the vocal folds, and is not ejected from the airway  6     Contrast enters the airway, crosses the plane of the vocal folds, and is ejected from the airway.  7     Contrast enters the airway, crosses the plane of the vocal folds, and is not ejected from the airway despite effort.  8     Contrast enters the airway, crosses the plane of the vocal folds, is not ejected from the airway and there is no response to aspiration.        Oral phase:  Premature spillage to the pyriform sinuses with trials of thin liquids, MTL, soft and bite size, and mixed consistencies. Impaired mastication of mixed consistency and pt c/o difficulty with mastication of solid textures.      Pharyngeal phase:    Thin liquids: Suspect penetration during the swallow to the level of VF consistently with cup sip trials. Trace residue remained on VF which cleared with cued cough. Suspect penetration to VF in 1/2 trials of straw sips. Trace residue remained on VF which cleared with cued cough. Trace pharyngeal residue noted after the swallow.    MTL: Suspect penetration during the swallow as seen by blue moving over tip of epiglottis, but no visual stasis in laryngeal vestibule after the swallow. Trace pharyngeal residue noted after the  swallow.    Apple sauce and pudding: Trace pharyngeal residue noted after the swallow but no penetration or aspiration appreciated.     Mixed consistency: Suspect penetration during the swallow as noted by trace residue noted in laryngeal vestibule after the swallow. A cued cough cleared residue.    Pt independently completing double swallow compensatory strategy which improved or eliminated pharyngeal residue. A cued cough was successful for removing residue after the swallow from VF and laryngeal vestibule. Deficits noted by impaired sensation and laryngeal mistiming.    Clinical Impressions  The pt presents with a mild oropharyngeal dysphagia, likely acute related to acute CVA.. Swallow safety is preserved; swallow efficiency is preserved. Risk for aspiration PNA is low. Risk for malnutrition/dehydration is low. A modified diet is indicated at this time.. Swallow prognosis is good given pt motivation and ability to follow directives. Pt appears to be a excellent candidate for exercise-based and behavioral swallow rehabilitation.         Recommendations  1. Soft and bite size/MTL diet. Okay for ice chips and sips of thin water between meals following good oral care. Double swallow strategy for all PO  2.  Swallowing Instructions & Precautions:   Supervision: Assist with meal tray set up, Distant supervision - check on patient 2-3 times per meal  Positioning: Fully upright and midline during oral intake  Medication: Whole with liquid, Whole with puree, As tolerated  Strategies: Small bites/sips, Multiple swallows (x 2) per bite/sip   Oral Care: Q2h      Plan    Recommend Speech Therapy 5 times per week until therapy goals are met for the following treatments:  Dysphagia Training and Patient / Family / Caregiver Education.    Discharge Recommendations: (P) Recommend post-acute placement for additional speech therapy services prior to discharge home       Objective       07/01/22 1227   Initial Contact Note   "  Initial Contact Note  Order Received and Verified, Speech Therapy Evaluation in Progress with Full Report to Follow.   Precautions   Precautions Fall Risk;Swallow Precautions ( See Comments);Nasogastric Tube   Comments EVD, dysarthria   Vitals   O2 (LPM) 0   O2 Delivery Device None - Room Air   Pain 0 - 10 Group   Therapist Pain Assessment Post Activity Pain Same as Prior to Activity;Nurse Notified;0   Prior Living Situation   Lives with - Patient's Self Care Capacity Spouse;Child Less than 18 Years of Age;Adult Children   Prior Level Of Function   Communication Within Functional Limits   Swallow Within Functional Limits   Dentition   (adequate)   Hearing Within Functional Limits for Evaluation   Patient's Primary Language English   FEES Evaluation Prior To Procedure   Respiratory Status Room Air   Dysphagia Symptoms Warranting Video Swallow s/sx of aspiration with PO trials, previous FEES   Procedure Performed While Patient Sitting In Bed   Seated at (Degrees) 90   FEES Laryngeal Adduction Assessment   Breath Holding Adequate   Cough Adequate   Phonation Adequate   Patient / Family Goals   Patient / Family Goal #1 \"I haven't eaten in 48 hours.\"   Goal #1 Outcome Progressing as expected   Short Term Goals   Short Term Goal # 1 Pt will follow swallow directives during prefeeding and FEES with min repetition.   Goal Outcome # 1 Goal met   Short Term Goal # 2 Pt will consume 5-10 single ice chips with nursing at 90 degrees and dble swallow and without s/s of difficulty.   Goal Outcome # 2  Progressing as expected   Short Term Goal # 3 Pt will complete dble swallow during prefeeding with single ice chips, 1/2 tsp amounts of water, and 1/2 tsp amounts of applesauce with  mod cues and without s/s of difficulty during swallowing.   Goal Outcome  # 3 Progressing as expected   Short Term Goal # 4 Pt will complete effortful swallow, tongue base retraction, and pharyngeal contraction exercises with written instructions and " mod cues.   Short Term Goal # 5 NEW 7/1: Pt will consume an SB6/MT2 diet with no overt s/sx of aspiration   Education Group   Education Provided Dysphagia   Dysphagia Patient Response Patient;Family;Acceptance;Explanation;Verbal Demonstration;Reinforcement Needed   Anticipated Discharge Needs   Discharge Recommendations Recommend post-acute placement for additional speech therapy services prior to discharge home   Therapy Recommendations Upon DC Dysphagia Training;Patient / Family / Caregiver Education;Community Re-Integration   Interdisciplinary Plan of Care Collaboration   IDT Collaboration with  Nursing;Family / Caregiver   Patient Position at End of Therapy Seated;In Bed;Tray Table within Reach;Call Light within Reach;Phone within Reach;Family / Friend in Room

## 2022-07-01 NOTE — PROGRESS NOTES
Neurosurgery Progress Note    Subjective:  57 yr old male presented with CVA.  POD#4 R EVD placement   Drain raised to 10cm yesterday    No acute events overnight  His ICPs have been stable, 1-8  EVD draining 4-14mL/hr    Exam:  A&O x3  PERRL, symmetric  Face symmetric   Cns II-VII grossly intact  Moving all extremities, follows commands  Sensation intact   No drift    BP  Min: 121/61  Max: 149/77  Pulse  Av.7  Min: 86  Max: 103  Resp  Av  Min: 8  Max: 39  Temp  Av.8 °C (98.3 °F)  Min: 36.7 °C (98 °F)  Max: 37 °C (98.6 °F)  SpO2  Av.7 %  Min: 89 %  Max: 96 %    ICP  Av.5 MM HG  Min: 3 MM HG  Max: 11 MM HG    Recent Labs     22  0546 22  0519 22  0550   WBC 10.1 7.8 7.0   RBC 5.39 5.25 5.19   HEMOGLOBIN 14.2 13.7* 13.9*   HEMATOCRIT 44.3 43.2 42.9   MCV 82.2 82.3 82.7   MCH 26.3* 26.1* 26.8*   MCHC 32.1* 31.7* 32.4*   RDW 48.6 49.1 48.4   PLATELETCT 251 256 268   MPV 9.5 9.4 9.3     Recent Labs     22  0546 22  1221 22  0519 22  1205 22  1800 22  0010 22  0550   SODIUM 144   < > 148*   < > 149* 146* 149*   POTASSIUM 3.4*  --  3.6  --   --   --  3.6   CHLORIDE 112  --  116*  --   --   --  116*   CO2   --  23  --   --   --  22   GLUCOSE 143*  --  140*  --   --   --  121*   BUN 15  --  13  --   --   --  17   CREATININE 0.59  --  0.57  --   --   --  0.54   CALCIUM 8.4*  --  8.4*  --   --   --  8.6    < > = values in this interval not displayed.               Intake/Output                       22 - 22 0622 - 22 Total  Total                 Intake    P.O.  240  -- 240  --  -- --    P.O. 240 -- 240 -- -- --    I.V.  1230  1281.3 2511.3  --  -- --    Cardene Volume 630 441.3 1071.3 -- -- --    Volume (mL) (3% sodium chloride (HYPERTONIC SALINE) 500mL infusion 500 mL)  -- -- --    NG/GT  900  900 1800  --  -- --    Intake (mL) (Enteral Tube  06/27/22 Left lamont)  -- -- --    Enteral  --  90 90  --  -- --    Free Water / Tube Flush -- 90 90 -- -- --    Total Intake 2370 2271.3 4641.3 -- -- --       Output    Urine  1525  1200 2725  --  -- --    Number of Times Voided 1 x -- 1 x -- -- --    Urine Void (mL) 1525 1200 2725 -- -- --    Drains  107  80 187  --  -- --    Output (mL) (ICP/Ventriculostomy Right Other (Comment)) 107 80 187 -- -- --    Total Output 1632 1280 2912 -- -- --       Net I/O     738 991.3 1729.3 -- -- --            Intake/Output Summary (Last 24 hours) at 7/1/2022 0937  Last data filed at 7/1/2022 0600  Gross per 24 hour   Intake 4221.25 ml   Output 2482 ml   Net 1739.25 ml            • potassium bicarbonate  50 mEq Once   • 3% sodium chloride  500 mL Continuous   • aspirin  81 mg DAILY   • enoxaparin (LOVENOX) injection  40 mg DAILY AT 1800   • lisinopril  10 mg Q DAY   • amLODIPine  10 mg Q DAY   • sodium chloride  2 g TID WITH MEALS   • fludrocortisone  0.1 mg DAILY   • atorvastatin  80 mg Q EVENING   • hydrALAZINE  20 mg Q4HRS PRN   • labetalol  10-20 mg Q2HRS PRN   • MD Alert...Adult ICU Electrolyte Replacement per Pharmacy   PHARMACY TO DOSE   • niCARdipine infusion  0-15 mg/hr Continuous   • HYDROmorphone  0.5 mg Q3HRS PRN   • Pharmacy  1 Each PHARMACY TO DOSE   • acetaminophen  650 mg Q6HRS PRN   • magnesium hydroxide  30 mL QDAY PRN    And   • senna-docusate  2 Tablet BID    And   • polyethylene glycol/lytes  1 Packet QDAY PRN    And   • bisacodyl  10 mg QDAY PRN   • ondansetron  4 mg Q4HRS PRN   • ondansetron  4 mg Q4HRS PRN   • prochlorperazine  5-10 mg Q4HRS PRN       Assessment and Plan:  Hospital day #4  POD#4 R EVD placement  Will continue to follow. Challenge drain as able      Chemical prophylactic DVT therapy: Yes - Lovenox 40mg/qd    Start date/time: today

## 2022-07-01 NOTE — PROGRESS NOTES
Repeat Na of 144 reviewed with Dr. Victor and Dr. Mary. Prior value was 149 and 3% was decreased from 60 to 50 during rounds, see Epic MAR for exact time. Per MDs, keep 3% at 50 ml/hr for now and notify MD with next sodium result.

## 2022-07-01 NOTE — CARE PLAN
The patient is Watcher - Medium risk of patient condition declining or worsening    Shift Goals  Clinical Goals: Stable neuro exam  Patient Goals: Mobilize  Family Goals: PREET    Progress made toward(s) clinical / shift goals:  Neuro exam unchanged, patient mobilized to edge of bed and then to chair this afternoon.     Patient is not progressing towards the following goals:

## 2022-07-01 NOTE — PROGRESS NOTES
"[facility] Critical Care Medicine Progress Note    Brief HPI/problem list:  Mr. Putnam is a 57 year old male with no past medical history and is not a smoker who presented to the ER yesterday with complaints of sudden onset of dizziness yesterday afternoon.  The patient reports that he was in his usual state of health when he got up to get a glass of water when he had sudden onset of dizziness as if the \"room was spinning\".  He reported that he had balance issues and had to sit down.  He had nausea and vomiting.  No tinnitus.  No unilateral numbness, tingling, or weakness to extremities.  Family witness that the patient had horizontal nystagmus for about 15 minutes.  EMS witnessed this as well.  He was brought to the ER and admitted.  He underwent an MRI brain today which revealed a large right cerebellar stroke and was transferred to the ICU for ongoing care. (Dr Valdivia HPI 6/27)    6/27 - admit, s/p EVD at night  6/28 - Neuro better, HA, ECHO ok, failed FEES  6/29 - Speech/vision better, NIH 2-3, EVD pressures/output ok, Nicardipine 5, 3% 60cc  6/30 - Neuro no change, EVD output low, 3% saline 60, neurosurgery raising EVD level to 10 cm today  7/1 - Neuro no change, EVD 10cm, 3% 60, off Nicardipine    Case reviewed with neurology at the bedside at length    Daily exam: A&O x4, neuro exam no delta, lungs clear, WOB low, RRR, no edema, EVD, CVC    TEAM Rounds:  Neuro: ICP 3-11, output 5-10, CCP , 3% saline 60cc/hr  HR: SR, 80s, Cardene off  SBP: 90s  Tmax: 98.6  GI: NPO, FEES pending for today, stefanie  UOP: great  Lines: R IJ CVC, PIVs  Resp: 2 lpm NC, CXR 6/27 clear, IS 1750  Vte: SCDs, no heparins with EVD  PPI/H2: none  Antibx: NA    Assessment:  CVA right cerebellum with fourth ventricle effacement, R PICA improved  Hydrocephalus status post EVD 6/27-controlled with drainage  Hypertension controlled with enteral regimen off nicardipine  Swallow dysfunction/dysphagia-speech following  Hypokalemia  History " of heart murmur  History of multiple orthopedic procedures    Plan of care:   Continue with BP goal less than 140  Off nicardipine for now, resume if necessary  Continue amlodipine to 10, add ACE if necessary  PRN ACE/labetalol/hydralazine   Add ACE next?  And weaning 3% saline 10 cc every 6 hours as tolerated  Continue every 6 hours sodium levels drop sodium no more than 10 mEq per 24 hours   Continue salt tabs, wean them after weaning 3% saline  Cont Florinef, wean last  Follow-up swallow eval, hopefully will pass and we can pull enteral feeding tube  I-S/mobilize-up to chair  Statin high-dose  Optimize electrolytes  Continue neurochecks to Q2H  Bowel care protocols  EVD challenge-clamp versus raise level as per neurosurgery  ASA/loenox okay per neurosurgery    Case reviewed with family, neurology, RN, Charge RN, RT, Clinical pharmacist, speech therapy and [facility] Gold Resident    Critical care time provided was 40 minutes. This excludes all separate billable procedures.     Please see [facility]/NP notes for additional documentation    Steve Victor MD  [facility]

## 2022-07-01 NOTE — THERAPY
Speech Language Pathology  Daily Treatment     Patient Name: Alistair Putnam  Age:  57 y.o., Sex:  male  Medical Record #: [id number]  Today's Date: 7/1/2022     Precautions  Precautions: Fall Risk, Swallow Precautions (See Comments), Nasogastric Tube  Comments: EVD, dysarthria,    Assessment  Patient seen this date for dysphagia tx session. Per RN, patient has been asking for ice chips frequently. Patient currently NPO/TF after FEES on 6/28. Patient awake, alert, pleasant but with flat affect, and cooperative. Patient appears feeling improved, stronger, and reports he has been doing dysphagia exercises frequently. Patient's wife verified same. Patient consumed PO trials of single ice chips, MTL via tsp and cup sip, purees, pudding, and thin liquids via tsp and cup sip. Patient consumed all PO trials with no s/sx of aspiration. Vocal quality remained strong and clear. Sufficient oral containment and no oral residue noted post-swallow. Patient required minimal verbal cues to complete two strong swallows per bite/sip. Patient appears appropriate and is agreeable to repeat FEES today to further assess oropharyngeal swallow function and r/o aspiration. RN and MD aware.     Continue NPO/TF. Still ok for ice chips. SLP to follow for FEES ~11:45 today.     Plan  Continue current treatment plan.    Discharge Recommendations: Recommend post-acute placement for additional speech therapy services prior to discharge home     Objective     07/01/22 1115   Precautions   Precautions Fall Risk;Swallow Precautions ( See Comments);Nasogastric Tube   Vitals   O2 Delivery Device Room air w/o2 available   Pain 0 - 10 Group   Therapist Pain Assessment Post Activity Pain Same as Prior to Activity;0;Nurse Notified   Dysphagia    Positioning / Behavior Modification Self Monitoring;Modulate Rate or Bite Size;Multiple Swallows   Diet / Liquid Recommendation NPO;Pre-Feeding Trials with SLP Only   Nutritional Liquid Intake Rating Scale  "Nothing by mouth   Nutritional Food Intake Rating Scale Nothing by mouth   Nursing Communication Swallow Precaution Sign Posted at Head of Bed   Skilled Intervention Verbal Cueing;Compensatory Strategies   Recommended Route of Medication Administration   Medication Administration  Via Gastric Tube   Patient / Family Goals   Patient / Family Goal #1 \"I haven't eaten in 48 hours.\"   Goal #1 Outcome Goal not met   Short Term Goals   Short Term Goal # 2 Pt will consume 5-10 single ice chips with nursing at 90 degrees and dble swallow and without s/s of difficulty.   Goal Outcome # 2  Progressing as expected   Short Term Goal # 3 Pt will complete dble swallow during prefeeding with single ice chips, 1/2 tsp amounts of water, and 1/2 tsp amounts of applesauce with  mod cues and without s/s of difficulty during swallowing.   Goal Outcome  # 3 Progressing as expected   Short Term Goal # 4 Pt will complete effortful swallow, tongue base retraction, and pharyngeal contraction exercises with written instructions and mod cues.   Goal Outcome  # 4 Progressing as expected   Education Group   Education Provided Dysphagia   Dysphagia Patient Response Patient;Acceptance;Explanation;Verbal Demonstration;Reinforcement Needed;Family   Anticipated Discharge Needs   Discharge Recommendations Recommend post-acute placement for additional speech therapy services prior to discharge home     "

## 2022-07-01 NOTE — DISCHARGE PLANNING
Stroke, s/p EVD placement. Physiatry following. Patient remains NPO w/ cortrak, failed FEEs. TCC will continue to follow for post-acute needs.

## 2022-07-01 NOTE — PROGRESS NOTES
Chief Complaint   Patient presents with   • Dizziness     Onset today at 1430   • Weakness   • Headache     Onset today at 1430    • Numbness     Numbness and tingling present to REKHA arms and legs   • Nausea       Problem List Items Addressed This Visit     * (Principal) Acute stroke due to occlusion of right cerebellar artery (HCC)     Presented with vertigo, found to have large right PICA stroke with partial effacement of fourth ventricle   Status post EVD placement on 6/27/2022   Neurology and neurosurgery following   Continue frequent neuro checks  SBP goal less than 160, continue as needed labetalol/hydralazine  Scheduled Amlodipine 10mg daily, started on Lisinopril today   Wean off Cardene gtt   Sodium nearly at goal. Continue salt tabs and 3% normal saline at 60cc/hour  High intensity statin  PT/OT/SLP               Relevant Medications    atorvastatin (LIPITOR) tablet 80 mg    hydrALAZINE (APRESOLINE) injection 20 mg    labetalol (NORMODYNE/TRANDATE) injection 10-20 mg    niCARdipine (CARDENE) 25 mg in  mL Infusion    amLODIPine (NORVASC) tablet 10 mg    lisinopril (PRINIVIL) tablet 10 mg    enoxaparin (Lovenox) inj 40 mg (Start on 7/1/2022  6:00 PM)    Other Relevant Orders    Central Line Insertion (Completed)      Other Visit Diagnoses     Vertigo        Nausea        Cerebrovascular accident (CVA), unspecified mechanism (HCC)        Relevant Medications    atorvastatin (LIPITOR) tablet 80 mg    hydrALAZINE (APRESOLINE) injection 20 mg    labetalol (NORMODYNE/TRANDATE) injection 10-20 mg    niCARdipine (CARDENE) 25 mg in  mL Infusion    amLODIPine (NORVASC) tablet 10 mg    amLODIPine (NORVASC) tablet 5 mg (Completed)    lisinopril (PRINIVIL) tablet 10 mg    enoxaparin (Lovenox) inj 40 mg (Start on 7/1/2022  6:00 PM)    Other Relevant Orders    Referral to Physiatry (PMR)    Referral to Neurology        Neurology Progress Note    Brief History of present illness:  57 y.o. male with relevant  "history of hyperlipidemia, hypertension, heart murmur (details limited) who presented to [facility] on 6/26/22 for dizziness, vertigo, headache, and nausea. CT head wo contrast 6/26/22 showed no acute hemorrhage, no large territory infarct, and no mass. MRI brain wo contrast 6/27/28 revealed a large acute infarct of the right posterior inferior cerebellar artery (PICA) territory with associated cytotoxic edema and mass effect with partial effacement of the fourth ventricle with mild lateral ventricle dilation suggestive of obstructive hydrocephalus and small petechial hemorrhagic transformation. CTA head and neck showed no high grade stenosis or large vessel occlusion. Etiology thus far remains occult. Neurosurgery placed right frontal EVD on 6/27/22 and continue to follow if patient develops worsening hydrocephalus requiring a decompressive suboccipital craniectomy.    Interval, 6/30/22:  Patient sitting up in bed; awake and alert. States that he feels well. Moves all extremities; follows commands; minimal dysarthria. No events overnight per nursing. SBP 130s-140s overnight; EVD with ICPs < 10, 178 mL output over 24 hours. Na goal 150-155; currently 148; 23% NaCl boluses given as needed to achieve goal.     Interval, 7/1/22:  Patient is sitting up in bed; awake and alert. Feels well; no events overnight. ICPs < 10; EVD with clear, non excessive drainage, 4-14 mL per hour. Plan to initiate ASA today; wean hypertonics for goal normonatremia in coming 1-2 days.     No changes to HPI as was previously documented.     Past medical history:   Past Medical History:   Diagnosis Date   • Heart murmur 12/2017    \"As a child\"   • Pain 12/2017    right shoulder pain       Past surgical history:   Past Surgical History:   Procedure Laterality Date   • SHOULDER ARTHROSCOPY W/ ROTATOR CUFF REPAIR Right 1/8/2018    Procedure: SHOULDER ARTHROSCOPY W/ ROTATOR CUFF REPAIR- WITH EXTENSIVE DEBRIDEMENT;  Surgeon: Rl Martinez, " M.D.;  Location: SURGERY [facility];  Service: Orthopedics   • SHOULDER DECOMPRESSION ARTHROSCOPIC Right 1/8/2018    Procedure: SHOULDER DECOMPRESSION ARTHROSCOPIC- SUBACROMIAL;  Surgeon: Rl Martinez M.D.;  Location: SURGERY [facility];  Service: Orthopedics   • SHOULDER ARTHROSCOPY W/ BICIPITAL TENODESIS REPAIR Right 1/8/2018    Procedure: SHOULDER ARTHROSCOPY W/ BICIPITAL TENODESIS REPAIR;  Surgeon: Rl Martinez M.D.;  Location: SURGERY [facility];  Service: Orthopedics   • SINUS OBLITERATION FRONTAL  2010   • KNEE ARTHROSCOPY Right 1989   • TONSILLECTOMY  1976   • ORIF, FRACTURE, TIBIA Right 1976    with skin graft       Family history:   Family History   Problem Relation Age of Onset   • Cancer Mother        Social history:   Social History     Socioeconomic History   • Marital status:      Spouse name: Not on file   • Number of children: Not on file   • Years of education: Not on file   • Highest education level: Not on file   Occupational History   • Not on file   Tobacco Use   • Smoking status: Never Smoker   • Smokeless tobacco: Never Used   Substance and Sexual Activity   • Alcohol use: Yes     Comment: 2 per month   • Drug use: Yes     Types: Marijuana, Inhaled     Comment: Infrequently, approximately 1-2 times per month   • Sexual activity: Not on file   Other Topics Concern   • Not on file   Social History Narrative   • Not on file     Social Determinants of Health     Financial Resource Strain: Not on file   Food Insecurity: Not on file   Transportation Needs: Not on file   Physical Activity: Not on file   Stress: Not on file   Social Connections: Not on file   Intimate Partner Violence: Not on file   Housing Stability: Not on file       Current medications:   Current Facility-Administered Medications   Medication Dose   • potassium bicarbonate (KLYTE) effervescent tablet 50 mEq  50 mEq   • 3% sodium chloride (HYPERTONIC SALINE) 500mL infusion 500 mL  500 mL   • aspirin  (ASA) chewable tab 81 mg  81 mg   • enoxaparin (Lovenox) inj 40 mg  40 mg   • lisinopril (PRINIVIL) tablet 10 mg  10 mg   • amLODIPine (NORVASC) tablet 10 mg  10 mg   • sodium chloride (SALT) tablet 2 g  2 g   • fludrocortisone (FLORINEF) tablet 0.1 mg  0.1 mg   • atorvastatin (LIPITOR) tablet 80 mg  80 mg   • hydrALAZINE (APRESOLINE) injection 20 mg  20 mg   • labetalol (NORMODYNE/TRANDATE) injection 10-20 mg  10-20 mg   • MD Alert...ICU Electrolyte Replacement per Pharmacy     • niCARdipine (CARDENE) 25 mg in  mL Infusion  0-15 mg/hr   • HYDROmorphone (Dilaudid) injection 0.5 mg  0.5 mg   • Pharmacy Consult: Enteral tube insertion - review meds/change route/product selection  1 Each   • acetaminophen (Tylenol) tablet 650 mg  650 mg   • magnesium hydroxide (MILK OF MAGNESIA) suspension 30 mL  30 mL    And   • senna-docusate (PERICOLACE or SENOKOT S) 8.6-50 MG per tablet 2 Tablet  2 Tablet    And   • polyethylene glycol/lytes (MIRALAX) PACKET 1 Packet  1 Packet    And   • bisacodyl (DULCOLAX) suppository 10 mg  10 mg   • ondansetron (ZOFRAN ODT) dispertab 4 mg  4 mg   • ondansetron (ZOFRAN) syringe/vial injection 4 mg  4 mg   • prochlorperazine (COMPAZINE) injection 5-10 mg  5-10 mg       Medication Allergy:  No Known Allergies      Review of systems:   Constitutional: denies fever, night sweats, weight loss.   Eyes: denies acute vision change, eye pain or secretion.   Ears, Nose, Mouth, Throat: denies nasal secretion, nasal bleeding, difficulty swallowing, hearing loss, tinnitus, vertigo, ear pain, acute dental problems, oral ulcers or lesions.   Endocrine: denies recent weight changes, heat or cold intolerance, polyuria, polydypsia, polyphagia,abnormal hair growth.  Cardiovascular: denies new onset of chest pain, palpitations, syncope, or dyspnea of exertion.  Pulmonary: denies shortness of breath, new onset of cough, hemoptysis, wheezing, chest pain or flu-like symptoms.   GI: denies nausea, vomiting,  diarrhea, GI bleeding, change in appetite, abdominal pain, and change in bowel habits.  : denies dysuria, urinary incontinence, hematuria.  Heme/oncology: denies history of easy bruising or bleeding. No history of cancer, DVTor PE.  Allergy/immunology: denies hives/urticaria, or itching.   Dermatologic: denies new rash, or new skin lesions.  Musculoskeletal:denies joint swelling or pain, muscle pain, neck and back pain.   Neurologic: As noted in detail above.   Psychiatric: denies symptoms of depression, anxiety, hallucinations, mood swings or changes, suicidal or homicidal thoughts.       Physical examination:   Vitals:    07/01/22 0330 07/01/22 0400 07/01/22 0500 07/01/22 0600   BP: (!) 140/75 131/72 139/74 (!) 143/78   Pulse: 95 87 88 92   Resp: (!) 8 20 20 19   Temp:       TempSrc:       SpO2:  93% 93% 94%   Weight:       Height:           General: Patient in no acute distress  HEENT: Normocephalic, no signs of acute trauma.   Neck: supple, no meningeal signs or carotid bruits. There is normal range of motion. No tenderness on exam.   Chest: clear to auscultation. No cough.   CV: RRR, no murmurs.   Skin: no signs of acute rashes or trauma.   Musculoskeletal: joints exhibit full range of motion, without any pain to palpation. There are no signs of joint or muscle swelling. There is no tenderness to deep palpation of muscles.   Psychiatric: No hallucinatory behavior.       NEUROLOGICAL EXAM:   Mental status, orientation: Awake, alert and fully oriented.   Speech and language: speech is clear and fluent; perhaps very mildly dysarthric. The patient is able to name, repeat and comprehend.   Cranial nerve exam: Pupils are 3-4 mm bilaterally and equally reactive to light. Visual fields are intact by confrontation.There is no nystagmus on primary or secondary gaze. Intact full EOM in all directions of gaze. Slight Left lower facial weakness appreciated. Sensation in the face is intact to light touch. Uvula is midline.  Palate elevates symmetrically. Tongue is midline and without any signs of tongue biting or fasciculations. Shoulder shrug is intact bilaterally.   Motor exam: Strength is 5/5 in all extremities. Tone is normal. No abnormal movements were seen on exam.   Sensory exam reveals normal sense of light touch and pinprick in all extremities.   Deep tendon reflexes: Plantar responses are flexor. There is no clonus.   Coordination: shows a normal finger-nose-finger. LL heel shin with mild ataxia.    Gait: Not assessed at this time as patient is a fall risk.    No significant changes to exam as was documented on 6/30/22.       NIH Stroke Scale    1a Level of Consciousness   1b Orientation Questions   1c Response to Commands   2 Gaze   3 Visual Fields   4 Facial Movement 1  5 Motor Function (arm)   a Left   b Right   6 Motor Function (leg)   a Left   b Right   7 Limb Ataxia 1  8 Sensory   9 Language   10 Articulation 1  11 Extinction/Inattention     Score: 3        ANCILLARY DATA REVIEWED:     Lab Data Review:  Recent Results (from the past 24 hour(s))   SODIUM SERUM (NA)    Collection Time: 06/30/22 12:05 PM   Result Value Ref Range    Sodium 146 (H) 135 - 145 mmol/L   SODIUM SERUM (NA)    Collection Time: 06/30/22  6:00 PM   Result Value Ref Range    Sodium 149 (H) 135 - 145 mmol/L   SODIUM SERUM (NA)    Collection Time: 07/01/22 12:10 AM   Result Value Ref Range    Sodium 146 (H) 135 - 145 mmol/L   CBC WITH DIFFERENTIAL    Collection Time: 07/01/22  5:50 AM   Result Value Ref Range    WBC 7.0 4.8 - 10.8 K/uL    RBC 5.19 4.70 - 6.10 M/uL    Hemoglobin 13.9 (L) 14.0 - 18.0 g/dL    Hematocrit 42.9 42.0 - 52.0 %    MCV 82.7 81.4 - 97.8 fL    MCH 26.8 (L) 27.0 - 33.0 pg    MCHC 32.4 (L) 33.7 - 35.3 g/dL    RDW 48.4 35.9 - 50.0 fL    Platelet Count 268 164 - 446 K/uL    MPV 9.3 9.0 - 12.9 fL    Neutrophils-Polys 68.00 44.00 - 72.00 %    Lymphocytes 18.70 (L) 22.00 - 41.00 %    Monocytes 11.40 0.00 - 13.40 %    Eosinophils 0.70  0.00 - 6.90 %    Basophils 0.30 0.00 - 1.80 %    Immature Granulocytes 0.90 0.00 - 0.90 %    Nucleated RBC 0.00 /100 WBC    Neutrophils (Absolute) 4.73 1.82 - 7.42 K/uL    Lymphs (Absolute) 1.30 1.00 - 4.80 K/uL    Monos (Absolute) 0.79 0.00 - 0.85 K/uL    Eos (Absolute) 0.05 0.00 - 0.51 K/uL    Baso (Absolute) 0.02 0.00 - 0.12 K/uL    Immature Granulocytes (abs) 0.06 0.00 - 0.11 K/uL    NRBC (Absolute) 0.00 K/uL   Basic Metabolic Panel    Collection Time: 07/01/22  5:50 AM   Result Value Ref Range    Sodium 149 (H) 135 - 145 mmol/L    Potassium 3.6 3.6 - 5.5 mmol/L    Chloride 116 (H) 96 - 112 mmol/L    Co2 22 20 - 33 mmol/L    Glucose 121 (H) 65 - 99 mg/dL    Bun 17 8 - 22 mg/dL    Creatinine 0.54 0.50 - 1.40 mg/dL    Calcium 8.6 8.5 - 10.5 mg/dL    Anion Gap 11.0 7.0 - 16.0   ESTIMATED GFR    Collection Time: 07/01/22  5:50 AM   Result Value Ref Range    GFR (CKD-EPI) 117 >60 mL/min/1.73 m 2       Labs reviewed by me.         Imaging reviewed by me:     EC-ECHOCARDIOGRAM COMPLETE W/O CONT   Final Result      CT-HEAD W/O   Final Result         1.  Postprocedural changes of ventriculostomy placement with minimal hyperdense hemorrhage tracking along the ventriculostomy tract.   2.  Small anterior foci of right pneumocephalus.   3.  Low-density evolving stroke within the right cerebellar hemisphere effacing the fourth ventricle.   4.  Atherosclerosis         DX-ABDOMEN FOR TUBE PLACEMENT   Final Result      1.  Feeding tube tip overlies the proximal stomach.      DX-CHEST-LIMITED (1 VIEW)   Final Result      1.  Right IJ catheter tip projects over the cavoatrial junction without pneumothorax.   2.  Enlarged cardiac silhouette with mild vascular congestion.   3.  There is bibasilar atelectasis.      CT-CTA HEAD WITH & W/O-POST PROCESS   Final Result         1. No hemodynamically significant narrowing of the major intracranial vessels.      CT-CTA NECK WITH & W/O-POST PROCESSING   Final Result      1. No evidence of  flow-limiting stenosis in the cervical carotid or cervical vertebral arteries.      MR-BRAIN-W/O   Final Result      1.  Large acute right posterior inferior cerebellar artery territory infarct with associated edema/swelling and localized mass effect, partially effacing the fourth ventricle. The lateral ventricles are now mildly dilated suggesting developing    obstructive hydrocephalus.   2.  Small area of petechial hemorrhage within the infarct without other significant acute intracranial hemorrhage.   3.  FLAIR hyperintensity in the right vertebral artery suggesting sluggish flow, less likely thrombus formation.      These findings were discussed with MONIQUE KNOX on 6/27/2022 1:49 PM.      CT-HEAD W/O   Final Result      Head CT without contrast within normal limits. No evidence of acute cerebral infarction, hemorrhage or mass lesion.               Presumed mechanism by TOAST:  __Large Artery Atherosclerosis  __Small Vessel (Lacunar)  __Cardioembolic  __Other (Sickle Cell, Vasculitis, Hypercoagulable)  _X_Unknown    Modified Francoise Scale (MRS): 0 = No symptoms      ASSESSMENT AND PLAN:  57 y.o. male with relevant history of hyperlipidemia, hypertension, heart murmur (details limited) who presented to [facility] on 6/26/22 for dizziness, vertigo, headache, and nausea; imaging has revealed acute Right PICA ischemic infarct of unclear etiology; CTA head/neck with no LVO nor hemodynamically significant stenosis nor dissection. NIHSS 3. Patient now post-stroke day #5; exiting peak edema window; plan to initiate ASA today, DVT chemo ppx; initiate hypertonic wean for goal eunatremia in coming 1-2 days.     Recommendations/Plan:    -q2h and PRN neuro assessment. VS per nursing/unit protocol. BP goal is normotension, 100-130/60-80. Antihypertensives per primary team. Please notify neurology with changes in exam.    -Telemetry; currently SR. Screen for Afib/arrhythmia. Note TTE with EF 65%; no gross structural  abnormalities. If cardiac work up here is unrevealing, will recommend outpatient cardiac monitoring with Zio patch at time of discharge.   - Hypertonic 3% and PO NaCl tabs-- wean underway; normalize Na in coming 1-2 days. Current na is 149.    -Start ASA 81 mg PO q day.   -Atorvastatin 80 mg PO q HS. Note LDL is 129; goal < 70.   -Recommend aggressive BG management per primary team. Avoid IVF with Dextrose. BG goal 140-180. hemoglobin A1c 6.2.  -PT/OT/SLP eval and treat. Physiatry consult.   -All other medical management per primary/ICU team. EVD management per neurosurgery.   -DVT PPX: SCDs. Lovenox.      The plan of care above has been discussed with Dr Bayron Paul. Please call with questions.     ARSALAN Kumar.  [place] [facility]

## 2022-07-01 NOTE — DISCHARGE PLANNING
Case Management Discharge Planning    This CM met with the patient, wife Deanna and daughter, Hilda at the bedside.  Pt lives in a 2 story home with the bedroom upstairs.  There are 2 steps to the front door.  The patient lives with his wife and two of his three children who are 15 and 27 years of age.  There is another daughter who is 33 years old who lives nearby.    Patient was independent and driving prior to this admission.  He has no DME in the home at this time.    Support:  Wife and three children are available to assist the patient once he returns home.  There are also numerous neighbors available to help.      Pharmacy:   [facility], [facility] [employer] [place] [address]    Pt does not currently have an Advanced Directive.  This CM discussed the importance of completing this document and gave a packet to the family.    Admission Date: 6/26/2022  GMLOS: 7.1  ALOS: 4    6-Clicks ADL Score: 19  6-Clicks Mobility Score: 17  PT and/or OT Eval ordered: Yes  Post-acute Referrals Ordered: No. At SNF level however may progress to HH prior to discharge per PT notes.  Post-acute Choice Obtained: No, TBD as we get closer to dc.  Has referral(s) been sent to post-acute provider:  No      Anticipated Discharge Dispo: SNF with Medicare cert in anticipation of skilled care (03)    DME Needed: TBD    Action(s) Taken: DC Assessment Complete (See below) and OTHER, Gave Advanced Directive packet    Escalations Completed: Pending Discharge Destination    Medically Clear: No    Next Steps:   1.  Monitor to determine if pt will SNF vs Inpt Rehab vs HH for PT  2.  Monitor for emerging needs    Barriers to Discharge: Medical clearance    Is the patient up for discharge tomorrow: No   Care Transition Team Assessment    Information Source  Orientation Level: Oriented X4  Information Given By: Spouse, Relative  Informant's Name: Deanna Putnam  Who is responsible for making decisions for patient? : Patient  Name(s) of Primary Decision Maker:  Patient, Deanna Putnam, spouse    Readmission Evaluation  Is this a readmission?: No    Elopement Risk  Legal Hold: No  Ambulatory or Self Mobile in Wheelchair: No-Not an Elopement Risk  Disoriented: No  Psychiatric Symptoms: None  History of Wandering: No  Elopement this Admit: No  Vocalizing Wanting to Leave: No  Displays Behaviors, Body Language Wanting to Leave: No-Not at Risk for Elopement  Elopement Risk: Not at Risk for Elopement    Interdisciplinary Discharge Planning  Does Admitting Nurse Feel This Could be a Complex Discharge?: Yes  Primary Care Physician: Rl Musa  Lives with - Patient's Self Care Capacity: Spouse, Child Less than 18 Years of Age, Adult Children  Patient or legal guardian wants to designate a caregiver: No  Support Systems: Children, Spouse / Significant Other, Friends / Neighbors  Housing / Facility: 2 Story Apartment / Condo  Prior Services: None  Durable Medical Equipment:  (Currently no DME at home)    Discharge Preparedness  What is your plan after discharge?: Uncertain - pending medical team collaboration  What are your discharge supports?: Spouse, Child  Prior Functional Level: Ambulatory, Independent with Activities of Daily Living, Independent with Medication Management, Drives Self  Difficulity with ADLs: None  Difficulity with IADLs: None    Functional Assesment  Prior Functional Level: Ambulatory, Independent with Activities of Daily Living, Independent with Medication Management, Drives Self    Finances  Financial Barriers to Discharge: No  Prescription Coverage: Yes ([employer])    Vision / Hearing Impairment  Vision Impairment : Yes  Right Eye Vision: Impaired, Wears Glasses  Left Eye Vision: Impaired, Wears Glasses  Hearing Impairment : No    Advance Directive  Advance Directive?: None  Advance Directive offered?: AD Booklet given    Domestic Abuse  Have you ever been the victim of abuse or violence?: No  Physical Abuse or Sexual Abuse: No  Verbal Abuse or  Emotional Abuse: No  Possible Abuse/Neglect Reported to:: Not Applicable    Discharge Risks or Barriers  Discharge risks or barriers?: No    Anticipated Discharge Information  Discharge Disposition: D/T to SNF with Medicare cert in anticipation of skilled care (03)

## 2022-07-01 NOTE — PROGRESS NOTES
[facility] ICU Progress Note      Admit Date: 6/26/2022    Resident(s): Yo Mary M.D.   Attending:  KEVYN COVINGTON/ Dr. Victor     Patient ID:    Name:  Alistair Putnam   YOB: 1965  Age:  57 y.o.  male   MRN:  [id number]    Hospital Course (carried forward and updated):  Alistair Putnam is a 57 y.o. male with no significant past medical history (on no medication at home) who presented to the emergency department on 6/26/2022 with vertigo; initial CT head on presentation was unremarkable, and admitted to the floor. 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 Evaluated by neurosurgery; EVD was placed (6/27) due to concern for early obstructive hydrocephalus. Patient being monitored in RICU.    Consultants:  Critical Care  Neurosurgery  Neurology    Interval Events:    No overnight events  Alert and oriented x3, able to follow commands  More awake and interactive today  EVD draining clear fluid. Did not clamp overnight.   Afebrile, HR 80-90s, 's, on 3 L O2  Continue nightly neuro checks per neuro surgery recommendation.  No longer requiring cardene gtt; Goal systolic BP <140  Continue Amlodipine 10mg daily  Continue Lisinopril mg daily   Goal sodium 150-155, on 3% hypertonic saline. Max rate @ 60cc/hr. Also on Salt tabs. Sodium has been stable at ~149. Neurology to decide whether to push 23% hypertonic saline. Patient is nearly outside the window for cerebral edema.   Repleting lytes daily  Neurosurgery following  Restarted DVT ppx Lovenox  EVD raised to 10cm   Patient received push of 23% hypertonic saline. Doubled dose of salt tabs  Echocardiogram: EF: 65-70%  Monitor and replete electrolytes as indicated  TF currently at goal; FEES failed yesterday  Speech and swallow to re-evaluate patient          Vitals Range last 24h:  Temp:  [36.6 °C (97.8 °F)-37 °C (98.6 °F)] 36.6 °C (97.8 °F)  Pulse:  [] 96  Resp:   [8-39] 23  BP: (121-151)/(58-83) 142/76  SpO2:  [89 %-96 %] 94 %      Intake/Output Summary (Last 24 hours) at 7/1/2022 1202  Last data filed at 7/1/2022 1100  Gross per 24 hour   Intake 4070.75 ml   Output 2595 ml   Net 1475.75 ml        Review of Systems   Constitutional: Positive for malaise/fatigue. Negative for chills and fever.   HENT: Negative for congestion, ear discharge and nosebleeds.    Eyes: Negative for blurred vision, double vision and photophobia.   Respiratory: Negative for cough, hemoptysis and sputum production.    Cardiovascular: Negative for chest pain, palpitations and orthopnea.   Gastrointestinal: Negative for abdominal pain, nausea and vomiting.   Genitourinary: Negative for dysuria, frequency and urgency.   Musculoskeletal: Negative for myalgias and neck pain.   Neurological: Negative for sensory change, speech change and focal weakness.   Psychiatric/Behavioral: Negative for depression.       PHYSICAL EXAM:  Vitals:    07/01/22 0800 07/01/22 0900 07/01/22 1000 07/01/22 1100   BP: 136/77 (!) 143/77 137/67 (!) 142/76   Pulse: 89 97 92 96   Resp: 12 14 12 (!) 23   Temp: 36.6 °C (97.8 °F)      TempSrc: Temporal      SpO2: 93% 94% 93% 94%   Weight:       Height:        Body mass index is 30.29 kg/m².    O2 therapy: Pulse Oximetry: 94 %, O2 Delivery Device: Room air w/o2 available    Date 07/01/22 0700 - 07/02/22 0659   Shift 1828-7964 3563-6490 1321-3696 24 Hour Total   INTAKE   I.V. 234.5   234.5     Cardene Volume 0   0     Volume (mL) (3% sodium chloride (HYPERTONIC SALINE) 500mL infusion 500 mL) 222   222     Volume (mL) (3% sodium chloride (HYPERTONIC SALINE) 500mL infusion 500 mL) 12.5   12.5   Other 100   100     Medications (PO/Enteral Liquids) 100   100   NG/   150     Intake (mL) (Enteral Tube 06/27/22 Left nare) 150   150   Shift Total 484.5   484.5   OUTPUT   Urine 925   925     Number of Times Voided 4 x   4 x     Urine Void (mL) 925   925   Drains 41   41     Output (mL)  (ICP/Ventriculostomy Right Other (Comment)) 41   41   Shift Total 966   966   NET -481.5   -481.5        Physical Exam  Constitutional:       General: He is not in acute distress.     Appearance: He is not ill-appearing.   HENT:      Head:      Comments: EVD in place     Nose: Nose normal.      Mouth/Throat:      Mouth: Mucous membranes are moist.   Eyes:      Extraocular Movements: Extraocular movements intact.   Cardiovascular:      Rate and Rhythm: Normal rate and regular rhythm.      Pulses: Normal pulses.      Heart sounds: Normal heart sounds. No murmur heard.    No friction rub. No gallop.   Pulmonary:      Effort: Pulmonary effort is normal. No respiratory distress.      Breath sounds: No wheezing.   Abdominal:      General: Abdomen is flat. Bowel sounds are normal. There is no distension.      Palpations: Abdomen is soft.      Tenderness: There is no abdominal tenderness.   Musculoskeletal:      Right lower leg: No edema.      Left lower leg: No edema.   Skin:     General: Skin is warm.   Neurological:      Mental Status: He is alert.      Comments: Motor strength 5/5, sensation intact              Recent Labs     06/29/22  0546 06/29/22  1221 06/30/22  0519 06/30/22  1205 06/30/22  1800 07/01/22  0010 07/01/22  0550   SODIUM 144   < > 148*   < > 149* 146* 149*   POTASSIUM 3.4*  --  3.6  --   --   --  3.6   CHLORIDE 112  --  116*  --   --   --  116*   CO2 22  --  23  --   --   --  22   BUN 15  --  13  --   --   --  17   CREATININE 0.59  --  0.57  --   --   --  0.54   CALCIUM 8.4*  --  8.4*  --   --   --  8.6    < > = values in this interval not displayed.     Recent Labs     06/29/22  0546 06/30/22  0519 07/01/22  0550   GLUCOSE 143* 140* 121*     Recent Labs     06/29/22  0546 06/30/22  0519 07/01/22  0550   RBC 5.39 5.25 5.19   HEMOGLOBIN 14.2 13.7* 13.9*   HEMATOCRIT 44.3 43.2 42.9   PLATELETCT 251 256 268     Recent Labs     06/29/22  0546 06/30/22  0519 07/01/22  0550   WBC 10.1 7.8 7.0   NEUTSPOLYS  76.70* 70.70 68.00   LYMPHOCYTES 12.10* 17.20* 18.70*   MONOCYTES 10.60 10.90 11.40   EOSINOPHILS 0.00 0.40 0.70   BASOPHILS 0.20 0.40 0.30       Meds:  • potassium bicarbonate  50 mEq     • 3% sodium chloride  500 mL 50 mL/hr at 07/01/22 0945   • aspirin  81 mg     • enoxaparin (LOVENOX) injection  40 mg     • lisinopril  10 mg     • amLODIPine  10 mg     • sodium chloride  2 g     • fludrocortisone  0.1 mg     • atorvastatin  80 mg     • hydrALAZINE  20 mg     • labetalol  10-20 mg     • MD Alert...Adult ICU Electrolyte Replacement per Pharmacy       • niCARdipine infusion  0-15 mg/hr Stopped (07/01/22 0309)   • HYDROmorphone  0.5 mg     • Pharmacy  1 Each     • acetaminophen  650 mg     • magnesium hydroxide  30 mL      And   • senna-docusate  2 Tablet      And   • polyethylene glycol/lytes  1 Packet      And   • bisacodyl  10 mg     • ondansetron  4 mg     • ondansetron  4 mg     • prochlorperazine  5-10 mg          Procedures:  EVD placement on 6/27     Imaging:  EC-ECHOCARDIOGRAM COMPLETE W/O CONT   Final Result      CT-HEAD W/O   Final Result         1.  Postprocedural changes of ventriculostomy placement with minimal hyperdense hemorrhage tracking along the ventriculostomy tract.   2.  Small anterior foci of right pneumocephalus.   3.  Low-density evolving stroke within the right cerebellar hemisphere effacing the fourth ventricle.   4.  Atherosclerosis         DX-ABDOMEN FOR TUBE PLACEMENT   Final Result      1.  Feeding tube tip overlies the proximal stomach.      DX-CHEST-LIMITED (1 VIEW)   Final Result      1.  Right IJ catheter tip projects over the cavoatrial junction without pneumothorax.   2.  Enlarged cardiac silhouette with mild vascular congestion.   3.  There is bibasilar atelectasis.      CT-CTA HEAD WITH & W/O-POST PROCESS   Final Result         1. No hemodynamically significant narrowing of the major intracranial vessels.      CT-CTA NECK WITH & W/O-POST PROCESSING   Final Result      1. No  evidence of flow-limiting stenosis in the cervical carotid or cervical vertebral arteries.      MR-BRAIN-W/O   Final Result      1.  Large acute right posterior inferior cerebellar artery territory infarct with associated edema/swelling and localized mass effect, partially effacing the fourth ventricle. The lateral ventricles are now mildly dilated suggesting developing    obstructive hydrocephalus.   2.  Small area of petechial hemorrhage within the infarct without other significant acute intracranial hemorrhage.   3.  FLAIR hyperintensity in the right vertebral artery suggesting sluggish flow, less likely thrombus formation.      These findings were discussed with MONIQUE KNOX on 6/27/2022 1:49 PM.      CT-HEAD W/O   Final Result      Head CT without contrast within normal limits. No evidence of acute cerebral infarction, hemorrhage or mass lesion.             ASSESSEMENT and PLAN:    Dizziness  Presents for dizziness, room spinning sensation  Likely related to benign paroxysmal positional vertigo  Dizziness elicited with head movement to left  CT head normal  Supportive treatment, IV fluids, avoid trigger of left facing head position  Can try Epley's maneuver    Patient with persistent dizziness and nausea despite overnight supportive treatment  MRI brain ordered    Acute stroke due to occlusion of right cerebellar artery (HCC)  Presented with vertigo, found to have large right PICA stroke with partial effacement of fourth ventricle   Status post EVD placement on 6/27/2022   Neurology and neurosurgery following   Continue frequent neuro checks  SBP goal less than 160, continue PRN labetalol/hydralazine  Scheduled Amlodipine 10mg daily  Scheduled Lisinopril 10mg daily; room to go up on Lisinopril if needed  No longer requiring Cardene gtt  Sodium nearly at goal. Continue salt tabs and 3% normal saline at 60cc/hour  High intensity statin  PT/OT/SLP      Hypokalemia  Replete to > 4          CODE STATUS: Full  code  Quality Measures:  Feeding: Diet per SLP  Analgesia: Dilaudid  Sedation: None Tylenol,  Thromboprophylaxis: Held/SCD  Head of bed: >30 degrees  Ulcer prophylaxis: Not indicated  Glycemic control: Not indicated   bowel care: bowel regimen as needed  Indwelling lines: Right IJ, PIV   deescalation of antibiotics: None      Yo Mary M.D.

## 2022-07-01 NOTE — CARE PLAN
The patient is Watcher - Medium risk of patient condition declining or worsening    Shift Goals  Clinical Goals: Neurostability  Patient Goals: Pass next swallow eval  Family Goals: Rest    Progress made toward(s) clinical / shift goals:   Problem: Optimal Care of the Stroke Patient  Goal: Optimal emergency care for the stroke patient  Outcome: Progressing  Goal: Optimal acute care for the stroke patient  Outcome: Progressing     Problem: Knowledge Deficit - Stroke Education  Goal: Patient's knowledge of stroke and risk factors will improve  Outcome: Progressing

## 2022-07-02 LAB
ANION GAP SERPL CALC-SCNC: 11 MMOL/L (ref 7–16)
BASOPHILS # BLD AUTO: 0.4 % (ref 0–1.8)
BASOPHILS # BLD: 0.03 K/UL (ref 0–0.12)
BUN SERPL-MCNC: 16 MG/DL (ref 8–22)
CALCIUM SERPL-MCNC: 8.4 MG/DL (ref 8.5–10.5)
CHLORIDE SERPL-SCNC: 110 MMOL/L (ref 96–112)
CO2 SERPL-SCNC: 21 MMOL/L (ref 20–33)
CREAT SERPL-MCNC: 0.65 MG/DL (ref 0.5–1.4)
EOSINOPHIL # BLD AUTO: 0.03 K/UL (ref 0–0.51)
EOSINOPHIL NFR BLD: 0.4 % (ref 0–6.9)
ERYTHROCYTE [DISTWIDTH] IN BLOOD BY AUTOMATED COUNT: 47.4 FL (ref 35.9–50)
GFR SERPLBLD CREATININE-BSD FMLA CKD-EPI: 110 ML/MIN/1.73 M 2
GLUCOSE SERPL-MCNC: 107 MG/DL (ref 65–99)
HCT VFR BLD AUTO: 43.3 % (ref 42–52)
HGB BLD-MCNC: 14.1 G/DL (ref 14–18)
IMM GRANULOCYTES # BLD AUTO: 0.03 K/UL (ref 0–0.11)
IMM GRANULOCYTES NFR BLD AUTO: 0.4 % (ref 0–0.9)
LYMPHOCYTES # BLD AUTO: 1.2 K/UL (ref 1–4.8)
LYMPHOCYTES NFR BLD: 16.3 % (ref 22–41)
MCH RBC QN AUTO: 26.6 PG (ref 27–33)
MCHC RBC AUTO-ENTMCNC: 32.6 G/DL (ref 33.7–35.3)
MCV RBC AUTO: 81.7 FL (ref 81.4–97.8)
MONOCYTES # BLD AUTO: 0.73 K/UL (ref 0–0.85)
MONOCYTES NFR BLD AUTO: 9.9 % (ref 0–13.4)
NEUTROPHILS # BLD AUTO: 5.35 K/UL (ref 1.82–7.42)
NEUTROPHILS NFR BLD: 72.6 % (ref 44–72)
NRBC # BLD AUTO: 0 K/UL
NRBC BLD-RTO: 0 /100 WBC
PLATELET # BLD AUTO: 259 K/UL (ref 164–446)
PMV BLD AUTO: 9 FL (ref 9–12.9)
POTASSIUM SERPL-SCNC: 3.8 MMOL/L (ref 3.6–5.5)
RBC # BLD AUTO: 5.3 M/UL (ref 4.7–6.1)
SODIUM SERPL-SCNC: 139 MMOL/L (ref 135–145)
SODIUM SERPL-SCNC: 141 MMOL/L (ref 135–145)
SODIUM SERPL-SCNC: 142 MMOL/L (ref 135–145)
SODIUM SERPL-SCNC: 143 MMOL/L (ref 135–145)
WBC # BLD AUTO: 7.4 K/UL (ref 4.8–10.8)

## 2022-07-02 PROCEDURE — 84295 ASSAY OF SERUM SODIUM: CPT | Mod: 91

## 2022-07-02 PROCEDURE — 700111 HCHG RX REV CODE 636 W/ 250 OVERRIDE (IP): Performed by: INTERNAL MEDICINE

## 2022-07-02 PROCEDURE — A9270 NON-COVERED ITEM OR SERVICE: HCPCS | Performed by: INTERNAL MEDICINE

## 2022-07-02 PROCEDURE — 770022 HCHG ROOM/CARE - ICU (200)

## 2022-07-02 PROCEDURE — 85025 COMPLETE CBC W/AUTO DIFF WBC: CPT

## 2022-07-02 PROCEDURE — 80048 BASIC METABOLIC PNL TOTAL CA: CPT

## 2022-07-02 PROCEDURE — 99233 SBSQ HOSP IP/OBS HIGH 50: CPT | Performed by: PSYCHIATRY & NEUROLOGY

## 2022-07-02 PROCEDURE — 700105 HCHG RX REV CODE 258: Performed by: INTERNAL MEDICINE

## 2022-07-02 PROCEDURE — 700102 HCHG RX REV CODE 250 W/ 637 OVERRIDE(OP): Performed by: INTERNAL MEDICINE

## 2022-07-02 PROCEDURE — 99291 CRITICAL CARE FIRST HOUR: CPT | Performed by: INTERNAL MEDICINE

## 2022-07-02 PROCEDURE — 700105 HCHG RX REV CODE 258: Performed by: STUDENT IN AN ORGANIZED HEALTH CARE EDUCATION/TRAINING PROGRAM

## 2022-07-02 RX ORDER — 3% SODIUM CHLORIDE 3 G/100ML
500 INJECTION, SOLUTION INTRAVENOUS CONTINUOUS
Status: DISCONTINUED | OUTPATIENT
Start: 2022-07-02 | End: 2022-07-02

## 2022-07-02 RX ORDER — 3% SODIUM CHLORIDE 3 G/100ML
500 INJECTION, SOLUTION INTRAVENOUS CONTINUOUS
Status: DISCONTINUED | OUTPATIENT
Start: 2022-07-02 | End: 2022-07-05

## 2022-07-02 RX ADMIN — SODIUM CHLORIDE 500 ML: 3 INJECTION, SOLUTION INTRAVENOUS at 03:26

## 2022-07-02 RX ADMIN — SODIUM CHLORIDE 500 ML: 3 INJECTION, SOLUTION INTRAVENOUS at 13:06

## 2022-07-02 RX ADMIN — ASPIRIN 81 MG 81 MG: 81 TABLET ORAL at 05:19

## 2022-07-02 RX ADMIN — ENOXAPARIN SODIUM 40 MG: 40 INJECTION SUBCUTANEOUS at 17:23

## 2022-07-02 RX ADMIN — SODIUM CHLORIDE 2 G: 1 TABLET ORAL at 11:12

## 2022-07-02 RX ADMIN — POTASSIUM BICARBONATE 25 MEQ: 978 TABLET, EFFERVESCENT ORAL at 09:49

## 2022-07-02 RX ADMIN — FLUDROCORTISONE ACETATE 0.1 MG: 0.1 TABLET ORAL at 05:19

## 2022-07-02 RX ADMIN — SODIUM CHLORIDE 2 G: 1 TABLET ORAL at 06:36

## 2022-07-02 RX ADMIN — SENNOSIDES AND DOCUSATE SODIUM 2 TABLET: 50; 8.6 TABLET ORAL at 05:19

## 2022-07-02 RX ADMIN — HYDRALAZINE HYDROCHLORIDE 20 MG: 20 INJECTION INTRAMUSCULAR; INTRAVENOUS at 02:17

## 2022-07-02 RX ADMIN — SODIUM CHLORIDE 2 G: 1 TABLET ORAL at 17:23

## 2022-07-02 RX ADMIN — LISINOPRIL 10 MG: 10 TABLET ORAL at 05:19

## 2022-07-02 RX ADMIN — SENNOSIDES AND DOCUSATE SODIUM 2 TABLET: 50; 8.6 TABLET ORAL at 17:24

## 2022-07-02 RX ADMIN — ATORVASTATIN CALCIUM 80 MG: 80 TABLET, FILM COATED ORAL at 17:24

## 2022-07-02 RX ADMIN — AMLODIPINE BESYLATE 10 MG: 10 TABLET ORAL at 05:19

## 2022-07-02 ASSESSMENT — ENCOUNTER SYMPTOMS
ORTHOPNEA: 0
NAUSEA: 0
CHILLS: 0
ABDOMINAL PAIN: 0
VOMITING: 0
SPUTUM PRODUCTION: 0
MYALGIAS: 0
FEVER: 0
NECK PAIN: 0
FOCAL WEAKNESS: 0
DEPRESSION: 0
HEMOPTYSIS: 0
COUGH: 0
SENSORY CHANGE: 0
DOUBLE VISION: 0
PHOTOPHOBIA: 0
PALPITATIONS: 0
BLURRED VISION: 0
SPEECH CHANGE: 0

## 2022-07-02 NOTE — PROGRESS NOTES
Neurosurgery Progress Note    Subjective:  57 yr old male presented with CVA.  POD#5 R EVD placement   Drain raised to 10cm on .    No acute events overnight  His ICPs have been stable, 1-8  EVD draining 5-8mL/hr    Exam:  A&O x3  PERRL, symmetric  Face symmetric   Cns II-VII grossly intact  Moving all extremities, follows commands  Sensation intact   No drift    BP  Min: 122/70  Max: 154/88  Pulse  Av.2  Min: 85  Max: 99  Resp  Av.7  Min: 10  Max: 23  Temp  Av.6 °C (97.8 °F)  Min: 36.3 °C (97.3 °F)  Max: 36.8 °C (98.3 °F)  SpO2  Av.8 %  Min: 91 %  Max: 95 %    ICP  Av.9 MM HG  Min: 4 MM HG  Max: 12 MM HG  CPP   Av.7  Min: 77  Max: 100    Recent Labs     22  0519 22  0550 22  0600   WBC 7.8 7.0 7.4   RBC 5.25 5.19 5.30   HEMOGLOBIN 13.7* 13.9* 14.1   HEMATOCRIT 43.2 42.9 43.3   MCV 82.3 82.7 81.7   MCH 26.1* 26.8* 26.6*   MCHC 31.7* 32.4* 32.6*   RDW 49.1 48.4 47.4   PLATELETCT 256 268 259   MPV 9.4 9.3 9.0     Recent Labs     22  0519 22  1205 22  0550 22  1140 22  1805 22  0000 22  0600   SODIUM 148*   < > 149*   < > 142 141 142   POTASSIUM 3.6  --  3.6  --   --   --  3.8   CHLORIDE 116*  --  116*  --   --   --  110   CO2   --   --   --  21   GLUCOSE 140*  --  121*  --   --   --  107*   BUN 13  --  17  --   --   --  16   CREATININE 0.57  --  0.54  --   --   --  0.65   CALCIUM 8.4*  --  8.6  --   --   --  8.4*    < > = values in this interval not displayed.               Intake/Output                       22 - 22 0622 - 22 Total  Total                 Intake    P.O.  600  -- 600  --  -- --    P.O. 600 -- 600 -- -- --    I.V.  734.5  730.8 1465.3  --  -- --    Cardene Volume 0 -- 0 -- -- --    Volume (mL) (potassium chloride in water (KCL) ivpb (ICU ONLY) 40 mEq) 100 -- 100 -- -- --    Volume (mL) (3% sodium chloride (HYPERTONIC  SALINE) 500mL infusion 500 mL) 222 660 882 -- -- --    Volume (mL) (3% sodium chloride (HYPERTONIC SALINE) 500mL infusion 500 mL) 412.5 70.8 483.3 -- -- --    Other  100  -- 100  --  -- --    Medications (PO/Enteral Liquids) 100 -- 100 -- -- --    NG/GT  150  -- 150  --  -- --    Intake (mL) (Enteral Tube 06/27/22 Left nare) 150 -- 150 -- -- --    Total Intake 1584.5 730.8 2315.3 -- -- --       Output    Urine  925  2125 3050  --  -- --    Number of Times Voided 4 x -- 4 x -- -- --    Urine Void (mL) 925 2125 3050 -- -- --    Drains  72  75 147  --  -- --    Output (mL) (ICP/Ventriculostomy Right Other (Comment)) 72 75 147 -- -- --    Total Output 997 2200 3197 -- -- --       Net I/O     587.5 -1469.2 -881.7 -- -- --            Intake/Output Summary (Last 24 hours) at 7/2/2022 0817  Last data filed at 7/2/2022 0600  Gross per 24 hour   Intake 1945.33 ml   Output 2733 ml   Net -787.67 ml            • enoxaparin (LOVENOX) injection  40 mg DAILY AT 1800   • acetaminophen  650 mg Q6HRS PRN   • aspirin  81 mg DAILY   • amLODIPine  10 mg Q DAY   • atorvastatin  80 mg Q EVENING   • lisinopril  10 mg Q DAY   • fludrocortisone  0.1 mg DAILY   • ondansetron  4 mg Q4HRS PRN   • senna-docusate  2 Tablet BID    And   • polyethylene glycol/lytes  1 Packet QDAY PRN    And   • magnesium hydroxide  30 mL QDAY PRN    And   • bisacodyl  10 mg QDAY PRN   • sodium chloride  2 g TID WITH MEALS   • 3% sodium chloride  500 mL Continuous   • hydrALAZINE  20 mg Q4HRS PRN   • labetalol  10-20 mg Q2HRS PRN   • MD Alert...Adult ICU Electrolyte Replacement per Pharmacy   PHARMACY TO DOSE   • niCARdipine infusion  0-15 mg/hr Continuous   • HYDROmorphone  0.5 mg Q3HRS PRN   • ondansetron  4 mg Q4HRS PRN   • prochlorperazine  5-10 mg Q4HRS PRN       Assessment and Plan:  Hospital day #5  POD#5 R EVD placement.  Raised drain to 15 cm on 7/2, and will continue to challenge as able over weekend, possibly remove EVD on 7/4.   Systolic BP goal  <140.  Appreciate intensivists care.   Will continue to follow.      Chemical prophylactic DVT therapy: Yes - Lovenox 40mg/qd    Start date/time: today

## 2022-07-02 NOTE — PROGRESS NOTES
[facility] Progress Note      Admit Date: 6/26/2022    Resident(s): Yo Mary M.D.   Attending:  KEVYN COVINGTON/ Dr. Victor     Patient ID:    Name:  Alistair Putnam   YOB: 1965  Age:  57 y.o.  male   MRN:  [id number]    Hospital Course:  Alistair Putnam is a 57 y.o. male with no significant past medical history (on no medication at home) who presented to the emergency department on 6/26/2022 with vertigo; initial CT head on presentation was unremarkable, and admitted to the floor. 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 Evaluated by neurosurgery; EVD was placed (6/27) due to concern for early obstructive hydrocephalus. Patient being monitored in RICU.    Consultants:  Critical Care  Neurosurgery  Neurology    Interval Events:    No overnight events  Alert and oriented x3, able to follow commands  Requested removal of NJ tube; passed S&S; moist/minced here and there  EVD draining clear fluid  Afebrile, HR 80-90s, 's, on 3 L O2  Continue nightly neuro checks per neuro surgery recommendation.  No longer requiring cardene gtt; Hydralazine PRNx2 o/n; Goal systolic BP <140  Continue Amlodipine 10mg daily  Continue Lisinopril mg daily   Repleting lytes daily  Neurosurgery following  Restarted DVT ppx Lovenox  EVD raised to 10cm  Titrating down 3% Na; nearly outside the window for cerebral edema.  Echocardiogram: EF: 65-70%  Monitor and replete electrolytes as indicated  Speech and swallow passed; removing NGT today  Anticipate potentially clamping EVD soon    Vitals Range last 24h:  Temp:  [36.3 °C (97.3 °F)-36.8 °C (98.3 °F)] 36.7 °C (98.1 °F)  Pulse:  [85-99] 93  Resp:  [10-23] 18  BP: (122-154)/(62-88) 149/82  SpO2:  [91 %-95 %] 93 %      Intake/Output Summary (Last 24 hours) at 7/2/2022 0945  Last data filed at 7/2/2022 0600  Gross per 24 hour   Intake 1843.33 ml   Output 2722 ml   Net -878.67 ml         Review of Systems   Constitutional: Positive for malaise/fatigue. Negative for chills and fever.   HENT: Negative for congestion, ear discharge and nosebleeds.    Eyes: Negative for blurred vision, double vision and photophobia.   Respiratory: Negative for cough, hemoptysis and sputum production.    Cardiovascular: Negative for chest pain, palpitations and orthopnea.   Gastrointestinal: Negative for abdominal pain, nausea and vomiting.   Genitourinary: Negative for dysuria, frequency and urgency.   Musculoskeletal: Negative for myalgias and neck pain.   Neurological: Negative for sensory change, speech change and focal weakness.   Psychiatric/Behavioral: Negative for depression.       PHYSICAL EXAM:  Vitals:    07/02/22 0500 07/02/22 0600 07/02/22 0700 07/02/22 0900   BP: 133/78 (!) 141/81 (!) 143/72 (!) 149/82   Pulse: 89 85 94 93   Resp: 17 17 (!) 10 18   Temp:       TempSrc:       SpO2: 91% 92% 92% 93%   Weight:       Height:        Body mass index is 30.29 kg/m².    O2 therapy: Pulse Oximetry: 93 %, O2 (LPM): 0, O2 Delivery Device: None - Room Air         Physical Exam  Constitutional:       General: He is not in acute distress.     Appearance: He is not ill-appearing.   HENT:      Head:      Comments: EVD in place     Nose: Nose normal.      Mouth/Throat:      Mouth: Mucous membranes are moist.   Eyes:      Extraocular Movements: Extraocular movements intact.   Cardiovascular:      Rate and Rhythm: Normal rate and regular rhythm.      Pulses: Normal pulses.      Heart sounds: Normal heart sounds. No murmur heard.    No friction rub. No gallop.   Pulmonary:      Effort: Pulmonary effort is normal. No respiratory distress.      Breath sounds: No wheezing.   Abdominal:      General: Abdomen is flat. Bowel sounds are normal. There is no distension.      Palpations: Abdomen is soft.      Tenderness: There is no abdominal tenderness.   Musculoskeletal:      Right lower leg: No edema.      Left lower leg: No  edema.   Skin:     General: Skin is warm.   Neurological:      Mental Status: He is alert.      Comments: Motor strength 5/5, sensation intact              Recent Labs     06/30/22  0519 06/30/22  1205 07/01/22  0550 07/01/22  1140 07/01/22  1805 07/02/22  0000 07/02/22  0600   SODIUM 148*   < > 149*   < > 142 141 142   POTASSIUM 3.6  --  3.6  --   --   --  3.8   CHLORIDE 116*  --  116*  --   --   --  110   CO2 23 -- 22  --   --   --  21   BUN 13  --  17  --   --   --  16   CREATININE 0.57  --  0.54  --   --   --  0.65   CALCIUM 8.4*  --  8.6  --   --   --  8.4*    < > = values in this interval not displayed.     Recent Labs     06/30/22  0519 07/01/22  0550 07/02/22  0600   GLUCOSE 140* 121* 107*     Recent Labs     06/30/22  0519 07/01/22  0550 07/02/22  0600   RBC 5.25 5.19 5.30   HEMOGLOBIN 13.7* 13.9* 14.1   HEMATOCRIT 43.2 42.9 43.3   PLATELETCT 256 268 259     Recent Labs     06/30/22  0519 07/01/22  0550 07/02/22  0600   WBC 7.8 7.0 7.4   NEUTSPOLYS 70.70 68.00 72.60*   LYMPHOCYTES 17.20* 18.70* 16.30*   MONOCYTES 10.90 11.40 9.90   EOSINOPHILS 0.40 0.70 0.40   BASOPHILS 0.40 0.30 0.40       Meds:  • 3% sodium chloride  500 mL 50 mL/hr at 07/02/22 0945   • potassium bicarbonate  25 mEq     • enoxaparin (LOVENOX) injection  40 mg     • acetaminophen  650 mg     • aspirin  81 mg     • amLODIPine  10 mg     • atorvastatin  80 mg     • lisinopril  10 mg     • fludrocortisone  0.1 mg     • ondansetron  4 mg     • senna-docusate  2 Tablet      And   • polyethylene glycol/lytes  1 Packet      And   • magnesium hydroxide  30 mL      And   • bisacodyl  10 mg     • sodium chloride  2 g     • hydrALAZINE  20 mg     • labetalol  10-20 mg     • MD Alert...Adult ICU Electrolyte Replacement per Pharmacy       • niCARdipine infusion  0-15 mg/hr Stopped (07/01/22 0308)   • HYDROmorphone  0.5 mg     • ondansetron  4 mg     • prochlorperazine  5-10 mg          Procedures:  EVD placement on 6/27      Imaging:  EC-ECHOCARDIOGRAM COMPLETE W/O CONT   Final Result      CT-HEAD W/O   Final Result         1.  Postprocedural changes of ventriculostomy placement with minimal hyperdense hemorrhage tracking along the ventriculostomy tract.   2.  Small anterior foci of right pneumocephalus.   3.  Low-density evolving stroke within the right cerebellar hemisphere effacing the fourth ventricle.   4.  Atherosclerosis         DX-ABDOMEN FOR TUBE PLACEMENT   Final Result      1.  Feeding tube tip overlies the proximal stomach.      DX-CHEST-LIMITED (1 VIEW)   Final Result      1.  Right IJ catheter tip projects over the cavoatrial junction without pneumothorax.   2.  Enlarged cardiac silhouette with mild vascular congestion.   3.  There is bibasilar atelectasis.      CT-CTA HEAD WITH & W/O-POST PROCESS   Final Result         1. No hemodynamically significant narrowing of the major intracranial vessels.      CT-CTA NECK WITH & W/O-POST PROCESSING   Final Result      1. No evidence of flow-limiting stenosis in the cervical carotid or cervical vertebral arteries.      MR-BRAIN-W/O   Final Result      1.  Large acute right posterior inferior cerebellar artery territory infarct with associated edema/swelling and localized mass effect, partially effacing the fourth ventricle. The lateral ventricles are now mildly dilated suggesting developing    obstructive hydrocephalus.   2.  Small area of petechial hemorrhage within the infarct without other significant acute intracranial hemorrhage.   3.  FLAIR hyperintensity in the right vertebral artery suggesting sluggish flow, less likely thrombus formation.      These findings were discussed with MONIQUE KNOX on 6/27/2022 1:49 PM.      CT-HEAD W/O   Final Result      Head CT without contrast within normal limits. No evidence of acute cerebral infarction, hemorrhage or mass lesion.             ASSESSEMENT and PLAN:    Dizziness  Presents for dizziness, room spinning sensation  Likely  related to benign paroxysmal positional vertigo  Dizziness elicited with head movement to left  CT head normal  Supportive treatment, IV fluids, avoid trigger of left facing head position  Can try Epley's maneuver    Patient with persistent dizziness and nausea despite overnight supportive treatment  MRI brain ordered    Acute stroke due to occlusion of right cerebellar artery (HCC)  Presented with vertigo, found to have large right PICA stroke with partial effacement of fourth ventricle   Status post EVD placement on 6/27/2022; Monitoring EVD; may start clamping soon.  Neurology and neurosurgery following   Continue frequent neuro checks  SBP goal less than 160, continue PRN labetalol/hydralazine  Scheduled Amlodipine 10mg daily  Scheduled Lisinopril 10mg daily; room to go up on Lisinopril if needed  No longer requiring Cardene gtt  Titrating down 3% Na gtt; trending and monitoring serial Na now @55cc/hour  High intensity statin  PT/OT/SLP       Hypokalemia  Replete to > 4      CODE STATUS: Full code  Quality Measures:  Feeding: Diet per SLP  Analgesia: Dilaudid  Sedation: None Tylenol,  Thromboprophylaxis: Held/SCD  Head of bed: >30 degrees  Ulcer prophylaxis: Not indicated  Glycemic control: Not indicated   bowel care: bowel regimen as needed  Indwelling lines: Right IJ, PIV   deescalation of antibiotics: None      Yo Mary M.D.

## 2022-07-02 NOTE — PROGRESS NOTES
Neurology Progress Note  Neurohospitalist Service, [facility]    Referring Physician: Steve Victor M.D.    Chief Complaint   Patient presents with   • Dizziness     Onset today at 1430   • Weakness   • Headache     Onset today at 1430    • Numbness     Numbness and tingling present to REKHA arms and legs   • Nausea       HPI: Refer to initial documented Neurology H&P, as detailed in the patient's chart.    Interval History 7/2: No new events overnight    Review of systems: In addition to what is detailed in the HPI and/or updated in the interval history, all other systems reviewed and are negative.    Past Medical History:    has a past medical history of Heart murmur (12/2017) and Pain (12/2017).    FHx:  family history includes Cancer in his mother.    SHx:   reports that he has never smoked. He has never used smokeless tobacco. He reports current alcohol use. He reports current drug use. Drugs: Marijuana and Inhaled.    Medications:    Current Facility-Administered Medications:   •  enoxaparin (Lovenox) inj 40 mg, 40 mg, Subcutaneous, DAILY AT 1800, Steve Victor M.D., 40 mg at 07/01/22 1801  •  acetaminophen (Tylenol) tablet 650 mg, 650 mg, Oral, Q6HRS PRN, Steve Victor M.D.  •  aspirin (ASA) chewable tab 81 mg, 81 mg, Oral, DAILY, Steve Victor M.D., 81 mg at 07/02/22 0519  •  amLODIPine (NORVASC) tablet 10 mg, 10 mg, Oral, Q DAY, Steve Victor M.D., 10 mg at 07/02/22 0519  •  atorvastatin (LIPITOR) tablet 80 mg, 80 mg, Oral, Q EVENING, Steve Victor M.D., 80 mg at 07/01/22 1801  •  lisinopril (PRINIVIL) tablet 10 mg, 10 mg, Oral, Q DAY, Steve Victor M.D., 10 mg at 07/02/22 0519  •  fludrocortisone (FLORINEF) tablet 0.1 mg, 0.1 mg, Oral, DAILY, Steve Victor M.D., 0.1 mg at 07/02/22 0519  •  ondansetron (ZOFRAN ODT) dispertab 4 mg, 4 mg, Oral, Q4HRS PRN, Steve Victor M.D.  •  senna-docusate (PERICOLACE or SENOKOT S) 8.6-50 MG per tablet 2  Tablet, 2 Tablet, Oral, BID, 2 Tablet at 07/02/22 0519 **AND** polyethylene glycol/lytes (MIRALAX) PACKET 1 Packet, 1 Packet, Oral, QDAY PRN **AND** magnesium hydroxide (MILK OF MAGNESIA) suspension 30 mL, 30 mL, Oral, QDAY PRN, 30 mL at 07/01/22 1803 **AND** bisacodyl (DULCOLAX) suppository 10 mg, 10 mg, Rectal, QDAY PRN, Steve Victor M.D.  •  sodium chloride (SALT) tablet 2 g, 2 g, Oral, TID WITH MEALS, Steve Victor M.D., 2 g at 07/02/22 0636  •  3% sodium chloride (HYPERTONIC SALINE) 500mL infusion 500 mL, 500 mL, Intravenous, Continuous, Fantasma Weaver D.O., Last Rate: 50 mL/hr at 07/02/22 0833, Rate Change at 07/02/22 0833  •  hydrALAZINE (APRESOLINE) injection 20 mg, 20 mg, Intravenous, Q4HRS PRN, Steve Victor M.D., 20 mg at 07/02/22 0217  •  labetalol (NORMODYNE/TRANDATE) injection 10-20 mg, 10-20 mg, Intravenous, Q2HRS PRN, Steve Victor M.D., 10 mg at 06/28/22 1634  •  MD Alert...ICU Electrolyte Replacement per Pharmacy, , Other, PHARMACY TO DOSE, Steve Victor M.D.  •  niCARdipine (CARDENE) 25 mg in  mL Infusion, 0-15 mg/hr, Intravenous, Continuous, Steve Victor M.D., Paused at 07/01/22 0309  •  HYDROmorphone (Dilaudid) injection 0.5 mg, 0.5 mg, Intravenous, Q3HRS PRN, Elizabeth Valdivia M.D., 0.5 mg at 06/27/22 1805  •  ondansetron (ZOFRAN) syringe/vial injection 4 mg, 4 mg, Intravenous, Q4HRS PRN, Steve Miguel M.D.  •  prochlorperazine (COMPAZINE) injection 5-10 mg, 5-10 mg, Intravenous, Q4HRS PRN, Steve Miguel M.D., 5 mg at 06/27/22 1034    Physical Examination:     Vitals:    07/02/22 0400 07/02/22 0500 07/02/22 0600 07/02/22 0700   BP: 138/74 133/78 (!) 141/81 (!) 143/72   Pulse: 91 89 85 94   Resp: 20 17 17 (!) 10   Temp: 36.7 °C (98.1 °F)      TempSrc:       SpO2: 91% 91% 92% 92%   Weight:       Height:               NEUROLOGICAL EXAM:     Patient is awake and alert x4.    Speech is intact.    Cranial 2-12 pupils are equal reactive.  Face symmetrical.  No gaze  preference.  Visual field intact.  Hearing intact.  Vestibular response intact  Motor examinations antigravity in all 4 sustained  Sensation intact in all 4 soft touch  No signs of ataxia on finger-nose testing    Objective Data:    Labs:  Lab Results   Component Value Date/Time    PROTHROMBTM 13.2 06/26/2022 03:35 PM    INR 1.03 06/26/2022 03:35 PM      Lab Results   Component Value Date/Time    WBC 7.4 07/02/2022 06:00 AM    RBC 5.30 07/02/2022 06:00 AM    HEMOGLOBIN 14.1 07/02/2022 06:00 AM    HEMATOCRIT 43.3 07/02/2022 06:00 AM    MCV 81.7 07/02/2022 06:00 AM    MCH 26.6 (L) 07/02/2022 06:00 AM    MCHC 32.6 (L) 07/02/2022 06:00 AM    MPV 9.0 07/02/2022 06:00 AM    NEUTSPOLYS 72.60 (H) 07/02/2022 06:00 AM    LYMPHOCYTES 16.30 (L) 07/02/2022 06:00 AM    MONOCYTES 9.90 07/02/2022 06:00 AM    EOSINOPHILS 0.40 07/02/2022 06:00 AM    BASOPHILS 0.40 07/02/2022 06:00 AM      Lab Results   Component Value Date/Time    SODIUM 142 07/02/2022 06:00 AM    POTASSIUM 3.8 07/02/2022 06:00 AM    CHLORIDE 110 07/02/2022 06:00 AM    CO2 21 07/02/2022 06:00 AM    GLUCOSE 107 (H) 07/02/2022 06:00 AM    BUN 16 07/02/2022 06:00 AM    CREATININE 0.65 07/02/2022 06:00 AM      Lab Results   Component Value Date/Time    CHOLSTRLTOT 186 06/27/2022 04:26 PM     (H) 06/27/2022 04:26 PM    HDL 41 06/27/2022 04:26 PM    TRIGLYCERIDE 79 06/27/2022 04:26 PM       Lab Results   Component Value Date/Time    ALKPHOSPHAT 70 06/26/2022 03:35 PM    ASTSGOT 16 06/26/2022 03:35 PM    ALTSGPT 18 06/26/2022 03:35 PM    TBILIRUBIN 0.2 06/26/2022 03:35 PM        Imaging/Testing:  EC-ECHOCARDIOGRAM COMPLETE W/O CONT   Final Result      CT-HEAD W/O   Final Result         1.  Postprocedural changes of ventriculostomy placement with minimal hyperdense hemorrhage tracking along the ventriculostomy tract.   2.  Small anterior foci of right pneumocephalus.   3.  Low-density evolving stroke within the right cerebellar hemisphere effacing the fourth  ventricle.   4.  Atherosclerosis         DX-ABDOMEN FOR TUBE PLACEMENT   Final Result      1.  Feeding tube tip overlies the proximal stomach.      DX-CHEST-LIMITED (1 VIEW)   Final Result      1.  Right IJ catheter tip projects over the cavoatrial junction without pneumothorax.   2.  Enlarged cardiac silhouette with mild vascular congestion.   3.  There is bibasilar atelectasis.      CT-CTA HEAD WITH & W/O-POST PROCESS   Final Result         1. No hemodynamically significant narrowing of the major intracranial vessels.      CT-CTA NECK WITH & W/O-POST PROCESSING   Final Result      1. No evidence of flow-limiting stenosis in the cervical carotid or cervical vertebral arteries.      MR-BRAIN-W/O   Final Result      1.  Large acute right posterior inferior cerebellar artery territory infarct with associated edema/swelling and localized mass effect, partially effacing the fourth ventricle. The lateral ventricles are now mildly dilated suggesting developing    obstructive hydrocephalus.   2.  Small area of petechial hemorrhage within the infarct without other significant acute intracranial hemorrhage.   3.  FLAIR hyperintensity in the right vertebral artery suggesting sluggish flow, less likely thrombus formation.      These findings were discussed with MONIQUE KNOX on 6/27/2022 1:49 PM.      CT-HEAD W/O   Final Result      Head CT without contrast within normal limits. No evidence of acute cerebral infarction, hemorrhage or mass lesion.               Assessment and Plan:    57-year-old male found to have a large right PICA infarct of unclear etiology.  Status post EVD placement.  EVD pressure 7 stable between 1-8 and draining between 5 to 8 mL/h.  Neurosurgery has raised pressure to 15 cm with a plan to potentially move on Monday.  Hypertonic has been slowly been weaned.  Sodium stable at 142 today    Plan:  1.  Continue aspirin  2.  EVD management per neurosurgery agree with challenge  3.  Slowly weaning hypertonic  saline.  Avoid hyponatremia.  4.  Blood pressure goal keep below 140 systolic goal long-term treatment normotensive  5.  Continue Lipitor current LDL is 129.  Goal is below 70  6.  Maintain normal glycemia  7.  PT and OT once able      Spent 39 minutes of which over half was done at bedside discussing care with the primary team the patient including continuing weaning hypertonic saline trend avoid hyponatremia.    This chart was partially generated using voice recognition technology and sound alike word replacement may be present, best efforts were made to make the chart accurate.    Bayron Paul MD  Board Certified Neurology, ABPN  (t) [phone]

## 2022-07-02 NOTE — PROGRESS NOTES
Discussed possibility of discontinuing cortrak with [facility] Resident. Patient ate 25% of lunch. MD discussed options with patient and at this time the patient and MD are agreeable to leaving cortrak in until morning and assessing po intake this afternoon/evening.

## 2022-07-02 NOTE — PROGRESS NOTES
Doctor Fantasma Weaver notified about patients total urine output of 1550 ml's, as well as the increased urine output. Doctor notified that the urine output at 0000,0100,0200 were over 300 ml's. No new orders received. Doctor said he will review the 0600 labs, specifically the sodium level.

## 2022-07-02 NOTE — PROGRESS NOTES
Dr. Weaver notified via Volate of most recent sodium of 142. Sodium trends today and 3% titrations reviewed with MD. BARROW to place order into epic for rate change to 3% gtt.

## 2022-07-02 NOTE — PROGRESS NOTES
"[facility] Critical Care Medicine Progress Note    Brief HPI/problem list:  Mr. Putnam is a 57 year old male with no past medical history and is not a smoker who presented to the ER yesterday with complaints of sudden onset of dizziness yesterday afternoon.  The patient reports that he was in his usual state of health when he got up to get a glass of water when he had sudden onset of dizziness as if the \"room was spinning\".  He reported that he had balance issues and had to sit down.  He had nausea and vomiting.  No tinnitus.  No unilateral numbness, tingling, or weakness to extremities.  Family witness that the patient had horizontal nystagmus for about 15 minutes.  EMS witnessed this as well.  He was brought to the ER and admitted.  He underwent an MRI brain today which revealed a large right cerebellar stroke and was transferred to the ICU for ongoing care. (Dr Valdivia HPI 6/27)    6/27 - admit, s/p EVD at night  6/28 - Neuro better, HA, ECHO ok, failed FEES  6/29 - Speech/vision better, NIH 2-3, EVD pressures/output ok, Nicardipine 5, 3% 60cc  6/30 - Neuro no change, EVD output low, 3% saline 60, neurosurgery raising EVD level to 10 cm today  7/1 - Neuro no change, EVD 10cm, 3% 60, off Nicardipine, passed swallow  7/2 - Neuro no change, ECD to15 cm a day, 3% 55->50, Na 142    Case reviewed at length with neurology at the bedside and with neurosurgery as well    Daily exam: A&O x4, stim reflexes intact, lungs clear, RRR, no edema, EVD in place, CVC    TEAM Rounds:  Neuro: ICP 3-11, output 5-10, CCP , 3% saline 50cc/hr  HR: SR, 80-90s, Cardene remains off  SBP: 120-140s  Tmax: 98.3, WBC 7.4  GI: Starting p.o., FEES last, cortrak to be removed  UOP: great  Lines: R IJ CVC, PIVs  Resp: RA, CXR 6/27 clear, IS 1750  Vte: SCDs, Lovenox  PPI/H2: none  Antibx: NA    Assessment:  CVA right cerebellum with fourth ventricle effacement, R PICA improved  Hydrocephalus status post EVD 6/27-controlled with " drainage  Hypertension controlled with enteral regimen off nicardipine  Swallow dysfunction/dysphagia-speech following  Hypokalemia  History of heart murmur  History of multiple orthopedic procedures    Plan of care:   Continue with BP goal less than 140  Off nicardipine for now, resume if necessary  Continue amlodipine to 10, add ACE if necessary  PRN ACE/labetalol/hydralazine   Continue weaning 3% saline 10 cc every 6 hours as tolerated  Continue every 6 hours sodium levels  Drop sodium no more than 10 mEq per 24 hours   Continue salt tabs, wean them after weaning 3% saline  Cont Florinef, wean last  Remove enteral feeding tube, passed fees  I-S/mobilize-up to chair  Statin high-dose  Optimize electrolytes  Continue neurochecks to Q2H  Bowel care protocols  Base EVD to 15 cm and possibly clamped tomorrow per NS  ASA/loenox okay per neurosurgery    Case reviewed with patient, family, neurology, neurosurgery, RN, Charge RN, RT, Clinical pharmacist and [facility] Resident    Critical care time provided was 35 minutes. This excludes all separate billable procedures.     Please see [facility]/NP notes for additional documentation    Steve Victor MD  [facility]

## 2022-07-02 NOTE — PROGRESS NOTES
"0815: EVD raised to 15 cm H20 per ROME Ferris. Verified BP goals with ROME Lofton as well to maintain SBP <140.     1030: Called by ROME Ferris who stated he spoke to Dr. Nielsen and Dr. Hardy who wish to maintain EVD at 10 cm H20 over the weekend. EVD lowered back to 10 cm H20 at this time. Dr. Mary notified of this change.     1830: Notified Dr. Lisa of 3% currently running at 50 mL/hr and pt's Na+ at 1800 was 143 from 139 at 1200. Also notified that Dr. Victor's note states to \"continue weaning 3% saline 10 cc every 6 hours as tolerated.\" To decrease 3% gtt to 40 mL/hr per Dr. Lisa.   "

## 2022-07-03 LAB
ANION GAP SERPL CALC-SCNC: 9 MMOL/L (ref 7–16)
BASOPHILS # BLD AUTO: 0.3 % (ref 0–1.8)
BASOPHILS # BLD: 0.02 K/UL (ref 0–0.12)
BUN SERPL-MCNC: 19 MG/DL (ref 8–22)
CALCIUM SERPL-MCNC: 8.3 MG/DL (ref 8.5–10.5)
CHLORIDE SERPL-SCNC: 108 MMOL/L (ref 96–112)
CO2 SERPL-SCNC: 21 MMOL/L (ref 20–33)
CREAT SERPL-MCNC: 0.62 MG/DL (ref 0.5–1.4)
EOSINOPHIL # BLD AUTO: 0.13 K/UL (ref 0–0.51)
EOSINOPHIL NFR BLD: 1.8 % (ref 0–6.9)
ERYTHROCYTE [DISTWIDTH] IN BLOOD BY AUTOMATED COUNT: 46.4 FL (ref 35.9–50)
GFR SERPLBLD CREATININE-BSD FMLA CKD-EPI: 112 ML/MIN/1.73 M 2
GLUCOSE SERPL-MCNC: 88 MG/DL (ref 65–99)
HCT VFR BLD AUTO: 40.8 % (ref 42–52)
HGB BLD-MCNC: 13.3 G/DL (ref 14–18)
IMM GRANULOCYTES # BLD AUTO: 0.06 K/UL (ref 0–0.11)
IMM GRANULOCYTES NFR BLD AUTO: 0.8 % (ref 0–0.9)
LYMPHOCYTES # BLD AUTO: 1.65 K/UL (ref 1–4.8)
LYMPHOCYTES NFR BLD: 22.7 % (ref 22–41)
MCH RBC QN AUTO: 26.8 PG (ref 27–33)
MCHC RBC AUTO-ENTMCNC: 32.6 G/DL (ref 33.7–35.3)
MCV RBC AUTO: 82.1 FL (ref 81.4–97.8)
MONOCYTES # BLD AUTO: 0.81 K/UL (ref 0–0.85)
MONOCYTES NFR BLD AUTO: 11.1 % (ref 0–13.4)
NEUTROPHILS # BLD AUTO: 4.61 K/UL (ref 1.82–7.42)
NEUTROPHILS NFR BLD: 63.3 % (ref 44–72)
NRBC # BLD AUTO: 0 K/UL
NRBC BLD-RTO: 0 /100 WBC
PLATELET # BLD AUTO: 271 K/UL (ref 164–446)
PMV BLD AUTO: 9.5 FL (ref 9–12.9)
POTASSIUM SERPL-SCNC: 4.1 MMOL/L (ref 3.6–5.5)
RBC # BLD AUTO: 4.97 M/UL (ref 4.7–6.1)
SODIUM SERPL-SCNC: 138 MMOL/L (ref 135–145)
SODIUM SERPL-SCNC: 138 MMOL/L (ref 135–145)
SODIUM SERPL-SCNC: 139 MMOL/L (ref 135–145)
SODIUM SERPL-SCNC: 140 MMOL/L (ref 135–145)
WBC # BLD AUTO: 7.3 K/UL (ref 4.8–10.8)

## 2022-07-03 PROCEDURE — 700105 HCHG RX REV CODE 258

## 2022-07-03 PROCEDURE — 85025 COMPLETE CBC W/AUTO DIFF WBC: CPT

## 2022-07-03 PROCEDURE — 84295 ASSAY OF SERUM SODIUM: CPT | Mod: 91

## 2022-07-03 PROCEDURE — 80048 BASIC METABOLIC PNL TOTAL CA: CPT

## 2022-07-03 PROCEDURE — A9270 NON-COVERED ITEM OR SERVICE: HCPCS | Performed by: INTERNAL MEDICINE

## 2022-07-03 PROCEDURE — 99291 CRITICAL CARE FIRST HOUR: CPT | Performed by: INTERNAL MEDICINE

## 2022-07-03 PROCEDURE — 99233 SBSQ HOSP IP/OBS HIGH 50: CPT | Performed by: PSYCHIATRY & NEUROLOGY

## 2022-07-03 PROCEDURE — 700111 HCHG RX REV CODE 636 W/ 250 OVERRIDE (IP): Performed by: INTERNAL MEDICINE

## 2022-07-03 PROCEDURE — 770022 HCHG ROOM/CARE - ICU (200)

## 2022-07-03 PROCEDURE — 700102 HCHG RX REV CODE 250 W/ 637 OVERRIDE(OP): Performed by: INTERNAL MEDICINE

## 2022-07-03 RX ADMIN — SENNOSIDES AND DOCUSATE SODIUM 2 TABLET: 50; 8.6 TABLET ORAL at 17:41

## 2022-07-03 RX ADMIN — SODIUM CHLORIDE 500 ML: 3 INJECTION, SOLUTION INTRAVENOUS at 00:00

## 2022-07-03 RX ADMIN — ATORVASTATIN CALCIUM 80 MG: 80 TABLET, FILM COATED ORAL at 17:41

## 2022-07-03 RX ADMIN — ASPIRIN 81 MG 81 MG: 81 TABLET ORAL at 05:49

## 2022-07-03 RX ADMIN — LISINOPRIL 10 MG: 10 TABLET ORAL at 05:49

## 2022-07-03 RX ADMIN — SODIUM CHLORIDE 2 G: 1 TABLET ORAL at 08:43

## 2022-07-03 RX ADMIN — SODIUM CHLORIDE 500 ML: 3 INJECTION, SOLUTION INTRAVENOUS at 17:42

## 2022-07-03 RX ADMIN — AMLODIPINE BESYLATE 10 MG: 10 TABLET ORAL at 05:50

## 2022-07-03 RX ADMIN — FLUDROCORTISONE ACETATE 0.1 MG: 0.1 TABLET ORAL at 05:50

## 2022-07-03 RX ADMIN — SODIUM CHLORIDE 2 G: 1 TABLET ORAL at 17:41

## 2022-07-03 RX ADMIN — ENOXAPARIN SODIUM 40 MG: 40 INJECTION SUBCUTANEOUS at 17:42

## 2022-07-03 RX ADMIN — SODIUM CHLORIDE 2 G: 1 TABLET ORAL at 12:06

## 2022-07-03 RX ADMIN — SENNOSIDES AND DOCUSATE SODIUM 2 TABLET: 50; 8.6 TABLET ORAL at 05:49

## 2022-07-03 ASSESSMENT — ENCOUNTER SYMPTOMS
DOUBLE VISION: 0
BLURRED VISION: 0
CHILLS: 0
VOMITING: 0
SENSORY CHANGE: 0
HEMOPTYSIS: 0
ORTHOPNEA: 0
PALPITATIONS: 0
DEPRESSION: 0
FOCAL WEAKNESS: 0
COUGH: 0
NAUSEA: 0
MYALGIAS: 0
NECK PAIN: 0
FEVER: 0
SPUTUM PRODUCTION: 0
PHOTOPHOBIA: 0
ABDOMINAL PAIN: 0
SPEECH CHANGE: 0

## 2022-07-03 ASSESSMENT — PAIN DESCRIPTION - PAIN TYPE
TYPE: ACUTE PAIN

## 2022-07-03 NOTE — PROGRESS NOTES
[facility] Progress Note      Admit Date: 6/26/2022    Resident(s): Melissa Avila M.D.   Attending:  KEVYN COVINGTON/ Dr. Victor     Patient ID:    Name:  Alistair Putnam   YOB: 1965  Age:  57 y.o.  male   MRN:  [id number]    Hospital Course:  Alistair Putnam is a 57 y.o. male with no significant past medical history (on no medication at home) who presented to the emergency department on 6/26/2022 with vertigo; initial CT head on presentation was unremarkable, and admitted to the floor. 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 Evaluated by neurosurgery; EVD was placed (6/27) due to concern for early obstructive hydrocephalus. Patient being monitored in RICU.    Consultants:  Critical Care  Neurosurgery  Neurology    Interval Events:  No overnight events  Alert and oriented x3, able to follow commands  Diet as per speech therapy: Soft and Bite sized  EVD draining clear fluid  Afebrile, HR 80-90s, -120s, on Room Air  Continue nightly neuro checks per neuro surgery recommendation.   Hydralazine PRNx2 o/n; Goal systolic BP <140  Continue Amlodipine 10mg daily  Continue Lisinopril mg daily   Neurosurgery following  EVD raised to 10cm until 7/4  Titrating down 3% Na; nearly outside the window for cerebral edema.   Echocardiogram: EF: 65-70%  Anticipate potentially clamping EVD soon    Vitals Range last 24h:  Temp:  [36.2 °C (97.2 °F)-36.8 °C (98.2 °F)] 36.6 °C (97.9 °F)  Pulse:  [] 94  Resp:  [10-46] 15  BP: (112-145)/(68-88) 121/82  SpO2:  [90 %-96 %] 93 %      Intake/Output Summary (Last 24 hours) at 7/3/2022 1256  Last data filed at 7/3/2022 1200  Gross per 24 hour   Intake 1093.67 ml   Output 3015 ml   Net -1921.33 ml        Review of Systems   Constitutional: Positive for malaise/fatigue. Negative for chills and fever.   HENT: Negative for congestion, ear discharge and nosebleeds.    Eyes: Negative for  blurred vision, double vision and photophobia.   Respiratory: Negative for cough, hemoptysis and sputum production.    Cardiovascular: Negative for chest pain, palpitations and orthopnea.   Gastrointestinal: Negative for abdominal pain, nausea and vomiting.   Genitourinary: Negative for dysuria, frequency and urgency.   Musculoskeletal: Negative for myalgias and neck pain.   Neurological: Negative for sensory change, speech change and focal weakness.   Psychiatric/Behavioral: Negative for depression.       PHYSICAL EXAM:  Vitals:    07/03/22 0900 07/03/22 1000 07/03/22 1100 07/03/22 1200   BP: 129/78 130/69 112/68 121/82   Pulse: 90 99 88 94   Resp: 12 (!) 10 (!) 10 15   Temp:    36.6 °C (97.9 °F)   TempSrc:    Temporal   SpO2: 93% 92% 93% 93%   Weight:       Height:        Body mass index is 30.29 kg/m².    O2 therapy: Pulse Oximetry: 93 %, O2 Delivery Device: Room air w/o2 available    Date 07/03/22 0700 - 07/04/22 0659   Shift 3908-7865 8899-1221 4156-3262 24 Hour Total   INTAKE   I.V. 193.7   193.7     Volume (mL) (3% sodium chloride (HYPERTONIC SALINE) 500mL infusion 500 mL) 193.7   193.7   Shift Total 193.7   193.7   OUTPUT   Urine 325   325     Number of Times Voided 1 x   1 x     Urine Void (mL) 325   325   Drains 10   10     Output (mL) (ICP/Ventriculostomy Right Other (Comment)) 10   10   Stool         Number of Times Stooled 0 x   0 x   Shift Total 335   335   NET -141.3   -141.3        Physical Exam  Constitutional:       General: He is not in acute distress.     Appearance: He is not ill-appearing.   HENT:      Head:      Comments: EVD in place     Nose: Nose normal.      Mouth/Throat:      Mouth: Mucous membranes are moist.   Eyes:      Extraocular Movements: Extraocular movements intact.      Pupils: Pupils are equal, round, and reactive to light.   Cardiovascular:      Rate and Rhythm: Normal rate and regular rhythm.      Pulses: Normal pulses.      Heart sounds: Normal heart sounds. No murmur  heard.    No friction rub. No gallop.   Pulmonary:      Effort: Pulmonary effort is normal. No respiratory distress.      Breath sounds: No wheezing.   Abdominal:      General: Abdomen is flat. Bowel sounds are normal. There is no distension.      Palpations: Abdomen is soft.      Tenderness: There is no abdominal tenderness.   Musculoskeletal:      Right lower leg: No edema.      Left lower leg: No edema.   Skin:     General: Skin is warm.   Neurological:      General: No focal deficit present.      Mental Status: He is alert and oriented to person, place, and time.      Cranial Nerves: No cranial nerve deficit.      Motor: No weakness.      Comments: Motor strength 5/5, sensation intact              Recent Labs     07/01/22  0550 07/01/22  1140 07/02/22  0600 07/02/22  1125 07/02/22  2345 07/03/22  0555 07/03/22  1210   SODIUM 149*   < > 142   < > 139 138 138   POTASSIUM 3.6  --  3.8  --   --  4.1  --    CHLORIDE 116*  --  110  --   --  108  --    CO2 22  --  21  --   --  21  --    BUN 17  --  16  --   --  19  --    CREATININE 0.54  --  0.65  --   --  0.62  --    CALCIUM 8.6  --  8.4*  --   --  8.3*  --     < > = values in this interval not displayed.     Recent Labs     07/01/22  0550 07/02/22  0600 07/03/22  0555   GLUCOSE 121* 107* 88     Recent Labs     07/01/22  0550 07/02/22  0600 07/03/22  0555   RBC 5.19 5.30 4.97   HEMOGLOBIN 13.9* 14.1 13.3*   HEMATOCRIT 42.9 43.3 40.8*   PLATELETCT 268 259 271     Recent Labs     07/01/22  0550 07/02/22  0600 07/03/22  0555   WBC 7.0 7.4 7.3   NEUTSPOLYS 68.00 72.60* 63.30   LYMPHOCYTES 18.70* 16.30* 22.70   MONOCYTES 11.40 9.90 11.10   EOSINOPHILS 0.70 0.40 1.80   BASOPHILS 0.30 0.40 0.30       Meds:  • 3% sodium chloride  500 mL 30 mL/hr at 07/03/22 0722   • enoxaparin (LOVENOX) injection  40 mg     • acetaminophen  650 mg     • aspirin  81 mg     • amLODIPine  10 mg     • atorvastatin  80 mg     • lisinopril  10 mg     • fludrocortisone  0.1 mg     • ondansetron  4  mg     • senna-docusate  2 Tablet      And   • polyethylene glycol/lytes  1 Packet      And   • magnesium hydroxide  30 mL      And   • bisacodyl  10 mg     • sodium chloride  2 g     • hydrALAZINE  20 mg     • labetalol  10-20 mg     • MD Alert...Adult ICU Electrolyte Replacement per Pharmacy       • HYDROmorphone  0.5 mg     • ondansetron  4 mg     • prochlorperazine  5-10 mg          Procedures:  EVD placement on 6/27     Imaging:  EC-ECHOCARDIOGRAM COMPLETE W/O CONT   Final Result      CT-HEAD W/O   Final Result         1.  Postprocedural changes of ventriculostomy placement with minimal hyperdense hemorrhage tracking along the ventriculostomy tract.   2.  Small anterior foci of right pneumocephalus.   3.  Low-density evolving stroke within the right cerebellar hemisphere effacing the fourth ventricle.   4.  Atherosclerosis         DX-ABDOMEN FOR TUBE PLACEMENT   Final Result      1.  Feeding tube tip overlies the proximal stomach.      DX-CHEST-LIMITED (1 VIEW)   Final Result      1.  Right IJ catheter tip projects over the cavoatrial junction without pneumothorax.   2.  Enlarged cardiac silhouette with mild vascular congestion.   3.  There is bibasilar atelectasis.      CT-CTA HEAD WITH & W/O-POST PROCESS   Final Result         1. No hemodynamically significant narrowing of the major intracranial vessels.      CT-CTA NECK WITH & W/O-POST PROCESSING   Final Result      1. No evidence of flow-limiting stenosis in the cervical carotid or cervical vertebral arteries.      MR-BRAIN-W/O   Final Result      1.  Large acute right posterior inferior cerebellar artery territory infarct with associated edema/swelling and localized mass effect, partially effacing the fourth ventricle. The lateral ventricles are now mildly dilated suggesting developing    obstructive hydrocephalus.   2.  Small area of petechial hemorrhage within the infarct without other significant acute intracranial hemorrhage.   3.  FLAIR hyperintensity  in the right vertebral artery suggesting sluggish flow, less likely thrombus formation.      These findings were discussed with MONIQUE KNOX on 6/27/2022 1:49 PM.      CT-HEAD W/O   Final Result      Head CT without contrast within normal limits. No evidence of acute cerebral infarction, hemorrhage or mass lesion.             ASSESSEMENT and PLAN:    Acute stroke due to occlusion of right cerebellar artery (HCC)  Presented with vertigo, found to have large right PICA stroke with partial effacement of fourth ventricle   Status post EVD placement on 6/27/2022; Monitoring EVD.   Neurology and neurosurgery following   Continue frequent neuro checks  SBP goal less than 160, continue PRN labetalol/hydralazine  Scheduled Amlodipine 10mg daily  Scheduled Lisinopril 10mg daily; room to go up on Lisinopril if needed  Titrating down 3% Na gtt; trending and monitoring serial Na  Atorvastatin 80 mg  PT/OT/SLP     Dizziness  Presented for dizziness, room spinning sensation. Only having symptoms with standing. Likely related to benign paroxysmal positional vertigo.  Dizziness elicited with head movement to left.   CT head, no acute findings.   Supportive treatment, IV fluids, avoid trigger of left facing head position  Can try Epley's maneuver, in future.       Hypokalemia  Replete to > 4    CODE STATUS: Full code  Quality Measures:  Feeding: Diet per SLP  Analgesia: Dilaudid  Sedation: None Tylenol,  Thromboprophylaxis: Held/SCD  Head of bed: >30 degrees  Ulcer prophylaxis: Not indicated  Glycemic control: Not indicated   bowel care: bowel regimen as needed  Indwelling lines: Right IJ, PIV   deescalation of antibiotics: None      Melissa Avila M.D.

## 2022-07-03 NOTE — CARE PLAN
The patient is Watcher - Medium risk of patient condition declining or worsening    Shift Goals  Clinical Goals: Stable Neuro Exam  Patient Goals: Sleep  Family Goals: Rest    Progress made toward(s) clinical / shift goals:      Problem: Pain - Standard  Goal: Alleviation of pain or a reduction in pain to the patient’s comfort goal  Outcome: Progressing     Problem: Knowledge Deficit - Standard  Goal: Patient and family/care givers will demonstrate understanding of plan of care, disease process/condition, diagnostic tests and medications  Outcome: Progressing     Problem: Psychosocial - Patient Condition  Goal: Patient's ability to verbalize feelings about condition will improve  Outcome: Progressing     Problem: Neuro Status  Goal: Neuro status will remain stable or improve  Outcome: Progressing     Problem: Hemodynamic Monitoring  Goal: Patient's hemodynamics, fluid balance and neurologic status will be stable or improve  Outcome: Progressing     Patient is not progressing towards the following goals:

## 2022-07-03 NOTE — PROGRESS NOTES
2345 Na drawn and sent to labs.   0100 Called lab and per tech due to machine glitch it's still in process.

## 2022-07-03 NOTE — PROGRESS NOTES
Neurology Progress Note  Neurohospitalist Service, [facility]    Referring Physician: Steve Victor M.D.    Chief Complaint   Patient presents with   • Dizziness     Onset today at 1430   • Weakness   • Headache     Onset today at 1430    • Numbness     Numbness and tingling present to REKHA arms and legs   • Nausea       HPI: Refer to initial documented Neurology H&P, as detailed in the patient's chart.    Interval History 7/3: No new events overnight    Review of systems: In addition to what is detailed in the HPI and/or updated in the interval history, all other systems reviewed and are negative.    Past Medical History:    has a past medical history of Heart murmur (12/2017) and Pain (12/2017).    FHx:  family history includes Cancer in his mother.    SHx:   reports that he has never smoked. He has never used smokeless tobacco. He reports current alcohol use. He reports current drug use. Drugs: Marijuana and Inhaled.    Medications:    Current Facility-Administered Medications:   •  3% sodium chloride (HYPERTONIC SALINE) 500mL infusion 500 mL, 500 mL, Intravenous, Continuous, Lester Lisa M.D., Last Rate: 30 mL/hr at 07/03/22 0722, Rate Change at 07/03/22 0722  •  enoxaparin (Lovenox) inj 40 mg, 40 mg, Subcutaneous, DAILY AT 1800, Steve Victor M.D., 40 mg at 07/02/22 1723  •  acetaminophen (Tylenol) tablet 650 mg, 650 mg, Oral, Q6HRS PRN, Steve Victor M.D.  •  aspirin (ASA) chewable tab 81 mg, 81 mg, Oral, DAILY, Steve Victor M.D., 81 mg at 07/03/22 0549  •  amLODIPine (NORVASC) tablet 10 mg, 10 mg, Oral, Q DAY, Steve Victor M.D., 10 mg at 07/03/22 0550  •  atorvastatin (LIPITOR) tablet 80 mg, 80 mg, Oral, Q EVENING, Steve Victor M.D., 80 mg at 07/02/22 1724  •  lisinopril (PRINIVIL) tablet 10 mg, 10 mg, Oral, Q DAY, Steve Victor M.D., 10 mg at 07/03/22 0549  •  fludrocortisone (FLORINEF) tablet 0.1 mg, 0.1 mg, Oral, DAILY, Steve Victor M.D.,  0.1 mg at 07/03/22 0550  •  ondansetron (ZOFRAN ODT) dispertab 4 mg, 4 mg, Oral, Q4HRS PRN, Steve Victor M.D.  •  senna-docusate (PERICOLACE or SENOKOT S) 8.6-50 MG per tablet 2 Tablet, 2 Tablet, Oral, BID, 2 Tablet at 07/03/22 0549 **AND** polyethylene glycol/lytes (MIRALAX) PACKET 1 Packet, 1 Packet, Oral, QDAY PRN **AND** magnesium hydroxide (MILK OF MAGNESIA) suspension 30 mL, 30 mL, Oral, QDAY PRN, 30 mL at 07/01/22 1803 **AND** bisacodyl (DULCOLAX) suppository 10 mg, 10 mg, Rectal, QDAY PRN, Steve Victor M.D.  •  sodium chloride (SALT) tablet 2 g, 2 g, Oral, TID WITH MEALS, Steve Victor M.D., 2 g at 07/03/22 0843  •  hydrALAZINE (APRESOLINE) injection 20 mg, 20 mg, Intravenous, Q4HRS PRN, Steve Victor M.D., 20 mg at 07/02/22 0217  •  labetalol (NORMODYNE/TRANDATE) injection 10-20 mg, 10-20 mg, Intravenous, Q2HRS PRN, Steve Victor M.D., 10 mg at 06/28/22 1634  •  MD Alert...ICU Electrolyte Replacement per Pharmacy, , Other, PHARMACY TO DOSE, Steve Victor M.D.  •  HYDROmorphone (Dilaudid) injection 0.5 mg, 0.5 mg, Intravenous, Q3HRS PRN, Elizabeth Valdivia M.D., 0.5 mg at 06/27/22 1805  •  ondansetron (ZOFRAN) syringe/vial injection 4 mg, 4 mg, Intravenous, Q4HRS PRN, Steve Miguel M.D.  •  prochlorperazine (COMPAZINE) injection 5-10 mg, 5-10 mg, Intravenous, Q4HRS PRN, Steve Miguel M.D., 5 mg at 06/27/22 1034    Physical Examination:     Vitals:    07/03/22 0600 07/03/22 0700 07/03/22 0725 07/03/22 0800   BP: 127/82 (!) 143/88 136/81 136/87   Pulse: 85 89 83 80   Resp: 20 13 (!) 22 18   Temp:    36.7 °C (98 °F)   TempSrc:    Temporal   SpO2: 93% 93%  94%   Weight:       Height:               NEUROLOGICAL EXAM:     Patient is awake and alert x4.    Speech is intact.    Cranial 2-12 pupils are equal reactive.  Face symmetrical.  No gaze preference.  Visual field intact.  Hearing intact.  Vestibular response intact  Motor examinations antigravity in all 4 sustained  Sensation  intact in all 4 soft touch  No signs of ataxia on finger-nose testing    Objective Data:    Labs:  Lab Results   Component Value Date/Time    PROTHROMBTM 13.2 06/26/2022 03:35 PM    INR 1.03 06/26/2022 03:35 PM      Lab Results   Component Value Date/Time    WBC 7.3 07/03/2022 05:55 AM    RBC 4.97 07/03/2022 05:55 AM    HEMOGLOBIN 13.3 (L) 07/03/2022 05:55 AM    HEMATOCRIT 40.8 (L) 07/03/2022 05:55 AM    MCV 82.1 07/03/2022 05:55 AM    MCH 26.8 (L) 07/03/2022 05:55 AM    MCHC 32.6 (L) 07/03/2022 05:55 AM    MPV 9.5 07/03/2022 05:55 AM    NEUTSPOLYS 63.30 07/03/2022 05:55 AM    LYMPHOCYTES 22.70 07/03/2022 05:55 AM    MONOCYTES 11.10 07/03/2022 05:55 AM    EOSINOPHILS 1.80 07/03/2022 05:55 AM    BASOPHILS 0.30 07/03/2022 05:55 AM      Lab Results   Component Value Date/Time    SODIUM 138 07/03/2022 05:55 AM    POTASSIUM 4.1 07/03/2022 05:55 AM    CHLORIDE 108 07/03/2022 05:55 AM    CO2 21 07/03/2022 05:55 AM    GLUCOSE 88 07/03/2022 05:55 AM    BUN 19 07/03/2022 05:55 AM    CREATININE 0.62 07/03/2022 05:55 AM      Lab Results   Component Value Date/Time    CHOLSTRLTOT 186 06/27/2022 04:26 PM     (H) 06/27/2022 04:26 PM    HDL 41 06/27/2022 04:26 PM    TRIGLYCERIDE 79 06/27/2022 04:26 PM       Lab Results   Component Value Date/Time    ALKPHOSPHAT 70 06/26/2022 03:35 PM    ASTSGOT 16 06/26/2022 03:35 PM    ALTSGPT 18 06/26/2022 03:35 PM    TBILIRUBIN 0.2 06/26/2022 03:35 PM        Imaging/Testing:  EC-ECHOCARDIOGRAM COMPLETE W/O CONT   Final Result      CT-HEAD W/O   Final Result         1.  Postprocedural changes of ventriculostomy placement with minimal hyperdense hemorrhage tracking along the ventriculostomy tract.   2.  Small anterior foci of right pneumocephalus.   3.  Low-density evolving stroke within the right cerebellar hemisphere effacing the fourth ventricle.   4.  Atherosclerosis         DX-ABDOMEN FOR TUBE PLACEMENT   Final Result      1.  Feeding tube tip overlies the proximal stomach.       DX-CHEST-LIMITED (1 VIEW)   Final Result      1.  Right IJ catheter tip projects over the cavoatrial junction without pneumothorax.   2.  Enlarged cardiac silhouette with mild vascular congestion.   3.  There is bibasilar atelectasis.      CT-CTA HEAD WITH & W/O-POST PROCESS   Final Result         1. No hemodynamically significant narrowing of the major intracranial vessels.      CT-CTA NECK WITH & W/O-POST PROCESSING   Final Result      1. No evidence of flow-limiting stenosis in the cervical carotid or cervical vertebral arteries.      MR-BRAIN-W/O   Final Result      1.  Large acute right posterior inferior cerebellar artery territory infarct with associated edema/swelling and localized mass effect, partially effacing the fourth ventricle. The lateral ventricles are now mildly dilated suggesting developing    obstructive hydrocephalus.   2.  Small area of petechial hemorrhage within the infarct without other significant acute intracranial hemorrhage.   3.  FLAIR hyperintensity in the right vertebral artery suggesting sluggish flow, less likely thrombus formation.      These findings were discussed with MONIQUE KNOX on 6/27/2022 1:49 PM.      CT-HEAD W/O   Final Result      Head CT without contrast within normal limits. No evidence of acute cerebral infarction, hemorrhage or mass lesion.               Assessment and Plan:    57-year-old male found to have a large right PICA infarct of unclear etiology.  Status post EVD placement.  EVD pressure  between 5-10 and draining between 5 to 10 mL/h.  Slowly weaning off hypertonic saline.  Neurosurgery has EVD placed at 10 cm.  Will reevaluate after we can    Plan:  1.  Continue aspirin  2.  EVD management per neurosurgery agree with challenge  3.  Slowly weaning hypertonic saline.  Avoid hyponatremia.  4.  Blood pressure goal keep below 140 systolic goal long-term treatment normotensive  5.  Continue Lipitor current LDL is 129.  Goal is below 70  6.  Maintain normal  glycemia  7.  PT and OT once able      42 minutes of which over half was done at bedside discussing care with the primary team the patient including continuing weaning hypertonic saline trend avoid hyponatremia.    This chart was partially generated using voice recognition technology and sound alike word replacement may be present, best efforts were made to make the chart accurate.    Bayron Paul MD  Board Certified Neurology, ABPN  (t) [phone]

## 2022-07-03 NOTE — PROGRESS NOTES
"[facility] Critical Care Medicine Progress Note    Brief HPI/problem list:  Mr. Putnam is a 57 year old male with no past medical history and is not a smoker who presented to the ER yesterday with complaints of sudden onset of dizziness yesterday afternoon.  The patient reports that he was in his usual state of health when he got up to get a glass of water when he had sudden onset of dizziness as if the \"room was spinning\".  He reported that he had balance issues and had to sit down.  He had nausea and vomiting.  No tinnitus.  No unilateral numbness, tingling, or weakness to extremities.  Family witness that the patient had horizontal nystagmus for about 15 minutes.  EMS witnessed this as well.  He was brought to the ER and admitted.  He underwent an MRI brain today which revealed a large right cerebellar stroke and was transferred to the ICU for ongoing care. (Dr Valdivia HPI 6/27)    6/27 - admit, s/p EVD at night  6/28 - Neuro better, HA, ECHO ok, failed FEES  6/29 - Speech/vision better, NIH 2-3, EVD pressures/output ok, Nicardipine 5, 3% 60cc  6/30 - Neuro no change, EVD output low, 3% saline 60, neurosurgery raising EVD level to 10 cm today  7/1 - Neuro no change, EVD 10cm, 3% 60, off Nicardipine, passed swallow  7/2 - Neuro no change, ECD to15 cm a day, 3% 55->50, Na 142  7/3 - Neuro exam no change, EVD back to 10 cm per NS, 3% 40cc -> 30cc    Case reviewed at length with neurosurgery and neurology at the bedside again    Daily exam:  A&O x4, brainstem reflexes intact, EVD in place at 10 cm, and NFE, lungs clear, RRR, abdomen benign, no edema, right IJ CVC    TEAM Rounds:  Neuro: ICP 4-8, output 2-5, CCP 77-99, 3% saline 40cc/hr  HR: SR, 80-90s, nicardipine remains off  SBP: 110-130s  Tmax: 98.2, WBC 7.3  GI: Starting p.o., FEES last, cortrak to be removed  UOP: great  Lines: R IJ CVC, PIVs  Resp: RA, CXR 6/27 clear, IS 1750  Vte: SCDs, Lovenox  PPI/H2: none  Antibx: NA    Assessment:  CVA right cerebellum with " fourth ventricle effacement, R PICA improved  Hydrocephalus status post EVD 6/27-controlled with drainage  Hypertension controlled with enteral regimen off nicardipine, no change  Swallow dysfunction/dysphagia-speech following  Hypokalemia improved  History of heart murmur  History of multiple orthopedic procedures    Plan of care:   Continue with BP goal less than 140, may be ready to use 160 goal  Off nicardipine for now, resume if necessary  Continue amlodipine to 10, add ACE if necessary  PRN ACE/labetalol/hydralazine   Continue weaning 3% saline 10 cc every 6 hours as tolerated  Continue every 6 hours sodium levels  Drop sodium no more than 10 mEq per 24 hours   Continue salt tabs, wean them after weaning 3% saline  Cont Florinef, wean last  I-S/mobilize-up to chair, encouraged activity while in bed and in the chair, PT/OT  Continue statin high-dose  Continue optimize electrolytes  Continue neurochecks to Q2H  Bowel care protocols ongoing  EVD to 10 cm, clamp when okay with neurosurgery  ASA/loenox okay per neurosurgery    Case reviewed with patient, family, neurology, neurosurgery, RN, Charge RN, RT, Clinical pharmacist and [facility] Resident    Critical care time provided was 35 minutes. This excludes all separate billable procedures.     Please see [facility] notes for additional documentation    Steve Victor MD  RCC

## 2022-07-03 NOTE — PROGRESS NOTES
Neurosurgery Progress Note    Subjective:  57 yr old male presented with CVA.  POD#6 R EVD placement   Drain raised to 10cm on .    No acute events overnight  His ICPs have been stable, 5-10  EVD draining 4-11mL/hr.  Dr. Nielsen wants the EVD left at 10 cm through the weekend, so was dropped back down from 15 yesterday.     Exam:  A&O x3  PERRL, symmetric  Face symmetric   Cns II-VII grossly intact  Moving all extremities, follows commands  Sensation intact   No drift    BP  Min: 123/76  Max: 146/85  Pulse  Av  Min: 83  Max: 102  Resp  Av.6  Min: 9  Max: 46  Temp  Av.4 °C (97.6 °F)  Min: 36.2 °C (97.2 °F)  Max: 36.8 °C (98.2 °F)  SpO2  Av.7 %  Min: 90 %  Max: 96 %    ICP  Av.7 MM HG  Min: 2 MM HG  Max: 8 MM HG    Recent Labs     22  0550 22  0600 22  0555   WBC 7.0 7.4 7.3   RBC 5.19 5.30 4.97   HEMOGLOBIN 13.9* 14.1 13.3*   HEMATOCRIT 42.9 43.3 40.8*   MCV 82.7 81.7 82.1   MCH 26.8* 26.6* 26.8*   MCHC 32.4* 32.6* 32.6*   RDW 48.4 47.4 46.4   PLATELETCT 268 259 271   MPV 9.3 9.0 9.5     Recent Labs     22  0550 22  1140 22  0600 22  1125 22  1730 22  2345 22  0555   SODIUM 149*   < > 142   < > 143 139 138   POTASSIUM 3.6  --  3.8  --   --   --  4.1   CHLORIDE 116*  --  110  --   --   --  108   CO2 22  --  21  --   --   --  21   GLUCOSE 121*  --  107*  --   --   --  88   BUN 17  --  16  --   --   --  19   CREATININE 0.54  --  0.65  --   --   --  0.62   CALCIUM 8.6  --  8.4*  --   --   --  8.3*    < > = values in this interval not displayed.               Intake/Output                       22 - 22 - 22 Total  Total                 Intake    P.O.  550  -- 550  --  -- --    P.O. 550 -- 550 -- -- --    I.V.  412.5  450 862.5  --  -- --    Volume (mL) (3% sodium chloride (HYPERTONIC SALINE) 500mL infusion 500 mL) 412.5 -- 412.5 -- -- --    Volume  (mL) (3% sodium chloride (HYPERTONIC SALINE) 500mL infusion 500 mL) -- 450 450 -- -- --    Total Intake 962.5 450 1412.5 -- -- --       Output    Urine  1500  1600 3100  --  -- --    Urine Void (mL) 1500 1600 3100 -- -- --    Drains  49  61 110  --  -- --    Output (mL) (ICP/Ventriculostomy Right Other (Comment)) 49 61 110 -- -- --    Stool  --  -- --  --  -- --    Number of Times Stooled 1 x -- 1 x -- -- --    Total Output 1549 1661 3210 -- -- --       Net I/O     -586.5 -1211 -1797.5 -- -- --            Intake/Output Summary (Last 24 hours) at 7/3/2022 0759  Last data filed at 7/3/2022 0600  Gross per 24 hour   Intake 1412.5 ml   Output 3204 ml   Net -1791.5 ml            • 3% sodium chloride  500 mL Continuous   • enoxaparin (LOVENOX) injection  40 mg DAILY AT 1800   • acetaminophen  650 mg Q6HRS PRN   • aspirin  81 mg DAILY   • amLODIPine  10 mg Q DAY   • atorvastatin  80 mg Q EVENING   • lisinopril  10 mg Q DAY   • fludrocortisone  0.1 mg DAILY   • ondansetron  4 mg Q4HRS PRN   • senna-docusate  2 Tablet BID    And   • polyethylene glycol/lytes  1 Packet QDAY PRN    And   • magnesium hydroxide  30 mL QDAY PRN    And   • bisacodyl  10 mg QDAY PRN   • sodium chloride  2 g TID WITH MEALS   • hydrALAZINE  20 mg Q4HRS PRN   • labetalol  10-20 mg Q2HRS PRN   • MD Alert...Adult ICU Electrolyte Replacement per Pharmacy   PHARMACY TO DOSE   • HYDROmorphone  0.5 mg Q3HRS PRN   • ondansetron  4 mg Q4HRS PRN   • prochlorperazine  5-10 mg Q4HRS PRN       Assessment and Plan:  Hospital day #6  POD#6 R EVD placement.  EVD to remain at 10 cm over the weekend, but may possibly either raise this or clamp it tomorrow for possible removal on 7/5.    Systolic BP goal <140.  Appreciate intensivists care.   Will continue to follow.      Chemical prophylactic DVT therapy: Yes - Lovenox 40mg/qd    Start date/time: today

## 2022-07-03 NOTE — PROGRESS NOTES
0720 Notified Dr. Victor of Na result of 138 from 139 at 12 am. 3% currently running at 40 ml/ hr . Dr. Victor ordered to decrease 3% to 30 ml/ hr .

## 2022-07-03 NOTE — PROGRESS NOTES
0143 Notified Dr. Lisa of Na result of 139 from 143 at 6pm. 3% currently running at 40 ml/ hr . Per Dr. Lisa no new orders for now. Continue the same rate.

## 2022-07-04 LAB
ANION GAP SERPL CALC-SCNC: 9 MMOL/L (ref 7–16)
BASOPHILS # BLD AUTO: 0.4 % (ref 0–1.8)
BASOPHILS # BLD: 0.03 K/UL (ref 0–0.12)
BUN SERPL-MCNC: 20 MG/DL (ref 8–22)
CALCIUM SERPL-MCNC: 8.6 MG/DL (ref 8.5–10.5)
CHLORIDE SERPL-SCNC: 105 MMOL/L (ref 96–112)
CO2 SERPL-SCNC: 22 MMOL/L (ref 20–33)
CREAT SERPL-MCNC: 0.61 MG/DL (ref 0.5–1.4)
CRP SERPL HS-MCNC: <0.3 MG/DL (ref 0–0.75)
EOSINOPHIL # BLD AUTO: 0.14 K/UL (ref 0–0.51)
EOSINOPHIL NFR BLD: 1.8 % (ref 0–6.9)
ERYTHROCYTE [DISTWIDTH] IN BLOOD BY AUTOMATED COUNT: 44.3 FL (ref 35.9–50)
GFR SERPLBLD CREATININE-BSD FMLA CKD-EPI: 113 ML/MIN/1.73 M 2
GLUCOSE SERPL-MCNC: 83 MG/DL (ref 65–99)
HCT VFR BLD AUTO: 39.7 % (ref 42–52)
HGB BLD-MCNC: 12.7 G/DL (ref 14–18)
IMM GRANULOCYTES # BLD AUTO: 0.06 K/UL (ref 0–0.11)
IMM GRANULOCYTES NFR BLD AUTO: 0.8 % (ref 0–0.9)
LYMPHOCYTES # BLD AUTO: 1.97 K/UL (ref 1–4.8)
LYMPHOCYTES NFR BLD: 26 % (ref 22–41)
MAGNESIUM SERPL-MCNC: 2 MG/DL (ref 1.5–2.5)
MCH RBC QN AUTO: 26.2 PG (ref 27–33)
MCHC RBC AUTO-ENTMCNC: 32 G/DL (ref 33.7–35.3)
MCV RBC AUTO: 81.9 FL (ref 81.4–97.8)
MONOCYTES # BLD AUTO: 0.84 K/UL (ref 0–0.85)
MONOCYTES NFR BLD AUTO: 11.1 % (ref 0–13.4)
NEUTROPHILS # BLD AUTO: 4.54 K/UL (ref 1.82–7.42)
NEUTROPHILS NFR BLD: 59.9 % (ref 44–72)
NRBC # BLD AUTO: 0 K/UL
NRBC BLD-RTO: 0 /100 WBC
PHOSPHATE SERPL-MCNC: 3 MG/DL (ref 2.5–4.5)
PLATELET # BLD AUTO: 263 K/UL (ref 164–446)
PMV BLD AUTO: 9.2 FL (ref 9–12.9)
POTASSIUM SERPL-SCNC: 3.8 MMOL/L (ref 3.6–5.5)
RBC # BLD AUTO: 4.85 M/UL (ref 4.7–6.1)
SODIUM SERPL-SCNC: 136 MMOL/L (ref 135–145)
SODIUM SERPL-SCNC: 137 MMOL/L (ref 135–145)
SODIUM SERPL-SCNC: 137 MMOL/L (ref 135–145)
SODIUM SERPL-SCNC: 139 MMOL/L (ref 135–145)
WBC # BLD AUTO: 7.6 K/UL (ref 4.8–10.8)

## 2022-07-04 PROCEDURE — 99233 SBSQ HOSP IP/OBS HIGH 50: CPT | Performed by: STUDENT IN AN ORGANIZED HEALTH CARE EDUCATION/TRAINING PROGRAM

## 2022-07-04 PROCEDURE — 84100 ASSAY OF PHOSPHORUS: CPT

## 2022-07-04 PROCEDURE — 700102 HCHG RX REV CODE 250 W/ 637 OVERRIDE(OP): Performed by: INTERNAL MEDICINE

## 2022-07-04 PROCEDURE — 700111 HCHG RX REV CODE 636 W/ 250 OVERRIDE (IP): Performed by: INTERNAL MEDICINE

## 2022-07-04 PROCEDURE — 83735 ASSAY OF MAGNESIUM: CPT

## 2022-07-04 PROCEDURE — 770022 HCHG ROOM/CARE - ICU (200)

## 2022-07-04 PROCEDURE — A9270 NON-COVERED ITEM OR SERVICE: HCPCS | Performed by: INTERNAL MEDICINE

## 2022-07-04 PROCEDURE — 85025 COMPLETE CBC W/AUTO DIFF WBC: CPT

## 2022-07-04 PROCEDURE — 97535 SELF CARE MNGMENT TRAINING: CPT

## 2022-07-04 PROCEDURE — 86140 C-REACTIVE PROTEIN: CPT

## 2022-07-04 PROCEDURE — 99291 CRITICAL CARE FIRST HOUR: CPT | Performed by: INTERNAL MEDICINE

## 2022-07-04 PROCEDURE — 97530 THERAPEUTIC ACTIVITIES: CPT

## 2022-07-04 PROCEDURE — 84295 ASSAY OF SERUM SODIUM: CPT | Mod: 91

## 2022-07-04 PROCEDURE — 80048 BASIC METABOLIC PNL TOTAL CA: CPT

## 2022-07-04 RX ORDER — POTASSIUM CHLORIDE 20 MEQ/1
40 TABLET, EXTENDED RELEASE ORAL ONCE
Status: COMPLETED | OUTPATIENT
Start: 2022-07-04 | End: 2022-07-04

## 2022-07-04 RX ADMIN — LISINOPRIL 10 MG: 10 TABLET ORAL at 05:45

## 2022-07-04 RX ADMIN — ENOXAPARIN SODIUM 40 MG: 40 INJECTION SUBCUTANEOUS at 17:30

## 2022-07-04 RX ADMIN — FLUDROCORTISONE ACETATE 0.1 MG: 0.1 TABLET ORAL at 05:45

## 2022-07-04 RX ADMIN — SODIUM CHLORIDE 2 G: 1 TABLET ORAL at 08:38

## 2022-07-04 RX ADMIN — SODIUM CHLORIDE 2 G: 1 TABLET ORAL at 17:30

## 2022-07-04 RX ADMIN — AMLODIPINE BESYLATE 10 MG: 10 TABLET ORAL at 05:45

## 2022-07-04 RX ADMIN — ATORVASTATIN CALCIUM 80 MG: 80 TABLET, FILM COATED ORAL at 17:30

## 2022-07-04 RX ADMIN — SENNOSIDES AND DOCUSATE SODIUM 2 TABLET: 50; 8.6 TABLET ORAL at 05:46

## 2022-07-04 RX ADMIN — ASPIRIN 81 MG 81 MG: 81 TABLET ORAL at 05:45

## 2022-07-04 RX ADMIN — POTASSIUM CHLORIDE 40 MEQ: 1500 TABLET, EXTENDED RELEASE ORAL at 11:15

## 2022-07-04 RX ADMIN — SODIUM CHLORIDE 2 G: 1 TABLET ORAL at 11:15

## 2022-07-04 RX ADMIN — SENNOSIDES AND DOCUSATE SODIUM 2 TABLET: 50; 8.6 TABLET ORAL at 17:31

## 2022-07-04 ASSESSMENT — PAIN DESCRIPTION - PAIN TYPE
TYPE: ACUTE PAIN

## 2022-07-04 ASSESSMENT — GAIT ASSESSMENTS
DEVIATION: BRADYKINETIC
GAIT LEVEL OF ASSIST: CONTACT GUARD ASSIST
DISTANCE (FEET): 2

## 2022-07-04 ASSESSMENT — COGNITIVE AND FUNCTIONAL STATUS - GENERAL
WALKING IN HOSPITAL ROOM: A LITTLE
TURNING FROM BACK TO SIDE WHILE IN FLAT BAD: A LITTLE
SUGGESTED CMS G CODE MODIFIER MOBILITY: CK
CLIMB 3 TO 5 STEPS WITH RAILING: A LITTLE
MOVING TO AND FROM BED TO CHAIR: A LOT
MOBILITY SCORE: 17
MOVING FROM LYING ON BACK TO SITTING ON SIDE OF FLAT BED: A LITTLE
STANDING UP FROM CHAIR USING ARMS: A LITTLE

## 2022-07-04 ASSESSMENT — PATIENT HEALTH QUESTIONNAIRE - PHQ9
SUM OF ALL RESPONSES TO PHQ9 QUESTIONS 1 AND 2: 0
1. LITTLE INTEREST OR PLEASURE IN DOING THINGS: NOT AT ALL
2. FEELING DOWN, DEPRESSED, IRRITABLE, OR HOPELESS: NOT AT ALL

## 2022-07-04 NOTE — PROGRESS NOTES
Neurosurgery Progress Note    Subjective:  57 yr old male presented with CVA.  POD#7 R EVD placement   Drain raised to 10cm on .    No acute events overnight  His ICPs have been stable, 6-15  EVD draining 0-13 mL/hr.      Exam:  A&O x3  PERRL, symmetric  Face symmetric   Cns II-VII grossly intact  Moving all extremities, follows commands  Sensation intact   No drift    BP  Min: 109/68  Max: 136/83  Pulse  Av.4  Min: 75  Max: 99  Resp  Av  Min: 10  Max: 39  Temp  Av.7 °C (98 °F)  Min: 36.6 °C (97.8 °F)  Max: 36.8 °C (98.2 °F)  SpO2  Av.3 %  Min: 89 %  Max: 94 %    ICP  Av.8 MM HG  Min: 3 MM HG  Max: 15 MM HG    Recent Labs     22  0600 22  0555 22  0607   WBC 7.4 7.3 7.6   RBC 5.30 4.97 4.85   HEMOGLOBIN 14.1 13.3* 12.7*   HEMATOCRIT 43.3 40.8* 39.7*   MCV 81.7 82.1 81.9   MCH 26.6* 26.8* 26.2*   MCHC 32.6* 32.6* 32.0*   RDW 47.4 46.4 44.3   PLATELETCT 259 271 263   MPV 9.0 9.5 9.2     Recent Labs     22  0600 22  1125 22  0555 22  1210 22  1805 22  0010 22  0607   SODIUM 142   < > 138   < > 140 139 136   POTASSIUM 3.8  --  4.1  --   --   --  3.8   CHLORIDE 110  --  108  --   --   --  105   CO2   --   --   --  22   GLUCOSE 107*  --  88  --   --   --  83   BUN   --    --   --   --  20   CREATININE 0.65  --  0.62  --   --   --  0.61   CALCIUM 8.4*  --  8.3*  --   --   --  8.6    < > = values in this interval not displayed.               Intake/Output                       22 - 2259 22 - 2259      Total  Total                 Intake    P.O.  275  200 475  --  -- --    P.O. 275 200 475 -- -- --    I.V.  323.2  108.5 431.7  --  -- --    Volume (mL) (3% sodium chloride (HYPERTONIC SALINE) 500mL infusion 500 mL) 323.2 108.5 431.7 -- -- --    Total Intake 598.2 308.5 906.7 -- -- --       Output    Urine  325  1600 1925  --  -- --    Number of Times  Voided 1 x 4 x 5 x -- -- --    Urine Void (mL) 325 1600 1925 -- -- --    Drains  27  56 83  8  -- 8    Output (mL) (ICP/Ventriculostomy Right Other (Comment)) 27 56 83 8 -- 8    Stool  --  -- --  --  -- --    Number of Times Stooled 0 x -- 0 x -- -- --    Total Output 352 1656 2008 8 -- 8       Net I/O     246.2 -1347.5 -1101.3 -8 -- -8            Intake/Output Summary (Last 24 hours) at 7/4/2022 0727  Last data filed at 7/4/2022 0700  Gross per 24 hour   Intake 906.67 ml   Output 2016 ml   Net -1109.33 ml            • 3% sodium chloride  500 mL Continuous   • enoxaparin (LOVENOX) injection  40 mg DAILY AT 1800   • acetaminophen  650 mg Q6HRS PRN   • aspirin  81 mg DAILY   • amLODIPine  10 mg Q DAY   • atorvastatin  80 mg Q EVENING   • lisinopril  10 mg Q DAY   • fludrocortisone  0.1 mg DAILY   • ondansetron  4 mg Q4HRS PRN   • senna-docusate  2 Tablet BID    And   • polyethylene glycol/lytes  1 Packet QDAY PRN    And   • magnesium hydroxide  30 mL QDAY PRN    And   • bisacodyl  10 mg QDAY PRN   • sodium chloride  2 g TID WITH MEALS   • hydrALAZINE  20 mg Q4HRS PRN   • labetalol  10-20 mg Q2HRS PRN   • MD Alert...Adult ICU Electrolyte Replacement per Pharmacy   PHARMACY TO DOSE   • HYDROmorphone  0.5 mg Q3HRS PRN   • ondansetron  4 mg Q4HRS PRN   • prochlorperazine  5-10 mg Q4HRS PRN       Assessment and Plan:  Hospital day #7  POD#7 R EVD placement.  EVD raised to 15 cm today.  Will discuss with Dr. Nielsen tomorrow whether to raise this to 20 versus clamp on 7/5.   Systolic BP goal <140.  Appreciate intensivists care.   Will continue to follow.      Chemical prophylactic DVT therapy: Yes - Lovenox 40mg/qd    Start date/time: today

## 2022-07-04 NOTE — PROGRESS NOTES
Neurology Progress Note  Neurohospitalist Service, [facility]    Referring Physician: Bayron Hodge M.D.      Interval History: No acute events overnight.  Patient feels that his vision and dizziness have improved.  He is awaiting getting his EVD clamped in the next couple days.    Review of systems: In addition to what is detailed in the HPI and/or updated in the interval history, all other systems reviewed and are negative.    Past Medical History, Past Surgical History and Social History reviewed and unchanged from prior    Medications:    Current Facility-Administered Medications:   •  3% sodium chloride (HYPERTONIC SALINE) 500mL infusion 500 mL, 500 mL, Intravenous, Continuous, Lester Lisa M.D., Stopped at 07/04/22 0353  •  enoxaparin (Lovenox) inj 40 mg, 40 mg, Subcutaneous, DAILY AT 1800, Steve Victor M.D., 40 mg at 07/03/22 1742  •  acetaminophen (Tylenol) tablet 650 mg, 650 mg, Oral, Q6HRS PRN, Steve Victor M.D.  •  aspirin (ASA) chewable tab 81 mg, 81 mg, Oral, DAILY, Steve Victor M.D., 81 mg at 07/04/22 0545  •  amLODIPine (NORVASC) tablet 10 mg, 10 mg, Oral, Q DAY, Steve Victor M.D., 10 mg at 07/04/22 0545  •  atorvastatin (LIPITOR) tablet 80 mg, 80 mg, Oral, Q EVENING, Steve Victor M.D., 80 mg at 07/03/22 1741  •  lisinopril (PRINIVIL) tablet 10 mg, 10 mg, Oral, Q DAY, Steve Victor M.D., 10 mg at 07/04/22 0545  •  fludrocortisone (FLORINEF) tablet 0.1 mg, 0.1 mg, Oral, DAILY, Steve Victor M.D., 0.1 mg at 07/04/22 0545  •  ondansetron (ZOFRAN ODT) dispertab 4 mg, 4 mg, Oral, Q4HRS PRN, Steve Victor M.D.  •  senna-docusate (PERICOLACE or SENOKOT S) 8.6-50 MG per tablet 2 Tablet, 2 Tablet, Oral, BID, 2 Tablet at 07/04/22 0546 **AND** polyethylene glycol/lytes (MIRALAX) PACKET 1 Packet, 1 Packet, Oral, QDAY PRN **AND** magnesium hydroxide (MILK OF MAGNESIA) suspension 30 mL, 30 mL, Oral, QDAY PRN, 30 mL at 07/01/22 1803 **AND**  "bisacodyl (DULCOLAX) suppository 10 mg, 10 mg, Rectal, QDAY PRN, Steve Victor M.D.  •  sodium chloride (SALT) tablet 2 g, 2 g, Oral, TID WITH MEALS, Steve Victor M.D., 2 g at 07/04/22 1115  •  hydrALAZINE (APRESOLINE) injection 20 mg, 20 mg, Intravenous, Q4HRS PRN, Steve Victor M.D., 20 mg at 07/02/22 0217  •  labetalol (NORMODYNE/TRANDATE) injection 10-20 mg, 10-20 mg, Intravenous, Q2HRS PRN, Steve Victor M.D., 10 mg at 06/28/22 1634  •  MD Alert...ICU Electrolyte Replacement per Pharmacy, , Other, PHARMACY TO DOSE, Steve Victor M.D.  •  HYDROmorphone (Dilaudid) injection 0.5 mg, 0.5 mg, Intravenous, Q3HRS PRN, Elizabeth Valdivia M.D., 0.5 mg at 06/27/22 1805  •  ondansetron (ZOFRAN) syringe/vial injection 4 mg, 4 mg, Intravenous, Q4HRS PRN, Steve Miguel M.D.  •  prochlorperazine (COMPAZINE) injection 5-10 mg, 5-10 mg, Intravenous, Q4HRS PRN, Steve Miguel M.D., 5 mg at 06/27/22 1034    Physical Examination:   /64   Pulse 81   Temp 36.6 °C (97.9 °F) (Temporal)   Resp 14   Ht 1.803 m (5' 11\")   Wt 98.5 kg (217 lb 2.5 oz)   SpO2 91%   BMI 30.29 kg/m²       General: Patient is awake and in no acute distress  Neck: There is normal range of motion  CV: Regular rate   Extremities:  Warm, dry, and intact, without peripheral lower extremity edema    NEUROLOGICAL EXAM:     Mental status: Awake, alert and fully oriented  Speech and language: Speech is clear and fluent. The patient is able to name and repeat, and follow commands  Cranial nerve exam: Pupils are equal, round and reactive to light bilaterally. Visual fields are full. There is no nystagmus. Extraocular muscles are intact. Face is symmetric. Sensation in the face is intact to light touch. Palate elevates symmetrically. Tongue is midline.  Motor exam: Power 5/5 throughout all extremities. There is sustained antigravity with no downward drift in bilateral arms and legs.  There is no pronator drift. Tone is normal. No " abnormal movements were seen on exam.  Sensory exam: Reacts to tactile and pinprick in all 4 extremities, no neglect to double stim   Deep tendon reflexes:  2+ throughout. Toes down-going bilaterally.  Coordination: No ataxia on bilateral finger-to-nose testing  Gait: Deferred due to patient preference    Objective Data:    Labs:  Lab Results   Component Value Date/Time    PROTHROMBTM 13.2 06/26/2022 03:35 PM    INR 1.03 06/26/2022 03:35 PM      Lab Results   Component Value Date/Time    WBC 7.6 07/04/2022 06:07 AM    RBC 4.85 07/04/2022 06:07 AM    HEMOGLOBIN 12.7 (L) 07/04/2022 06:07 AM    HEMATOCRIT 39.7 (L) 07/04/2022 06:07 AM    MCV 81.9 07/04/2022 06:07 AM    MCH 26.2 (L) 07/04/2022 06:07 AM    MCHC 32.0 (L) 07/04/2022 06:07 AM    MPV 9.2 07/04/2022 06:07 AM    NEUTSPOLYS 59.90 07/04/2022 06:07 AM    LYMPHOCYTES 26.00 07/04/2022 06:07 AM    MONOCYTES 11.10 07/04/2022 06:07 AM    EOSINOPHILS 1.80 07/04/2022 06:07 AM    BASOPHILS 0.40 07/04/2022 06:07 AM      Lab Results   Component Value Date/Time    SODIUM 136 07/04/2022 06:07 AM    POTASSIUM 3.8 07/04/2022 06:07 AM    CHLORIDE 105 07/04/2022 06:07 AM    CO2 22 07/04/2022 06:07 AM    GLUCOSE 83 07/04/2022 06:07 AM    BUN 20 07/04/2022 06:07 AM    CREATININE 0.61 07/04/2022 06:07 AM      Lab Results   Component Value Date/Time    CHOLSTRLTOT 186 06/27/2022 04:26 PM     (H) 06/27/2022 04:26 PM    HDL 41 06/27/2022 04:26 PM    TRIGLYCERIDE 79 06/27/2022 04:26 PM       Lab Results   Component Value Date/Time    ALKPHOSPHAT 70 06/26/2022 03:35 PM    ASTSGOT 16 06/26/2022 03:35 PM    ALTSGPT 18 06/26/2022 03:35 PM    TBILIRUBIN 0.2 06/26/2022 03:35 PM        Imaging/Testing:    I interpreted and/or reviewed the patient's neuroimaging    EC-ECHOCARDIOGRAM COMPLETE W/O CONT   Final Result      CT-HEAD W/O   Final Result         1.  Postprocedural changes of ventriculostomy placement with minimal hyperdense hemorrhage tracking along the ventriculostomy tract.    2.  Small anterior foci of right pneumocephalus.   3.  Low-density evolving stroke within the right cerebellar hemisphere effacing the fourth ventricle.   4.  Atherosclerosis         DX-ABDOMEN FOR TUBE PLACEMENT   Final Result      1.  Feeding tube tip overlies the proximal stomach.      DX-CHEST-LIMITED (1 VIEW)   Final Result      1.  Right IJ catheter tip projects over the cavoatrial junction without pneumothorax.   2.  Enlarged cardiac silhouette with mild vascular congestion.   3.  There is bibasilar atelectasis.      CT-CTA HEAD WITH & W/O-POST PROCESS   Final Result         1. No hemodynamically significant narrowing of the major intracranial vessels.      CT-CTA NECK WITH & W/O-POST PROCESSING   Final Result      1. No evidence of flow-limiting stenosis in the cervical carotid or cervical vertebral arteries.      MR-BRAIN-W/O   Final Result      1.  Large acute right posterior inferior cerebellar artery territory infarct with associated edema/swelling and localized mass effect, partially effacing the fourth ventricle. The lateral ventricles are now mildly dilated suggesting developing    obstructive hydrocephalus.   2.  Small area of petechial hemorrhage within the infarct without other significant acute intracranial hemorrhage.   3.  FLAIR hyperintensity in the right vertebral artery suggesting sluggish flow, less likely thrombus formation.      These findings were discussed with MONIQUE KNOX on 6/27/2022 1:49 PM.      CT-HEAD W/O   Final Result      Head CT without contrast within normal limits. No evidence of acute cerebral infarction, hemorrhage or mass lesion.             Assessment and Plan:    Alistair Putnam is a 57 y.o. man who presented 6/26/2022 with a large right PICA cryptogenic ischemic infarct.  He had EVD placed as well as treatment with hypertonic saline but has remained neurologically stable throughout hospitalization.  He is currently doing well with a plan to clamp and  challenges EVD in the next 24 to 48 hours.    Problem list:  1.  Large right cerebellar ischemic infarct, cryptogenic  2.  Cerebellar edema with mild mass-effect on the fourth ventricle causing hydrocephalus    Stroke workup:  Suspected Etiology -cryptogenic  Vessel imaging -does not reveal any large vessel pathology or stenosis  MRI Brain -shows large right cerebellar ischemic infarct with some cerebellar edema and effacement of the fourth ventricle  FLP -186/79/41/129  A1C -6.2%  ECHO -65% ejection fraction.  Normal atria.  No evidence of atrial shunt.  Diet - Passed swallow. Regular diet.  Dispo - PT/OT as appropriate    - Continue Aspirin 81mg daily.   - Continue Atorvastatin 80mg or other comparable high intensity statin for a long-term LDL goal <70  - q4h neurochecks  -Blood pressure goal normotension.  - Continue to wean off hypertonic saline  - Continue EVD care per neurosurgery with EVD challenge coming in the coming days.  - Would add a hypercoagulable panel for cryptogenic stroke in a young patient.  Also consider long-term cardiac monitoring on discharge  -Hypercoagulable panel:   - Anticardiolipin antibody (IgG, IgM, IgA)   - Antithrombin 3   - Lupus Anticoagulant   - Factor V Leiden Gene Mutation   - Factor 8   - Homocysteine   - Protein C activity   - Protein S activity   - Prothrombin gene mutation ([id number])   - Erwin Viper Venom Time          The evaluation of the patient, and recommended management, was discussed with the resident staff. I have performed a physical exam and reviewed and updated ROS and Plan today (7/4/2022).     Quinn Mace D.O.  Neurohospitalist, Acute Care Services

## 2022-07-04 NOTE — DIETARY
Nutrition Services: Brief Update     Per RD screen, TF discontinued on 7/2. Pt advanced to diet L6: soft and bite sized with mildly thick liquids. PO intake has been >50% of majority of meals since 7/2.     RD monitoring per dept policy.

## 2022-07-04 NOTE — DISCHARGE PLANNING
POD#7 R EVD placement, pending medical clearance. Would appreciate updated therapy evals when appropriate. TCC will continue to follow.

## 2022-07-04 NOTE — CARE PLAN
Progress made toward(s) clinical / shift goals:    Problem: Pain - Standard  Goal: Alleviation of pain or a reduction in pain to the patient’s comfort goal  Outcome: Progressing     Problem: Fall Risk  Goal: Patient will remain free from falls  Outcome: Progressing

## 2022-07-04 NOTE — THERAPY
Missed Therapy     Patient Name: Alistair Putnam  Age:  57 y.o., Sex:  male  Medical Record #: [id number]  Today's Date: 7/4/2022    Pt working with PT, will follow up in PM or 7/5 as appropriate/able for OT follow up session.      Liborio Boyce OTD, OTR/L

## 2022-07-04 NOTE — THERAPY
Physical Therapy   Daily Treatment     Patient Name: Alistair Putnam  Age:  57 y.o., Sex:  male  Medical Record #: [id number]  Today's Date: 7/4/2022     Precautions  Precautions: Fall Risk;Swallow Precautions ( See Comments)  Comments: EVD    Assessment    Patient progressing well with functional mobility.  Patient primarily limited by balance and coordination. He demonstrated transfer to chair and short ambulation with CGA and HHA, distance limited by balance and EVD drain.  Patient demonstrated postural sway with static balance with eyes closed but no LOB.  He had difficulty maintaining single limb stance, able to maintain with toe touch to light WB on elevated leg.  Anticipate patient will progress quickly once EVD removed.  PT to continue to follow.    Plan    Continue current treatment plan.    DC Equipment Recommendations: (FWW vs no AD, TBD with ambulation)  Discharge Recommendations: Recommend post-acute placement for additional physical therapy services prior to discharge home (Will likely progress to home with home health with continued therapy & mobility)     Objective     07/04/22 1038   Precautions   Precautions Fall Risk;Swallow Precautions ( See Comments)   Comments EVD   Cognition    Cognition / Consciousness X   Comments Pleasant & cooperative, motivated   Standing Lower Body Exercises   Standing Lower Body Exercises Yes   Marching 1 set of 10   Other Exercises standing w/ eyes closed, single limb balance   Neuro-Muscular Treatments   Neuro-Muscular Treatments Compensatory Strategies;Tactile Cuing;Tapping;Sequencing;Weight Shift Right;Weight Shift Left;Verbal Cuing   Vision   Vision Comments Pt reported changes in close up vision with or without bifocals on.   Other Treatments   Other Treatments Provided Educated pt to sit up in chair for at least 2 out of 3 meals per day, perform LE exercises in bed and up in chair for strengthening & fluid management, and to perform LE coordination tasks  while up in chair (i.e. controlled toe taps to target on floor).   Balance   Sitting Balance (Static) Fair +   Sitting Balance (Dynamic) Fair +   Standing Balance (Static) Fair   Standing Balance (Dynamic) Fair -   Weight Shift Sitting Good   Weight Shift Standing Fair   Skilled Intervention Verbal Cuing;Tactile Cuing   Comments w/ HHA   Gait Analysis   Gait Level Of Assist Contact Guard Assist   Assistive Device None   Distance (Feet) 2   # of Times Distance was Traveled 2   Deviation Bradykinetic (decreased balance)   # of Stairs Climbed 0   Weight Bearing Status No restrictions   Skilled Intervention Verbal Cuing;Tactile Cuing   Comments steps from EOB > chair, a few steps forward & backward   Bed Mobility    Supine to Sit Minimal Assist   Sit to Supine (NT, left up in chair)   Skilled Intervention Verbal Cuing;Tactile Cuing   Functional Mobility   Sit to Stand Contact Guard Assist   Bed, Chair, Wheelchair Transfer Contact Guard Assist   Transfer Method Stand Step   Mobility bed mobility, STS, short ambulation   Skilled Intervention Verbal Cuing;Tactile Cuing;Sequencing   Activity Tolerance   Sitting in Chair Post session   Sitting Edge of Bed 5 min   Standing 10 min   Short Term Goals    Short Term Goal # 1 Pt will perform supine <> sit without bed features with SPV within 6 visits in order to progress toward PLOF   Goal Outcome # 1 Progressing as expected   Short Term Goal # 2 Pt will perform STS/functional transfers with SPV within 6 visits in order to progress OOB mobility   Goal Outcome # 2 Progressing as expected   Short Term Goal # 3 Pt will ambulate 200 ft with SPV within 6 visits in order   Goal Outcome # 3 Goal not met   Short Term Goal # 4 Pt will negotiate FOS with single rail & SPV within 6 visits in order to access home   Goal Outcome # 4 Goal not met   Anticipated Discharge Equipment and Recommendations   DC Equipment Recommendations (FWW vs no AD, TBD with ambulation)   Discharge Recommendations  Recommend post-acute placement for additional physical therapy services prior to discharge home (Will likely progress to home with home health with continued therapy & mobility)

## 2022-07-04 NOTE — PROGRESS NOTES
"Critical Care Progress Note    Date of admission  6/26/2022    Chief Complaint  57 year old male with no past medical history and is not a smoker who presented to the ER yesterday with complaints of sudden onset of dizziness yesterday afternoon.  The patient reports that he was in his usual state of health when he got up to get a glass of water when he had sudden onset of dizziness as if the \"room was spinning\".  He reported that he had balance issues and had to sit down.  He had nausea and vomiting.  No tinnitus.  No unilateral numbness, tingling, or weakness to extremities.  Family witness that the patient had horizontal nystagmus for about 15 minutes.  EMS witnessed this as well.  He was brought to the ER and admitted.  He underwent an MRI brain today which revealed a large right cerebellar stroke and was transferred to the ICU for ongoing care. (Dr Valdivia HPI 6/27)     Hospital Course  6/27 - admit, s/p EVD at night  6/28 - Neuro better, HA, ECHO ok, failed FEES  6/29 - Speech/vision better, NIH 2-3, EVD pressures/output ok, Nicardipine 5, 3% 60cc  6/30 - Neuro no change, EVD output low, 3% saline 60, neurosurgery raising EVD level to 10 cm today  7/1 - Neuro no change, EVD 10cm, 3% 60, off Nicardipine, passed swallow  7/2 - Neuro no change, ECD to15 cm a day, 3% 55->50, Na 142  7/3 - Neuro exam no change, EVD back to 10 cm per NS, 3% 40cc -> 30cc  7/4 EVD @ 15; HTS weaning    Interval Problem Update  Reviewed last 24 hour events:  Awake and alert  SR  Afebrile  VTE ppx  HTS off since 5 am  Advancing bowel regimen   Deann po  EVD @ 10 --> 15 today    Review of Systems  Review of Systems   Unable to perform ROS: Critical illness        Vital Signs for last 24 hours   Temp:  [36.4 °C (97.6 °F)-36.8 °C (98.2 °F)] 36.6 °C (97.9 °F)  Pulse:  [75-93] 81  Resp:  [13-39] 14  BP: (107-139)/(64-83) 109/64  SpO2:  [89 %-94 %] 91 %    Hemodynamic parameters for last 24 hours       Respiratory Information for the last 24 " hours       Physical Exam   Physical Exam  Vitals and nursing note reviewed.   HENT:      Head: Normocephalic and atraumatic.      Comments: Drain in place     Right Ear: External ear normal.      Left Ear: External ear normal.      Nose: Nose normal.      Mouth/Throat:      Mouth: Mucous membranes are moist.   Eyes:      Pupils: Pupils are equal, round, and reactive to light.   Cardiovascular:      Rate and Rhythm: Normal rate and regular rhythm.      Pulses: Normal pulses.   Pulmonary:      Effort: Pulmonary effort is normal. No respiratory distress.   Abdominal:      General: Abdomen is flat. There is no distension.      Palpations: Abdomen is soft.   Musculoskeletal:      Right lower leg: No edema.      Left lower leg: No edema.   Skin:     General: Skin is warm and dry.      Capillary Refill: Capillary refill takes less than 2 seconds.   Neurological:      Mental Status: He is alert.      Comments: Awake, alert, clear speech, normal strength both upper and lower extremities, normal repetitive motion, EOMi         Medications  Current Facility-Administered Medications   Medication Dose Route Frequency Provider Last Rate Last Admin   • 3% sodium chloride (HYPERTONIC SALINE) 500mL infusion 500 mL  500 mL Intravenous Continuous Lester Lisa M.D.   Stopped at 07/04/22 0353   • enoxaparin (Lovenox) inj 40 mg  40 mg Subcutaneous DAILY AT 1800 Steve Victor M.D.   40 mg at 07/03/22 1742   • acetaminophen (Tylenol) tablet 650 mg  650 mg Oral Q6HRS PRN Steve Victor M.D.       • aspirin (ASA) chewable tab 81 mg  81 mg Oral DAILY Steve Victor M.D.   81 mg at 07/04/22 0545   • amLODIPine (NORVASC) tablet 10 mg  10 mg Oral Q DAY Steve Victor M.D.   10 mg at 07/04/22 0545   • atorvastatin (LIPITOR) tablet 80 mg  80 mg Oral Q EVENING Steve Victor M.D.   80 mg at 07/03/22 1741   • lisinopril (PRINIVIL) tablet 10 mg  10 mg Oral Q DAY Steve Victor M.D.   10 mg at 07/04/22 0545   •  fludrocortisone (FLORINEF) tablet 0.1 mg  0.1 mg Oral DAILY Steve Victor M.D.   0.1 mg at 07/04/22 0545   • ondansetron (ZOFRAN ODT) dispertab 4 mg  4 mg Oral Q4HRS PRN Steve Victor M.D.       • senna-docusate (PERICOLACE or SENOKOT S) 8.6-50 MG per tablet 2 Tablet  2 Tablet Oral BID Steve Victor M.D.   2 Tablet at 07/04/22 0546    And   • polyethylene glycol/lytes (MIRALAX) PACKET 1 Packet  1 Packet Oral QDAY PRN Steve Victor M.D.        And   • magnesium hydroxide (MILK OF MAGNESIA) suspension 30 mL  30 mL Oral QDAY PRN Steve Victor M.D.   30 mL at 07/01/22 1803    And   • bisacodyl (DULCOLAX) suppository 10 mg  10 mg Rectal QDAY PRN Steve Victor M.D.       • sodium chloride (SALT) tablet 2 g  2 g Oral TID WITH MEALS Steve Victor M.D.   2 g at 07/04/22 1115   • hydrALAZINE (APRESOLINE) injection 20 mg  20 mg Intravenous Q4HRS PRN Steve Victor M.D.   20 mg at 07/02/22 0217   • labetalol (NORMODYNE/TRANDATE) injection 10-20 mg  10-20 mg Intravenous Q2HRS PRN Steve Victor M.D.   10 mg at 06/28/22 1634   • MD Alert...ICU Electrolyte Replacement per Pharmacy   Other PHARMACY TO DOSE Steve Victor M.D.       • HYDROmorphone (Dilaudid) injection 0.5 mg  0.5 mg Intravenous Q3HRS PRN Elizabeth Valdivia M.D.   0.5 mg at 06/27/22 1805   • ondansetron (ZOFRAN) syringe/vial injection 4 mg  4 mg Intravenous Q4HRS PRN Steve Miguel M.D.       • prochlorperazine (COMPAZINE) injection 5-10 mg  5-10 mg Intravenous Q4HRS PRN Steve Miguel M.D.   5 mg at 06/27/22 1034       Fluids    Intake/Output Summary (Last 24 hours) at 7/4/2022 1340  Last data filed at 7/4/2022 1200  Gross per 24 hour   Intake 833 ml   Output 2208 ml   Net -1375 ml       Laboratory          Recent Labs     07/02/22  0600 07/02/22  1125 07/03/22  0555 07/03/22  1210 07/04/22  0010 07/04/22  0607 07/04/22  0815 07/04/22  1210   SODIUM 142   < > 138   < > 139 136  --  137   POTASSIUM 3.8  --  4.1  --   --   3.8  --   --    CHLORIDE 110  --  108  --   --  105  --   --    CO2 21  --  21  --   --  22  --   --    BUN 16  --  19  --   --  20  --   --    CREATININE 0.65  --  0.62  --   --  0.61  --   --    MAGNESIUM  --   --   --   --   --   --  2.0  --    PHOSPHORUS  --   --   --   --   --   --  3.0  --    CALCIUM 8.4*  --  8.3*  --   --  8.6  --   --     < > = values in this interval not displayed.     Recent Labs     07/02/22 0600 07/03/22  0555 07/04/22  0607   GLUCOSE 107* 88 83     Recent Labs     07/02/22 0600 07/03/22  0555 07/04/22  0607   WBC 7.4 7.3 7.6   NEUTSPOLYS 72.60* 63.30 59.90   LYMPHOCYTES 16.30* 22.70 26.00   MONOCYTES 9.90 11.10 11.10   EOSINOPHILS 0.40 1.80 1.80   BASOPHILS 0.40 0.30 0.40     Recent Labs     07/02/22 0600 07/03/22  0555 07/04/22  0607   RBC 5.30 4.97 4.85   HEMOGLOBIN 14.1 13.3* 12.7*   HEMATOCRIT 43.3 40.8* 39.7*   PLATELETCT 259 271 263       Imaging  X-Ray:  I have personally reviewed the images and compared with prior images.    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)- (present on admission)  Assessment & Plan  Presented with vertigo, found to have large right PICA stroke with partial effacement of fourth ventricle   S/P EVD placement on 6/27/2022    SBP < 140  HTS to maintain eunatremia, tolerating wean  Salt tabs + Florinef   EVD @ 10 -->15 per NSGY  Neurology and neurosurgery following   Statin  ASA  Q 2 hour neuro checks    Hypokalemia- (present on admission)  Assessment & Plan  Replete to > 4       VTE:  Lovenox  Ulcer: Not Indicated  Lines: Central Line  Ongoing indication addressed and Bennett Catheter  Ongoing indication addressed    I have performed a physical exam and reviewed and updated ROS and Plan today (7/4/2022). In review of yesterday's note (7/3/2022), there are no changes except as documented above.     Discussed patient condition and risk of morbidity and/or mortality with Family, RN, RT, Therapies, Pharmacy, Charge  nurse / hot rounds and Patient  The patient remains critically ill.  Critical care time = 61 minutes in directly providing and coordinating critical care and extensive data review.  No time overlap and excludes procedures.

## 2022-07-05 LAB
ANION GAP SERPL CALC-SCNC: 9 MMOL/L (ref 7–16)
BASOPHILS # BLD AUTO: 0.5 % (ref 0–1.8)
BASOPHILS # BLD: 0.04 K/UL (ref 0–0.12)
BUN SERPL-MCNC: 21 MG/DL (ref 8–22)
CALCIUM SERPL-MCNC: 8.7 MG/DL (ref 8.5–10.5)
CHLORIDE SERPL-SCNC: 104 MMOL/L (ref 96–112)
CO2 SERPL-SCNC: 22 MMOL/L (ref 20–33)
CREAT SERPL-MCNC: 0.7 MG/DL (ref 0.5–1.4)
EOSINOPHIL # BLD AUTO: 0.15 K/UL (ref 0–0.51)
EOSINOPHIL NFR BLD: 1.8 % (ref 0–6.9)
ERYTHROCYTE [DISTWIDTH] IN BLOOD BY AUTOMATED COUNT: 42.5 FL (ref 35.9–50)
GFR SERPLBLD CREATININE-BSD FMLA CKD-EPI: 108 ML/MIN/1.73 M 2
GLUCOSE SERPL-MCNC: 87 MG/DL (ref 65–99)
HCT VFR BLD AUTO: 40.5 % (ref 42–52)
HGB BLD-MCNC: 13.1 G/DL (ref 14–18)
IMM GRANULOCYTES # BLD AUTO: 0.05 K/UL (ref 0–0.11)
IMM GRANULOCYTES NFR BLD AUTO: 0.6 % (ref 0–0.9)
LYMPHOCYTES # BLD AUTO: 1.82 K/UL (ref 1–4.8)
LYMPHOCYTES NFR BLD: 22.2 % (ref 22–41)
MAGNESIUM SERPL-MCNC: 2 MG/DL (ref 1.5–2.5)
MCH RBC QN AUTO: 26.1 PG (ref 27–33)
MCHC RBC AUTO-ENTMCNC: 32.3 G/DL (ref 33.7–35.3)
MCV RBC AUTO: 80.8 FL (ref 81.4–97.8)
MONOCYTES # BLD AUTO: 1.01 K/UL (ref 0–0.85)
MONOCYTES NFR BLD AUTO: 12.3 % (ref 0–13.4)
NEUTROPHILS # BLD AUTO: 5.12 K/UL (ref 1.82–7.42)
NEUTROPHILS NFR BLD: 62.6 % (ref 44–72)
NRBC # BLD AUTO: 0 K/UL
NRBC BLD-RTO: 0 /100 WBC
PHOSPHATE SERPL-MCNC: 3 MG/DL (ref 2.5–4.5)
PLATELET # BLD AUTO: 264 K/UL (ref 164–446)
PMV BLD AUTO: 9.2 FL (ref 9–12.9)
POTASSIUM SERPL-SCNC: 4 MMOL/L (ref 3.6–5.5)
RBC # BLD AUTO: 5.01 M/UL (ref 4.7–6.1)
SODIUM SERPL-SCNC: 133 MMOL/L (ref 135–145)
SODIUM SERPL-SCNC: 135 MMOL/L (ref 135–145)
SODIUM SERPL-SCNC: 136 MMOL/L (ref 135–145)
SODIUM SERPL-SCNC: 137 MMOL/L (ref 135–145)
WBC # BLD AUTO: 8.2 K/UL (ref 4.8–10.8)

## 2022-07-05 PROCEDURE — 92526 ORAL FUNCTION THERAPY: CPT

## 2022-07-05 PROCEDURE — 99291 CRITICAL CARE FIRST HOUR: CPT | Performed by: INTERNAL MEDICINE

## 2022-07-05 PROCEDURE — 85025 COMPLETE CBC W/AUTO DIFF WBC: CPT

## 2022-07-05 PROCEDURE — 97116 GAIT TRAINING THERAPY: CPT

## 2022-07-05 PROCEDURE — 700111 HCHG RX REV CODE 636 W/ 250 OVERRIDE (IP): Performed by: INTERNAL MEDICINE

## 2022-07-05 PROCEDURE — 99233 SBSQ HOSP IP/OBS HIGH 50: CPT | Performed by: PHYSICAL MEDICINE & REHABILITATION

## 2022-07-05 PROCEDURE — 84295 ASSAY OF SERUM SODIUM: CPT

## 2022-07-05 PROCEDURE — A9270 NON-COVERED ITEM OR SERVICE: HCPCS | Performed by: INTERNAL MEDICINE

## 2022-07-05 PROCEDURE — 99232 SBSQ HOSP IP/OBS MODERATE 35: CPT | Performed by: STUDENT IN AN ORGANIZED HEALTH CARE EDUCATION/TRAINING PROGRAM

## 2022-07-05 PROCEDURE — 83735 ASSAY OF MAGNESIUM: CPT

## 2022-07-05 PROCEDURE — 97535 SELF CARE MNGMENT TRAINING: CPT

## 2022-07-05 PROCEDURE — 700102 HCHG RX REV CODE 250 W/ 637 OVERRIDE(OP): Performed by: INTERNAL MEDICINE

## 2022-07-05 PROCEDURE — 84100 ASSAY OF PHOSPHORUS: CPT

## 2022-07-05 PROCEDURE — 80048 BASIC METABOLIC PNL TOTAL CA: CPT

## 2022-07-05 PROCEDURE — 770022 HCHG ROOM/CARE - ICU (200)

## 2022-07-05 RX ADMIN — LISINOPRIL 10 MG: 10 TABLET ORAL at 05:25

## 2022-07-05 RX ADMIN — SODIUM CHLORIDE 2 G: 1 TABLET ORAL at 07:17

## 2022-07-05 RX ADMIN — AMLODIPINE BESYLATE 10 MG: 10 TABLET ORAL at 05:26

## 2022-07-05 RX ADMIN — FLUDROCORTISONE ACETATE 0.1 MG: 0.1 TABLET ORAL at 05:25

## 2022-07-05 RX ADMIN — SENNOSIDES AND DOCUSATE SODIUM 2 TABLET: 50; 8.6 TABLET ORAL at 05:27

## 2022-07-05 RX ADMIN — SODIUM CHLORIDE 2 G: 1 TABLET ORAL at 11:28

## 2022-07-05 RX ADMIN — SODIUM CHLORIDE 2 G: 1 TABLET ORAL at 17:30

## 2022-07-05 RX ADMIN — ASPIRIN 81 MG 81 MG: 81 TABLET ORAL at 05:26

## 2022-07-05 RX ADMIN — POLYETHYLENE GLYCOL 3350 1 PACKET: 17 POWDER, FOR SOLUTION ORAL at 08:10

## 2022-07-05 RX ADMIN — SENNOSIDES AND DOCUSATE SODIUM 2 TABLET: 50; 8.6 TABLET ORAL at 17:30

## 2022-07-05 RX ADMIN — ENOXAPARIN SODIUM 40 MG: 40 INJECTION SUBCUTANEOUS at 17:31

## 2022-07-05 RX ADMIN — ATORVASTATIN CALCIUM 80 MG: 80 TABLET, FILM COATED ORAL at 17:30

## 2022-07-05 ASSESSMENT — GAIT ASSESSMENTS
GAIT LEVEL OF ASSIST: CONTACT GUARD ASSIST
DISTANCE (FEET): 75
DEVIATION: BRADYKINETIC
ASSISTIVE DEVICE: FRONT WHEEL WALKER

## 2022-07-05 ASSESSMENT — COGNITIVE AND FUNCTIONAL STATUS - GENERAL
SUGGESTED CMS G CODE MODIFIER DAILY ACTIVITY: CK
DRESSING REGULAR UPPER BODY CLOTHING: A LITTLE
TURNING FROM BACK TO SIDE WHILE IN FLAT BAD: A LITTLE
SUGGESTED CMS G CODE MODIFIER MOBILITY: CK
DRESSING REGULAR LOWER BODY CLOTHING: A LITTLE
HELP NEEDED FOR BATHING: A LITTLE
PERSONAL GROOMING: A LITTLE
MOVING TO AND FROM BED TO CHAIR: A LITTLE
STANDING UP FROM CHAIR USING ARMS: A LITTLE
MOBILITY SCORE: 18
CLIMB 3 TO 5 STEPS WITH RAILING: A LITTLE
WALKING IN HOSPITAL ROOM: A LITTLE
MOVING FROM LYING ON BACK TO SITTING ON SIDE OF FLAT BED: A LITTLE
DAILY ACTIVITIY SCORE: 19
TOILETING: A LITTLE

## 2022-07-05 ASSESSMENT — PAIN DESCRIPTION - PAIN TYPE
TYPE: ACUTE PAIN

## 2022-07-05 ASSESSMENT — PATIENT HEALTH QUESTIONNAIRE - PHQ9
2. FEELING DOWN, DEPRESSED, IRRITABLE, OR HOPELESS: NOT AT ALL
1. LITTLE INTEREST OR PLEASURE IN DOING THINGS: NOT AT ALL
SUM OF ALL RESPONSES TO PHQ9 QUESTIONS 1 AND 2: 0

## 2022-07-05 NOTE — THERAPY
Occupational Therapy  Daily Treatment     Patient Name: Alistair Putnam  Age:  57 y.o., Sex:  male  Medical Record #: [id number]  Today's Date: 7/5/2022     Precautions  Precautions: Fall Risk, Swallow Precautions ( See Comments)  Comments: EVD    Assessment    Pt seen for follow up OT tx session, pt making steady progress with functional mobility and ADLs despite still having EVD in place hopeful to have removed in the next day or two. Will continue to see for skilled therapy while admitted as well as recommend post-acute placement.    Plan    Continue current treatment plan.    DC Equipment Recommendations: (P) Unable to determine at this time  Discharge Recommendations: (P) Recommend post-acute placement for additional occupational therapy services prior to discharge home    Objective       07/05/22 1332   Precautions   Precautions Fall Risk;Swallow Precautions ( See Comments)   Comments EVD   Active ROM Upper Body   Active ROM Upper Body  WDL   Strength Upper Body   Upper Body Strength  WDL   Balance   Sitting Balance (Static) Fair +   Sitting Balance (Dynamic) Fair +   Standing Balance (Static) Fair   Standing Balance (Dynamic) Fair -   Weight Shift Sitting Good   Weight Shift Standing Fair   Skilled Intervention Verbal Cuing;Facilitation   Comments w/ FWW   Bed Mobility    Sit to Supine Supervised   Rolling Supervised   Skilled Intervention Verbal Cuing;Facilitation   Comments up in chair before session, returned to bed at end of session   Activities of Daily Living   Grooming Supervision   Upper Body Dressing Supervision   Lower Body Dressing Minimal Assist   Toileting   (NT-refused need)   Skilled Intervention Verbal Cuing   How much help from another person does the patient currently need...   Putting on and taking off regular lower body clothing? 3   Bathing (including washing, rinsing, and drying)? 3   Toileting, which includes using a toilet, bedpan, or urinal? 3   Putting on and taking off regular  upper body clothing? 3   Taking care of personal grooming such as brushing teeth? 3   Eating meals? 4   6 Clicks Daily Activity Score 19   Modified [place] (mRS)   Modified [place] Score 2   Functional Mobility   Sit to Stand Contact Guard Assist   Bed, Chair, Wheelchair Transfer Contact Guard Assist   Toilet Transfers Refused   Transfer Method Stand Step   Mobility STS from chair, hallway mobility, back to bed   Skilled Intervention Verbal Cuing;Facilitation   Comments w/ FWW   Activity Tolerance   Sitting Edge of Bed 5 min   Standing 15 min   Short Term Goals   Short Term Goal # 1 Pt will complete ADL transfers with supervision   Short Term Goal # 2 Pt will complete LB dressing with supervision   Short Term Goal # 3 Pt will complete standing G/H with supervision   Education Group   Education Provided Role of Occupational Therapist   Role of Occupational Therapist Patient Response Patient;Acceptance;Explanation   Anticipated Discharge Equipment and Recommendations   DC Equipment Recommendations Unable to determine at this time   Discharge Recommendations Recommend post-acute placement for additional occupational therapy services prior to discharge home   Interdisciplinary Plan of Care Collaboration   IDT Collaboration with  Nursing   Patient Position at End of Therapy In Bed;Bed Alarm On;Call Light within Reach;Tray Table within Reach;Phone within Reach   Collaboration Comments RN updated

## 2022-07-05 NOTE — PROGRESS NOTES
"Critical Care Progress Note    Date of admission  6/26/2022    Chief Complaint  57 year old male with no past medical history and is not a smoker who presented to the ER yesterday with complaints of sudden onset of dizziness yesterday afternoon.  The patient reports that he was in his usual state of health when he got up to get a glass of water when he had sudden onset of dizziness as if the \"room was spinning\".  He reported that he had balance issues and had to sit down.  He had nausea and vomiting.  No tinnitus.  No unilateral numbness, tingling, or weakness to extremities.  Family witness that the patient had horizontal nystagmus for about 15 minutes.  EMS witnessed this as well.  He was brought to the ER and admitted.  He underwent an MRI brain today which revealed a large right cerebellar stroke and was transferred to the ICU for ongoing care. (Dr Valdivia HPI 6/27)     Hospital Course  6/27 - admit, s/p EVD at night  6/28 - Neuro better, HA, ECHO ok, failed FEES  6/29 - Speech/vision better, NIH 2-3, EVD pressures/output ok, Nicardipine 5, 3% 60cc  6/30 - Neuro no change, EVD output low, 3% saline 60, neurosurgery raising EVD level to 10 cm today  7/1 - Neuro no change, EVD 10cm, 3% 60, off Nicardipine, passed swallow  7/2 - Neuro no change, ECD to15 cm a day, 3% 55->50, Na 142  7/3 - Neuro exam no change, EVD back to 10 cm per NS, 3% 40cc -> 30cc  7/4 EVD @ 15; HTS weaning  7/5 -EVD clamped today, sodium 135, off 3% saline    Interval Problem Update  Reviewed last 24 hour events:  T-max 98.1  WBC 8.2  EVD at 15 cm, draining min o/p 4cc  ICP 2-10  SR  Bowel protocol  yesi PO diet, advance per SLP  I/O = 370/1965  R IJ TLC  2 lpm oxygen/room air  lovenox  No abx  Na 135, 3% off  Florinef and salt tab    Review of Systems  Review of Systems   Unable to perform ROS: Critical illness        Vital Signs for last 24 hours   Temp:  [36.4 °C (97.5 °F)-36.7 °C (98.1 °F)] 36.7 °C (98.1 °F)  Pulse:  [73-94] 73  Resp:  " [12-24] 21  BP: ()/(54-83) 109/74  SpO2:  [90 %-98 %] 95 %    Hemodynamic parameters for last 24 hours       Respiratory Information for the last 24 hours       Physical Exam   Physical Exam  Vitals and nursing note reviewed.   HENT:      Head: Normocephalic and atraumatic.      Comments: Drain in place     Right Ear: External ear normal.      Left Ear: External ear normal.      Nose: Nose normal.      Mouth/Throat:      Mouth: Mucous membranes are moist.   Eyes:      Pupils: Pupils are equal, round, and reactive to light.   Cardiovascular:      Rate and Rhythm: Normal rate and regular rhythm.      Pulses: Normal pulses.   Pulmonary:      Effort: Pulmonary effort is normal. No respiratory distress.   Abdominal:      General: Abdomen is flat. There is no distension.      Palpations: Abdomen is soft.   Musculoskeletal:      Right lower leg: No edema.      Left lower leg: No edema.   Skin:     General: Skin is warm and dry.      Capillary Refill: Capillary refill takes less than 2 seconds.   Neurological:      Mental Status: He is alert.      Comments: Awake, alert, clear speech, normal strength both upper and lower extremities, normal repetitive motion, EOMi         Medications  Current Facility-Administered Medications   Medication Dose Route Frequency Provider Last Rate Last Admin   • 3% sodium chloride (HYPERTONIC SALINE) 500mL infusion 500 mL  500 mL Intravenous Continuous Lester Lisa M.D.   Stopped at 07/04/22 0353   • enoxaparin (Lovenox) inj 40 mg  40 mg Subcutaneous DAILY AT 1800 Steve Victor M.D.   40 mg at 07/04/22 1730   • acetaminophen (Tylenol) tablet 650 mg  650 mg Oral Q6HRS PRN Steve Victor M.D.       • aspirin (ASA) chewable tab 81 mg  81 mg Oral DAILY Steve Victor M.D.   81 mg at 07/05/22 0526   • amLODIPine (NORVASC) tablet 10 mg  10 mg Oral Q DAY Steve Victor M.D.   10 mg at 07/05/22 0526   • atorvastatin (LIPITOR) tablet 80 mg  80 mg Oral Q EVENING Steve MOTTA  MIGUEL Victor   80 mg at 07/04/22 1730   • lisinopril (PRINIVIL) tablet 10 mg  10 mg Oral Q DAY Steve Victor M.D.   10 mg at 07/05/22 0525   • fludrocortisone (FLORINEF) tablet 0.1 mg  0.1 mg Oral DAILY Steve Victor M.D.   0.1 mg at 07/05/22 0525   • ondansetron (ZOFRAN ODT) dispertab 4 mg  4 mg Oral Q4HRS PRN Steve Victor M.D.       • senna-docusate (PERICOLACE or SENOKOT S) 8.6-50 MG per tablet 2 Tablet  2 Tablet Oral BID Steve Victor M.D.   2 Tablet at 07/05/22 0527    And   • polyethylene glycol/lytes (MIRALAX) PACKET 1 Packet  1 Packet Oral QDAY PRN Steve Victor M.D.        And   • magnesium hydroxide (MILK OF MAGNESIA) suspension 30 mL  30 mL Oral QDAY PRN Steve Victor M.D.   30 mL at 07/01/22 1803    And   • bisacodyl (DULCOLAX) suppository 10 mg  10 mg Rectal QDAY PRN Steve Victor M.D.       • sodium chloride (SALT) tablet 2 g  2 g Oral TID WITH MEALS Steve Victor M.D.   2 g at 07/05/22 0717   • hydrALAZINE (APRESOLINE) injection 20 mg  20 mg Intravenous Q4HRS PRN Steve Victor M.D.   20 mg at 07/02/22 0217   • labetalol (NORMODYNE/TRANDATE) injection 10-20 mg  10-20 mg Intravenous Q2HRS PRN Steve Victor M.D.   10 mg at 06/28/22 1634   • MD Alert...ICU Electrolyte Replacement per Pharmacy   Other PHARMACY TO DOSE Steve Victor M.D.       • HYDROmorphone (Dilaudid) injection 0.5 mg  0.5 mg Intravenous Q3HRS PRN Elizabeth Valdivia M.D.   0.5 mg at 06/27/22 1805   • ondansetron (ZOFRAN) syringe/vial injection 4 mg  4 mg Intravenous Q4HRS PRN Steve Miguel M.D.       • prochlorperazine (COMPAZINE) injection 5-10 mg  5-10 mg Intravenous Q4HRS PRN Steve Miguel M.D.   5 mg at 06/27/22 1034       Fluids    Intake/Output Summary (Last 24 hours) at 7/5/2022 0728  Last data filed at 7/5/2022 0600  Gross per 24 hour   Intake 370 ml   Output 1957 ml   Net -1587 ml       Laboratory          Recent Labs     07/03/22  0555 07/03/22  1210 07/04/22  0607  07/04/22  0815 07/04/22  1210 07/04/22  1740 07/05/22  0001 07/05/22  0602   SODIUM 138   < > 136  --    < > 137 137 135   POTASSIUM 4.1  --  3.8  --   --   --   --  4.0   CHLORIDE 108  --  105  --   --   --   --  104   CO2 21  --  22  --   --   --   --  22   BUN 19  --  20  --   --   --   --  21   CREATININE 0.62  --  0.61  --   --   --   --  0.70   MAGNESIUM  --   --   --  2.0  --   --   --  2.0   PHOSPHORUS  --   --   --  3.0  --   --   --  3.0   CALCIUM 8.3*  --  8.6  --   --   --   --  8.7    < > = values in this interval not displayed.     Recent Labs     07/03/22  0555 07/04/22 0607 07/05/22 0602   GLUCOSE 88 83 87     Recent Labs     07/03/22  0555 07/04/22  0607 07/05/22  0602   WBC 7.3 7.6 8.2   NEUTSPOLYS 63.30 59.90 62.60   LYMPHOCYTES 22.70 26.00 22.20   MONOCYTES 11.10 11.10 12.30   EOSINOPHILS 1.80 1.80 1.80   BASOPHILS 0.30 0.40 0.50     Recent Labs     07/03/22  0555 07/04/22  0607 07/05/22  0602   RBC 4.97 4.85 5.01   HEMOGLOBIN 13.3* 12.7* 13.1*   HEMATOCRIT 40.8* 39.7* 40.5*   PLATELETCT 271 689 264       Imaging  X-Ray:  I have personally reviewed the images and compared with prior images.    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)- (present on admission)  Assessment & Plan  Presented with vertigo, found to have large right PICA stroke with partial effacement of fourth ventricle   S/P EVD placement on 6/27/2022  SBP < 140  Salt tabs + Florinef, off hypertonic saline now  EVD clamped today, neurosurgery following   Statin  ASA    Hypokalemia- (present on admission)  Assessment & Plan  Replete to > 4     Update: Diet per SLP recommendations with restrictions, consider MBS as needed tomorrow  Reviewed with neurosurgery, physiatry  EVD clamped today, likely out tomorrow  Continue neurochecks and follow in ICU    VTE:  Lovenox  Ulcer: Not Indicated  Lines: Central Line  Ongoing indication addressed and Bennett Catheter  Ongoing indication addressed    I have  performed a physical exam and reviewed and updated ROS and Plan today (7/5/2022). In review of yesterday's note (7/4/2022), there are no changes except as documented above.     Discussed patient condition and risk of morbidity and/or mortality with Family, RN, RT, Therapies, Pharmacy, Charge nurse / hot rounds and Patient  The patient remains critically ill.  Critical care time = 35 minutes in directly providing and coordinating critical care and extensive data review.  No time overlap and excludes procedures.

## 2022-07-05 NOTE — PROGRESS NOTES
Neurosurgery Progress Note    Subjective:  57 yr old male presented with CVA.  POD#8 R EVD placement   Drain raised to 15 yesterday. ICPs and output have been stable.    Exam:  A&O x3  PERRL, symmetric  Face symmetric   Cns II-VII grossly intact  Moving all extremities, follows commands  Sensation intact   No drift    BP  Min: 94/56  Max: 128/69  Pulse  Av.6  Min: 72  Max: 94  Resp  Av.3  Min: 12  Max: 24  Temp  Av.6 °C (97.8 °F)  Min: 36.4 °C (97.5 °F)  Max: 36.7 °C (98.1 °F)  SpO2  Av.7 %  Min: 90 %  Max: 98 %    ICP  Av.4 MM HG  Min: 2 MM HG  Max: 12 MM HG    Recent Labs     22  0555 22  0607 22  0602   WBC 7.3 7.6 8.2   RBC 4.97 4.85 5.01   HEMOGLOBIN 13.3* 12.7* 13.1*   HEMATOCRIT 40.8* 39.7* 40.5*   MCV 82.1 81.9 80.8*   MCH 26.8* 26.2* 26.1*   MCHC 32.6* 32.0* 32.3*   RDW 46.4 44.3 42.5   PLATELETCT 271 263 264   MPV 9.5 9.2 9.2     Recent Labs     22  0555 22  1210 22  0607 22  1210 22  1740 22  0001 22  0602   SODIUM 138   < > 136   < > 137 137 135   POTASSIUM 4.1  --  3.8  --   --   --  4.0   CHLORIDE 108  --  105  --   --   --  104   CO2   --    --   --   --  22   GLUCOSE 88  --  83  --   --   --  87   BUN   --  20  --   --   --  21   CREATININE 0.62  --  0.61  --   --   --  0.70   CALCIUM 8.3*  --  8.6  --   --   --  8.7    < > = values in this interval not displayed.               Intake/Output                       22 0700 - 0722 - 22 Total  Total                 Intake    P.O.  370  -- 370  200  -- 200    P.O. 370 -- 370 200 -- 200    I.V.  0  -- 0  --  -- --    Volume (mL) (3% sodium chloride (HYPERTONIC SALINE) 500mL infusion 500 mL) 0 -- 0 -- -- --    Total Intake 370 -- 370 200 -- 200       Output    Urine  1450  500 1950  --  -- --    Number of Times Voided 4 x -- 4 x -- -- --    Urine Void (mL) 8000 827 3309 -- -- --     Drains  11  4 15  0  -- 0    Output (mL) (ICP/Ventriculostomy Right Other (Comment)) 11 4 15 0 -- 0    Stool  --  -- --  --  -- --    Number of Times Stooled 0 x 0 x 0 x -- -- --    Total Output 0919 815 0335 0 -- 0       Net I/O     -1091 -504 -1597 200 -- 200            Intake/Output Summary (Last 24 hours) at 7/5/2022 1003  Last data filed at 7/5/2022 0900  Gross per 24 hour   Intake 450 ml   Output 1430 ml   Net -980 ml            • enoxaparin (LOVENOX) injection  40 mg DAILY AT 1800   • acetaminophen  650 mg Q6HRS PRN   • aspirin  81 mg DAILY   • amLODIPine  10 mg Q DAY   • atorvastatin  80 mg Q EVENING   • lisinopril  10 mg Q DAY   • fludrocortisone  0.1 mg DAILY   • ondansetron  4 mg Q4HRS PRN   • senna-docusate  2 Tablet BID    And   • polyethylene glycol/lytes  1 Packet QDAY PRN    And   • magnesium hydroxide  30 mL QDAY PRN    And   • bisacodyl  10 mg QDAY PRN   • sodium chloride  2 g TID WITH MEALS   • hydrALAZINE  20 mg Q4HRS PRN   • labetalol  10-20 mg Q2HRS PRN   • MD Alert...Adult ICU Electrolyte Replacement per Pharmacy   PHARMACY TO DOSE   • ondansetron  4 mg Q4HRS PRN   • prochlorperazine  5-10 mg Q4HRS PRN       Assessment and Plan:  Hospital day #8  POD#8 R EVD placement.  EVD to be clamped today.   Systolic BP goal <140.  Appreciate intensivists care.   Will continue to follow.      Chemical prophylactic DVT therapy: Yes - Lovenox 40mg/qd    Start date/time: today

## 2022-07-05 NOTE — PROGRESS NOTES
Neurology Progress Note  Neurohospitalist Service, [facility]    Referring Physician: Bayron Hodge M.D.      Interval History: No acute events overnight.      Review of systems: In addition to what is detailed in the HPI and/or updated in the interval history, all other systems reviewed and are negative.    Past Medical History, Past Surgical History and Social History reviewed and unchanged from prior    Medications:    Current Facility-Administered Medications:   •  enoxaparin (Lovenox) inj 40 mg, 40 mg, Subcutaneous, DAILY AT 1800, Steve Victor M.D., 40 mg at 07/04/22 1730  •  acetaminophen (Tylenol) tablet 650 mg, 650 mg, Oral, Q6HRS PRN, Steve Victor M.D.  •  aspirin (ASA) chewable tab 81 mg, 81 mg, Oral, DAILY, Steve Victor M.D., 81 mg at 07/05/22 0526  •  amLODIPine (NORVASC) tablet 10 mg, 10 mg, Oral, Q DAY, Steve Victor M.D., 10 mg at 07/05/22 0526  •  atorvastatin (LIPITOR) tablet 80 mg, 80 mg, Oral, Q EVENING, Steve Victor M.D., 80 mg at 07/04/22 1730  •  lisinopril (PRINIVIL) tablet 10 mg, 10 mg, Oral, Q DAY, Steve Victor M.D., 10 mg at 07/05/22 0525  •  fludrocortisone (FLORINEF) tablet 0.1 mg, 0.1 mg, Oral, DAILY, Steve Victor M.D., 0.1 mg at 07/05/22 0525  •  ondansetron (ZOFRAN ODT) dispertab 4 mg, 4 mg, Oral, Q4HRS PRN, Steve Victor M.D.  •  senna-docusate (PERICOLACE or SENOKOT S) 8.6-50 MG per tablet 2 Tablet, 2 Tablet, Oral, BID, 2 Tablet at 07/05/22 0527 **AND** polyethylene glycol/lytes (MIRALAX) PACKET 1 Packet, 1 Packet, Oral, QDAY PRN, 1 Packet at 07/05/22 0810 **AND** magnesium hydroxide (MILK OF MAGNESIA) suspension 30 mL, 30 mL, Oral, QDAY PRN, 30 mL at 07/01/22 1803 **AND** bisacodyl (DULCOLAX) suppository 10 mg, 10 mg, Rectal, QDAY PRN, Steve Victor M.D.  •  sodium chloride (SALT) tablet 2 g, 2 g, Oral, TID WITH MEALS, Steve Victor M.D., 2 g at 07/05/22 1128  •  hydrALAZINE (APRESOLINE) injection 20  "mg, 20 mg, Intravenous, Q4HRS PRN, Steve Victor M.D., 20 mg at 07/02/22 0217  •  labetalol (NORMODYNE/TRANDATE) injection 10-20 mg, 10-20 mg, Intravenous, Q2HRS PRN, Steve Victor M.D., 10 mg at 06/28/22 1634  •  MD Alert...ICU Electrolyte Replacement per Pharmacy, , Other, PHARMACY TO DOSE, Steve Victor M.D.  •  ondansetron (ZOFRAN) syringe/vial injection 4 mg, 4 mg, Intravenous, Q4HRS PRN, Steve Miguel M.D.  •  prochlorperazine (COMPAZINE) injection 5-10 mg, 5-10 mg, Intravenous, Q4HRS PRN, Steve Miguel M.D., 5 mg at 06/27/22 1034    Physical Examination:   /71   Pulse 77   Temp 36.6 °C (97.9 °F) (Temporal)   Resp (!) 22   Ht 1.803 m (5' 11\")   Wt 98.5 kg (217 lb 2.5 oz)   SpO2 92%   BMI 30.29 kg/m²       General: Patient is awake and in no acute distress  Neck: There is normal range of motion  CV: Regular rate   Extremities:  Warm, dry, and intact, without peripheral lower extremity edema    NEUROLOGICAL EXAM:     Mental status: Awake, alert and fully oriented  Speech and language: Speech is clear and fluent. The patient is able to name and repeat, and follow commands  Cranial nerve exam: Pupils are equal, round and reactive to light bilaterally. Visual fields are full. There is no nystagmus. Extraocular muscles are intact. Face is symmetric. Sensation in the face is intact to light touch. Palate elevates symmetrically. Tongue is midline.  Motor exam: Power 5/5 throughout all extremities. There is sustained antigravity with no downward drift in bilateral arms and legs.  There is no pronator drift. Tone is normal. No abnormal movements were seen on exam.  Sensory exam: Reacts to tactile and pinprick in all 4 extremities, no neglect to double stim   Deep tendon reflexes:  2+ throughout. Toes down-going bilaterally.  Coordination: No ataxia on bilateral finger-to-nose testing  Gait: Deferred due to patient preference    Objective Data:    Labs:  Lab Results   Component Value Date/Time "    PROTHROMBTM 13.2 06/26/2022 03:35 PM    INR 1.03 06/26/2022 03:35 PM      Lab Results   Component Value Date/Time    WBC 8.2 07/05/2022 06:02 AM    RBC 5.01 07/05/2022 06:02 AM    HEMOGLOBIN 13.1 (L) 07/05/2022 06:02 AM    HEMATOCRIT 40.5 (L) 07/05/2022 06:02 AM    MCV 80.8 (L) 07/05/2022 06:02 AM    MCH 26.1 (L) 07/05/2022 06:02 AM    MCHC 32.3 (L) 07/05/2022 06:02 AM    MPV 9.2 07/05/2022 06:02 AM    NEUTSPOLYS 62.60 07/05/2022 06:02 AM    LYMPHOCYTES 22.20 07/05/2022 06:02 AM    MONOCYTES 12.30 07/05/2022 06:02 AM    EOSINOPHILS 1.80 07/05/2022 06:02 AM    BASOPHILS 0.50 07/05/2022 06:02 AM      Lab Results   Component Value Date/Time    SODIUM 133 (L) 07/05/2022 11:40 AM    POTASSIUM 4.0 07/05/2022 06:02 AM    CHLORIDE 104 07/05/2022 06:02 AM    CO2 22 07/05/2022 06:02 AM    GLUCOSE 87 07/05/2022 06:02 AM    BUN 21 07/05/2022 06:02 AM    CREATININE 0.70 07/05/2022 06:02 AM      Lab Results   Component Value Date/Time    CHOLSTRLTOT 186 06/27/2022 04:26 PM     (H) 06/27/2022 04:26 PM    HDL 41 06/27/2022 04:26 PM    TRIGLYCERIDE 79 06/27/2022 04:26 PM       Lab Results   Component Value Date/Time    ALKPHOSPHAT 70 06/26/2022 03:35 PM    ASTSGOT 16 06/26/2022 03:35 PM    ALTSGPT 18 06/26/2022 03:35 PM    TBILIRUBIN 0.2 06/26/2022 03:35 PM        Imaging/Testing:    I interpreted and/or reviewed the patient's neuroimaging    EC-ECHOCARDIOGRAM COMPLETE W/O CONT   Final Result      CT-HEAD W/O   Final Result         1.  Postprocedural changes of ventriculostomy placement with minimal hyperdense hemorrhage tracking along the ventriculostomy tract.   2.  Small anterior foci of right pneumocephalus.   3.  Low-density evolving stroke within the right cerebellar hemisphere effacing the fourth ventricle.   4.  Atherosclerosis         DX-ABDOMEN FOR TUBE PLACEMENT   Final Result      1.  Feeding tube tip overlies the proximal stomach.      DX-CHEST-LIMITED (1 VIEW)   Final Result      1.  Right IJ catheter tip  projects over the cavoatrial junction without pneumothorax.   2.  Enlarged cardiac silhouette with mild vascular congestion.   3.  There is bibasilar atelectasis.      CT-CTA HEAD WITH & W/O-POST PROCESS   Final Result         1. No hemodynamically significant narrowing of the major intracranial vessels.      CT-CTA NECK WITH & W/O-POST PROCESSING   Final Result      1. No evidence of flow-limiting stenosis in the cervical carotid or cervical vertebral arteries.      MR-BRAIN-W/O   Final Result      1.  Large acute right posterior inferior cerebellar artery territory infarct with associated edema/swelling and localized mass effect, partially effacing the fourth ventricle. The lateral ventricles are now mildly dilated suggesting developing    obstructive hydrocephalus.   2.  Small area of petechial hemorrhage within the infarct without other significant acute intracranial hemorrhage.   3.  FLAIR hyperintensity in the right vertebral artery suggesting sluggish flow, less likely thrombus formation.      These findings were discussed with MONIQUE KNOX on 6/27/2022 1:49 PM.      CT-HEAD W/O   Final Result      Head CT without contrast within normal limits. No evidence of acute cerebral infarction, hemorrhage or mass lesion.         DX-ESOPHAGUS - [id number]    (Results Pending)       Assessment and Plan:    Alistair Putnam is a 57 y.o. man who presented 6/26/2022 with a large right PICA cryptogenic ischemic infarct.  He had EVD placed as well as treatment with hypertonic saline but has remained neurologically stable throughout hospitalization.  He is currently doing well with a plan to clamp and challenges EVD in the next 24 to 48 hours.    Problem list:  1.  Large right cerebellar ischemic infarct, cryptogenic  2.  Cerebellar edema with mild mass-effect on the fourth ventricle causing hydrocephalus    Stroke workup:  Suspected Etiology -cryptogenic  Vessel imaging -does not reveal any large vessel pathology or  stenosis  MRI Brain -shows large right cerebellar ischemic infarct with some cerebellar edema and effacement of the fourth ventricle  FLP -186/79/41/129  A1C -6.2%  ECHO -65% ejection fraction.  Normal atria.  No evidence of atrial shunt.  Diet - Passed swallow. Regular diet.  Dispo - PT/OT as appropriate    - Continue Aspirin 81mg daily.   - Continue Atorvastatin 80mg or other comparable high intensity statin for a long-term LDL goal <70  - q4h neurochecks  -Blood pressure goal normotension.  - off hypertonic saline  - Continue EVD care per neurosurgery with EVD challenge coming in the coming days.  - long-term cardiac monitoring on discharge -- Zio patch  -Hypercoagulable panel pending          The evaluation of the patient, and recommended management, was discussed with the resident staff. I have performed a physical exam and reviewed and updated ROS and Plan today (7/5/2022).     Quinn Mace D.O.  Neurohospitalist, Acute Care Services

## 2022-07-05 NOTE — THERAPY
Speech Language Pathology  Daily Treatment     Patient Name: Alistair Putnam  Age:  57 y.o., Sex:  male  Medical Record #: [id number]  Today's Date: 7/5/2022    Precautions: Fall Risk, Swallow Precautions ( See Comments)  Comments: EVD    Assessment  Patient was seen on this date for dysphagia treatment. Patient eager for diet upgrade and reported no difficulty swallowing. Re-education provided to pt and SO regarding results of FEES including impaired sensation and laryngeal mistiming with thin liquids.     PO trials were administered and consisted of trials of thin liquids with cues for oral hold and quick swallow and double swallow with trials of regular solids. Patient with good carryover of swallow strategies and presented with no overt s/sx of aspiration. Voice remained clear during sustain falsetto task (x5). Education provided to pt regarding current status and SLP recs for repeat dx swallow study prior to diet upgrade.    Recommendations  1. Continue soft & bite size and mildly thick liquid diet  2. Modified barium swallow study to be completed tomorrow   -Per MD, EVD likely to be removed tomorrow     Plan  Continue current treatment plan.    Discharge Recommendations: Recommend post-acute placement for additional speech therapy services prior to discharge home     07/05/22 1102   Vitals   O2 Delivery Device Nasal Cannula   Short Term Goals   Short Term Goal # 5 NEW 7/1: Pt will consume an SB6/MT2 diet with no overt s/sx of aspiration   Goal Outcome  # 5 Progressing as expected

## 2022-07-05 NOTE — THERAPY
Physical Therapy   Daily Treatment     Patient Name: Alistair Putnam  Age:  57 y.o., Sex:  male  Medical Record #: [id number]  Today's Date: 7/5/2022    Precautions: Fall Risk;Swallow Precautions  Comments: EVD clamped    Assessment  Pt highly motivated to participate. Able to incr gait today and pt ambulated 75ft with FWW, CGA, no overt LOB. Demos decr step length and needs cues for continuous push with FWW. Recommend postacute intensive therapy to maximize safety and functional independence prior to return home.     Plan  Continue current treatment plan.  DC Equipment Recommendations: Unable to determine at this time  Discharge Recommendations: Recommend post-acute placement for additional physical therapy services prior to discharge home (can tolerate 3hrs/day intensive therapy)         07/05/22 1319   Cognition    Level of Consciousness Alert   Comments pleasant, highly motivated   Balance   Sitting Balance (Static) Fair +   Sitting Balance (Dynamic) Fair +   Standing Balance (Static) Fair   Standing Balance (Dynamic) Fair -   Weight Shift Sitting Good   Weight Shift Standing Fair   Comments standing with FWW   Gait Analysis   Gait Level Of Assist Contact Guard Assist   Assistive Device Front Wheel Walker   Distance (Feet) 75   Deviation Bradykinetic   Weight Bearing Status no restrictions   Skilled Intervention Verbal Cuing;Postural Facilitation;Sequencing   Comments cues for continuous oush with FWW, ambulates with decr step length and step height. reports fatigue. no LOB.   Functional Mobility   Sit to Stand Supervised   Bed, Chair, Wheelchair Transfer Contact Guard Assist   Short Term Goals    Short Term Goal # 1 Pt will perform supine <> sit without bed features with SPV within 6 visits in order to progress toward PLOF   Goal Outcome # 1 Progressing as expected   Short Term Goal # 2 Pt will perform STS/functional transfers with SPV within 6 visits in order to progress OOB mobility   Goal Outcome # 2  Progressing as expected   Short Term Goal # 3 Pt will ambulate 200 ft with SPV within 6 visits in order   Goal Outcome # 3 Progressing as expected   Short Term Goal # 4 Pt will negotiate FOS with single rail & SPV within 6 visits in order to access home   Goal Outcome # 4 Goal not met

## 2022-07-05 NOTE — PROGRESS NOTES
"  Physical Medicine and Rehabilitation Consultation          Note adapted from Dr. Pedroza's original consult note on 6/30    Chief complaint: PICA infarct     HPI: The patient is a 57 y.o.  male with a past medical history of HLD, HTN and \"heart murmur\" ;  who presented on 6/26/2022  3:31 PM with acute onset dizziness, vertigo, headache and nausea.  A CT of the head obtained on 6/22 showed no acute hemorrhage no large territory infarct and no mass.  The brain MRI was obtained on 6/27 which revealed a large acute infarct in the right posterior inferior cerebellar artery territory with associated cytotoxic edema and mass-effect with partial effacement of the fourth ventricle and mild lateral ventricle dilation suggestive of obstructive hydrocephalus and small petechial hemorrhage transformation.  Neurology and neurosurgery were consulted.  Neurosurgery placed a right frontal EVD on 6/27 for treatment of patient's hydrocephalus.  Per neurology, etiology at this time is presumed to be an emboli to the right PICA with suspicion of cardioembolic source.  Echocardiogram was obtained which showed an EF of 65% and no shunt.  Patient remains in the ICU for EVD monitoring and hypertonic therapies.  Functionally, patient has been willing to participate with therapy, he is functioning at a min assist level for upper body dressing, min assist for bed mobility, patient continues to be n.p.o. with cortrack in place. EVD in place as of 6/30.     Patient seen and examined at bedside, family at bedside. Patient able to tolerate getting up to chair, seated in recliner. Continues to have some dizziness, improved, but overall feels well. Denies new changes in numbness/tingling/weakness.     7/5/2022  Patient is progressing with therapies.  Now walking 75 feet contact-guard assist with front wheel walker, still is requiring min assist with lower body dressing, otherwise to supervision for ADLs. Patient is tolerating a soft and bite sized " "diet with mildly thick liquids. The plan to have an MBSS tomorrow. Patient had EVD clamped today, possibly will be removed tomorrow.      Social Hx:  Patient lives with his spouse in a 2story home, family able to provide assistance   3 CINTHIA  At prior level of function patient was independent with mobility and ADLs       Employment: sells [employer]   Tobacco: Denies  Alcohol: Denies  Drugs: Denies     THERAPY:  Restrictions: Fall risk, EVD placement, swallow precautions  PT: Functional mobility    PT note: Contact-guard assist for 2 feet, limited by ABD, contact-guard assist for sit to stand  : Walking 75 feet with front wheel walker at contact-guard assist    OT: ADLs   OT note: Min assist upper body dressing  : Min assist lower body dressing    SLP:    SLP note: N.p.o. with core track  : Soft and bite-size diet with mildly thick liquid    IMAGIN/28 echocardiogram EF 65%, no evidence of shunt      CT HEAD   1.  Postprocedural changes of ventriculostomy placement with minimal hyperdense hemorrhage tracking along the ventriculostomy tract.  2.  Small anterior foci of right pneumocephalus.  3.  Low-density evolving stroke within the right cerebellar hemisphere effacing the fourth ventricle.  4.  Atherosclerosis    PROCEDURES:   R frontal EVD placement performed by Dr. Nielsen     PMH:  Past Medical History:   Diagnosis Date   • Heart murmur 2017    \"As a child\"   • Pain 2017    right shoulder pain       PSH:  Past Surgical History:   Procedure Laterality Date   • SHOULDER ARTHROSCOPY W/ ROTATOR CUFF REPAIR Right 2018    Procedure: SHOULDER ARTHROSCOPY W/ ROTATOR CUFF REPAIR- WITH EXTENSIVE DEBRIDEMENT;  Surgeon: Rl Martinez M.D.;  Location: SURGERY [facility];  Service: Orthopedics   • SHOULDER DECOMPRESSION ARTHROSCOPIC Right 2018    Procedure: SHOULDER DECOMPRESSION ARTHROSCOPIC- SUBACROMIAL;  Surgeon: Rl Martinez M.D.;  Location: [facility]" "[facility];  Service: Orthopedics   • SHOULDER ARTHROSCOPY W/ BICIPITAL TENODESIS REPAIR Right 1/8/2018    Procedure: SHOULDER ARTHROSCOPY W/ BICIPITAL TENODESIS REPAIR;  Surgeon: Rl Martinez M.D.;  Location: SURGERY [facility];  Service: Orthopedics   • SINUS OBLITERATION FRONTAL  2010   • KNEE ARTHROSCOPY Right 1989   • TONSILLECTOMY  1976   • ORIF, FRACTURE, TIBIA Right 1976    with skin graft       FHX:  Family History   Problem Relation Age of Onset   • Cancer Mother        Medications:  Current Facility-Administered Medications   Medication Dose   • enoxaparin (Lovenox) inj 40 mg  40 mg   • acetaminophen (Tylenol) tablet 650 mg  650 mg   • aspirin (ASA) chewable tab 81 mg  81 mg   • amLODIPine (NORVASC) tablet 10 mg  10 mg   • atorvastatin (LIPITOR) tablet 80 mg  80 mg   • lisinopril (PRINIVIL) tablet 10 mg  10 mg   • fludrocortisone (FLORINEF) tablet 0.1 mg  0.1 mg   • ondansetron (ZOFRAN ODT) dispertab 4 mg  4 mg   • senna-docusate (PERICOLACE or SENOKOT S) 8.6-50 MG per tablet 2 Tablet  2 Tablet    And   • polyethylene glycol/lytes (MIRALAX) PACKET 1 Packet  1 Packet    And   • magnesium hydroxide (MILK OF MAGNESIA) suspension 30 mL  30 mL    And   • bisacodyl (DULCOLAX) suppository 10 mg  10 mg   • sodium chloride (SALT) tablet 2 g  2 g   • hydrALAZINE (APRESOLINE) injection 20 mg  20 mg   • labetalol (NORMODYNE/TRANDATE) injection 10-20 mg  10-20 mg   • MD Alert...ICU Electrolyte Replacement per Pharmacy     • ondansetron (ZOFRAN) syringe/vial injection 4 mg  4 mg   • prochlorperazine (COMPAZINE) injection 5-10 mg  5-10 mg       Allergies:  No Known Allergies    Physical Exam:  Vitals: /67   Pulse 89   Temp 36.6 °C (97.9 °F) (Temporal)   Resp 20   Ht 1.803 m (5' 11\")   Wt 98.5 kg (217 lb 2.5 oz)   SpO2 93%   Gen: NAD, seated comfortably in recliner, family at side   Head: + EVD in place   Eyes/ Nose/ Mouth: PERRLA, moist mucous membranes, cortrak in place   Cardio: RRR, good distal " perfusion, warm extremities  Pulm: normal respiratory effort, no cyanosis , breathing comfortably on RA   Abd: Soft NTND, negative borborygmi   Ext: No peripheral edema. No calf tenderness. No clubbing.    Motor:      Upper Extremity  Myotome R L   Shoulder flexion C5 4/5 4/5   Elbow flexion C5 5/5 5/5   Wrist extension C6 5/5 5/5   Elbow extension C7 5/5 5/5   Finger flexion C8 5/5 5/5   Finger abduction T1 5/5 5/5     Lower Extremity Myotome R L   Hip flexion L2 4/5 4/5   Knee extension L3 5/5 5/5   Ankle dorsiflexion L4 5/5 5/5   Toe extension L5 5/5 5/5   Ankle plantarflexion S1 5/5 5/5     Sensory:   intact to light touch through out    Coordination:    Intact finger kit bilaterally       Labs: Reviewed and significant for   Recent Labs     07/03/22 0555 07/04/22  0607 07/05/22  0602   RBC 4.97 4.85 5.01   HEMOGLOBIN 13.3* 12.7* 13.1*   HEMATOCRIT 40.8* 39.7* 40.5*   PLATELETCT 271 263 264     Recent Labs     07/03/22  0555 07/03/22  1210 07/04/22  0607 07/04/22  1210 07/05/22  0001 07/05/22  0602 07/05/22  1140   SODIUM 138   < > 136   < > 137 135 133*   POTASSIUM 4.1  --  3.8  --   --  4.0  --    CHLORIDE 108  --  105  --   --  104  --    CO2 21  --  22  --   --  22  --    GLUCOSE 88  --  83  --   --  87  --    BUN 19  --  20  --   --  21  --    CREATININE 0.62  --  0.61  --   --  0.70  --    CALCIUM 8.3*  --  8.6  --   --  8.7  --     < > = values in this interval not displayed.     Recent Results (from the past 24 hour(s))   SODIUM SERUM (NA)    Collection Time: 07/04/22  5:40 PM   Result Value Ref Range    Sodium 137 135 - 145 mmol/L   CRP QUANTITIVE (NON-CARDIAC)    Collection Time: 07/04/22  5:40 PM   Result Value Ref Range    Stat C-Reactive Protein <0.30 0.00 - 0.75 mg/dL   SODIUM SERUM (NA)    Collection Time: 07/05/22 12:01 AM   Result Value Ref Range    Sodium 137 135 - 145 mmol/L   CBC WITH DIFFERENTIAL    Collection Time: 07/05/22  6:02 AM   Result Value Ref Range    WBC 8.2 4.8 - 10.8 K/uL     RBC 5.01 4.70 - 6.10 M/uL    Hemoglobin 13.1 (L) 14.0 - 18.0 g/dL    Hematocrit 40.5 (L) 42.0 - 52.0 %    MCV 80.8 (L) 81.4 - 97.8 fL    MCH 26.1 (L) 27.0 - 33.0 pg    MCHC 32.3 (L) 33.7 - 35.3 g/dL    RDW 42.5 35.9 - 50.0 fL    Platelet Count 264 164 - 446 K/uL    MPV 9.2 9.0 - 12.9 fL    Neutrophils-Polys 62.60 44.00 - 72.00 %    Lymphocytes 22.20 22.00 - 41.00 %    Monocytes 12.30 0.00 - 13.40 %    Eosinophils 1.80 0.00 - 6.90 %    Basophils 0.50 0.00 - 1.80 %    Immature Granulocytes 0.60 0.00 - 0.90 %    Nucleated RBC 0.00 /100 WBC    Neutrophils (Absolute) 5.12 1.82 - 7.42 K/uL    Lymphs (Absolute) 1.82 1.00 - 4.80 K/uL    Monos (Absolute) 1.01 (H) 0.00 - 0.85 K/uL    Eos (Absolute) 0.15 0.00 - 0.51 K/uL    Baso (Absolute) 0.04 0.00 - 0.12 K/uL    Immature Granulocytes (abs) 0.05 0.00 - 0.11 K/uL    NRBC (Absolute) 0.00 K/uL   Basic Metabolic Panel    Collection Time: 07/05/22  6:02 AM   Result Value Ref Range    Sodium 135 135 - 145 mmol/L    Potassium 4.0 3.6 - 5.5 mmol/L    Chloride 104 96 - 112 mmol/L    Co2 22 20 - 33 mmol/L    Glucose 87 65 - 99 mg/dL    Bun 21 8 - 22 mg/dL    Creatinine 0.70 0.50 - 1.40 mg/dL    Calcium 8.7 8.5 - 10.5 mg/dL    Anion Gap 9.0 7.0 - 16.0   MAGNESIUM    Collection Time: 07/05/22  6:02 AM   Result Value Ref Range    Magnesium 2.0 1.5 - 2.5 mg/dL   PHOSPHORUS    Collection Time: 07/05/22  6:02 AM   Result Value Ref Range    Phosphorus 3.0 2.5 - 4.5 mg/dL   ESTIMATED GFR    Collection Time: 07/05/22  6:02 AM   Result Value Ref Range    GFR (CKD-EPI) 108 >60 mL/min/1.73 m 2   SODIUM SERUM (NA)    Collection Time: 07/05/22 11:40 AM   Result Value Ref Range    Sodium 133 (L) 135 - 145 mmol/L         ASSESSMENT:  Patient is a 57 y.o. male admitted with dizziness due to R PICA CVA     C Code / Diagnosis to Support: 0001.4 - Stroke: No Paresis    Rehabilitation: Impaired ADLs and mobility  Patient is unlikely to require IPR     Barriers to transfer include: Insurance  authorization, TCCs to verify disposition, medical clearance and bed availability     Additional Recommendations:  R PICA CVA  Partial effacement of 4th ventricle,hydrocephalus  - greatest deficits are dysphagia and impaired balance  - s/p EVD placement on 6/27 - clamped 7/5, possibly removing 7/6  - continues to be on hypertonic saline   - continue with PT/OT/SLP    Dysphagia   - Modified diet   - MBSS tomorrow     Dispo  -Patient is progressing well with therapies and is unlikely to require IPR when he is medically cleared.    - Rehab will follow for progress with therapy and sign off when he can demonstrate independence with mobility and ADLs sufficient enough to return home with with wife and kids.   -follow up order placed for Dr. Reva Velez MD, outpatient PMR physician   - Patient encouraged to take the next 2-3 months off from working.   -PMR to follow in the periphery for rehab appropriateness, please reach out with questions or request for medical management    Medical Complexity:  R PICA CVA   Hydrocephalus   Hypernatremia   Dysphagia   Impaired balance  Dizziness    DVT PPX: SCDs       Thank you for allowing us to participate in the care of this patient.     Patient was seen for 37 minutes on unit/floor of which > 50% of time was spent on counseling and coordination of care regarding the above, including prognosis, risk reduction, benefits of treatment, and options for next stage of care.    Tim Scott D.O.   Physical Medicine and Rehabilitation     Please note that this dictation was created using voice recognition software. I have made every reasonable attempt to correct obvious errors, but there may be errors of grammar and possibly content that I did not discover before finalizing the note.

## 2022-07-06 ENCOUNTER — APPOINTMENT (OUTPATIENT)
Dept: RADIOLOGY | Facility: MEDICAL CENTER | Age: 57
DRG: 023 | End: 2022-07-06
Attending: PHYSICIAN ASSISTANT
Payer: COMMERCIAL

## 2022-07-06 ENCOUNTER — APPOINTMENT (OUTPATIENT)
Dept: RADIOLOGY | Facility: MEDICAL CENTER | Age: 57
DRG: 023 | End: 2022-07-06
Attending: INTERNAL MEDICINE
Payer: COMMERCIAL

## 2022-07-06 LAB
ANION GAP SERPL CALC-SCNC: 9 MMOL/L (ref 7–16)
BASOPHILS # BLD AUTO: 0.4 % (ref 0–1.8)
BASOPHILS # BLD: 0.03 K/UL (ref 0–0.12)
BUN SERPL-MCNC: 15 MG/DL (ref 8–22)
CALCIUM SERPL-MCNC: 8.9 MG/DL (ref 8.5–10.5)
CHLORIDE SERPL-SCNC: 103 MMOL/L (ref 96–112)
CO2 SERPL-SCNC: 23 MMOL/L (ref 20–33)
CREAT SERPL-MCNC: 0.71 MG/DL (ref 0.5–1.4)
EOSINOPHIL # BLD AUTO: 0.14 K/UL (ref 0–0.51)
EOSINOPHIL NFR BLD: 1.9 % (ref 0–6.9)
ERYTHROCYTE [DISTWIDTH] IN BLOOD BY AUTOMATED COUNT: 42.1 FL (ref 35.9–50)
GFR SERPLBLD CREATININE-BSD FMLA CKD-EPI: 108 ML/MIN/1.73 M 2
GLUCOSE SERPL-MCNC: 90 MG/DL (ref 65–99)
HCT VFR BLD AUTO: 39.3 % (ref 42–52)
HGB BLD-MCNC: 13.2 G/DL (ref 14–18)
IMM GRANULOCYTES # BLD AUTO: 0.04 K/UL (ref 0–0.11)
IMM GRANULOCYTES NFR BLD AUTO: 0.5 % (ref 0–0.9)
LYMPHOCYTES # BLD AUTO: 2.12 K/UL (ref 1–4.8)
LYMPHOCYTES NFR BLD: 28.2 % (ref 22–41)
MAGNESIUM SERPL-MCNC: 2 MG/DL (ref 1.5–2.5)
MCH RBC QN AUTO: 26.7 PG (ref 27–33)
MCHC RBC AUTO-ENTMCNC: 33.6 G/DL (ref 33.7–35.3)
MCV RBC AUTO: 79.6 FL (ref 81.4–97.8)
MONOCYTES # BLD AUTO: 1 K/UL (ref 0–0.85)
MONOCYTES NFR BLD AUTO: 13.3 % (ref 0–13.4)
NEUTROPHILS # BLD AUTO: 4.2 K/UL (ref 1.82–7.42)
NEUTROPHILS NFR BLD: 55.7 % (ref 44–72)
NRBC # BLD AUTO: 0 K/UL
NRBC BLD-RTO: 0 /100 WBC
PHOSPHATE SERPL-MCNC: 3.6 MG/DL (ref 2.5–4.5)
PLATELET # BLD AUTO: 266 K/UL (ref 164–446)
PMV BLD AUTO: 9.5 FL (ref 9–12.9)
POTASSIUM SERPL-SCNC: 4 MMOL/L (ref 3.6–5.5)
RBC # BLD AUTO: 4.94 M/UL (ref 4.7–6.1)
SODIUM SERPL-SCNC: 134 MMOL/L (ref 135–145)
SODIUM SERPL-SCNC: 135 MMOL/L (ref 135–145)
WBC # BLD AUTO: 7.5 K/UL (ref 4.8–10.8)

## 2022-07-06 PROCEDURE — 700102 HCHG RX REV CODE 250 W/ 637 OVERRIDE(OP): Performed by: INTERNAL MEDICINE

## 2022-07-06 PROCEDURE — 92611 MOTION FLUOROSCOPY/SWALLOW: CPT

## 2022-07-06 PROCEDURE — 770020 HCHG ROOM/CARE - TELE (206)

## 2022-07-06 PROCEDURE — 70450 CT HEAD/BRAIN W/O DYE: CPT

## 2022-07-06 PROCEDURE — 99233 SBSQ HOSP IP/OBS HIGH 50: CPT | Performed by: INTERNAL MEDICINE

## 2022-07-06 PROCEDURE — 84295 ASSAY OF SERUM SODIUM: CPT

## 2022-07-06 PROCEDURE — 84100 ASSAY OF PHOSPHORUS: CPT

## 2022-07-06 PROCEDURE — 85025 COMPLETE CBC W/AUTO DIFF WBC: CPT

## 2022-07-06 PROCEDURE — 80048 BASIC METABOLIC PNL TOTAL CA: CPT

## 2022-07-06 PROCEDURE — 700111 HCHG RX REV CODE 636 W/ 250 OVERRIDE (IP): Performed by: INTERNAL MEDICINE

## 2022-07-06 PROCEDURE — 83735 ASSAY OF MAGNESIUM: CPT

## 2022-07-06 PROCEDURE — 99233 SBSQ HOSP IP/OBS HIGH 50: CPT | Performed by: HOSPITALIST

## 2022-07-06 PROCEDURE — A9270 NON-COVERED ITEM OR SERVICE: HCPCS | Performed by: INTERNAL MEDICINE

## 2022-07-06 PROCEDURE — 74230 X-RAY XM SWLNG FUNCJ C+: CPT

## 2022-07-06 RX ADMIN — SODIUM CHLORIDE 2 G: 1 TABLET ORAL at 11:21

## 2022-07-06 RX ADMIN — ASPIRIN 81 MG 81 MG: 81 TABLET ORAL at 05:15

## 2022-07-06 RX ADMIN — SENNOSIDES AND DOCUSATE SODIUM 2 TABLET: 50; 8.6 TABLET ORAL at 05:16

## 2022-07-06 RX ADMIN — LISINOPRIL 10 MG: 10 TABLET ORAL at 05:16

## 2022-07-06 RX ADMIN — FLUDROCORTISONE ACETATE 0.1 MG: 0.1 TABLET ORAL at 05:16

## 2022-07-06 RX ADMIN — ATORVASTATIN CALCIUM 80 MG: 80 TABLET, FILM COATED ORAL at 17:18

## 2022-07-06 RX ADMIN — AMLODIPINE BESYLATE 10 MG: 10 TABLET ORAL at 05:15

## 2022-07-06 RX ADMIN — SODIUM CHLORIDE 2 G: 1 TABLET ORAL at 08:52

## 2022-07-06 RX ADMIN — ENOXAPARIN SODIUM 40 MG: 40 INJECTION SUBCUTANEOUS at 17:18

## 2022-07-06 RX ADMIN — SENNOSIDES AND DOCUSATE SODIUM 2 TABLET: 50; 8.6 TABLET ORAL at 17:20

## 2022-07-06 RX ADMIN — SODIUM CHLORIDE 2 G: 1 TABLET ORAL at 17:18

## 2022-07-06 ASSESSMENT — PAIN DESCRIPTION - PAIN TYPE
TYPE: ACUTE PAIN

## 2022-07-06 ASSESSMENT — ENCOUNTER SYMPTOMS
COUGH: 0
CONSTIPATION: 0
DIZZINESS: 1
SHORTNESS OF BREATH: 0
NAUSEA: 0
DIARRHEA: 0
VOMITING: 0
WHEEZING: 0
CHILLS: 0
HEADACHES: 0
FEVER: 0

## 2022-07-06 NOTE — THERAPY
Speech Language Pathology   Video Swallow Evaluation     Patient Name: Alistair Putnam  AGE:  57 y.o., SEX:  male  Medical Record #: [id number]  Today's Date: 7/6/2022     Precautions  Precautions: Fall Risk, Swallow Precautions ( See Comments)  Comments: EVD      Current Method of Nutrition   Oral diet (SB6/MT2)    Pertinent Information  Affect/Behavior: Cooperative  Oxygen Requirements: Room Air  Secretion Management: WNL  Cortrak: None  Dentition: Good  Factor(s) Affecting Performance: None      Discussed with the risks, benefits, and alternatives of the MBSS procedure. Patient/family acknowledged and agreed to proceed.    Assessment  Videofluoroscopic Swallow Study was conducted in the lateral projection(s) to evaluate oropharyngeal swallow function. A radiology tech was present to assist with the procedure.       Positioning: seated upright in fluoroscopy chair  Anatomic View: WNL  Bolus Administration: SLP and Patient  PO barium contrast trials: Varibar thin liquid, liquidized mixed with Varibar powder, solid coated in Varibar pudding      Consistency PAS Score Timing Comments   Thin Liquid 2 During swallow PAS 2 following cup sip x1  PAS 1 w/ subsequent trials of thins w/ oral hold and single and consecutive sips from straw    Solid 1 N/A    Mixed 1 N/A        1     No contrast enters airway  2     Contrast enters the airway, remains above the vocal folds, and is ejected from the airway (not seen in the airway at the end of the swallow).  3     Contrast enters the airway, remains above the vocal folds, and is not ejected from the airway (is seen in the airway after the swallow).  4     Contrast enters the airway, contacts the vocal folds, and is ejected from the airway.  5     Contrast enters the airway, contacts the vocal folds, and is not ejected from the airway  6     Contrast enters the airway, crosses the plane of the vocal folds, and is ejected from the airway.  7     Contrast enters the airway,  crosses the plane of the vocal folds, and is not ejected from the airway despite effort.  8     Contrast enters the airway, crosses the plane of the vocal folds, is not ejected from the airway and there is no response to aspiration.    Oral phase: WFL    Pharyngeal phase:   1. Laryngeal mistiming resulting in laryngeal penetration x1. Cues for oral hold eliminated penetration. NO other laryngeal penetration or aspiration appreciated with subsequent trials of thins via single and consecutive sips from straw.  2. Trace-mild diffuse pharyngeal residue but eliminated with cued or spontaneous second swallow.    Esophageal phase: WFL.       Clinical Impressions  The pt presents with a slight oropharyngeal dysphagia, likely acute related to CVA. Swallow safety is preserved; swallow efficiency is preserved. Risk for aspiration PNA is low. Risk for malnutrition/dehydration is low. A modified diet is not indicated at this time. Swallow prognosis is excellent given functional oropharyngeal swallow. Pt will likely not benefit from SLP services given swallow function is only slightly impaired w/ anticipation for quick progress.       Recommendations  1. Upgrade to regular and thin liquid diet   2.  Swallowing Instructions & Precautions:   Supervision: Distant supervision - check on patient 2-3 times per meal  Positioning: Fully upright and midline during oral intake  Medication: Whole with liquid  Strategies: Small bites/sips, Slow rate of intake, Multiple swallows (x 2) per bite/sip   Oral Care: BID  3. SLP following to ensure diet tolerance and carryover of swallow strategies and to complete orders for a cognitive-linguistic evaluation.       Plan  Recommend Speech Therapy 3 times per week until therapy goals are met for the following treatments:  Dysphagia Training and Patient / Family / Caregiver Education.    Discharge Recommendations: Anticipate that the patient will have no further speech therapy needs after discharge  from the hospital       07/06/22 1240   Vitals   O2 Delivery Device Room air w/o2 available   Prior Level Of Function   Communication Within Functional Limits   Swallow Within Functional Limits   Dentition Other (See Comments)  (adequate)   Dentures None   Hearing Within Functional Limits for Evaluation   Patient's Primary Language English   Short Term Goals   Short Term Goal # 5 NEW 7/1: Pt will consume an SB6/MT2 diet with no overt s/sx of aspiration   Goal Outcome  # 5 Goal met, new goal added   Short Term Goal # 5 B  Patient will consume a RG7/TN0 diet w/ no overt s/sx of aspiraiton given min cues to swallow precautions.

## 2022-07-06 NOTE — PROGRESS NOTES
Phoned report to Maya CLAYTON on neuroscience. No concerns. Awaiting bed to be cleaned for transfer.

## 2022-07-06 NOTE — PROGRESS NOTES
"Critical Care Progress Note    Date of admission  6/26/2022    Chief Complaint  57 year old male with no past medical history and is not a smoker who presented to the ER yesterday with complaints of sudden onset of dizziness yesterday afternoon.  The patient reports that he was in his usual state of health when he got up to get a glass of water when he had sudden onset of dizziness as if the \"room was spinning\".  He reported that he had balance issues and had to sit down.  He had nausea and vomiting.  No tinnitus.  No unilateral numbness, tingling, or weakness to extremities.  Family witness that the patient had horizontal nystagmus for about 15 minutes.  EMS witnessed this as well.  He was brought to the ER and admitted.  He underwent an MRI brain today which revealed a large right cerebellar stroke and was transferred to the ICU for ongoing care. (Dr Valdivia HPI 6/27)     Hospital Course  6/27 - admit, s/p EVD at night  6/28 - Neuro better, HA, ECHO ok, failed FEES  6/29 - Speech/vision better, NIH 2-3, EVD pressures/output ok, Nicardipine 5, 3% 60cc  6/30 - Neuro no change, EVD output low, 3% saline 60, neurosurgery raising EVD level to 10 cm today  7/1 - Neuro no change, EVD 10cm, 3% 60, off Nicardipine, passed swallow  7/2 - Neuro no change, ECD to15 cm a day, 3% 55->50, Na 142  7/3 - Neuro exam no change, EVD back to 10 cm per NS, 3% 40cc -> 30cc  7/4 EVD @ 15; HTS weaning  7/5 -EVD clamped today, sodium 135, off 3% saline  7/6 - EVD removed; transfer out of ICU; advance diet per SLP recs    Interval Problem Update  Reviewed last 24 hour events:  Repeat CT this am imrpoved  EVD clamped  ICP - poor waveform  Neuro intact, no pain  SR  SBP < 140, no meds needed  2 lpm o2 with sleep  Soft diet, SLP following, MBS planned today  Na 134-135  lovenox ppx  Florinef and salt tab    Review of Systems  Review of Systems   Unable to perform ROS: Critical illness        Vital Signs for last 24 hours   Temp:  [36.2 °C " (97.2 °F)-36.8 °C (98.2 °F)] 36.7 °C (98.1 °F)  Pulse:  [69-92] 72  Resp:  [12-26] 17  BP: (100-135)/(57-71) 108/65  SpO2:  [89 %-98 %] 98 %    Hemodynamic parameters for last 24 hours       Respiratory Information for the last 24 hours       Physical Exam   Physical Exam  Vitals and nursing note reviewed.   HENT:      Head: Normocephalic and atraumatic.      Comments: Drain in place     Right Ear: External ear normal.      Left Ear: External ear normal.      Nose: Nose normal.      Mouth/Throat:      Mouth: Mucous membranes are moist.   Eyes:      Pupils: Pupils are equal, round, and reactive to light.   Cardiovascular:      Rate and Rhythm: Normal rate and regular rhythm.      Pulses: Normal pulses.   Pulmonary:      Effort: Pulmonary effort is normal. No respiratory distress.   Abdominal:      General: Abdomen is flat. There is no distension.      Palpations: Abdomen is soft.   Musculoskeletal:      Right lower leg: No edema.      Left lower leg: No edema.   Skin:     General: Skin is warm and dry.      Capillary Refill: Capillary refill takes less than 2 seconds.   Neurological:      Mental Status: He is alert.      Comments: Awake, alert, clear speech, normal strength both upper and lower extremities, normal repetitive motion, EOMi         Medications  Current Facility-Administered Medications   Medication Dose Route Frequency Provider Last Rate Last Admin   • enoxaparin (Lovenox) inj 40 mg  40 mg Subcutaneous DAILY AT 1800 Steve Victor M.D.   40 mg at 07/05/22 1731   • acetaminophen (Tylenol) tablet 650 mg  650 mg Oral Q6HRS PRN Steve Victor M.D.       • aspirin (ASA) chewable tab 81 mg  81 mg Oral DAILY Steve Victor M.D.   81 mg at 07/06/22 0515   • amLODIPine (NORVASC) tablet 10 mg  10 mg Oral Q DAY Steve Victor M.D.   10 mg at 07/06/22 0515   • atorvastatin (LIPITOR) tablet 80 mg  80 mg Oral Q EVENING Steve Victor M.D.   80 mg at 07/05/22 1730   • lisinopril (PRINIVIL) tablet  10 mg  10 mg Oral Q DAY Steve Victor M.D.   10 mg at 07/06/22 0516   • fludrocortisone (FLORINEF) tablet 0.1 mg  0.1 mg Oral DAILY Steve Victor M.D.   0.1 mg at 07/06/22 0516   • ondansetron (ZOFRAN ODT) dispertab 4 mg  4 mg Oral Q4HRS PRN Steve Victor M.D.       • senna-docusate (PERICOLACE or SENOKOT S) 8.6-50 MG per tablet 2 Tablet  2 Tablet Oral BID Steve Victor M.D.   2 Tablet at 07/06/22 0516    And   • polyethylene glycol/lytes (MIRALAX) PACKET 1 Packet  1 Packet Oral QDAY PRN Steve Victor M.D.   1 Packet at 07/05/22 0810    And   • magnesium hydroxide (MILK OF MAGNESIA) suspension 30 mL  30 mL Oral QDAY PRN Steve Victor M.D.   30 mL at 07/01/22 1803    And   • bisacodyl (DULCOLAX) suppository 10 mg  10 mg Rectal QDAY PRN Steve Victor M.D.       • sodium chloride (SALT) tablet 2 g  2 g Oral TID WITH MEALS Steve Victor M.D.   2 g at 07/05/22 1730   • hydrALAZINE (APRESOLINE) injection 20 mg  20 mg Intravenous Q4HRS PRN Steve Victor M.D.   20 mg at 07/02/22 0217   • labetalol (NORMODYNE/TRANDATE) injection 10-20 mg  10-20 mg Intravenous Q2HRS PRN Steve Victor M.D.   10 mg at 06/28/22 1634   • MD Alert...ICU Electrolyte Replacement per Pharmacy   Other PHARMACY TO DOSE Steve Victor M.D.       • ondansetron (ZOFRAN) syringe/vial injection 4 mg  4 mg Intravenous Q4HRS PRN Steve Miguel M.D.       • prochlorperazine (COMPAZINE) injection 5-10 mg  5-10 mg Intravenous Q4HRS PRN Steve Miguel M.D.   5 mg at 06/27/22 1034       Fluids    Intake/Output Summary (Last 24 hours) at 7/6/2022 0719  Last data filed at 7/6/2022 0600  Gross per 24 hour   Intake 200 ml   Output 1805 ml   Net -1605 ml       Laboratory          Recent Labs     07/04/22  0607 07/04/22  0815 07/04/22  1210 07/05/22  0602 07/05/22  1140 07/05/22  1734 07/06/22  0015 07/06/22  0510   SODIUM 136  --    < > 135   < > 136 134* 135   POTASSIUM 3.8  --   --  4.0  --   --   --  4.0   CHLORIDE  105  --   --  104  --   --   --  103   CO2 22  --   --  22  --   --   --  23   BUN 20  --   --  21  --   --   --  15   CREATININE 0.61  --   --  0.70  --   --   --  0.71   MAGNESIUM  --  2.0  --  2.0  --   --   --  2.0   PHOSPHORUS  --  3.0  --  3.0  --   --   --  3.6   CALCIUM 8.6  --   --  8.7  --   --   --  8.9    < > = values in this interval not displayed.     Recent Labs     07/04/22 0607 07/05/22 0602 07/06/22  0510   GLUCOSE 83 87 90     Recent Labs     07/04/22 0607 07/05/22 0602 07/06/22  0510   WBC 7.6 8.2 7.5   NEUTSPOLYS 59.90 62.60 55.70   LYMPHOCYTES 26.00 22.20 28.20   MONOCYTES 11.10 12.30 13.30   EOSINOPHILS 1.80 1.80 1.90   BASOPHILS 0.40 0.50 0.40     Recent Labs     07/04/22 0607 07/05/22 0602 07/06/22  0510   RBC 4.85 5.01 4.94   HEMOGLOBIN 12.7* 13.1* 13.2*   HEMATOCRIT 39.7* 40.5* 39.3*   PLATELETCT 263 264 266       Imaging  X-Ray:  I have personally reviewed the images and compared with prior images.    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

## 2022-07-06 NOTE — ASSESSMENT & PLAN NOTE
Presented with vertigo, found to have large right PICA stroke with partial effacement of fourth ventricle   S/P EVD placement on 6/27/2022, removed 7/6  Salt tabs + Florinef, finished hypertonic saline  Statin  ASA

## 2022-07-06 NOTE — CONSULTS
Hospital Consult and Progress Note    Date of Service  7/6/2022    Primary Care Physician  Rl Musa M.D.    Consult Requested by  Critical care Dr. Reyes    Chief Complaint  Chief Complaint   Patient presents with   • Dizziness     Onset today at 1430   • Weakness   • Headache     Onset today at 1430    • Numbness     Numbness and tingling present to REKHA arms and legs   • Nausea   Reason for consult: Transfer of medical care to hospitalist team    History of Presenting Illness  57 year old male with no past medical history and is not a smoker who presented to the ER yesterday with complaints of sudden onset of dizziness. MRI brain revealed a large right cerebellar stroke and was transferred to the ICU.  Neurosurgery was consulted and EVD was placed.  Echo unremarkable.  Failed FEES study on 6/28.  His speech and vision slowly improved.  He was titrated on nicardipine drip, which was eventually able to be weaned off.  Hypertonic saline was also able to be weaned off.  EVD was then removed on 7/6.  Patient was able to be transferred out of the ICU and thus hospitalist was consulted.  Rehab was consulted and signed off.  Now with the EVD out, patient will continue to work with therapy for hopes of outpatient therapy (patient prefers outpatient therapy rather than home health).    I discussed the plan of care with patient and family and critical care Dr. Reyes.    Review of Systems  Review of Systems   Constitutional: Negative for chills and fever.   Respiratory: Negative for cough, shortness of breath and wheezing.    Cardiovascular: Negative for chest pain.   Gastrointestinal: Negative for constipation (Had 2 large bowel movements yesterday), diarrhea, nausea and vomiting.   Genitourinary: Negative for dysuria.   Neurological: Positive for dizziness ( With standing or walking). Negative for headaches ( Only when patient had had staples placed).     all other systems reviewed and are negative    Past Medical  "History   has a past medical history of Heart murmur (12/2017) and Pain (12/2017).    Surgical History   has a past surgical history that includes knee arthroscopy (Right, 1989); tonsillectomy (1976); orif, fracture, tibia (Right, 1976); sinus obliteration frontal (2010); shoulder arthroscopy w/ rotator cuff repair (Right, 1/8/2018); shoulder decompression arthroscopic (Right, 1/8/2018); and shoulder arthroscopy w/ bicipital tenodesis repair (Right, 1/8/2018).    Family History  family history includes Cancer in his mother.    Social History   reports that he has never smoked. He has never used smokeless tobacco. He reports current alcohol use. He reports current drug use. Drugs: Marijuana and Inhaled.    Allergies  No Known Allergies    Medications  No current facility-administered medications on file prior to encounter.     Current Outpatient Medications on File Prior to Encounter   Medication Sig Dispense Refill   • Zinc 50 MG Tab Take 50 mg by mouth every day.     • multivitamin (THERAGRAN) Tab Take 1 Tablet by mouth every day.     • calcium polycarbophil (FIBERCON) 625 MG Tab Take 625 mg by mouth every day.     • Omega-3 Fatty Acids (FISH OIL) 1000 MG Cap capsule Take 1,000 mg by mouth every day.         Physical Exam  Weight/BMI: Body mass index is 30.29 kg/m².  /62   Pulse 79   Temp 36.3 °C (97.4 °F) (Temporal)   Resp (!) 22   Ht 1.803 m (5' 11\")   Wt 98.5 kg (217 lb 2.5 oz)   SpO2 97%    Vitals:    07/06/22 0900 07/06/22 1000 07/06/22 1100 07/06/22 1200   BP: 106/69 101/61 (!) 94/57 102/62   Pulse: 79      Resp: 20 (!) 26 13 (!) 22   Temp:  36.3 °C (97.4 °F)     TempSrc:  Temporal  Temporal   SpO2: 97%      Weight:       Height:        Oxygen Therapy:  Pulse Oximetry: 97 %, O2 (LPM): 2, O2 Delivery Device: Room air w/o2 available  Physical Exam  Constitutional:       General: He is not in acute distress.     Appearance: He is well-developed. He is not diaphoretic.   HENT:      Head: Normocephalic " and atraumatic.      Right Ear: External ear normal.      Left Ear: External ear normal.      Mouth/Throat:      Pharynx: No oropharyngeal exudate or posterior oropharyngeal erythema.   Eyes:      General:         Right eye: No discharge.         Left eye: No discharge.      Extraocular Movements: Extraocular movements intact.   Cardiovascular:      Rate and Rhythm: Normal rate.      Heart sounds:     No gallop.   Pulmonary:      Effort: No respiratory distress.      Breath sounds: No wheezing.   Abdominal:      General: There is no distension.      Tenderness: There is no abdominal tenderness. There is no guarding.   Skin:     General: Skin is warm.      Comments: Right head bandage over staples   Neurological:      Mental Status: He is alert and oriented to person, place, and time. Mental status is at baseline.      Motor: No weakness.   Psychiatric:         Mood and Affect: Mood normal.         Behavior: Behavior normal.         Laboratory:   Objective   Recent Results (from the past 24 hour(s))   SODIUM SERUM (NA)    Collection Time: 07/05/22  5:34 PM   Result Value Ref Range    Sodium 136 135 - 145 mmol/L   SODIUM SERUM (NA)    Collection Time: 07/06/22 12:15 AM   Result Value Ref Range    Sodium 134 (L) 135 - 145 mmol/L   CBC WITH DIFFERENTIAL    Collection Time: 07/06/22  5:10 AM   Result Value Ref Range    WBC 7.5 4.8 - 10.8 K/uL    RBC 4.94 4.70 - 6.10 M/uL    Hemoglobin 13.2 (L) 14.0 - 18.0 g/dL    Hematocrit 39.3 (L) 42.0 - 52.0 %    MCV 79.6 (L) 81.4 - 97.8 fL    MCH 26.7 (L) 27.0 - 33.0 pg    MCHC 33.6 (L) 33.7 - 35.3 g/dL    RDW 42.1 35.9 - 50.0 fL    Platelet Count 266 164 - 446 K/uL    MPV 9.5 9.0 - 12.9 fL    Neutrophils-Polys 55.70 44.00 - 72.00 %    Lymphocytes 28.20 22.00 - 41.00 %    Monocytes 13.30 0.00 - 13.40 %    Eosinophils 1.90 0.00 - 6.90 %    Basophils 0.40 0.00 - 1.80 %    Immature Granulocytes 0.50 0.00 - 0.90 %    Nucleated RBC 0.00 /100 WBC    Neutrophils (Absolute) 4.20 1.82 - 7.42  K/uL    Lymphs (Absolute) 2.12 1.00 - 4.80 K/uL    Monos (Absolute) 1.00 (H) 0.00 - 0.85 K/uL    Eos (Absolute) 0.14 0.00 - 0.51 K/uL    Baso (Absolute) 0.03 0.00 - 0.12 K/uL    Immature Granulocytes (abs) 0.04 0.00 - 0.11 K/uL    NRBC (Absolute) 0.00 K/uL   Basic Metabolic Panel    Collection Time: 07/06/22  5:10 AM   Result Value Ref Range    Sodium 135 135 - 145 mmol/L    Potassium 4.0 3.6 - 5.5 mmol/L    Chloride 103 96 - 112 mmol/L    Co2 23 20 - 33 mmol/L    Glucose 90 65 - 99 mg/dL    Bun 15 8 - 22 mg/dL    Creatinine 0.71 0.50 - 1.40 mg/dL    Calcium 8.9 8.5 - 10.5 mg/dL    Anion Gap 9.0 7.0 - 16.0   MAGNESIUM    Collection Time: 07/06/22  5:10 AM   Result Value Ref Range    Magnesium 2.0 1.5 - 2.5 mg/dL   PHOSPHORUS    Collection Time: 07/06/22  5:10 AM   Result Value Ref Range    Phosphorus 3.6 2.5 - 4.5 mg/dL   ESTIMATED GFR    Collection Time: 07/06/22  5:10 AM   Result Value Ref Range    GFR (CKD-EPI) 108 >60 mL/min/1.73 m 2       (click the triangle to expand results)    Imaging:  CT-HEAD W/O   Final Result         1. No acute intracranial hemorrhage.      2. Evolution of the right cerebellar infarct with decreased edema and less mass effect on the fourth ventricle.                     EC-ECHOCARDIOGRAM COMPLETE W/O CONT   Final Result      CT-HEAD W/O   Final Result         1.  Postprocedural changes of ventriculostomy placement with minimal hyperdense hemorrhage tracking along the ventriculostomy tract.   2.  Small anterior foci of right pneumocephalus.   3.  Low-density evolving stroke within the right cerebellar hemisphere effacing the fourth ventricle.   4.  Atherosclerosis         DX-ABDOMEN FOR TUBE PLACEMENT   Final Result      1.  Feeding tube tip overlies the proximal stomach.      DX-CHEST-LIMITED (1 VIEW)   Final Result      1.  Right IJ catheter tip projects over the cavoatrial junction without pneumothorax.   2.  Enlarged cardiac silhouette with mild vascular congestion.   3.  There is  bibasilar atelectasis.      CT-CTA HEAD WITH & W/O-POST PROCESS   Final Result         1. No hemodynamically significant narrowing of the major intracranial vessels.      CT-CTA NECK WITH & W/O-POST PROCESSING   Final Result      1. No evidence of flow-limiting stenosis in the cervical carotid or cervical vertebral arteries.      MR-BRAIN-W/O   Final Result      1.  Large acute right posterior inferior cerebellar artery territory infarct with associated edema/swelling and localized mass effect, partially effacing the fourth ventricle. The lateral ventricles are now mildly dilated suggesting developing    obstructive hydrocephalus.   2.  Small area of petechial hemorrhage within the infarct without other significant acute intracranial hemorrhage.   3.  FLAIR hyperintensity in the right vertebral artery suggesting sluggish flow, less likely thrombus formation.      These findings were discussed with MONIQUE KNOX on 6/27/2022 1:49 PM.      CT-HEAD W/O   Final Result      Head CT without contrast within normal limits. No evidence of acute cerebral infarction, hemorrhage or mass lesion.         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 (HCC)- (present on admission)  Assessment & Plan  Presented with vertigo, found to have large right PICA stroke with partial effacement of fourth ventricle   S/P EVD placement on 6/27/2022, removed 7/6  Salt tabs + Florinef, finished hypertonic saline  Statin  ASA    Hypokalemia- (present on admission)  Assessment & Plan  Now repleted    Stroke of unknown etiology (HCC)- (present on admission)  Assessment & Plan  Neuro consulted  Monitor on telemetry  Zio patch on discharge    Dizziness- (present on admission)  Assessment & Plan  Was admitted for work-up of this  Due to stroke  Now largely improved other than when patient stands or walks      VTE prophylaxis:  enoxaparin

## 2022-07-06 NOTE — DISCHARGE PLANNING
Per physiatry follow up yesterday patient unlikely to require IPR, anticipate dc home with OP services. [facility] will no longer follow, please reach out if discharge plan changes [id number].

## 2022-07-06 NOTE — PROGRESS NOTES
Pt left unit in wheelchair, on tele box, with CNA and family. All belongings sent with patient. No concerns.

## 2022-07-06 NOTE — CARE PLAN
The patient is Stable - Low risk of patient condition declining or worsening    Shift Goals  Clinical Goals: SBP <140  Patient Goals: Rest  Family Goals: lamberto    Progress made toward(s) clinical / shift goals:    Problem: Pain - Standard  Goal: Alleviation of pain or a reduction in pain to the patient’s comfort goal  Outcome: Progressing     Problem: Knowledge Deficit - Standard  Goal: Patient and family/care givers will demonstrate understanding of plan of care, disease process/condition, diagnostic tests and medications  Outcome: Progressing     Problem: Fall Risk  Goal: Patient will remain free from falls  Outcome: Progressing     Problem: Knowledge Deficit - Stroke Education  Goal: Patient's knowledge of stroke and risk factors will improve  Outcome: Progressing       Patient is not progressing towards the following goals:  None.

## 2022-07-06 NOTE — HOSPITAL COURSE
57 year old male with no past medical history and is not a smoker who presented to the ER yesterday with complaints of sudden onset of dizziness. MRI brain revealed a large right cerebellar stroke and was transferred to the ICU.  Neurosurgery was consulted and EVD was placed.  Echo unremarkable.  Failed FEES study on 6/28.  His speech and vision slowly improved.  He was titrated on nicardipine drip, which was eventually able to be weaned off.  Hypertonic saline was also able to be weaned off.  EVD was then removed on 7/6.  Patient was able to be transferred out of the ICU and thus hospitalist was consulted.  Rehab was consulted and signed off.  Now with the EVD out, patient will continue to work with therapy for hopes of outpatient therapy (patient prefers outpatient therapy rather than home health).

## 2022-07-06 NOTE — PROGRESS NOTES
Neurosurgery Progress Note    Subjective:  57 yr old male presented with CVA.  POD#9 R EVD placement   EVD clamped 7/5 ICPs 3-15, though nursing communicated this AM there was no wave form detected so these may not be accurate.   Neuro exam was stable overnight.     Repeat head CT stable/slightly improved    Exam:  A&O x3  PERRL, symmetric  Face symmetric   CNs II-VII grossly intact  Moving all extremities, follows commands  Sensation intact   No drift    BP  Min: 100/59  Max: 135/60  Pulse  Av.9  Min: 69  Max: 92  Resp  Av.2  Min: 12  Max: 26  Temp  Av.6 °C (97.8 °F)  Min: 36.2 °C (97.2 °F)  Max: 36.8 °C (98.2 °F)  SpO2  Av.1 %  Min: 89 %  Max: 98 %    ICP  Av.9 MM HG  Min: 0 MM HG  Max: 15 MM HG    Recent Labs     22  0607 22  0602 22  0510   WBC 7.6 8.2 7.5   RBC 4.85 5.01 4.94   HEMOGLOBIN 12.7* 13.1* 13.2*   HEMATOCRIT 39.7* 40.5* 39.3*   MCV 81.9 80.8* 79.6*   MCH 26.2* 26.1* 26.7*   MCHC 32.0* 32.3* 33.6*   RDW 44.3 42.5 42.1   PLATELETCT 263 264 266   MPV 9.2 9.2 9.5     Recent Labs     22  0607 22  1210 22  0602 22  1140 22  1734 22  0015 22  0510   SODIUM 136   < > 135   < > 136 134* 135   POTASSIUM 3.8  --  4.0  --   --   --  4.0   CHLORIDE 105  --  104  --   --   --  103   CO2   --   --   --  23   GLUCOSE 83  --  87  --   --   --  90   BUN 20  --  21  --   --   --  15   CREATININE 0.61  --  0.70  --   --   --  0.71   CALCIUM 8.6  --  8.7  --   --   --  8.9    < > = values in this interval not displayed.               Intake/Output                       22 - 22 - 22 Total  Total                 Intake    P.O.  200  -- 200  --  -- --    P.O. 200 -- 200 -- -- --    Total Intake 200 -- 200 -- -- --       Output    Urine  425  1380 1805  --  -- --    Urine Void (mL) 425 1380 1805 -- -- --    Drains  0  0 0  --  -- --    Output  (mL) (ICP/Ventriculostomy Right Other (Comment)) 0 0 0 -- -- --    Stool  --  -- --  --  -- --    Number of Times Stooled -- 0 x 0 x -- -- --    Total Output 425 1380 1805 -- -- --       Net I/O     -225 -1380 -1605 -- -- --            Intake/Output Summary (Last 24 hours) at 7/6/2022 0900  Last data filed at 7/6/2022 0600  Gross per 24 hour   Intake --   Output 1805 ml   Net -1805 ml            • enoxaparin (LOVENOX) injection  40 mg DAILY AT 1800   • acetaminophen  650 mg Q6HRS PRN   • aspirin  81 mg DAILY   • amLODIPine  10 mg Q DAY   • atorvastatin  80 mg Q EVENING   • lisinopril  10 mg Q DAY   • fludrocortisone  0.1 mg DAILY   • ondansetron  4 mg Q4HRS PRN   • senna-docusate  2 Tablet BID    And   • polyethylene glycol/lytes  1 Packet QDAY PRN    And   • magnesium hydroxide  30 mL QDAY PRN    And   • bisacodyl  10 mg QDAY PRN   • sodium chloride  2 g TID WITH MEALS   • hydrALAZINE  20 mg Q4HRS PRN   • labetalol  10-20 mg Q2HRS PRN   • MD Alert...Adult ICU Electrolyte Replacement per Pharmacy   PHARMACY TO DOSE   • ondansetron  4 mg Q4HRS PRN   • prochlorperazine  5-10 mg Q4HRS PRN       Assessment and Plan:  Hospital day #9  POD#9 R EVD placement.  EVD clamped yesterday.   Discussed case with Dr. Nielsen and he recommended removing EVD today.   EVD removed and two staples placed over drain site. Patient tolerated the procedure well. Keep the drain site dry for two more days. Will need staples removed in 7 days.   Systolic BP goal <140.  Appreciate intensivists care.   Will continue to follow.      Chemical prophylactic DVT therapy: Yes - Lovenox 40mg/qd    Start date/time: today

## 2022-07-06 NOTE — CARE PLAN
Problem: Pain - Standard  Goal: Alleviation of pain or a reduction in pain to the patient’s comfort goal  Outcome: Progressing     Problem: Knowledge Deficit - Standard  Goal: Patient and family/care givers will demonstrate understanding of plan of care, disease process/condition, diagnostic tests and medications  Outcome: Progressing     Problem: Fall Risk  Goal: Patient will remain free from falls  Outcome: Progressing     Problem: Optimal Care of the Stroke Patient  Goal: Optimal emergency care for the stroke patient  Outcome: Progressing  Goal: Optimal acute care for the stroke patient  Outcome: Progressing     Problem: Knowledge Deficit - Stroke Education  Goal: Patient's knowledge of stroke and risk factors will improve  Outcome: Progressing     Problem: Psychosocial - Patient Condition  Goal: Patient's ability to verbalize feelings about condition will improve  Outcome: Progressing  Goal: Patient's ability to re-evaluate and adapt role responsibilities will improve  Outcome: Progressing     Problem: Discharge Planning - Stroke  Goal: Ensure Stroke Core Measures are met prior to discharge  Outcome: Progressing  Goal: Patient’s continuum of care needs will be met  Outcome: Progressing     Problem: Neuro Status  Goal: Neuro status will remain stable or improve  Outcome: Progressing     Problem: Hemodynamic Monitoring  Goal: Patient's hemodynamics, fluid balance and neurologic status will be stable or improve  Outcome: Progressing     Problem: Respiratory - Stroke Patient  Goal: Patient will achieve/maintain optimum respiratory rate/effort  Outcome: Progressing     Problem: Dysphagia  Goal: Dysphagia will improve  Outcome: Progressing     Problem: Risk for Aspiration  Goal: Patient's risk for aspiration will be absent or decrease  Outcome: Progressing     Problem: Urinary Elimination  Goal: Establish and maintain regular urinary output  Outcome: Progressing     Problem: Bowel Elimination  Goal: Establish and  maintain regular bowel function  Outcome: Progressing     Problem: Mobility - Stroke  Goal: Patient's capacity to carry out activities will improve  Outcome: Progressing  Goal: Spasticity will be prevented or improved  Outcome: Progressing  Goal: Subluxation will be prevented or improved  Outcome: Progressing     Problem: Self Care  Goal: Patient will have the ability to perform ADLs independently or with assistance (bathe, groom, dress, toilet and feed)  Outcome: Progressing     Problem: Skin Integrity  Goal: Skin integrity is maintained or improved  Outcome: Progressing     The patient is Stable - Low risk of patient condition declining or worsening    Shift Goals  Clinical Goals: sbp <140, pain score 3 or less, rass 0  Patient Goals: change diet order  Family Goals: lamberto    Progress made toward(s) clinical / shift goals:  EVD clamped today with stable ICPs, pain score 0, pt able to have bowel movement today with PRN meds    Patient is not progressing towards the following goals: SLP ordered barium swallow for tomorrow in hopes of progressing diet

## 2022-07-07 LAB
ANION GAP SERPL CALC-SCNC: 11 MMOL/L (ref 7–16)
BASOPHILS # BLD AUTO: 0.4 % (ref 0–1.8)
BASOPHILS # BLD: 0.04 K/UL (ref 0–0.12)
BUN SERPL-MCNC: 19 MG/DL (ref 8–22)
CALCIUM SERPL-MCNC: 9 MG/DL (ref 8.5–10.5)
CHLORIDE SERPL-SCNC: 103 MMOL/L (ref 96–112)
CO2 SERPL-SCNC: 23 MMOL/L (ref 20–33)
CREAT SERPL-MCNC: 0.66 MG/DL (ref 0.5–1.4)
EOSINOPHIL # BLD AUTO: 0.11 K/UL (ref 0–0.51)
EOSINOPHIL NFR BLD: 1.2 % (ref 0–6.9)
ERYTHROCYTE [DISTWIDTH] IN BLOOD BY AUTOMATED COUNT: 41.9 FL (ref 35.9–50)
GFR SERPLBLD CREATININE-BSD FMLA CKD-EPI: 110 ML/MIN/1.73 M 2
GLUCOSE SERPL-MCNC: 93 MG/DL (ref 65–99)
HCT VFR BLD AUTO: 43.3 % (ref 42–52)
HGB BLD-MCNC: 14.3 G/DL (ref 14–18)
IMM GRANULOCYTES # BLD AUTO: 0.06 K/UL (ref 0–0.11)
IMM GRANULOCYTES NFR BLD AUTO: 0.6 % (ref 0–0.9)
LYMPHOCYTES # BLD AUTO: 2.05 K/UL (ref 1–4.8)
LYMPHOCYTES NFR BLD: 21.7 % (ref 22–41)
MAGNESIUM SERPL-MCNC: 2 MG/DL (ref 1.5–2.5)
MCH RBC QN AUTO: 26.5 PG (ref 27–33)
MCHC RBC AUTO-ENTMCNC: 33 G/DL (ref 33.7–35.3)
MCV RBC AUTO: 80.3 FL (ref 81.4–97.8)
MONOCYTES # BLD AUTO: 1.16 K/UL (ref 0–0.85)
MONOCYTES NFR BLD AUTO: 12.3 % (ref 0–13.4)
NEUTROPHILS # BLD AUTO: 6.04 K/UL (ref 1.82–7.42)
NEUTROPHILS NFR BLD: 63.8 % (ref 44–72)
NRBC # BLD AUTO: 0 K/UL
NRBC BLD-RTO: 0 /100 WBC
PHOSPHATE SERPL-MCNC: 3.7 MG/DL (ref 2.5–4.5)
PLATELET # BLD AUTO: 314 K/UL (ref 164–446)
PMV BLD AUTO: 9.7 FL (ref 9–12.9)
POTASSIUM SERPL-SCNC: 4.1 MMOL/L (ref 3.6–5.5)
RBC # BLD AUTO: 5.39 M/UL (ref 4.7–6.1)
SODIUM SERPL-SCNC: 137 MMOL/L (ref 135–145)
WBC # BLD AUTO: 9.5 K/UL (ref 4.8–10.8)

## 2022-07-07 PROCEDURE — 92523 SPEECH SOUND LANG COMPREHEN: CPT

## 2022-07-07 PROCEDURE — 83735 ASSAY OF MAGNESIUM: CPT

## 2022-07-07 PROCEDURE — 97535 SELF CARE MNGMENT TRAINING: CPT

## 2022-07-07 PROCEDURE — 97116 GAIT TRAINING THERAPY: CPT

## 2022-07-07 PROCEDURE — A9270 NON-COVERED ITEM OR SERVICE: HCPCS | Performed by: INTERNAL MEDICINE

## 2022-07-07 PROCEDURE — 99233 SBSQ HOSP IP/OBS HIGH 50: CPT | Performed by: STUDENT IN AN ORGANIZED HEALTH CARE EDUCATION/TRAINING PROGRAM

## 2022-07-07 PROCEDURE — 36415 COLL VENOUS BLD VENIPUNCTURE: CPT

## 2022-07-07 PROCEDURE — 770020 HCHG ROOM/CARE - TELE (206)

## 2022-07-07 PROCEDURE — 84100 ASSAY OF PHOSPHORUS: CPT

## 2022-07-07 PROCEDURE — 80048 BASIC METABOLIC PNL TOTAL CA: CPT

## 2022-07-07 PROCEDURE — 700105 HCHG RX REV CODE 258: Performed by: NURSE PRACTITIONER

## 2022-07-07 PROCEDURE — 85025 COMPLETE CBC W/AUTO DIFF WBC: CPT

## 2022-07-07 PROCEDURE — 700111 HCHG RX REV CODE 636 W/ 250 OVERRIDE (IP): Performed by: INTERNAL MEDICINE

## 2022-07-07 PROCEDURE — 700102 HCHG RX REV CODE 250 W/ 637 OVERRIDE(OP): Performed by: INTERNAL MEDICINE

## 2022-07-07 RX ORDER — SODIUM CHLORIDE, SODIUM LACTATE, POTASSIUM CHLORIDE, AND CALCIUM CHLORIDE .6; .31; .03; .02 G/100ML; G/100ML; G/100ML; G/100ML
500 INJECTION, SOLUTION INTRAVENOUS ONCE
Status: COMPLETED | OUTPATIENT
Start: 2022-07-07 | End: 2022-07-07

## 2022-07-07 RX ADMIN — ATORVASTATIN CALCIUM 80 MG: 80 TABLET, FILM COATED ORAL at 17:05

## 2022-07-07 RX ADMIN — ENOXAPARIN SODIUM 40 MG: 40 INJECTION SUBCUTANEOUS at 17:06

## 2022-07-07 RX ADMIN — SODIUM CHLORIDE 2 G: 1 TABLET ORAL at 17:06

## 2022-07-07 RX ADMIN — SENNOSIDES AND DOCUSATE SODIUM 2 TABLET: 50; 8.6 TABLET ORAL at 05:22

## 2022-07-07 RX ADMIN — SENNOSIDES AND DOCUSATE SODIUM 2 TABLET: 50; 8.6 TABLET ORAL at 17:06

## 2022-07-07 RX ADMIN — FLUDROCORTISONE ACETATE 0.1 MG: 0.1 TABLET ORAL at 05:22

## 2022-07-07 RX ADMIN — SODIUM CHLORIDE, POTASSIUM CHLORIDE, SODIUM LACTATE AND CALCIUM CHLORIDE 500 ML: 600; 310; 30; 20 INJECTION, SOLUTION INTRAVENOUS at 05:45

## 2022-07-07 RX ADMIN — ASPIRIN 81 MG 81 MG: 81 TABLET ORAL at 05:22

## 2022-07-07 RX ADMIN — SODIUM CHLORIDE, POTASSIUM CHLORIDE, SODIUM LACTATE AND CALCIUM CHLORIDE 500 ML: 600; 310; 30; 20 INJECTION, SOLUTION INTRAVENOUS at 06:20

## 2022-07-07 RX ADMIN — SODIUM CHLORIDE 2 G: 1 TABLET ORAL at 08:40

## 2022-07-07 RX ADMIN — SODIUM CHLORIDE 2 G: 1 TABLET ORAL at 11:42

## 2022-07-07 ASSESSMENT — ENCOUNTER SYMPTOMS
EYE PAIN: 0
CHILLS: 0
BACK PAIN: 0
BLURRED VISION: 0
VOMITING: 0
SHORTNESS OF BREATH: 0
ABDOMINAL PAIN: 0
HEADACHES: 0
FOCAL WEAKNESS: 0
INSOMNIA: 0
FEVER: 0
COUGH: 0
PALPITATIONS: 0
NAUSEA: 0
DIZZINESS: 1
SENSORY CHANGE: 0

## 2022-07-07 ASSESSMENT — COGNITIVE AND FUNCTIONAL STATUS - GENERAL
MOVING TO AND FROM BED TO CHAIR: A LITTLE
HELP NEEDED FOR BATHING: A LITTLE
SUGGESTED CMS G CODE MODIFIER MOBILITY: CK
SUGGESTED CMS G CODE MODIFIER DAILY ACTIVITY: CJ
DRESSING REGULAR LOWER BODY CLOTHING: A LITTLE
CLIMB 3 TO 5 STEPS WITH RAILING: A LITTLE
MOBILITY SCORE: 19
STANDING UP FROM CHAIR USING ARMS: A LITTLE
WALKING IN HOSPITAL ROOM: A LITTLE
MOVING FROM LYING ON BACK TO SITTING ON SIDE OF FLAT BED: A LITTLE
TOILETING: A LITTLE
DAILY ACTIVITIY SCORE: 21

## 2022-07-07 ASSESSMENT — LIFESTYLE VARIABLES: SUBSTANCE_ABUSE: 0

## 2022-07-07 ASSESSMENT — PAIN DESCRIPTION - PAIN TYPE
TYPE: ACUTE PAIN
TYPE: ACUTE PAIN

## 2022-07-07 ASSESSMENT — GAIT ASSESSMENTS
DEVIATION: BRADYKINETIC;INCREASED BASE OF SUPPORT
DISTANCE (FEET): 100
GAIT LEVEL OF ASSIST: STANDBY ASSIST
ASSISTIVE DEVICE: FRONT WHEEL WALKER

## 2022-07-07 NOTE — PROGRESS NOTES
Monitor summary: SR 75-81, VT -0.17, QRS -0.09, QT -0.38, with rare PAC and PVC per strip from the monitor room.

## 2022-07-07 NOTE — THERAPY
"Physical Therapy   Daily Treatment     Patient Name: Alistair Putnam  Age:  57 y.o., Sex:  male  Medical Record #: [id number]  Today's Date: 7/7/2022     Precautions  Precautions: Fall Risk  Comments: .    Assessment    Pt progressing as expected. Pt able to ambulate 100ft with FWW and SBA and negotiate 6 steps with CGA and unilateral UE support. Pt functionally capable of negotiating steps to enter home; pt with goal to negotiate 1 FOS at home to enter bedroom. Pt limited by BLE fatigue, weakness, and mild c/o HA, dizziness while negotiating stairs- RN aware. Recommend home with OP PT. Provided pt and wife with resources to find neuro PT in their area. Will continue to follow in order to optimize functional mobility prior to d/c home.     Plan    Continue current treatment plan.    DC Equipment Recommendations: None (has FWW)  Discharge Recommendations: Recommend outpatient physical therapy services to address higher level deficits (Pt prefers OP PT to HH 2/2 increased frequency/intensity of OP therapy)      Subjective    \"My head just feels off, I can't explain it.\" Regarding since hospital admission     Objective     07/07/22 1435   Vitals   O2 Delivery Device None - Room Air   Vitals Comments orthostatic BP stable, maintained 106/66 with ambulation and stairs   Pain 0 - 10 Group   Therapist Pain Assessment Nurse Notified;Post Activity;1  (c/o HA after stairs. RN made aware)   Cognition    Cognition / Consciousness WDL   Level of Consciousness Alert   Comments Pleasant and cooperative   Strength Lower Body   Lower Body Strength  X   Comments BLE grossly 4/5, fatigue with minimal OOB mobility   Balance   Sitting Balance (Static) Fair +   Sitting Balance (Dynamic) Fair   Standing Balance (Static) Fair   Standing Balance (Dynamic) Fair -   Weight Shift Sitting Fair   Weight Shift Standing Fair   Skilled Intervention Verbal Cuing;Compensatory Strategies   Comments w/ FWW   Gait Analysis   Gait Level Of Assist " Standby Assist   Assistive Device Front Wheel Walker   Distance (Feet) 100   # of Times Distance was Traveled 1   Deviation Bradykinetic;Increased Base Of Support   # of Stairs Climbed 6   Level of Assist with Stairs Contact Guard Assist   Weight Bearing Status no restrictions   Skilled Intervention Verbal Cuing;Tactile Cuing;Sequencing;Facilitation;Compensatory Strategies   Comments discussed different strategies for stair negotiation. Mild dizziness at top of steps   Bed Mobility    Supine to Sit Supervised   Sit to Supine Supervised   Scooting Supervised   Rolling Supervised   Skilled Intervention Verbal Cuing   Functional Mobility   Sit to Stand Supervised   Bed, Chair, Wheelchair Transfer Supervised   Toilet Transfers Supervised   Transfer Method Stand Step   Mobility w/ FWW   Skilled Intervention Verbal Cuing   How much difficulty does the patient currently have...   Turning over in bed (including adjusting bedclothes, sheets and blankets)? 4   Sitting down on and standing up from a chair with arms (e.g., wheelchair, bedside commode, etc.) 3   Moving from lying on back to sitting on the side of the bed? 3   How much help from another person does the patient currently need...   Moving to and from a bed to a chair (including a wheelchair)? 3   Need to walk in a hospital room? 3   Climbing 3-5 steps with a railing? 3   6 clicks Mobility Score 19   Activity Tolerance   Comments limited by HA, fatigue   Short Term Goals    Short Term Goal # 1 Pt will perform supine <> sit without bed features with SPV within 6 visits in order to progress toward PLOF   Goal Outcome # 1 Goal met   Short Term Goal # 2 Pt will perform STS/functional transfers with SPV within 6 visits in order to progress OOB mobility   Goal Outcome # 2 Goal met   Short Term Goal # 3 Pt will ambulate 200 ft with SPV within 6 visits in order   Goal Outcome # 3 Progressing as expected   Short Term Goal # 4 Pt will negotiate FOS with single rail & SPV  within 6 visits in order to access home   Goal Outcome # 4 Progressing as expected   Education Group   Education Provided Stair Training;Gait Training   Gait Training Patient Response Patient;Family;Acceptance;Explanation;Action Demonstration   Stair Training Patient Response Patient;Family;Acceptance;Explanation;Action Demonstration   Anticipated Discharge Equipment and Recommendations   DC Equipment Recommendations None  (has FWW)   Discharge Recommendations Recommend outpatient physical therapy services to address higher level deficits  (Pt prefers OP PT to HH 2/2 increased frequency/intensity of OP therapy)   Interdisciplinary Plan of Care Collaboration   IDT Collaboration with  Family / Caregiver;Nursing   Patient Position at End of Therapy In Bed;Bed Alarm On;Tray Table within Reach;Call Light within Reach;Phone within Reach   Collaboration Comments RN updated   Session Information   Date / Session Number  7/7- 4 (1/4, 7/11)

## 2022-07-07 NOTE — CARE PLAN
The patient is Stable - Low risk of patient condition declining or worsening    Shift Goals  Clinical Goals: Maintain neuro status  Patient Goals: Shower  Family Goals: Patient to receive shower    Progress made toward(s) clinical / shift goals: Assumed care of patient at 1915. Patient is A&Ox4, Q4hr neuro checks are being completed. Patient received shower. EVD site is [facility], staples in place. SBP is being maintained < 140. Bed is low and locked, bed alarm is on, call light is within reach, hourly rounding continues.     Problem: Fall Risk  Goal: Patient will remain free from falls  Outcome: Progressing  Note: Bed is low and locked, yellow bracelet is in place, and bed alarm is in place.      Problem: Mobility - Stroke  Goal: Patient's capacity to carry out activities will improve  Outcome: Progressing  Note: Gait is stable, but does need x1 assist with FWW. Patient states he hasn't been walking around much. General weakness apparent.      Problem: Skin Integrity  Goal: Skin integrity is maintained or improved  Outcome: Progressing  Note: Removed glasses while asleep.      Patient is not progressing towards the following goals:N/A

## 2022-07-07 NOTE — PROGRESS NOTES
0523: BP measured at 89/41 mmHg. Held Lisinopril 10mg and Amlodipine 10mg. On-call hospitalist notified.     0545: Lactated Ringers bolus of 500mL order received, and given.     0615: BP measured at 92/48 mmHg. Orders received for an additional 500mL bolus of Lactated Ringers.     0700: BP measured at 101/57 mmHg.

## 2022-07-07 NOTE — CARE PLAN
The patient is Stable - Low risk of patient condition declining or worsening    Shift Goals  Clinical Goals: Stable neuro status  Patient Goals: Shower  Family Goals: lamberto    Progress made toward(s) clinical / shift goals:    Problem: Fall Risk  Goal: Patient will remain free from falls  Outcome: Progressing  Note: Patient educated on fall risk and call light use. Patient A+Ox4, verbalizes understanding of calling for assistance before getting out of bed. Bed is locked and in low position, patient has non slip socks, call light and phone are within reach, bed alarm is on. Patient using call light appropriately.      Problem: Knowledge Deficit - Stroke Education  Goal: Patient's knowledge of stroke and risk factors will improve  Outcome: Progressing  Note: Pt verbalizes understanding of plan of care and asks appropriate questions.

## 2022-07-07 NOTE — THERAPY
Speech Language Pathology   Initial Assessment     Patient Name: Alistair Putnam  AGE:  57 y.o., SEX:  male  Medical Record #: [id number]  Today's Date: 7/7/2022     Precautions: Fall Risk  Comments: EVD    Subjective  Patient was seen on this date for a cognitive-linguistic evaluation. Patient awake and denied acute changes in mentation. 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 Patient lives with his wife and two children (15 and 26 y.o.)    Assessment   Portions of the COGNISTAT (Neurobehavioral Cognitive Status Assessment) and other measures were administered. Patient scored the following on the Cognistat:    Orientation: WNL  Attention: WNL  Comprehension (Language): WNL  Repetition (Language): WNL  Naming (Language): WNL  Memory: MILD-MODERATE   Calculations: WNL  Similarities (Reasoning): WNL  Judgment (Reasoning): WNL    Further testing revealed slight impairment during clock drawing (hands reversed when placing time). Mild impairment with reading comprehension at the sentence level and required verbal to self correct and complete written prompt during medication management task. Spelling error x1 noted during writing sample. Query impairments in higher level attention to task given minor errors across various tasks.     Spouse arrived at the end of the session and w/ pt were education in regards to current status and SLP recs. Spouse reported improving cognitive function each day but has noticed pt have some difficulty with his memory. She will be with pt at home and will also have support from neighbor as needed. Pt and spouse agreeable to SLP recs.     Recommendations  Initial close monitoring of IADLs to ensure safety and outpatient SLP services to address higher level cognitive deficits.     Plan  Recommend Speech Therapy 3 times per week until therapy goals are met for the following treatments:  Cognitive-Linguistic Training and Patient / Family /  Caregiver Education.    Discharge Recommendations: Recommend outpatient speech therapy services       07/07/22 0980   Vitals   O2 Delivery Device None - Room Air   Prior Level Of Function   Communication Within Functional Limits   Swallow Within Functional Limits   Dentures None   Hearing Within Functional Limits for Evaluation   Hearing Aid None   Patient's Primary Language English   Education Some College or Trade School   Short Term Goals   Short Term Goal # 1 NEW 7/7: patient will recall 4/4 words w/ 5 min delay given min verbal cueing.

## 2022-07-07 NOTE — PROGRESS NOTES
Neurosurgery Progress Note    Subjective:  57 yr old male presented with CVA.  POD#10 R EVD placement   EVD removed .     Neuro exam stable overnight.   Denies headaches, no nausea/vomiting, no double/blurred vision.   Some dizziness intermittently with changing positions too quickly. Received multiple boluses of fluid for hypotension. BP now improved.     Repeat head CT  stable/slightly improved.    Discharge pending therapy evals.     Exam:  A&O x3  PERRL, symmetric  Face symmetric   CNs II-VII grossly intact  Moving all extremities, follows commands  Sensation intact   No drift  Incisions CDI.     BP  Min: 89/41  Max: 117/76  Pulse  Av.3  Min: 69  Max: 82  Resp  Av  Min: 13  Max: 26  Temp  Av °C (98.6 °F)  Min: 36.3 °C (97.4 °F)  Max: 37.6 °C (99.7 °F)  Monitored Temp 2  Av.9 °C (98.4 °F)  Min: 36.9 °C (98.4 °F)  Max: 36.9 °C (98.4 °F)  SpO2  Av.2 %  Min: 91 %  Max: 95 %    ICP  Av.5 MM HG  Min: 3 MM HG  Max: 10 MM HG    Recent Labs     22  0602 22  0510 22  0332   WBC 8.2 7.5 9.5   RBC 5.01 4.94 5.39   HEMOGLOBIN 13.1* 13.2* 14.3   HEMATOCRIT 40.5* 39.3* 43.3   MCV 80.8* 79.6* 80.3*   MCH 26.1* 26.7* 26.5*   MCHC 32.3* 33.6* 33.0*   RDW 42.5 42.1 41.9   PLATELETCT 264 266 314   MPV 9.2 9.5 9.7     Recent Labs     22  0602 22  1140 22  0015 22  0510 22  0332   SODIUM 135   < > 134* 135 137   POTASSIUM 4.0  --   --  4.0 4.1   CHLORIDE 104  --   --  103 103   CO2 22  --   --  23 23   GLUCOSE 87  --   --  90 93   BUN 21  --   --  15 19   CREATININE 0.70  --   --  0.71 0.66   CALCIUM 8.7  --   --  8.9 9.0    < > = values in this interval not displayed.               Intake/Output                       22 - 22 - 22 Total  Total                 Intake    P.O.  120  -- 120  --  -- --    P.O. 120 -- 120 -- -- --    Total Intake 120 -- 120 -- -- --        Output    Urine  --  -- --  --  -- --    Number of Times Voided 0 x 3 x 3 x -- -- --    Stool  --  -- --  --  -- --    Number of Times Stooled 0 x -- 0 x -- -- --    Total Output -- -- -- -- -- --       Net I/O     120 -- 120 -- -- --            Intake/Output Summary (Last 24 hours) at 7/7/2022 0947  Last data filed at 7/6/2022 1600  Gross per 24 hour   Intake 120 ml   Output --   Net 120 ml            • enoxaparin (LOVENOX) injection  40 mg DAILY AT 1800   • acetaminophen  650 mg Q6HRS PRN   • aspirin  81 mg DAILY   • atorvastatin  80 mg Q EVENING   • lisinopril  10 mg Q DAY   • fludrocortisone  0.1 mg DAILY   • ondansetron  4 mg Q4HRS PRN   • senna-docusate  2 Tablet BID    And   • polyethylene glycol/lytes  1 Packet QDAY PRN    And   • magnesium hydroxide  30 mL QDAY PRN    And   • bisacodyl  10 mg QDAY PRN   • sodium chloride  2 g TID WITH MEALS   • hydrALAZINE  20 mg Q4HRS PRN   • labetalol  10-20 mg Q2HRS PRN   • ondansetron  4 mg Q4HRS PRN   • prochlorperazine  5-10 mg Q4HRS PRN       Assessment and Plan:  Hospital day #10  POD#10 R EVD placement.  EVD removed 7/6.   Keep EVD drain site dry until 7/8.  Pending evaluation with therapies patient is cleared for discharge from neurosurgical standpoint. Discharge instructions / emergent return precautions discussed.   Neurosurgery to sign off.   Patient will need wound check for suture/staple removal at [facility] in 1 week. Please call with questions/concerns [phone].  Appreciate intensivists care.       Chemical prophylactic DVT therapy: Yes - Lovenox 40mg/qd    Start date/time: today

## 2022-07-07 NOTE — DISCHARGE PLANNING
Case Management Discharge Planning    Admission Date: 6/26/2022  GMLOS: 7.1  ALOS: 10    6-Clicks ADL Score: 21  6-Clicks Mobility Score: 19      Anticipated Discharge Dispo: Discharge Disposition: Discharged to home/self care (01)    DME Needed: None    Action(s) Taken: Spoke to patient and spouse, Deanna today.  They requested for a reconsideration for acute rehab.  I voalted [facility] and reply was that patient too high level.  I informed patient and Deanna.  They would like to go home with outpatient therapy.    Voalte msg to Dr. Miguel for outpatient therapy orders in [facility].  [facility] Therapy Department will call patient to schedule the appointments.    Medically Clear: No    Next Steps: Provide transitional care coordination as needed.    Barriers to Discharge: None

## 2022-07-07 NOTE — PROGRESS NOTES
4 Eyes Skin Assessment Completed by FORREST Pollard and FORREST Trejo.    Head brandee from EVD removal  Ears WDL  Nose WDL  Mouth WDL  Neck WDL  Breast/Chest WDL  Shoulder Blades WDL  Spine WDL  (R) Arm/Elbow/Hand WDL  (L) Arm/Elbow/Hand WDL  Abdomen WDL  Groin WDL  Scrotum/Coccyx/Buttocks WDL  (R) Leg Scar  (L) Leg Scar  (R) Heel/Foot/Toe WDL  (L) Heel/Foot/Toe WDL          Devices In Places Tele Box      Interventions In Place Pressure Redistribution Mattress    Possible Skin Injury No    Pictures Uploaded Into Epic N/A  Wound Consult Placed N/A  RN Wound Prevention Protocol Ordered No

## 2022-07-07 NOTE — THERAPY
"Occupational Therapy  Daily Treatment     Patient Name: Alistair Putnam  Age:  57 y.o., Sex:  male  Medical Record #: [id number]  Today's Date: 7/7/2022      Assessment    Pt seen for OT tx, demonstrates self-care ADLs and household distance mobility with FWW at SPV level. Supportive spouse at bedside confirms ability to assist and supervise at home, she does not work. Pt met all acute OT goals this session. Pt will benefit from home health OT to ensure safe ADLs at home, pt reports interest in outpatient therapy to address higher complexity IADL independence. No additional acute OT needs at this time.    Plan    Discharge secondary to goals met.    DC Equipment Recommendations: None (has shower chair)  Discharge Recommendations: Recommend home health for continued occupational therapy services    Subjective    \"I'm just really afraid of this happening again.\"     Objective     07/07/22 1225   Precautions   Precautions Fall Risk   Pain 0 - 10 Group   Therapist Pain Assessment Post Activity Pain Same as Prior to Activity;Nurse Notified;0   Cognition    Level of Consciousness Alert   Strength Upper Body   Upper Body Strength  WDL   Sensation Upper Body   Upper Extremity Sensation  WDL   Upper Body Muscle Tone   Upper Body Muscle Tone  WDL   Fine Motor / Dexterity    Comments  pt reports bilateral coordination feels equal   Balance   Sitting Balance (Static) Fair +   Sitting Balance (Dynamic) Fair   Standing Balance (Static) Fair   Standing Balance (Dynamic) Fair -   Weight Shift Sitting Fair   Weight Shift Standing Fair   Skilled Intervention Verbal Cuing;Tactile Cuing   Comments FWW   Bed Mobility    Supine to Sit Supervised   Sit to Supine Supervised   Scooting Supervised   Rolling Supervised   Skilled Intervention Verbal Cuing   Activities of Daily Living   Eating Supervision   Grooming Supervision;Standing   Upper Body Dressing Supervision   Lower Body Dressing Supervision   Skilled Intervention Verbal Cuing "   How much help from another person does the patient currently need...   Putting on and taking off regular lower body clothing? 3   Bathing (including washing, rinsing, and drying)? 3   Toileting, which includes using a toilet, bedpan, or urinal? 3   Putting on and taking off regular upper body clothing? 4   Taking care of personal grooming such as brushing teeth? 4   Eating meals? 4   6 Clicks Daily Activity Score 21   Functional Mobility   Sit to Stand Supervised   Bed, Chair, Wheelchair Transfer Supervised   Transfer Method Stand Step   Mobility w/FWW   Activity Tolerance   Sitting in Chair functional   Sitting Edge of Bed funtional   Standing functional for household distances   Short Term Goals   Short Term Goal # 1 Pt will complete ADL transfers with supervision   Goal Outcome # 1 Goal met   Short Term Goal # 2 Pt will complete LB dressing with supervision   Goal Outcome # 2 Goal met   Short Term Goal # 3 Pt will complete standing G/H with supervision   Goal Outcome # 3 Goal met   Education Group   Education Provided Role of Occupational Therapist;Transfers;Home Safety;Activities of Daily Living   Role of Occupational Therapist Patient Response Acceptance;Significant Other;Patient;Explanation;Verbal Demonstration   Home Safety Patient Response Acceptance;Significant Other;Patient;Explanation;Verbal Demonstration   Transfers Patient Response Acceptance;Significant Other;Patient;Explanation;Verbal Demonstration   ADL Patient Response Acceptance;Significant Other;Patient;Explanation;Verbal Demonstration   Anticipated Discharge Equipment and Recommendations   DC Equipment Recommendations None  (has shower chair)   Discharge Recommendations Recommend home health for continued occupational therapy services

## 2022-07-07 NOTE — PROGRESS NOTES
[facility] Medicine Daily Progress Note    Date of Service  7/7/2022    Chief Complaint  Alistair Putnam is a 57 y.o. male admitted 6/26/2022 with dizziness.    Hospital Course  57 year old male with no past medical history and is not a smoker who presented to the ER yesterday with complaints of sudden onset of dizziness. MRI brain revealed a large right cerebellar stroke and was transferred to the ICU.  Neurosurgery was consulted and EVD was placed.  Echo unremarkable.  Failed FEES study on 6/28.  His speech and vision slowly improved.  He was titrated on nicardipine drip, which was eventually able to be weaned off.  Hypertonic saline was also able to be weaned off.  EVD was then removed on 7/6.  Patient was able to be transferred out of the ICU and thus hospitalist was consulted.  Rehab was consulted and signed off.  Now with the EVD out, patient will continue to work with therapy for hopes of outpatient therapy (patient prefers outpatient therapy rather than home health).      Interval Problem Update  No acute events overnight. Transferred out of ICU yesterday to neuro floor.  Patient still with intermittent dizziness when changing positions, but feels well overall.  EVD out yesterday, doing well.  PT/OT evaluations.  HH ordered.  Patient to follow up with neurosurgery as outpatient in 1 week.  BP low this morning, improved with 500cc bolus.  Amlodipine stopped.  Anticipate discharge home tomorrow if remaining clinically stable.      I have discussed this patient's plan of care and discharge plan at IDT rounds today with Case Management, Nursing, Nursing leadership, and other members of the IDT team.    Consultants/Specialty  critical care, neurology and neurosurgery    Code Status  Full Code    Disposition  Patient is not medically cleared for discharge.   Anticipate discharge to to home with close outpatient follow-up.  I have placed the appropriate orders for post-discharge needs.    Review of  Systems  Review of Systems   Constitutional: Negative for chills and fever.   Eyes: Negative for blurred vision and pain.   Respiratory: Negative for cough and shortness of breath.    Cardiovascular: Negative for chest pain, palpitations and leg swelling.   Gastrointestinal: Negative for abdominal pain, nausea and vomiting.   Genitourinary: Negative for dysuria and urgency.   Musculoskeletal: Negative for back pain.   Skin: Negative for itching and rash.   Neurological: Positive for dizziness. Negative for sensory change, focal weakness and headaches.   Psychiatric/Behavioral: Negative for substance abuse. The patient does not have insomnia.         Physical Exam  Temp:  [36.8 °C (98.2 °F)-37.6 °C (99.7 °F)] 36.8 °C (98.2 °F)  Pulse:  [68-82] 68  Resp:  [16-25] 18  BP: ()/(41-77) 112/67  SpO2:  [91 %-95 %] 93 %    Physical Exam  Constitutional:       General: He is not in acute distress.     Appearance: He is not ill-appearing.   HENT:      Head: Normocephalic and atraumatic.      Right Ear: External ear normal.      Left Ear: External ear normal.      Mouth/Throat:      Pharynx: No oropharyngeal exudate or posterior oropharyngeal erythema.   Eyes:      Extraocular Movements: Extraocular movements intact.      Pupils: Pupils are equal, round, and reactive to light.   Cardiovascular:      Rate and Rhythm: Normal rate and regular rhythm.      Pulses: Normal pulses.      Heart sounds: Normal heart sounds.   Pulmonary:      Effort: Pulmonary effort is normal. No respiratory distress.      Breath sounds: Normal breath sounds. No wheezing or rales.   Abdominal:      General: Bowel sounds are normal. There is no distension.      Palpations: Abdomen is soft.      Tenderness: There is no abdominal tenderness. There is no guarding.   Musculoskeletal:         General: No swelling or tenderness.      Cervical back: Normal range of motion and neck supple.   Skin:     General: Skin is warm and dry.   Neurological:       General: No focal deficit present.      Mental Status: He is oriented to person, place, and time.      Cranial Nerves: No cranial nerve deficit.      Sensory: No sensory deficit.      Motor: No weakness.   Psychiatric:         Mood and Affect: Mood normal.         Behavior: Behavior normal.         Fluids    Intake/Output Summary (Last 24 hours) at 7/7/2022 1341  Last data filed at 7/7/2022 0900  Gross per 24 hour   Intake 240 ml   Output --   Net 240 ml       Laboratory  Recent Labs     07/05/22  0602 07/06/22  0510 07/07/22  0332   WBC 8.2 7.5 9.5   RBC 5.01 4.94 5.39   HEMOGLOBIN 13.1* 13.2* 14.3   HEMATOCRIT 40.5* 39.3* 43.3   MCV 80.8* 79.6* 80.3*   MCH 26.1* 26.7* 26.5*   MCHC 32.3* 33.6* 33.0*   RDW 42.5 42.1 41.9   PLATELETCT 264 266 314   MPV 9.2 9.5 9.7     Recent Labs     07/05/22  0602 07/05/22  1140 07/06/22  0015 07/06/22  0510 07/07/22  0332   SODIUM 135   < > 134* 135 137   POTASSIUM 4.0  --   --  4.0 4.1   CHLORIDE 104  --   --  103 103   CO2 22  --   --  23 23   GLUCOSE 87  --   --  90 93   BUN 21  --   --  15 19   CREATININE 0.70  --   --  0.71 0.66   CALCIUM 8.7  --   --  8.9 9.0    < > = values in this interval not displayed.                   Imaging  DX-ESOPHAGUS - [id number]   Final Result      CT-HEAD W/O   Final Result         1. No acute intracranial hemorrhage.      2. Evolution of the right cerebellar infarct with decreased edema and less mass effect on the fourth ventricle.                     EC-ECHOCARDIOGRAM COMPLETE W/O CONT   Final Result      CT-HEAD W/O   Final Result         1.  Postprocedural changes of ventriculostomy placement with minimal hyperdense hemorrhage tracking along the ventriculostomy tract.   2.  Small anterior foci of right pneumocephalus.   3.  Low-density evolving stroke within the right cerebellar hemisphere effacing the fourth ventricle.   4.  Atherosclerosis         DX-ABDOMEN FOR TUBE PLACEMENT   Final Result      1.  Feeding tube tip overlies the  proximal stomach.      DX-CHEST-LIMITED (1 VIEW)   Final Result      1.  Right IJ catheter tip projects over the cavoatrial junction without pneumothorax.   2.  Enlarged cardiac silhouette with mild vascular congestion.   3.  There is bibasilar atelectasis.      CT-CTA HEAD WITH & W/O-POST PROCESS   Final Result         1. No hemodynamically significant narrowing of the major intracranial vessels.      CT-CTA NECK WITH & W/O-POST PROCESSING   Final Result      1. No evidence of flow-limiting stenosis in the cervical carotid or cervical vertebral arteries.      MR-BRAIN-W/O   Final Result      1.  Large acute right posterior inferior cerebellar artery territory infarct with associated edema/swelling and localized mass effect, partially effacing the fourth ventricle. The lateral ventricles are now mildly dilated suggesting developing    obstructive hydrocephalus.   2.  Small area of petechial hemorrhage within the infarct without other significant acute intracranial hemorrhage.   3.  FLAIR hyperintensity in the right vertebral artery suggesting sluggish flow, less likely thrombus formation.      These findings were discussed with MONIQUE KNOX on 6/27/2022 1:49 PM.      CT-HEAD W/O   Final Result      Head CT without contrast within normal limits. No evidence of acute cerebral infarction, hemorrhage or mass lesion.              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)- (present on admission)  Assessment & Plan  Presented with vertigo, found to have large right PICA stroke with partial effacement of fourth ventricle   S/P EVD placement on 6/27/2022, removed 7/6  Salt tabs + Florinef, finished hypertonic saline  Statin  ASA    Hypokalemia- (present on admission)  Assessment & Plan  Now repleted    Stroke of unknown etiology (HCC)- (present on admission)  Assessment & Plan  Neuro consulted  Monitor on telemetry  Zio patch on discharge    Dizziness- (present on  admission)  Assessment & Plan  Was admitted for work-up of this  Due to stroke  Now largely improved other than when patient stands or walks       VTE prophylaxis: enoxaparin ppx    I have performed a physical exam and reviewed and updated ROS and Plan today (7/7/2022). In review of yesterday's note (7/6/2022), there are no changes except as documented above.

## 2022-07-07 NOTE — FACE TO FACE
Face to Face Supporting Documentation - Home Health    The encounter with this patient was in whole or in part the primary reason for home health admission.    Date of encounter:   Patient:                    MRN:                       YOB: 2021  Alistair Putnam  [id number]  1965     Home health to see patient for:  Skilled Nursing care for assessment, interventions & education, Physical Therapy evaluation and treatment, Occupational therapy evaluation and treatment and Speech Language Pathology evaluation and treatment    Skilled need for:  New Onset Medical Diagnosis stroke    Skilled nursing interventions to include:  Comment: physical, occupational, speech language therapies    Homebound status evidenced by:  Needs the assistance of another person in order to leave the home. Leaving home requires a considerable and taxing effort. There is a normal inability to leave the home.    Community Physician to provide follow up care: Rl Musa M.D.     Optional Interventions? No      I certify the face to face encounter for this home health care referral meets the CMS requirements and the encounter/clinical assessment with the patient was, in whole, or in part, for the medical condition(s) listed above, which is the primary reason for home health care. Based on my clinical findings: the service(s) are medically necessary, support the need for home health care, and the homebound criteria are met.  I certify that this patient has had a face to face encounter by myself.  Steve Miguel M.D. - NPI: [id number]

## 2022-07-08 ENCOUNTER — PHARMACY VISIT (OUTPATIENT)
Dept: PHARMACY | Facility: MEDICAL CENTER | Age: 57
End: 2022-07-08
Payer: COMMERCIAL

## 2022-07-08 ENCOUNTER — NON-PROVIDER VISIT (OUTPATIENT)
Dept: CARDIOLOGY | Facility: MEDICAL CENTER | Age: 57
End: 2022-07-08
Payer: COMMERCIAL

## 2022-07-08 VITALS
WEIGHT: 217.15 LBS | RESPIRATION RATE: 17 BRPM | DIASTOLIC BLOOD PRESSURE: 74 MMHG | SYSTOLIC BLOOD PRESSURE: 111 MMHG | HEIGHT: 71 IN | HEART RATE: 67 BPM | TEMPERATURE: 98.1 F | OXYGEN SATURATION: 92 % | BODY MASS INDEX: 30.4 KG/M2

## 2022-07-08 DIAGNOSIS — I49.1 PREMATURE ATRIAL CONTRACTIONS: ICD-10-CM

## 2022-07-08 DIAGNOSIS — I49.3 PVCS (PREMATURE VENTRICULAR CONTRACTIONS): ICD-10-CM

## 2022-07-08 LAB
ANION GAP SERPL CALC-SCNC: 9 MMOL/L (ref 7–16)
BASOPHILS # BLD AUTO: 0.6 % (ref 0–1.8)
BASOPHILS # BLD: 0.04 K/UL (ref 0–0.12)
BUN SERPL-MCNC: 15 MG/DL (ref 8–22)
CALCIUM SERPL-MCNC: 8.6 MG/DL (ref 8.5–10.5)
CHLORIDE SERPL-SCNC: 107 MMOL/L (ref 96–112)
CO2 SERPL-SCNC: 22 MMOL/L (ref 20–33)
CREAT SERPL-MCNC: 0.68 MG/DL (ref 0.5–1.4)
EOSINOPHIL # BLD AUTO: 0.13 K/UL (ref 0–0.51)
EOSINOPHIL NFR BLD: 1.9 % (ref 0–6.9)
ERYTHROCYTE [DISTWIDTH] IN BLOOD BY AUTOMATED COUNT: 42.5 FL (ref 35.9–50)
GFR SERPLBLD CREATININE-BSD FMLA CKD-EPI: 109 ML/MIN/1.73 M 2
GLUCOSE SERPL-MCNC: 91 MG/DL (ref 65–99)
HCT VFR BLD AUTO: 39.6 % (ref 42–52)
HGB BLD-MCNC: 13.1 G/DL (ref 14–18)
IMM GRANULOCYTES # BLD AUTO: 0.04 K/UL (ref 0–0.11)
IMM GRANULOCYTES NFR BLD AUTO: 0.6 % (ref 0–0.9)
LYMPHOCYTES # BLD AUTO: 1.95 K/UL (ref 1–4.8)
LYMPHOCYTES NFR BLD: 28.2 % (ref 22–41)
MAGNESIUM SERPL-MCNC: 1.9 MG/DL (ref 1.5–2.5)
MCH RBC QN AUTO: 26.5 PG (ref 27–33)
MCHC RBC AUTO-ENTMCNC: 33.1 G/DL (ref 33.7–35.3)
MCV RBC AUTO: 80 FL (ref 81.4–97.8)
MONOCYTES # BLD AUTO: 0.84 K/UL (ref 0–0.85)
MONOCYTES NFR BLD AUTO: 12.1 % (ref 0–13.4)
NEUTROPHILS # BLD AUTO: 3.92 K/UL (ref 1.82–7.42)
NEUTROPHILS NFR BLD: 56.6 % (ref 44–72)
NRBC # BLD AUTO: 0 K/UL
NRBC BLD-RTO: 0 /100 WBC
PHOSPHATE SERPL-MCNC: 3.9 MG/DL (ref 2.5–4.5)
PLATELET # BLD AUTO: 286 K/UL (ref 164–446)
PMV BLD AUTO: 9.9 FL (ref 9–12.9)
POTASSIUM SERPL-SCNC: 4.1 MMOL/L (ref 3.6–5.5)
RBC # BLD AUTO: 4.95 M/UL (ref 4.7–6.1)
SODIUM SERPL-SCNC: 138 MMOL/L (ref 135–145)
WBC # BLD AUTO: 6.9 K/UL (ref 4.8–10.8)

## 2022-07-08 PROCEDURE — 85025 COMPLETE CBC W/AUTO DIFF WBC: CPT

## 2022-07-08 PROCEDURE — RXMED WILLOW AMBULATORY MEDICATION CHARGE: Performed by: STUDENT IN AN ORGANIZED HEALTH CARE EDUCATION/TRAINING PROGRAM

## 2022-07-08 PROCEDURE — 80048 BASIC METABOLIC PNL TOTAL CA: CPT

## 2022-07-08 PROCEDURE — 84100 ASSAY OF PHOSPHORUS: CPT

## 2022-07-08 PROCEDURE — 36415 COLL VENOUS BLD VENIPUNCTURE: CPT

## 2022-07-08 PROCEDURE — A9270 NON-COVERED ITEM OR SERVICE: HCPCS | Performed by: INTERNAL MEDICINE

## 2022-07-08 PROCEDURE — 700102 HCHG RX REV CODE 250 W/ 637 OVERRIDE(OP): Performed by: INTERNAL MEDICINE

## 2022-07-08 PROCEDURE — 83735 ASSAY OF MAGNESIUM: CPT

## 2022-07-08 PROCEDURE — 99239 HOSP IP/OBS DSCHRG MGMT >30: CPT | Performed by: STUDENT IN AN ORGANIZED HEALTH CARE EDUCATION/TRAINING PROGRAM

## 2022-07-08 RX ORDER — LISINOPRIL 10 MG/1
10 TABLET ORAL DAILY
Qty: 30 TABLET | Refills: 0 | Status: SHIPPED | OUTPATIENT
Start: 2022-07-09 | End: 2022-08-29

## 2022-07-08 RX ORDER — ASPIRIN 81 MG/1
81 TABLET, CHEWABLE ORAL DAILY
Qty: 100 TABLET | Refills: 0 | Status: SHIPPED | OUTPATIENT
Start: 2022-07-09

## 2022-07-08 RX ORDER — FLUDROCORTISONE ACETATE 0.1 MG/1
0.1 TABLET ORAL DAILY
Qty: 14 TABLET | Refills: 0 | Status: SHIPPED | OUTPATIENT
Start: 2022-07-09 | End: 2022-08-29

## 2022-07-08 RX ORDER — ATORVASTATIN CALCIUM 80 MG/1
80 TABLET, FILM COATED ORAL EVERY EVENING
Qty: 30 TABLET | Refills: 0 | Status: SHIPPED | OUTPATIENT
Start: 2022-07-08

## 2022-07-08 RX ADMIN — SODIUM CHLORIDE 2 G: 1 TABLET ORAL at 09:21

## 2022-07-08 RX ADMIN — ACETAMINOPHEN 650 MG: 325 TABLET, FILM COATED ORAL at 00:26

## 2022-07-08 RX ADMIN — FLUDROCORTISONE ACETATE 0.1 MG: 0.1 TABLET ORAL at 05:05

## 2022-07-08 RX ADMIN — LISINOPRIL 10 MG: 10 TABLET ORAL at 05:05

## 2022-07-08 RX ADMIN — ASPIRIN 81 MG 81 MG: 81 TABLET ORAL at 05:05

## 2022-07-08 NOTE — PROGRESS NOTES
Monitor summary: SR 64-77, PA -0.16, QRS -0.10, QT -0.36, with rare PAC and PVC per strip from the monitor room.

## 2022-07-08 NOTE — DISCHARGE SUMMARY
Discharge Summary    CHIEF COMPLAINT ON ADMISSION  Chief Complaint   Patient presents with   • Dizziness     Onset today at 1430   • Weakness   • Headache     Onset today at 1430    • Numbness     Numbness and tingling present to REKHA arms and legs   • Nausea       Reason for Admission  EMS     Admission Date  6/26/2022    CODE STATUS  Full Code    HPI & HOSPITAL COURSE  57 year old male with no past medical history and is not a smoker who presented to the ER yesterday with complaints of sudden onset of dizziness. MRI brain revealed a large right cerebellar stroke with partial effacement of fourth ventricle, patient was transferred to the ICU. Neurosurgery was consulted and EVD was placed.  Echo unremarkable.  He was titrated on nicardipine drip, which was eventually able to be weaned off.  Hypertonic saline was also able to be weaned off.  Patient on florinef at time of discharge. EVD was then removed on 7/6.  Patient was able to be transferred out of the ICU after EVD removed. Patient doing well, physical and occupational therapy recommend outpatient services. Patient on aspirin and statin therapy. He is given ziopatch on discharge. Patient is to follow up with neurosurgery in 1 week for suture removal; he is also to follow up with neurology in stroke clinic.      Therefore, he is discharged in fair and stable condition to home with close outpatient follow-up.    The patient met 2-midnight criteria for an inpatient stay at the time of discharge.    Discharge Date  7/8/2022    FOLLOW UP ITEMS POST DISCHARGE  Take medications as prescribed.  Follow up with neurosurgery, neurology, PCP.    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 *      FOLLOW UP  Future Appointments   Date Time  Provider Department Center   8/29/2022 12:50 PM DAIJA Swan [facility] None   9/29/2022  2:20 PM Cain Ortiz M.D. [facility] None     [facility]  [address]  [place] [address]  [phone]  Call in 1 day(s)      [facility]  [address]  [place] [address]  [phone]  Follow up in 1 week(s)      Rl Musa M.D.  [address]  [address]  [place] [address]  [phone]    Go on 7/11/2022  Please go to your follow up at 12:30pm.      MEDICATIONS ON DISCHARGE     Medication List      START taking these medications      Instructions   Aspirin Low Dose 81 MG Chew chewable tablet  Start taking on: July 9, 2022  Generic drug: aspirin   Chew 1 Tablet every day.  Dose: 81 mg     atorvastatin 80 MG tablet  Commonly known as: LIPITOR   Take 1 Tablet by mouth every evening.  Dose: 80 mg     fludrocortisone 0.1 MG Tabs  Start taking on: July 9, 2022  Commonly known as: FLORINEF   Take 1 Tablet by mouth every day.  Dose: 0.1 mg     lisinopril 10 MG Tabs  Start taking on: July 9, 2022  Commonly known as: PRINIVIL   Take 1 Tablet by mouth every day.  Dose: 10 mg        CONTINUE taking these medications      Instructions   calcium polycarbophil 625 MG Tabs  Commonly known as: FIBERCON   Take 625 mg by mouth every day.  Dose: 625 mg     fish oil 1000 MG Caps capsule   Take 1,000 mg by mouth every day.  Dose: 1,000 mg     multivitamin Tabs   Take 1 Tablet by mouth every day.  Dose: 1 Tablet     Zinc 50 MG Tabs   Take 50 mg by mouth every day.  Dose: 50 mg            Allergies  No Known Allergies    DIET  Orders Placed This Encounter   Procedures   • Diet Order Diet: Regular     Standing Status:   Standing     Number of Occurrences:   1     Order Specific Question:   Diet:     Answer:   Regular [1]       ACTIVITY  As tolerated.  Weight bearing as tolerated    CONSULTATIONS  Critical care  Neurology  Neurosurgery  Physical medicine and rehab    PROCEDURES  6/27: central line insertion  6/27:  right frontal EVD placement      LABORATORY  Lab Results   Component Value Date    SODIUM 138 07/08/2022    POTASSIUM 4.1 07/08/2022    CHLORIDE 107 07/08/2022    CO2 22 07/08/2022    GLUCOSE 91 07/08/2022    BUN 15 07/08/2022    CREATININE 0.68 07/08/2022        Lab Results   Component Value Date    WBC 6.9 07/08/2022    HEMOGLOBIN 13.1 (L) 07/08/2022    HEMATOCRIT 39.6 (L) 07/08/2022    PLATELETCT 286 07/08/2022        Total time of the discharge process exceeds 38 minutes.

## 2022-07-08 NOTE — CARE PLAN
The patient is Stable - Low risk of patient condition declining or worsening    Shift Goals  Clinical Goals: Maintain neuro status  Patient Goals: Discharge  Family Goals: Patient to receive shower    Progress made toward(s) clinical / shift goals:    Problem: Knowledge Deficit - Stroke Education  Goal: Patient's knowledge of stroke and risk factors will improve  Outcome: Progressing  Note: Pt verbalizes understanding of plan of care and asks appropriate questions.       Problem: Hemodynamic Monitoring  Goal: Patient's hemodynamics, fluid balance and neurologic status will be stable or improve  Outcome: Progressing  Note: Neuro status remains stable.

## 2022-07-08 NOTE — DISCHARGE INSTRUCTIONS
Discharge Instructions    Discharged to home by car with relative. Discharged via wheelchair, hospital escort: Yes.  Special equipment needed: Not Applicable    Be sure to schedule a follow-up appointment with your primary care doctor or any specialists as instructed.     Discharge Plan:   Diet Plan: Discussed  Activity Level: Discussed  Confirmed Follow up Appointment: Appointment Scheduled  Confirmed Symptoms Management: Discussed  Medication Reconciliation Updated: Yes    I understand that a diet low in cholesterol, fat, and sodium is recommended for good health. Unless I have been given specific instructions below for another diet, I accept this instruction as my diet prescription.   Other diet: regular well balanced diet    Special Instructions:     Stroke/CVA/TIA/Hemorrhagic Ischemia Discharge Instructions  You have had a stroke. Your risk factors have been identified as follows:  Age - Over 55  Gender - Men are at a higher risk than women  High blood pressure  High Cholesterol and lipids  It is important that you reduce your risk factors to avoid another stroke in the future. Here are some general guidelines to follow:  Eat healthy - avoid food high in fat.  Get regular exercise.  Maintain a healthy weight.  Avoid smoking.  Avoid alcohol and illegal drug use.  Take your medications as directed.  For more information regarding risk factors, refer to pages 17-19 in your Stroke Patient Education Guide. Stroke Education Guide was given to patient.    Warning signs of a stroke include (which can also be found on page 3 of your Stroke Patient Education Guide):  Sudden numbness of weakness of the face, arm or leg (especially on one side of the body).  Sudden confusion, trouble speaking or understanding.  Sudden trouble seeing in one or both eyes.  Sudden trouble walking, dizziness, loss of balance or coordination.  Sudden severe headache with no known cause.  It is very important to get treatment quickly when a  stroke occurs. If you experience any of the above warning signs, call [phone] immediately.     Some patients who have had a stroke will be going home on a blood thinner medication called Warfarin (Coumadin).  This medication requires very close monitoring and follow up.  This follow up can be provided by either your Primary Care Physician or by [facility].  The Outpatient Anticoagulation Service is located at the [facility] at [facility] ([facility]).  If you do not know when your follow up appointment is scheduled, call [phone] to verify your appointment time.    -Is this patient being discharged with medication to prevent blood clots?  Yes, Aspirin   Aspirin, ASA oral tablets  What is this medicine?  ASPIRIN (AS pir in) is a pain reliever. It is used to treat mild pain and fever. This medicine is also used as directed by a doctor to prevent and to treat heart attacks, to prevent strokes and blood clots, and to treat arthritis or inflammation.  This medicine may be used for other purposes; ask your health care provider or pharmacist if you have questions.  COMMON BRAND NAME(S): Aspir-Low, Aspir-Sosa, Aspirtab, Rosetta Advanced Aspirin, Rosetta Aspirin, Rosetta Aspirin Extra Strength, Rosetta Aspirin Plus, Rosetta Extra Strength, Rosetta Extra Strength Plus, Rosetta Genuine Aspirin, Rosetta Womens Aspirin, Bufferin, Bufferin Extra Strength, Bufferin Low Dose  What should I tell my health care provider before I take this medicine?  They need to know if you have any of these conditions:  anemia  asthma  bleeding problems  child with chickenpox, the flu, or other viral infection  diabetes  gout  if you frequently drink alcohol containing drinks  kidney disease  liver disease  low level of vitamin K  lupus  smoke tobacco  stomach ulcers or other problems  an unusual or allergic reaction to aspirin, tartrazine dye, other medicines, dyes, or  preservatives  pregnant or trying to get pregnant  breast-feeding  How should I use this medicine?  Take this medicine by mouth with a glass of water. Follow the directions on the package or prescription label. You can take this medicine with or without food. If it upsets your stomach, take it with food. Do not take your medicine more often than directed.  Talk to your pediatrician regarding the use of this medicine in children. While this drug may be prescribed for children as young as 12 years of age for selected conditions, precautions do apply. Children and teenagers should not use this medicine to treat chicken pox or flu symptoms unless directed by a doctor.  Patients over 65 years old may have a stronger reaction and need a smaller dose.  Overdosage: If you think you have taken too much of this medicine contact a poison control center or emergency room at once.  NOTE: This medicine is only for you. Do not share this medicine with others.  What if I miss a dose?  If you are taking this medicine on a regular schedule and miss a dose, take it as soon as you can. If it is almost time for your next dose, take only that dose. Do not take double or extra doses.  What may interact with this medicine?  Do not take this medicine with any of the following medications:  cidofovir  ketorolac  probenecid  This medicine may also interact with the following medications:  alcohol  alendronate  bismuth subsalicylate  flavocoxid  herbal supplements like feverfew, garlic, krystle, ginkgo biloba, horse chestnut  medicines for diabetes or glaucoma like acetazolamide, methazolamide  medicines for gout  medicines that treat or prevent blood clots like enoxaparin, heparin, ticlopidine, warfarin  other aspirin and aspirin-like medicines  NSAIDs, medicines for pain and inflammation, like ibuprofen or naproxen  pemetrexed  sulfinpyrazone  varicella live vaccine  This list may not describe all possible interactions. Give your health care  provider a list of all the medicines, herbs, non-prescription drugs, or dietary supplements you use. Also tell them if you smoke, drink alcohol, or use illegal drugs. Some items may interact with your medicine.  What should I watch for while using this medicine?  If you are treating yourself for pain, tell your doctor or health care professional if the pain lasts more than 10 days, if it gets worse, or if there is a new or different kind of pain. Tell your doctor if you see redness or swelling. Also, check with your doctor if you have a fever that lasts for more than 3 days. Only take this medicine to prevent heart attacks or blood clotting if prescribed by your doctor or health care professional.  Do not take aspirin or aspirin-like medicines with this medicine. Too much aspirin can be dangerous. Always read the labels carefully.  This medicine can irritate your stomach or cause bleeding problems. Do not smoke cigarettes or drink alcohol while taking this medicine. Do not lie down for 30 minutes after taking this medicine to prevent irritation to your throat.  If you are scheduled for any medical or dental procedure, tell your healthcare provider that you are taking this medicine. You may need to stop taking this medicine before the procedure.  This medicine may be used to treat migraines. If you take migraine medicines for 10 or more days a month, your migraines may get worse. Keep a diary of headache days and medicine use. Contact your healthcare professional if your migraine attacks occur more frequently.  What side effects may I notice from receiving this medicine?  Side effects that you should report to your doctor or health care professional as soon as possible:  allergic reactions like skin rash, itching or hives, swelling of the face, lips, or tongue  breathing problems  changes in hearing, ringing in the ears  confusion  general ill feeling or flu-like symptoms  pain on swallowing  redness, blistering,  peeling or loosening of the skin, including inside the mouth or nose  signs and symptoms of bleeding such as bloody or black, tarry stools; red or dark-brown urine; spitting up blood or brown material that looks like coffee grounds; red spots on the skin; unusual bruising or bleeding from the eye, gums, or nose  trouble passing urine or change in the amount of urine  unusually weak or tired  yellowing of the eyes or skin  Side effects that usually do not require medical attention (report to your doctor or health care professional if they continue or are bothersome):  diarrhea or constipation  headache  nausea, vomiting  stomach gas, heartburn  This list may not describe all possible side effects. Call your doctor for medical advice about side effects. You may report side effects to FDA at 0-304-UTT-7525.  Where should I keep my medicine?  Keep out of the reach of children.  Store at room temperature between 15 and 30 degrees C (59 and 86 degrees F). Protect from heat and moisture. Do not use this medicine if it has a strong vinegar smell. Throw away any unused medicine after the expiration date.  NOTE: This sheet is a summary. It may not cover all possible information. If you have questions about this medicine, talk to your doctor, pharmacist, or health care provider.  © 2020 Elsevier/Gold Standard (2018-01-30 10:42:13)    Is patient discharged on Warfarin / Coumadin?   No     Stroke Prevention  Some medical conditions and lifestyle choices can lead to a higher risk for a stroke. You can help to prevent a stroke by making nutrition, lifestyle, and other changes.  What nutrition changes can be made?    Eat healthy foods.  Choose foods that are high in fiber. These include:  Fresh fruits.  Fresh vegetables.  Whole grains.  Eat at least 5 or more servings of fruits and vegetables each day. Try to fill half of your plate at each meal with fruits and vegetables.  Choose lean protein foods. These include:  Lowfat (lean)  cuts of meat.  Chicken without skin.  Fish.  Tofu.  Beans.  Nuts.  Eat low-fat dairy products.  Avoid foods that:  Are high in salt (sodium).  Have saturated fat.  Have trans fat.  Have cholesterol.  Are processed.  Are premade.  Follow eating guidelines as told by your doctor. These may include:  Reducing how many calories you eat and drink each day.  Limiting how much salt you eat or drink each day to 1,500 milligrams (mg).  Using only healthy fats for cooking. These include:  Olive oil.  Canola oil.  Sunflower oil.  Counting how many carbohydrates you eat and drink each day.  What lifestyle changes can be made?  Try to stay at a healthy weight. Talk to your doctor about what a good weight is for you.  Get at least 30 minutes of moderate physical activity at least 5 days a week. This can include:  Fast walking.  Biking.  Swimming.  Do not use any products that have nicotine or tobacco. This includes cigarettes and e-cigarettes. If you need help quitting, ask your doctor. Avoid being around tobacco smoke in general.  Limit how much alcohol you drink to no more than 1 drink a day for nonpregnant women and 2 drinks a day for men. One drink equals 12 oz of beer, 5 oz of wine, or 1½ oz of hard liquor.  Do not use drugs.  Avoid taking birth control pills. Talk to your doctor about the risks of taking birth control pills if:  You are over 35 years old.  You smoke.  You get migraines.  You have had a blood clot.  What other changes can be made?  Manage your cholesterol.  It is important to eat a healthy diet.  If your cholesterol cannot be managed through your diet, you may also need to take medicines. Take medicines as told by your doctor.  Manage your diabetes.  It is important to eat a healthy diet and to exercise regularly.  If your blood sugar cannot be managed through diet and exercise, you may need to take medicines. Take medicines as told by your doctor.  Control your high blood pressure (hypertension).  Try to  "keep your blood pressure below 130/80. This can help lower your risk of stroke.  It is important to eat a healthy diet and to exercise regularly.  If your blood pressure cannot be managed through diet and exercise, you may need to take medicines. Take medicines as told by your doctor.  Ask your doctor if you should check your blood pressure at home.  Have your blood pressure checked every year. Do this even if your blood pressure is normal.  Talk to your doctor about getting checked for a sleep disorder. Signs of this can include:  Snoring a lot.  Feeling very tired.  Take over-the-counter and prescription medicines only as told by your doctor. These may include aspirin or blood thinners (antiplatelets or anticoagulants).  Make sure that any other medical conditions you have are managed.  Where to find more information  American Stroke Association: www.strokeassociation.org  National Stroke Association: www.stroke.org  Get help right away if:  You have any symptoms of stroke. \"BE FAST\" is an easy way to remember the main warning signs:  B - Balance. Signs are dizziness, sudden trouble walking, or loss of balance.  E - Eyes. Signs are trouble seeing or a sudden change in how you see.  F - Face. Signs are sudden weakness or loss of feeling of the face, or the face or eyelid drooping on one side.  A - Arms. Signs are weakness or loss of feeling in an arm. This happens suddenly and usually on one side of the body.  S - Speech. Signs are sudden trouble speaking, slurred speech, or trouble understanding what people say.  T - Time. Time to call emergency services. Write down what time symptoms started.  You have other signs of stroke, such as:  A sudden, very bad headache with no known cause.  Feeling sick to your stomach (nausea).  Throwing up (vomiting).  Jerky movements you cannot control (seizure).  These symptoms may represent a serious problem that is an emergency. Do not wait to see if the symptoms will go away. Get " medical help right away. Call your local emergency services (911 in the [place] Do not drive yourself to the hospital.  Summary  You can prevent a stroke by eating healthy, exercising, not smoking, drinking less alcohol, and treating other health problems, such as diabetes, high blood pressure, or high cholesterol.  Do not use any products that contain nicotine or tobacco, such as cigarettes and e-cigarettes.  Get help right away if you have any signs or symptoms of a stroke.  This information is not intended to replace advice given to you by your health care provider. Make sure you discuss any questions you have with your health care provider.  Document Released: 06/18/2013 Document Revised: 02/13/2020 Document Reviewed: 03/21/2018  Elsevier Patient Education © 2020 Elsevier Inc.

## 2022-07-08 NOTE — PROGRESS NOTES
Assumed care of pt at 1900. Pt alert and oriented.  Denies pain or discomfort.  Ambulates with FWW.  Bed low & locked, alarm on.  Reviewed plan for evening.  Hourly rounding continues.

## 2022-07-08 NOTE — CARE PLAN
The patient is Stable - Low risk of patient condition declining or worsening    Shift Goals  Clinical Goals: Stable neuro exam  Patient Goals: Sleep  Family Goals: PREET    Progress made toward(s) clinical / shift goals:    Problem: Fall Risk  Goal: Patient will remain free from falls  Outcome: Progressing     Problem: Optimal Care of the Stroke Patient  Goal: Optimal acute care for the stroke patient  Outcome: Progressing     Problem: Neuro Status  Goal: Neuro status will remain stable or improve  Outcome: Progressing       Patient is not progressing towards the following goals:

## 2022-07-08 NOTE — PROGRESS NOTES
Monitor Summary: SR 68-75, HI 0.15, QRS 0.09, QT 0.39 with rare-frequent PACs per strip from monitor room.

## 2022-07-08 NOTE — PROGRESS NOTES
Patient discharged home with outpatient PT. Zio patch placed. Discharge information and education provided, all questions answered. PIV removed. All belongings returned. Patient escorted out via wheelchair.

## 2022-07-12 ENCOUNTER — OCCUPATIONAL THERAPY (OUTPATIENT)
Dept: OCCUPATIONAL THERAPY | Facility: REHABILITATION | Age: 57
End: 2022-07-12
Attending: STUDENT IN AN ORGANIZED HEALTH CARE EDUCATION/TRAINING PROGRAM
Payer: COMMERCIAL

## 2022-07-12 ENCOUNTER — PHYSICAL THERAPY (OUTPATIENT)
Dept: PHYSICAL THERAPY | Facility: REHABILITATION | Age: 57
End: 2022-07-12
Attending: STUDENT IN AN ORGANIZED HEALTH CARE EDUCATION/TRAINING PROGRAM
Payer: COMMERCIAL

## 2022-07-12 DIAGNOSIS — Z74.09 IMPAIRED MOBILITY: ICD-10-CM

## 2022-07-12 DIAGNOSIS — R26.89 LOSS OF BALANCE: ICD-10-CM

## 2022-07-12 DIAGNOSIS — I63.541 ACUTE STROKE DUE TO OCCLUSION OF RIGHT CEREBELLAR ARTERY (HCC): ICD-10-CM

## 2022-07-12 DIAGNOSIS — R53.1 WEAKNESS: ICD-10-CM

## 2022-07-12 DIAGNOSIS — R26.2 DIFFICULTY WALKING: ICD-10-CM

## 2022-07-12 PROCEDURE — 97165 OT EVAL LOW COMPLEX 30 MIN: CPT

## 2022-07-12 PROCEDURE — 97162 PT EVAL MOD COMPLEX 30 MIN: CPT

## 2022-07-12 PROCEDURE — 97110 THERAPEUTIC EXERCISES: CPT

## 2022-07-12 SDOH — ECONOMIC STABILITY: GENERAL: QUALITY OF LIFE: FAIR

## 2022-07-12 ASSESSMENT — ENCOUNTER SYMPTOMS
PAIN SCALE: 1
ALLEVIATING FACTORS: REST
PAIN SCALE AT LOWEST: 0
EXACERBATED BY: ACTIVITY
PAIN TIMING: OCCASIONAL
PAIN SCALE AT HIGHEST: 4
QUALITY: ACHING
PAIN LOCATION: LEFT SHOULDER

## 2022-07-12 ASSESSMENT — BALANCE ASSESSMENTS
WEIGHT SHIFT STANDING: GOOD
BALANCE - STANDING DYNAMIC: FAIR +
BALANCE - SITTING STATIC: NORMAL
WEIGHT SHIFT SITTING: NORMAL
BALANCE - SITTING DYNAMIC: NORMAL
BALANCE - STANDING STATIC: GOOD

## 2022-07-12 ASSESSMENT — ACTIVITIES OF DAILY LIVING (ADL)
POOR_BALANCE: 1
AMBULATION_WITHOUT_ASSISTIVE_DEVICE: CONTACT GUARD ASSIST
AMBULATION_WITH_ASSISTIVE_DEVICE: INDEPENDENT

## 2022-07-12 NOTE — OP THERAPY EVALUATION
"  Outpatient Physical Therapy  INITIAL NEUROLOGICAL EVALUATION    [facility] [place] [address]  [address]  [place] [address]  Phone:  [phone]  Fax:  [phone]    Date of Evaluation: 07/12/2022    Patient: Alistair Putnam  YOB: 1965  MRN: [id number]     Referring Provider: Steve Miguel M.D.  [address] [place] [address]   Referring Diagnosis Acute stroke due to occlusion of right cerebellar artery (HCC) [I63.541]     Time Calculation    Start time: 1445  Stop time: 1528 Time Calculation (min): 43 minutes             Chief Complaint: Weakness, Difficulty Walking, and Loss Of Balance    Visit Diagnoses     ICD-10-CM   1. Acute stroke due to occlusion of right cerebellar artery (HCC)  I63.541   2. Weakness  R53.1   3. Loss of balance  R26.89   4. Impaired mobility  Z74.09   5. Difficulty walking  R26.2       Subjective:   History of Present Illness:     Date of onset:  6/26/2022    Mechanism of injury:  Pt DC from acute care Friday 7/8. Pt reports transition from home has gone well. He finds himself grabbing walls for stabilty. Had been using 4WW but started walking without it today. Has 15 stairs in the home he states he can do with handrail and someone behind him if he falls. No difficulty with bed mobility. Currently sits to don clothes and shower. Able to tie shoes/coordination.  Currently complains of fatigue, feeling off balance, and over all weakness. States some dizziness immediately upon standing. No falls since DC. Has not been ambulating out doors without AD, has 4WW.  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       Acute stay 6/26-7/8/22  \"57 year old male with no past medical history and is not a smoker who presented to the ER yesterday with complaints of sudden onset of dizziness. MRI brain revealed a large right cerebellar stroke with partial effacement of fourth " "ventricle, patient was transferred to the ICU. Neurosurgery was consulted and EVD was placed.  Echo unremarkable.  He was titrated on nicardipine drip, which was eventually able to be weaned off.  Hypertonic saline was also able to be weaned off.  Patient on florinef at time of discharge. EVD was then removed on 7/6.  Patient was able to be transferred out of the ICU after EVD removed. Patient doing well, physical and occupational therapy recommend outpatient services. Patient on aspirin and statin therapy. He is given ziopatch on discharge. Patient is to follow up with neurosurgery in 1 week for suture removal; he is also to follow up with neurology in stroke clinic.\"    Acute PT 6/26  \"Pt progressing as expected. Pt able to ambulate 100ft with FWW and SBA and negotiate 6 steps with CGA and unilateral UE support. Pt functionally capable of negotiating steps to enter home; pt with goal to negotiate 1 FOS at home to enter bedroom. Pt limited by BLE fatigue, weakness, and mild c/o HA, dizziness while negotiating stairs- RN aware. Recommend home with OP PT. Provided pt and wife with resources to find neuro PT in their area. 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# Has to include  Patient Goals:     Patient goals for therapy:  Improved balance, increased strength and return to work      Past Medical History:   Diagnosis Date   • Heart murmur 12/2017    \"As a child\"   • Pain 12/2017    right shoulder pain     Past Surgical History:   Procedure Laterality Date   • SHOULDER ARTHROSCOPY W/ ROTATOR CUFF REPAIR Right 1/8/2018    Procedure: SHOULDER ARTHROSCOPY W/ ROTATOR CUFF REPAIR- WITH EXTENSIVE DEBRIDEMENT;  Surgeon: Rl Martinez M.D.;  Location: SURGERY [facility];  Service: Orthopedics   • SHOULDER DECOMPRESSION ARTHROSCOPIC Right 1/8/2018 "    Procedure: SHOULDER DECOMPRESSION ARTHROSCOPIC- SUBACROMIAL;  Surgeon: Rl Martinez M.D.;  Location: SURGERY [facility];  Service: Orthopedics   • SHOULDER ARTHROSCOPY W/ BICIPITAL TENODESIS REPAIR Right 2018    Procedure: SHOULDER ARTHROSCOPY W/ BICIPITAL TENODESIS REPAIR;  Surgeon: Rl Martinez M.D.;  Location: SURGERY [facility];  Service: Orthopedics   • SINUS OBLITERATION FRONTAL     • KNEE ARTHROSCOPY Right    • TONSILLECTOMY     • ORIF, FRACTURE, TIBIA Right     with skin graft     Social History     Tobacco Use   • Smoking status: Never Smoker   • Smokeless tobacco: Never Used   Substance Use Topics   • Alcohol use: Yes     Comment: 2 per month     Family and Occupational History     Socioeconomic History   • Marital status:      Spouse name: Not on file   • Number of children: Not on file   • Years of education: Not on file   • Highest education level: Not on file   Occupational History   • Not on file       Objective:     Tone, Sensation and Coordination:     Coordination   Upper extremity (left):     Rapid alternating movements: Within functional limits    Finger touch to nose: Within functional limits  Upper extremity (right):     Rapid alternating movements: Within functional limits    Finger touch to nose: Within functional limits  Lower extremity (left):     Rapid alternating movements: Within functional limits    Toe tapping: Within functional limits  Lower extremity (right):     Rapid alternating movements: Within functional limits    Toe tapping: Within functional limits    Balance/Gait Comments   10MWT: 17.77 no AD  Gait speed:.56 m/s  5STS BUE support standard chair 23.78 seconds  6MWT: 706 feet. PRE 6/10  TU.09   TUG cog 23.39    Appropriate balance assessment next visit      Exercises/Treatment  Time-based treatments/modalities:           Assessment, Response and Plan:   Impairments: abnormal ADL function, abnormal gait, activity intolerance,  difficulty performing job, impaired functional mobility, impaired balance, impaired physical strength, lacks appropriate home exercise program and safety issue    Assessment details:  Pat presents to PT 4 days post acute care DC for CVA related impairments including gait deficits, balance, and weakness impacting function. Due to time constraints unable to complete formal balance assessment and will be completed next visit. Via 5STS, 10MWT, and 6MWT pt does have significant  Deficits and will require skilled intervention to improve above deficits as well as improve independence and safety with ambulation to maximize function in order to assist pt in possible return to work force.  Barriers to therapy:  Comorbidities  Prognosis: good    Goals:   Short Term Goals:   1. Pt will be compliant with HEP daily for improving strength and stabilty.  2. Pt will complete floor transfer assessment.   3. Pt will complete most appropriate balance outcome measure.    Short term goal time span:  4-6 weeks      Long Term Goals:    1. Pt will demonstrate improved functional mobility with TUG score 12 sec or better.  2. Pt will demonstrate improved functional LE strength with 5STS 15 sec or better.  3. Pt will demonstrate improved CV endurance and gait speed with 6MWT score 1200 feet or better.  4. Pt will demonstrate improved balance scoring low fall risk on most appropriate outcome measure.    5. Pt will demonstrate improved gait speed with 10MWT of 1-1.2m/s or better to dec fall risk.   6. Pt will be independent with floor transfer to perform work specific tasks.  7. Pt will demonstrate ability to perform floor to waist lift 20# x 10 reps to perform work specific functions.   Long term goal time span:  2-4 months    Plan:   Planned therapy interventions:  Gait Training (CPT 19203), Therapeutic Activities (CPT 03388), Therapeutic Exercise (CPT 15232) and Neuromuscular Re-education (CPT 38837)  Frequency:  2x week  Duration in weeks:   12  Discussed with:  Patient      Functional Assessment Used      5STS, TUG, 6MWT, 10MWT    Referring provider co-signature:  I have reviewed this plan of care and my co-signature certifies the need for services.    Certification Period: 07/12/2022 to  10/11/22    Physician Signature: ________________________________ Date: ______________

## 2022-07-12 NOTE — OP THERAPY EVALUATION
Outpatient Occupational Therapy  INITIAL NEUROLOGICAL EVALUATION    [facility] [place] [id number]  [address]  [place] [address]  Phone:  [phone]  Fax:  [phone]    Date of Evaluation: 07/12/2022    Patient: Alistair Putnam  YOB: 1965  MRN: [id number]     Referring Provider: Steve Miguel M.D.  [address] [place] [address]   Referring Diagnosis Acute stroke due to occlusion of right cerebellar artery (HCC) [I63.541]     Time Calculation    Start time: 0800  Stop time: 0845 Time Calculation (min): 45 minutes             Chief Complaint: Extremity Weakness and Self Care Duties    Visit Diagnoses     ICD-10-CM   1. Acute stroke due to occlusion of right cerebellar artery (HCC)  I63.541       Subjective:   History of Present Illness:     Date of onset:  6/26/2022    Mechanism of injury:  As per hospital admission note:  Reason for Admission  EMS      Admission Date  6/26/2022     CODE STATUS  Full Code     HPI & HOSPITAL COURSE  57 year old male with no past medical history and is not a smoker who presented to the ER yesterday with complaints of sudden onset of dizziness. MRI brain revealed a large right cerebellar stroke with partial effacement of fourth ventricle, patient was transferred to the ICU. Neurosurgery was consulted and EVD was placed.  Echo unremarkable.  He was titrated on nicardipine drip, which was eventually able to be weaned off.  Hypertonic saline was also able to be weaned off.  Patient on florinef at time of discharge. EVD was then removed on 7/6.  Patient was able to be transferred out of the ICU after EVD removed. Patient doing well, physical and occupational therapy recommend outpatient services. Patient on aspirin and statin therapy. He is given ziopatch on discharge. Patient is to follow up with neurosurgery in 1 week for suture removal; he is also to follow up with neurology in stroke clinic.        Therefore, he is  "discharged in fair and stable condition to home with close outpatient follow-up.     The patient met 2-midnight criteria for an inpatient stay at the time of discharge.     Discharge Date  2022  Prior level of function:  Independent with all ADLs and worked full time in sales for [employer].    Headaches:  no headaches  Ear problems: none  Sleep disturbance:  Difficulty falling asleep  Pain:     Current pain ratin    At best pain ratin    At worst pain ratin    Location:  Left shoulder     Quality:  Aching    Pain timing:  Occasional    Relieving factors:  Rest    Aggravating factors:  Activity    Progression:  Improving  Social Support:     Lives in:  Multiple-level home    Lives with:  Spouse and adult children  Hand dominance:  Right  Diagnostic Tests:     MRI studies: abnormal    Treatments:     Previous treatment:  Physical therapy, occupational therapy and speech therapy    Discharged from (in last 30 days): inpatient      Treatment Comments:  Discharged from acute care to home on 22  Activities of Daily Living:     Patient reported ADL status: Mod I with all personal ADLs and has started helping with light cooking and household tasks.  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   Patient Goals:     Patient goals for therapy:  Improved balance, increased strength, independence with ADLs/IADLs, return to sport/leisure activities and return to work      Past Medical History:   Diagnosis Date   • Heart murmur 2017    \"As a child\"   • Pain 2017    right shoulder pain     Past Surgical History:   Procedure Laterality Date   • SHOULDER ARTHROSCOPY W/ ROTATOR CUFF REPAIR Right 2018    Procedure: SHOULDER ARTHROSCOPY W/ ROTATOR CUFF REPAIR- WITH EXTENSIVE DEBRIDEMENT;  Surgeon: Rl Martinez M.D.;  Location: SURGERY [facility];  Service: Orthopedics   • SHOULDER DECOMPRESSION ARTHROSCOPIC Right 2018    Procedure: SHOULDER DECOMPRESSION " "ARTHROSCOPIC- SUBACROMIAL;  Surgeon: Rl Martinez M.D.;  Location: SURGERY [facility];  Service: Orthopedics   • SHOULDER ARTHROSCOPY W/ BICIPITAL TENODESIS REPAIR Right 1/8/2018    Procedure: SHOULDER ARTHROSCOPY W/ BICIPITAL TENODESIS REPAIR;  Surgeon: Rl Martinez M.D.;  Location: SURGERY [facility];  Service: Orthopedics   • SINUS OBLITERATION FRONTAL  2010   • KNEE ARTHROSCOPY Right 1989   • TONSILLECTOMY  1976   • ORIF, FRACTURE, TIBIA Right 1976    with skin graft     Social History     Tobacco Use   • Smoking status: Never Smoker   • Smokeless tobacco: Never Used   Substance Use Topics   • Alcohol use: Yes     Comment: 2 per month     Family and Occupational History     Socioeconomic History   • Marital status:      Spouse name: Not on file   • Number of children: Not on file   • Years of education: Not on file   • Highest education level: Not on file   Occupational History   • Not on file       Objective:   Active Range of Motion:   Upper extremity (left):     All left upper extremity active range of motion: All within functional limits  Upper extremity (right):     All right upper extremity active range of motion: All within functional limits  Active range of motion comments: Patient has a \"clicking\" sensation in left shoulder during abduction and internal/external rotation on abduction; which he did not have prior to stroke.  He feels it may have been due to being in a prolonged position when in the hospital bed.        Strength:     Right upper extremity strength within functional limits.    Upper extremity strength (left):     Shoulder flexion: 4    Shoulder extension:  4    Shoulder abduction: 3    Shoulder adduction: 4    Shoulder external rotation:  3    Elbow flexion: 4    Elbow extension: 4    Forearm pronation: 4    Forearm supination: 4    Wrist flexion: 4    Wrist extension: 4    Fingers flexion: 4    Fingers extension: 4    Fingers abduction: 4    Fingers adduction: " "4    Strength Comments:   strength:  R=84 pounds  L=94 pounds    Tone, Sensation and Coordination:   Tone:     Left upper extremity muscle tone: Normal    Right upper extremity muscle tone: Normal    Modified Carolyn:     Tone comments:   9 hole peg test:  R=26 seconds and L= 24 seconds    Sensation   Upper extremity (left):     Light touch: Intact    Sharp/dull: Intact     Stereognosis: Intact     Proprioception: Intact   Upper extremity (right):     Light touch: Intact    Sharp/dull: Intact     Stereognosis: Intact     Proprioception: Intact     Coordination   Upper extremity (left):     Fine motor: Within functional limits    Gross motor: Within functional limits    Slow alternating movements: Within functional limits    Rapid alternating movements: Within functional limits    Finger to finger: Within functional limits    Finger touch to nose: Within functional limits  Upper extremity (right):     Fine motor: Within functional limits    Gross motor: Within functional limits    Slow alternating movements: Within functional limits    Rapid alternating movements: Within functional limits    Finger to finger: Within functional limits    Finger touch to nose: Within functional limits    Cognition:     Orientation: normal to situation, normal to person, normal to place and normal to time    Direction following: three step    Short term memory: intact    Long term memory: intact    Attention span: intact    Cognition Comments:  Patient stated he feels he has \"brain fog\" and sometimes a bit forgetful.      Vision/Perception:     Formal vision perception assessment needed.    Vision/Perception Comments:   [place] test = 35/35 in 2 minutes  Maze task test= 22 seconds    Postural Control (Balance)     Sitting (static): Normal    Sitting (dynamic): Normal    Standing (static): Good    Standing (dynamic): Fair +    Weight shift (sitting): Normal    Weight shift (standing): Good    Ambulation: Level Surfaces   Ambulation " with assistive device: independent  Ambulation without assistive device: contact guard assist    Activities of Daily Living:     ADL Comments:  Patient interested in returning back to driving and then returning back to work.  Enjoys working on his cars.    Patient stated that he tires easily and needs to take a lot of rest breaks during the day .        Therapeutic Exercises (CPT 92615):     1. Firm resistance theraband    2. Firm resistance theraputty    Therapeutic Exercise Summary:  Initiated HEP with written, visual and verbal instruction.  To complete 3 x a day   Also provided written information and discussed energy conservation techniques to maximize level of function in early stages of his stroke recovery.        Time-based treatments/modalities:    Occupational Therapy Timed Treatment Charges  Therapeutic exercise minutes (CPT 91280): 15 minutes      Assessment, Response and Plan:   Impairments: activity intolerance, difficulty performing job, fine motor function, impaired functional mobility, impaired balance, impaired physical strength, lacks appropriate home exercise program and limited ADL's    Assessment details:  Patient is a 58 y/o male s/p large right cerebellar stroke on 6/26/22 and recently discharged home from acute care.  Patient progressing well; however presenting today with impaired activity tolerance, impaired standing balance and functional mobility, impaired strength and limitations with domestic and leisure tasks.  Patient is interested in returning to driving and returning back to work in sales.  Patient would benefit from OT intervention to enhance level of function and activity tolerance with focus on returning to prior level of function.  Patient is highly motivated and has a supportive family.    Barriers to therapy:  None  Prognosis: good    Goals:   Short Term Goals:   Patient will be independent with HEP  Patient will initiate pre-driving training to ascertain safety regarding  transitioning back to driving  Patient will enhance dynamic standing balance to Good to enhance leisure and work related tasks  Patient will improve dynamic standing tolerance to at least 15 minutes without rest.   Short term goal timespan:  2-4 weeks    Long Term Goals:   Patient will complete pre-driving training to ascertain safety regarding transitioning back to driving  Patient will enhance dynamic standing balance to Normal to enhance leisure and work related tasks  Patient will improve dynamic standing tolerance to at least 20 minutes without rest.   Patient will have 5/5 upper extremity strength to enhance leisure and work related tasks  Patient will score at least 65/80 on the UEFI.   Long term goal timespan:  4-6 weeks    Plan:   Therapy options:  Occupational therapy treatment to continue  Planned therapy interventions:  Neuromuscular Re-education (CPT 81364), Self Care ADL Training (CPT 66700), Therapeutic Activities (CPT 27583) and Therapeutic Exercise (CPT 79250)  Frequency:  2x week  Duration in weeks:  5  Duration in visits:  10  Discussed with:  Patient and family      Functional Assessment Used:  UEFI = 39/80        Referring provider co-signature:  I have reviewed this plan of care and my co-signature certifies the need for services.    Certification Period: 07/12/2022 to  08/16/22    Physician Signature: ________________________________ Date: ______________

## 2022-07-13 ASSESSMENT — ACTIVITIES OF DAILY LIVING (ADL): POOR_BALANCE: 1

## 2022-07-14 ENCOUNTER — OCCUPATIONAL THERAPY (OUTPATIENT)
Dept: OCCUPATIONAL THERAPY | Facility: REHABILITATION | Age: 57
End: 2022-07-14
Attending: STUDENT IN AN ORGANIZED HEALTH CARE EDUCATION/TRAINING PROGRAM
Payer: COMMERCIAL

## 2022-07-14 ENCOUNTER — PHYSICAL THERAPY (OUTPATIENT)
Dept: PHYSICAL THERAPY | Facility: REHABILITATION | Age: 57
End: 2022-07-14
Attending: STUDENT IN AN ORGANIZED HEALTH CARE EDUCATION/TRAINING PROGRAM
Payer: COMMERCIAL

## 2022-07-14 DIAGNOSIS — Z74.09 IMPAIRED MOBILITY: ICD-10-CM

## 2022-07-14 DIAGNOSIS — I63.541 ACUTE STROKE DUE TO OCCLUSION OF RIGHT CEREBELLAR ARTERY (HCC): ICD-10-CM

## 2022-07-14 PROCEDURE — 97112 NEUROMUSCULAR REEDUCATION: CPT

## 2022-07-14 PROCEDURE — 97530 THERAPEUTIC ACTIVITIES: CPT
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
## 2022-07-14 NOTE — OP THERAPY DAILY TREATMENT
Outpatient Physical Therapy  DAILY TREATMENT     [facility] [address]  [address]  [place] [address]  Phone:  [phone]  Fax:  [phone]    Date: 07/14/2022    Patient: Alistair Putnam  YOB: 1965  MRN: [id number]     Time Calculation    Start time: 1532  Stop time: 1615 Time Calculation (min): 43 minutes         Chief Complaint: Difficulty Walking    Visit #: 2    SUBJECTIVE:  Patient present with wife, Deanna. Reports that he has been performing HEP (sit to stand, clamshells) and has been trying to stay awake more/helping around the house. He participated in his driving evaluation this morning and did well.     Complains of left shoulder pain since sleeping on it wrong in the hospital.    OBJECTIVE:  Current objective measures:   LLE MMT  Hip flexion: 5   Hip extension: 4+  Hip abduction: 4+  Hip adduction: 4+  Knee flexion: 5  Knee extension: 5  Ankle DF: 4+  Ankle PF:5   Ankle Inversion: 5  Ankle Eversion: 5    Floor transfers: [place] with UE assistance    MiniBEST  1. SIT TO STAND: 2  2. RISE ON TOES: 2  3. STANDING ON ONE LEG: Left: 2 Right: 2  4. COMPENSATORY STEPPING CORRECTION - FORWARD: 1  5. COMPENSATORY STEPPING CORRECTION - BACKWARD:1  6. COMPENSATORY STEPPING CORRECTION - LATERAL: Left: 1 Right:1  7. STANCE (FEET TOGETHER); EYES OPEN, FIRM SURFACE: 2  8. STANCE (FEET TOGETHER); EYES CLOSED, FOAM SURFACE: 1  9. INCLINE, EYES CLOSED: 2  10. CHANGE IN GAIT SPEED: 1  11. WALK WITH HEAD TURNS - HORIZONTAL: 1  12. WALK WITH PIVOT TURNS: 1  13. STEP OVER OBSTACLES: 1  14. TIMED UP & GO WITH DUAL TASK: 1 (completed at initial evaluation)  TOTAL: 19/28            Therapeutic Exercises (CPT 31779):     1. LE MMT Assessment , See above for details.     Therapeutic Treatments and Modalities:     1. Neuromuscular Re-education (CPT 28356), Balance Assessment , See above for details , Floor transfer assessment , See above for details, Head thrust test,  Negative     Therapeutic Treatment and Modalities Summary: -Education on slowly increasing activities around the household/walks around his cul-de-sac to increase endurance but to also give his body some awilda for all it has been through and take naps/breaks as needed to allow the brain to heal.     Time-based treatments/modalities:    Physical Therapy Timed Treatment Charges  Neuromusc re-ed, balance, coor, post minutes (CPT 82823): 43 minutes        ASSESSMENT:   Response to treatment: Patient demonstrates minimal lower extremity strength impairments and functionally is able to get on/off the ground with [place]. He does classify as a moderate risk for falling based on the Mini-BEST and demonstrates decreased endurance. Would benefit from continued skilled physical therapy to address balance and endurance impairments as he plans to return to a physically demanding job.     PLAN/RECOMMENDATIONS:   Plan for treatment: therapy treatment to continue next visit.  Planned interventions for next visit: continue with current treatment.

## 2022-07-18 ENCOUNTER — PHYSICAL THERAPY (OUTPATIENT)
Dept: PHYSICAL THERAPY | Facility: REHABILITATION | Age: 57
End: 2022-07-18
Attending: STUDENT IN AN ORGANIZED HEALTH CARE EDUCATION/TRAINING PROGRAM
Payer: COMMERCIAL

## 2022-07-18 DIAGNOSIS — R26.89 LOSS OF BALANCE: ICD-10-CM

## 2022-07-18 DIAGNOSIS — Z74.09 IMPAIRED MOBILITY: ICD-10-CM

## 2022-07-18 DIAGNOSIS — I63.541 ACUTE STROKE DUE TO OCCLUSION OF RIGHT CEREBELLAR ARTERY (HCC): ICD-10-CM

## 2022-07-18 DIAGNOSIS — R26.2 DIFFICULTY WALKING: ICD-10-CM

## 2022-07-18 DIAGNOSIS — R53.1 WEAKNESS: ICD-10-CM

## 2022-07-18 PROCEDURE — 97112 NEUROMUSCULAR REEDUCATION: CPT

## 2022-07-18 PROCEDURE — 97110 THERAPEUTIC EXERCISES: CPT

## 2022-07-18 NOTE — OP THERAPY DAILY TREATMENT
"  Outpatient Physical Therapy  DAILY TREATMENT     [facility] Physical [address].  [address]  [place] [address]  Phone:  [phone]  Fax:  [phone]    Date: 07/18/2022    Patient: Alistair Putnam  YOB: 1965  MRN: [id number]     Time Calculation    Start time: 1100  Stop time: 1143 Time Calculation (min): 43 minutes         Chief Complaint: Loss Of Balance and Weakness    Visit #: 3    SUBJECTIVE:  Patient present with wife, Deanna. Reports that he has been performing HEP (sit to stand, clamshells) and has been trying to stay awake more/helping around the house. He participated in his driving evaluation this morning and did well.     Complains of left shoulder pain since sleeping on it wrong in the hospital.    OBJECTIVE:  Current objective measures:   LLE MMT  Hip flexion: 5   Hip extension: 4+  Hip abduction: 4+  Hip adduction: 4+  Knee flexion: 5  Knee extension: 5  Ankle DF: 4+  Ankle PF:5   Ankle Inversion: 5  Ankle Eversion: 5    Floor transfers: Rosalba with UE assistance    Mini-BEST  1. SIT TO STAND: 2  2. RISE ON TOES: 2  3. STANDING ON ONE LEG: Left: 2 Right: 2  4. COMPENSATORY STEPPING CORRECTION - FORWARD: 1  5. COMPENSATORY STEPPING CORRECTION - BACKWARD:1  6. COMPENSATORY STEPPING CORRECTION - LATERAL: Left: 1 Right:1  7. STANCE (FEET TOGETHER); EYES OPEN, FIRM SURFACE: 2  8. STANCE (FEET TOGETHER); EYES CLOSED, FOAM SURFACE: 1  9. INCLINE, EYES CLOSED: 2  10. CHANGE IN GAIT SPEED: 1  11. WALK WITH HEAD TURNS - HORIZONTAL: 1  12. WALK WITH PIVOT TURNS: 1  13. STEP OVER OBSTACLES: 1  14. TIMED UP & GO WITH DUAL TASK: 1 (completed at initial evaluation)  TOTAL: 19/28            Therapeutic Exercises (CPT 57283):     1. Foam roll snow jose alfredo, x 1 min, Lt UE pain @ 90    2. Foam roll serratus punches, x 15    3. Split STS no UE 18\" table, x 15 B      Therapeutic Exercise Summary: HEP added 1/2 kneeling balance progression, split STS  HEP: baudilio hernandez, " "STS    Therapeutic Treatments and Modalities:     1. Neuromuscular Re-education (CPT 42988)    Therapeutic Treatment and Modalities Summary: 1/2 kneeling progression SBA performed bilaterally x 45 sec each  Min A x 2 events EC vertical head turns on Rt LE.   Vertical head turns, horizontal head turns, EC, EC horizontal head turns, EC vertical head turns, EC head circles   Increased difficulty noted Rt> Lt LE.  1/2 kneeling overhead press 5# x 12 reps, limited by Lt UE pain  1/2 kneeling forward chest press Rt UE 5#, Lt UE 0 x 15 reps     STS on airex no UE 19\" table x 15. Cues for eccentric lowering  STS on airex no UE EC 19\" table close SBA, 3 reps touch A. Good ankle strategy. Significant fatigue.     Time-based treatments/modalities:    Physical Therapy Timed Treatment Charges  Neuromusc re-ed, balance, coor, post minutes (CPT 41088): 30 minutes  Therapeutic exercise minutes (CPT 60827): 13 minutes        ASSESSMENT:   Response to treatment: Educated on high intensity training for maximizing improvement as well as education related to DOMS and safety. 1/2 Tall kneeling progressions currently limited by pt left shoulder pain during session. Will continue to progress.    PLAN/RECOMMENDATIONS:   Plan for treatment: therapy treatment to continue next visit.  Planned interventions for next visit: continue with current treatment.       "

## 2022-07-19 ENCOUNTER — OCCUPATIONAL THERAPY (OUTPATIENT)
Dept: OCCUPATIONAL THERAPY | Facility: REHABILITATION | Age: 57
End: 2022-07-19
Attending: STUDENT IN AN ORGANIZED HEALTH CARE EDUCATION/TRAINING PROGRAM
Payer: COMMERCIAL

## 2022-07-19 DIAGNOSIS — I69.30 LATE EFFECTS OF CEREBRAL ISCHEMIC STROKE: ICD-10-CM

## 2022-07-19 PROCEDURE — 97112 NEUROMUSCULAR REEDUCATION: CPT

## 2022-07-19 NOTE — OP THERAPY DAILY TREATMENT
"  Outpatient Occupational Therapy  DAILY TREATMENT     [facility] Occupational Therapy [address].  [address]  [place] [address]  Phone:  [phone]  Fax:  [phone]    Date: 07/19/2022    Patient: Alistair Putnam  YOB: 1965  MRN: [id number]     Time Calculation  Start time: 0800  Stop time: 0845 Time Calculation (min): 45 minutes         Chief Complaint: Extremity Weakness and Self Care Duties    Visit #: 3    SUBJECTIVE:  I haven't had a chance to practice driving with my wife yet.     OBJECTIVE:  Current objective measures:   Active Range of Motion:   Upper extremity (left):     All left upper extremity active range of motion: All within functional limits  Upper extremity (right):     All right upper extremity active range of motion: All within functional limits  Active range of motion comments: Patient has a \"clicking\" sensation in left shoulder during abduction and internal/external rotation on abduction; which he did not have prior to stroke.  He feels it may have been due to being in a prolonged position when in the hospital bed.          Strength:     Right upper extremity strength within functional limits.    Upper extremity strength (left):     Shoulder flexion: 4    Shoulder extension:  4    Shoulder abduction: 3    Shoulder adduction: 4    Shoulder external rotation:  3    Elbow flexion: 4    Elbow extension: 4    Forearm pronation: 4    Forearm supination: 4    Wrist flexion: 4    Wrist extension: 4    Fingers flexion: 4    Fingers extension: 4    Fingers abduction: 4    Fingers adduction: 4     Strength Comments:   strength:  R=100 pounds  L=97 pounds     Tone, Sensation and Coordination:   Tone:     Left upper extremity muscle tone: Normal    Right upper extremity muscle tone: Normal     Modified Carolyn:      Tone comments:   9 hole peg test:  R=26 seconds and L= 24 seconds     Sensation   Upper extremity (left):     Light touch: Intact    Sharp/dull: " "Intact     Stereognosis: Intact     Proprioception: Intact   Upper extremity (right):     Light touch: Intact    Sharp/dull: Intact     Stereognosis: Intact     Proprioception: Intact      Coordination   Upper extremity (left):     Fine motor: Within functional limits    Gross motor: Within functional limits    Slow alternating movements: Within functional limits    Rapid alternating movements: Within functional limits    Finger to finger: Within functional limits    Finger touch to nose: Within functional limits  Upper extremity (right):     Fine motor: Within functional limits    Gross motor: Within functional limits    Slow alternating movements: Within functional limits    Rapid alternating movements: Within functional limits    Finger to finger: Within functional limits    Finger touch to nose: Within functional limits     Cognition:     Orientation: normal to situation, normal to person, normal to place and normal to time    Direction following: three step    Short term memory: intact    Long term memory: intact    Attention span: intact     Cognition Comments:  Patient stated he feels he has \"brain fog\" and sometimes a bit forgetful.       Vision/Perception:     Formal vision perception assessment needed.     Vision/Perception Comments:   Decker test = 35/35 in 2 minutes  Maze task test= 22 seconds     Postural Control (Balance)     Sitting (static): Normal    Sitting (dynamic): Normal    Standing (static): Good    Standing (dynamic): Fair +    Weight shift (sitting): Normal    Weight shift (standing): Good     Ambulation: Level Surfaces   Ambulation with assistive device: independent  Ambulation without assistive device: contact guard assist     Activities of Daily Living:      ADL Comments:  Patient interested in returning back to driving and then returning back to work.  Enjoys working on his cars.    Patient stated that he tires easily and needs to take a lot of rest breaks during the day .    "     Therapeutic Treatments and Modalities:    1. Neuromuscular Re-education (CPT 56953)    Therapeutic Treatments and Modalities Summary: Focus on dynamic standing balance/endurance and left UE strengthening:  UBE while standing on resistance 3 for 5 minutes  Pull down exercise while standing on 20# 3 x 10 to enhance lower trap strength  Dynamic standing while overhead reaching with ball activity  While seated UE strengthening with 3# for bicep/triceps, external/internal rotation at 0 degrees.  1# for FF/Ext, overhead reach, horizontal abd/add    After treatment session, patient received TENS and heat treatment to left shoulder for pain management by our Technician.      Time-based treatments/modalities:  Neuromusc re-ed minutes (CPT 82242): 45 minutes        Pain rating before treatment: 1  Pain rating after treatment: 7    ASSESSMENT:   Response to treatment: improved standing tolerance and  strength.  Pain in left shoulder mainly anterior and medial ....? Impingement?  Will continue to monitor.      PLAN/RECOMMENDATIONS:   Plan for treatment: therapy treatment to continue next visit.  Planned interventions for next visit: continue with current treatment, neuromuscular re-education (CPT 01431), self care ADL training (CPT 38593), therapeutic activities (CPT 03609) and therapeutic exercise (CPT 73912)

## 2022-07-21 ENCOUNTER — PHYSICAL THERAPY (OUTPATIENT)
Dept: PHYSICAL THERAPY | Facility: REHABILITATION | Age: 57
End: 2022-07-21
Attending: STUDENT IN AN ORGANIZED HEALTH CARE EDUCATION/TRAINING PROGRAM
Payer: COMMERCIAL

## 2022-07-21 ENCOUNTER — OCCUPATIONAL THERAPY (OUTPATIENT)
Dept: OCCUPATIONAL THERAPY | Facility: REHABILITATION | Age: 57
End: 2022-07-21
Attending: STUDENT IN AN ORGANIZED HEALTH CARE EDUCATION/TRAINING PROGRAM
Payer: COMMERCIAL

## 2022-07-21 DIAGNOSIS — R26.2 DIFFICULTY WALKING: ICD-10-CM

## 2022-07-21 DIAGNOSIS — R53.1 WEAKNESS: ICD-10-CM

## 2022-07-21 DIAGNOSIS — R26.89 LOSS OF BALANCE: ICD-10-CM

## 2022-07-21 DIAGNOSIS — I63.541 ACUTE STROKE DUE TO OCCLUSION OF RIGHT CEREBELLAR ARTERY (HCC): ICD-10-CM

## 2022-07-21 DIAGNOSIS — I69.30 LATE EFFECTS OF CEREBRAL ISCHEMIC STROKE: ICD-10-CM

## 2022-07-21 DIAGNOSIS — Z74.09 IMPAIRED MOBILITY: ICD-10-CM

## 2022-07-21 PROCEDURE — 97112 NEUROMUSCULAR REEDUCATION: CPT

## 2022-07-21 PROCEDURE — 97116 GAIT TRAINING THERAPY: CPT

## 2022-07-21 NOTE — OP THERAPY DAILY TREATMENT
"  Outpatient Occupational Therapy  DAILY TREATMENT     [facility] Occupational Therapy [address].  [address]  [place] [address]  Phone:  [phone]  Fax:  [phone]    Date: 07/21/2022    Patient: Alistair Putnam  YOB: 1965  MRN: [id number]     Time Calculation  Start time: 0800  Stop time: 0845 Time Calculation (min): 45 minutes         Chief Complaint: Extremity Weakness and Self Care Duties    Visit #: 4    SUBJECTIVE:  This shoulder is still quite painful.      OBJECTIVE:  Current objective measures:   Active Range of Motion:   Upper extremity (left):     All left upper extremity active range of motion: All within functional limits  Upper extremity (right):     All right upper extremity active range of motion: All within functional limits  Active range of motion comments: Patient has a \"clicking\" sensation in left shoulder during abduction and internal/external rotation on abduction; which he did not have prior to stroke.  He feels it may have been due to being in a prolonged position when in the hospital bed.          Strength:     Right upper extremity strength within functional limits.    Upper extremity strength (left):     Shoulder flexion: 4    Shoulder extension:  4    Shoulder abduction: 3    Shoulder adduction: 4    Shoulder external rotation:  3+    Elbow flexion: 4    Elbow extension: 4    Forearm pronation: 4    Forearm supination: 4    Wrist flexion: 4    Wrist extension: 4    Fingers flexion: 4    Fingers extension: 4    Fingers abduction: 4    Fingers adduction: 4     Strength Comments:   strength:  R=104 pounds  L103 pounds     Tone, Sensation and Coordination:   Tone:     Left upper extremity muscle tone: Normal    Right upper extremity muscle tone: Normal     Modified Carolyn:      Tone comments:   9 hole peg test:  R=26 seconds and L= 24 seconds     Sensation   Upper extremity (left):     Light touch: Intact    Sharp/dull: " "Intact     Stereognosis: Intact     Proprioception: Intact   Upper extremity (right):     Light touch: Intact    Sharp/dull: Intact     Stereognosis: Intact     Proprioception: Intact      Coordination   Upper extremity (left):     Fine motor: Within functional limits    Gross motor: Within functional limits    Slow alternating movements: Within functional limits    Rapid alternating movements: Within functional limits    Finger to finger: Within functional limits    Finger touch to nose: Within functional limits  Upper extremity (right):     Fine motor: Within functional limits    Gross motor: Within functional limits    Slow alternating movements: Within functional limits    Rapid alternating movements: Within functional limits    Finger to finger: Within functional limits    Finger touch to nose: Within functional limits     Cognition:     Orientation: normal to situation, normal to person, normal to place and normal to time    Direction following: three step    Short term memory: intact    Long term memory: intact    Attention span: intact     Cognition Comments:  Patient stated he feels he has \"brain fog\" and sometimes a bit forgetful.       Vision/Perception:     Formal vision perception assessment needed.     Vision/Perception Comments:   [place] test = 35/35 in 2 minutes  Maze task test= 22 seconds     Postural Control (Balance)     Sitting (static): Normal    Sitting (dynamic): Normal    Standing (static): Good    Standing (dynamic): Fair +    Weight shift (sitting): Normal    Weight shift (standing): Good     Ambulation: Level Surfaces   Ambulation with assistive device: independent  Ambulation without assistive device: contact guard assist     Activities of Daily Living:      ADL Comments:  Patient interested in returning back to driving and then returning back to work.  Enjoys working on his cars.    Patient stated that he tires easily and needs to take a lot of rest breaks during the day .    "     Therapeutic Treatments and Modalities:    1. Neuromuscular Re-education (CPT 53230)    Therapeutic Treatments and Modalities Summary: Focus on dynamic standing balance/endurance and left UE strengthening:  UBE while standing on resistance 3 for 5 minutes  Pull down exercise while standing on 20# 3 x 10 to enhance lower trap strength  Soft tissue stretching of sternocleidomastoid and scapular depression and retraction exercises completed.    While seated UE strengthening with 3# for bicep/triceps, external/internal rotation at 0 degrees.  1# for FF/Ext, overhead reach, horizontal abd/add    After treatment session, patient received TENS and heat treatment to left shoulder for pain management by our Technician.      Time-based treatments/modalities:  Neuromusc re-ed minutes (CPT 02101): 45 minutes        Pain rating before treatment: 2  Pain rating after treatment: 4    ASSESSMENT:   Response to treatment: increased pain noted with shoulder abduction and external rotation.  Patient found the TENS and heat treatment helpful with pain post therapy session.  Slight scapular elevation noted today.  Continued increase in  strength and external rotation    PLAN/RECOMMENDATIONS:   Plan for treatment: therapy treatment to continue next visit.  Planned interventions for next visit: continue with current treatment, neuromuscular re-education (CPT 56483), self care ADL training (CPT 64108), therapeutic activities (CPT 88880) and therapeutic exercise (CPT 30046)

## 2022-07-21 NOTE — OP THERAPY DAILY TREATMENT
Outpatient Physical Therapy  DAILY TREATMENT     [facility] Physical [address].  [address]  [place] [address]  Phone:  [phone]  Fax:  [phone]    Date: 07/21/2022    Patient: Alistair Putnma  YOB: 1965  MRN: [id number]     Time Calculation    Start time: 0900  Stop time: 0941 Time Calculation (min): 41 minutes         Chief Complaint: Difficulty Walking, Loss Of Balance, and Weakness    Visit #: 4    SUBJECTIVE:  Did not do EC portion of half kneeling dure to therapist miscommunication, will complete moving forward.       OBJECTIVE:  Current objective measures:   LLE MMT  Hip flexion: 5   Hip extension: 4+  Hip abduction: 4+  Hip adduction: 4+  Knee flexion: 5  Knee extension: 5  Ankle DF: 4+  Ankle PF:5   Ankle Inversion: 5  Ankle Eversion: 5    Floor transfers: Rosalba with UE assistance    Mini-BEST  1. SIT TO STAND: 2  2. RISE ON TOES: 2  3. STANDING ON ONE LEG: Left: 2 Right: 2  4. COMPENSATORY STEPPING CORRECTION - FORWARD: 1  5. COMPENSATORY STEPPING CORRECTION - BACKWARD:1  6. COMPENSATORY STEPPING CORRECTION - LATERAL: Left: 1 Right:1  7. STANCE (FEET TOGETHER); EYES OPEN, FIRM SURFACE: 2  8. STANCE (FEET TOGETHER); EYES CLOSED, FOAM SURFACE: 1  9. INCLINE, EYES CLOSED: 2  10. CHANGE IN GAIT SPEED: 1  11. WALK WITH HEAD TURNS - HORIZONTAL: 1  12. WALK WITH PIVOT TURNS: 1  13. STEP OVER OBSTACLES: 1  14. TIMED UP & GO WITH DUAL TASK: 1 (completed at initial evaluation)  TOTAL: 19/28            Therapeutic Exercises (CPT 00687):       Therapeutic Exercise Summary: HEP added 1/2 kneeling balance progression, split STS  HEP: clamshells, bridges, STS    Therapeutic Treatments and Modalities:     1. Gait Training (CPT 34915)    2. Neuromuscular Re-education (CPT 60752)    Therapeutic Treatment and Modalities Summary: Gait: high intensity gait training with litegait for patient safety. Min cues for step length and heel strike IC  Resting vitals /83 Rt UE,  96%, 87 bpm  5 min incline 4, speed 1.5 mph PRE 6/10 , 95%   treadmill shut off  Round 2  Incline 4, speed 1.7   1043 feet 7.5 min, 102 pm PRE 7/10  Standing rest between sets    NMR for improving core strength and stabilty  1/2 kneeling on airex x 1 min SBA, progressed to horizontal head turns EC x 2 min each SBA, 1 min A to left side.     Tall kneeling eccentric curls 2x15 SPV on airex  Quadruped hip ext x 15 B  Quadruped fire hydrant hold x 1 min B    Time-based treatments/modalities:    Physical Therapy Timed Treatment Charges  Gait training minutes (CPT 08999): 25 minutes  Neuromusc re-ed, balance, coor, post minutes (CPT 24286): 16 minutes        ASSESSMENT:   Response to treatment: Demod fair fatigue after high intensity gait training with appropriate increase in HR as well as PRE. Completed quadruped stabilty training without complaints of shoulder pain weight bearing position.    PLAN/RECOMMENDATIONS: lite gait, multi directional stepping, hurdles  Plan for treatment: therapy treatment to continue next visit.  Planned interventions for next visit: continue with current treatment.

## 2022-07-25 ENCOUNTER — PHYSICAL THERAPY (OUTPATIENT)
Dept: PHYSICAL THERAPY | Facility: REHABILITATION | Age: 57
End: 2022-07-25
Attending: STUDENT IN AN ORGANIZED HEALTH CARE EDUCATION/TRAINING PROGRAM
Payer: COMMERCIAL

## 2022-07-25 DIAGNOSIS — I63.541 ACUTE STROKE DUE TO OCCLUSION OF RIGHT CEREBELLAR ARTERY (HCC): ICD-10-CM

## 2022-07-25 DIAGNOSIS — Z74.09 IMPAIRED MOBILITY: ICD-10-CM

## 2022-07-25 DIAGNOSIS — R26.89 LOSS OF BALANCE: ICD-10-CM

## 2022-07-25 DIAGNOSIS — R53.1 WEAKNESS: ICD-10-CM

## 2022-07-25 DIAGNOSIS — R26.2 DIFFICULTY WALKING: ICD-10-CM

## 2022-07-25 PROCEDURE — 97112 NEUROMUSCULAR REEDUCATION: CPT

## 2022-07-25 NOTE — OP THERAPY DAILY TREATMENT
Outpatient Physical Therapy  DAILY TREATMENT     [facility] Physical [address]  [address]  [place] [address]  Phone:  [phone]  Fax:  [phone]    Date: 07/25/2022    Patient: Alistair Putnam  YOB: 1965  MRN: [id number]     Time Calculation    Start time: 0830  Stop time: 0912 Time Calculation (min): 42 minutes         Chief Complaint: Weakness, Difficulty Walking, and Loss Of Balance    Visit #: 5    SUBJECTIVE:  Spent a few hours in friends pool yesterday which felt good. Notes feeling dizzy/off balance upon standing and fast turns. Wears bifocals.       OBJECTIVE:  Current objective measures:   LLE MMT  Hip flexion: 5   Hip extension: 4+  Hip abduction: 4+  Hip adduction: 4+  Knee flexion: 5  Knee extension: 5  Ankle DF: 4+  Ankle PF:5   Ankle Inversion: 5  Ankle Eversion: 5    Floor transfers: Rosalba with UE assistance    Mini-BEST  1. SIT TO STAND: 2  2. RISE ON TOES: 2  3. STANDING ON ONE LEG: Left: 2 Right: 2  4. COMPENSATORY STEPPING CORRECTION - FORWARD: 1  5. COMPENSATORY STEPPING CORRECTION - BACKWARD:1  6. COMPENSATORY STEPPING CORRECTION - LATERAL: Left: 1 Right:1  7. STANCE (FEET TOGETHER); EYES OPEN, FIRM SURFACE: 2  8. STANCE (FEET TOGETHER); EYES CLOSED, FOAM SURFACE: 1  9. INCLINE, EYES CLOSED: 2  10. CHANGE IN GAIT SPEED: 1  11. WALK WITH HEAD TURNS - HORIZONTAL: 1  12. WALK WITH PIVOT TURNS: 1  13. STEP OVER OBSTACLES: 1  14. TIMED UP & GO WITH DUAL TASK: 1 (completed at initial evaluation)  TOTAL: 19/28    Head impulse test:Positive with Rt head turn          Therapeutic Exercises (CPT 96296):       Therapeutic Exercise Summary: HEP added 1/2 kneeling balance progression, split STS  HEP: clamshells, bridges, STS    Therapeutic Treatments and Modalities:     2. Neuromuscular Re-education (CPT 47388)    Therapeutic Treatment and Modalities Summary: NMR: For improving SL stabilty and gait mechanics performed fed stepping over 12 inch hurdles  CGA. 1 cues to dec Left EL circumduction around omar. 2 instance min A. 4 x 15 feet completed leading with bilateral LE. BWD stepping over 4 inch hurdles 4 x 10, 1 LOB with mod A. Overall good stabilty.     For improving reactive balance with og challenge performed clock yourself dru on brain coordination at 40 SPM x 3 min without error. Progressed to include 12 in omar anteriorly and 4 in hurdles positioned in to [place] to facilitate step/clearing of object with coordination/reactive balance task. Mod difficulty however good clearance of LE, errors related to speed, performed at 30spm    Seated VOR x 1 horizontal, vertical, diagonal 3 x 30 sec. Education on findings, gaze stabilization and hypofunction. Provided HEP handout to be performed in sitting position, verbalized understanding.       Time-based treatments/modalities:    Physical Therapy Timed Treatment Charges  Neuromusc re-ed, balance, coor, post minutes (CPT 58055): 42 minutes        ASSESSMENT:   Response to treatment: Due to pr complaint of dizziness with turning completed head impulse test which indicated vestibular hypofunction specifically with right sided head turn. Provided VOR HEP with use of metronome for objective assessment of speed at 85bpm. Significantly more challenging horizontal and diagonal head movements. Demod improved SL stabilty and LE control stepping over hurdles as well as good reactive balance.   PLAN/RECOMMENDATIONS: lite gait, multi directional stepping, hurdles  Plan for treatment: therapy treatment to continue next visit.  Planned interventions for next visit: continue with current treatment.

## 2022-07-27 ENCOUNTER — HOSPITAL ENCOUNTER (OUTPATIENT)
Dept: RADIOLOGY | Facility: MEDICAL CENTER | Age: 57
End: 2022-07-27
Attending: STUDENT IN AN ORGANIZED HEALTH CARE EDUCATION/TRAINING PROGRAM
Payer: COMMERCIAL

## 2022-07-27 ENCOUNTER — OCCUPATIONAL THERAPY (OUTPATIENT)
Dept: OCCUPATIONAL THERAPY | Facility: REHABILITATION | Age: 57
End: 2022-07-27
Attending: STUDENT IN AN ORGANIZED HEALTH CARE EDUCATION/TRAINING PROGRAM
Payer: COMMERCIAL

## 2022-07-27 DIAGNOSIS — I69.30 LATE EFFECTS OF CEREBRAL ISCHEMIC STROKE: ICD-10-CM

## 2022-07-27 DIAGNOSIS — G91.9 HYDROCEPHALUS, UNSPECIFIED TYPE (HCC): ICD-10-CM

## 2022-07-27 PROCEDURE — 70450 CT HEAD/BRAIN W/O DYE: CPT

## 2022-07-27 PROCEDURE — 97112 NEUROMUSCULAR REEDUCATION: CPT

## 2022-07-27 NOTE — OP THERAPY DAILY TREATMENT
"  Outpatient Occupational Therapy  DAILY TREATMENT     [facility] Occupational Therapy [address].  [address]  [place] [address]  Phone:  [phone]  Fax:  [phone]    Date: 07/27/2022    Patient: Alistair Putnam  YOB: 1965  MRN: [id number]     Time Calculation                 Chief Complaint: No chief complaint on file.    Visit #: 5    SUBJECTIVE:  My left arm is definitely  getting stronger, but it still hurts.     OBJECTIVE:  Current objective measures:  Upper extremity (left):     All left upper extremity active range of motion: All within functional limits  Upper extremity (right):     All right upper extremity active range of motion: All within functional limits  Active range of motion comments: Patient has a \"clicking\" sensation in left shoulder during abduction and internal/external rotation on abduction; which he did not have prior to stroke.  He feels it may have been due to being in a prolonged position when in the hospital bed.          Strength:     Right upper extremity strength within functional limits.    Upper extremity strength (left):     Shoulder flexion: 4    Shoulder extension:  4    Shoulder abduction: 3    Shoulder adduction: 4    Shoulder external rotation:  3+    Elbow flexion: 4    Elbow extension: 4    Forearm pronation: 4    Forearm supination: 4    Wrist flexion: 4    Wrist extension: 4    Fingers flexion: 4    Fingers extension: 4    Fingers abduction: 4    Fingers adduction: 4     Strength Comments:   strength:  R=104 pounds  L103 pounds     Tone, Sensation and Coordination:   Tone:     Left upper extremity muscle tone: Normal    Right upper extremity muscle tone: Normal     Modified Carolyn:      Tone comments:   9 hole peg test:  R=26 seconds and L= 24 seconds     Sensation   Upper extremity (left):     Light touch: Intact    Sharp/dull: Intact     Stereognosis: Intact     Proprioception: Intact   Upper extremity (right):     Light " "touch: Intact    Sharp/dull: Intact     Stereognosis: Intact     Proprioception: Intact      Coordination   Upper extremity (left):     Fine motor: Within functional limits    Gross motor: Within functional limits    Slow alternating movements: Within functional limits    Rapid alternating movements: Within functional limits    Finger to finger: Within functional limits    Finger touch to nose: Within functional limits  Upper extremity (right):     Fine motor: Within functional limits    Gross motor: Within functional limits    Slow alternating movements: Within functional limits    Rapid alternating movements: Within functional limits    Finger to finger: Within functional limits    Finger touch to nose: Within functional limits     Cognition:     Orientation: normal to situation, normal to person, normal to place and normal to time    Direction following: three step    Short term memory: intact    Long term memory: intact    Attention span: intact     Cognition Comments:  Patient stated he feels he has \"brain fog\" and sometimes a bit forgetful.       Vision/Perception:     Formal vision perception assessment needed.     Vision/Perception Comments:   [place] test = 35/35 in 2 minutes  Maze task test= 22 seconds     Postural Control (Balance)     Sitting (static): Normal    Sitting (dynamic): Normal    Standing (static): Good    Standing (dynamic): Fair +    Weight shift (sitting): Normal    Weight shift (standing): Good     Ambulation: Level Surfaces   Ambulation with assistive device: independent  Ambulation without assistive device: contact guard assist     Activities of Daily Living:      ADL Comments:  Patient interested in returning back to driving and then returning back to work.  Enjoys working on his cars.    Patient stated that he tires easily and needs to take a lot of rest breaks during the day .        Therapeutic Treatments and Modalities:    1. Neuromuscular Re-education (CPT 38764)    Therapeutic " Treatments and Modalities Summary: Focus on dynamic standing balance/endurance and left UE strengthening:  UBE while standing on resistance 3 for 5 minutes  Pull down exercise while standing on 20# 3 x 10 to enhance lower trap strength  Soft tissue stretching of sternocleidomastoid and scapular depression and retraction exercises completed.    While seated UE strengthening with 3# for bicep/triceps, external/internal rotation at 0 degrees.  2# for FF/Ext, overhead reach, horizontal abd/add    Dynamic standing exercises incorporating overhead reaching while stepping forward/backward and side to side to enhance dynamic balance and endurance    Time-based treatments/modalities:           Pain rating before treatment: 2  Pain rating after treatment: 3    ASSESSMENT:   Response to treatment: increased tolerance and balance noted today with decreased shoulder pain.  Continues to have scapular elevation    PLAN/RECOMMENDATIONS:   Plan for treatment: therapy treatment to continue next visit.  Planned interventions for next visit: continue with current treatment, neuromuscular re-education (CPT 25759), self care ADL training (CPT 33006), therapeutic activities (CPT 98652) and therapeutic exercise (CPT 17761)

## 2022-07-28 ENCOUNTER — APPOINTMENT (OUTPATIENT)
Dept: PHYSICAL THERAPY | Facility: REHABILITATION | Age: 57
End: 2022-07-28
Attending: STUDENT IN AN ORGANIZED HEALTH CARE EDUCATION/TRAINING PROGRAM
Payer: COMMERCIAL

## 2022-07-29 ENCOUNTER — OCCUPATIONAL THERAPY (OUTPATIENT)
Dept: OCCUPATIONAL THERAPY | Facility: REHABILITATION | Age: 57
End: 2022-07-29
Attending: STUDENT IN AN ORGANIZED HEALTH CARE EDUCATION/TRAINING PROGRAM
Payer: COMMERCIAL

## 2022-07-29 DIAGNOSIS — I69.30 LATE EFFECTS OF CEREBRAL ISCHEMIC STROKE: ICD-10-CM

## 2022-07-29 PROCEDURE — 97112 NEUROMUSCULAR REEDUCATION: CPT

## 2022-07-29 NOTE — OP THERAPY DAILY TREATMENT
"  Outpatient Occupational Therapy  DAILY TREATMENT     [facility] Occupational Therapy [address].  [address]  [place] [address]  Phone:  [phone]  Fax:  [phone]    Date: 07/29/2022    Patient: Alistair Putnam  YOB: 1965  MRN: [id number]     Time Calculation  Start time: 0845  Stop time: 0930 Time Calculation (min): 45 minutes         Chief Complaint: Extremity Weakness and Self Care Duties    Visit #: 6    SUBJECTIVE:  My (left) shoulder is really hurting today    OBJECTIVE:  Current objective measures:   Upper extremity (left):     All left upper extremity active range of motion: All within functional limits  Upper extremity (right):     All right upper extremity active range of motion: All within functional limits  Active range of motion comments: Patient has a \"clicking\" sensation in left shoulder during abduction and internal/external rotation on abduction; which he did not have prior to stroke.  He feels it may have been due to being in a prolonged position when in the hospital bed.          Strength:     Right upper extremity strength within functional limits.    Upper extremity strength (left):     Shoulder flexion: 4    Shoulder extension:  4    Shoulder abduction: 3+    Shoulder adduction: 4    Shoulder external rotation:  4-    Elbow flexion: 4    Elbow extension: 4    Forearm pronation: 4    Forearm supination: 4    Wrist flexion: 4    Wrist extension: 4    Fingers flexion: 4    Fingers extension: 4    Fingers abduction: 4    Fingers adduction: 4     Strength Comments:   strength:  R=104 pounds  L103 pounds     Tone, Sensation and Coordination:   Tone:     Left upper extremity muscle tone: Normal    Right upper extremity muscle tone: Normal     Modified Carolyn:      Tone comments:   9 hole peg test:  R=26 seconds and L= 24 seconds     Sensation   Upper extremity (left):     Light touch: Intact    Sharp/dull: Intact     Stereognosis: " "Intact     Proprioception: Intact   Upper extremity (right):     Light touch: Intact    Sharp/dull: Intact     Stereognosis: Intact     Proprioception: Intact      Coordination   Upper extremity (left):     Fine motor: Within functional limits    Gross motor: Within functional limits    Slow alternating movements: Within functional limits    Rapid alternating movements: Within functional limits    Finger to finger: Within functional limits    Finger touch to nose: Within functional limits  Upper extremity (right):     Fine motor: Within functional limits    Gross motor: Within functional limits    Slow alternating movements: Within functional limits    Rapid alternating movements: Within functional limits    Finger to finger: Within functional limits    Finger touch to nose: Within functional limits     Cognition:     Orientation: normal to situation, normal to person, normal to place and normal to time    Direction following: three step    Short term memory: intact    Long term memory: intact    Attention span: intact     Cognition Comments:  Patient stated he feels he has \"brain fog\" and sometimes a bit forgetful.       Vision/Perception:     Formal vision perception assessment needed.     Vision/Perception Comments:   [place] test = 35/35 in 2 minutes  Maze task test= 22 seconds     Postural Control (Balance)     Sitting (static): Normal    Sitting (dynamic): Normal    Standing (static): Good    Standing (dynamic): Fair +    Weight shift (sitting): Normal    Weight shift (standing): Good     Ambulation: Level Surfaces   Ambulation with assistive device: independent  Ambulation without assistive device: contact guard assist    ADLs:  Now back to practice driving with wife as passenger.  To continue with this and then transition to independent driving.         Therapeutic Treatments and Modalities:    1. Neuromuscular Re-education (CPT 30533)    Therapeutic Treatments and Modalities Summary: Focus on dynamic standing " balance/endurance and left UE strengthening:  UBE while standing on resistance 3 for 5 minutes  Pull down exercise while standing on 20# 3 x 10 to enhance lower trap strength  Soft tissue stretching of sternocleidomastoid and scapular depression and retraction exercises completed.    While seated UE strengthening with 3# for bicep/triceps, external/internal rotation at 0 degrees.  2# for FF/Ext, overhead reach, horizontal abd/add    Dynamic standing exercises incorporating overhead reaching while stepping forward/backward and side to side to enhance dynamic balance and endurance    Time-based treatments/modalities:  Neuromusc re-ed minutes (CPT 48766): 45 minutes        Pain rating before treatment: 4  Pain rating after treatment: 7    ASSESSMENT:   Response to treatment: Pain noted at anterior and middle deltoid of left shoulder.  Improving standing tolerance during therapy.  Patient and his wife planning to attend [place] [employer] this evening and will not be using his walker.  Aiming to increase walking tolerance.    PLAN/RECOMMENDATIONS:   Plan for treatment: therapy treatment to continue next visit.  Planned interventions for next visit: continue with current treatment, neuromuscular re-education (CPT 45811), self care ADL training (CPT 03836), therapeutic activities (CPT 70942) and therapeutic exercise (CPT 35544)

## 2022-08-01 ENCOUNTER — PHYSICAL THERAPY (OUTPATIENT)
Dept: PHYSICAL THERAPY | Facility: REHABILITATION | Age: 57
End: 2022-08-01
Attending: STUDENT IN AN ORGANIZED HEALTH CARE EDUCATION/TRAINING PROGRAM
Payer: COMMERCIAL

## 2022-08-01 ENCOUNTER — TELEPHONE (OUTPATIENT)
Dept: CARDIOLOGY | Facility: MEDICAL CENTER | Age: 57
End: 2022-08-01
Payer: COMMERCIAL

## 2022-08-01 DIAGNOSIS — Z74.09 IMPAIRED MOBILITY: ICD-10-CM

## 2022-08-01 PROCEDURE — 93268 ECG RECORD/REVIEW: CPT | Performed by: INTERNAL MEDICINE

## 2022-08-01 PROCEDURE — 97112 NEUROMUSCULAR REEDUCATION: CPT

## 2022-08-01 NOTE — OP THERAPY DAILY TREATMENT
"  Outpatient Physical Therapy  DAILY TREATMENT     [facility] Physical [address].  [address]  [place] [address]  Phone:  [phone]  Fax:  [phone]    Date: 08/01/2022    Patient: Alistair Putnam  YOB: 1965  MRN: [id number]     Time Calculation    Start time: 0947  Stop time: 1029 Time Calculation (min): 42 minutes         Chief Complaint: Loss Of Balance    Visit #: 6    SUBJECTIVE:  Pt reporting he feels foggy still. Was feeling pretty bad the other day when he was taking stool softeners but stopped doing that so he feels better.     OBJECTIVE:  Current objective measures:           Therapeutic Treatments and Modalities:     1. Neuromuscular Re-education (CPT 05839)    Therapeutic Treatment and Modalities Summary:   Multi-directional Stepping  -colored dots, R and L LE with emphasis on cross-body; min A initially, CGA as task continued  -B LE 3sets    Yellow Hurdles  -5x 5hurdles with air ex on last step; CGA for safety; cues for reducing circumduction on L  -initially step-to, cued for step through; slow pace  -performed large steps on the side of the hurdles at faster pace, then returned to hurdles with an improvement in speed.     Sit to Stand  -with eccentric hold in partial squat for 30sec x5    In // bars  -for dual-task processing: side stepping with UE crossing. L/R 2x10'    Single Leg Balance:  -6\" step, lifting unilateral LE, closing eyes, holding for 30sec. Min A, min postural sway, especially while loading R LE. Repeated x5 B      Time-based treatments/modalities:    Physical Therapy Timed Treatment Charges  Neuromusc re-ed, balance, coor, post minutes (CPT 44956): 42 minutes          ASSESSMENT:   Response to treatment: Pt is most challenged during tasks that involve multiple tasks as well as with single leg reliance. At most, he requires min A for correction of postural sway, and he is able to mostly self-correct. He will benefit from further " intervention to challenge stability and dual-task processing to prepare him for return to work.     PLAN/RECOMMENDATIONS:   Plan for treatment: therapy treatment to continue next visit.  Planned interventions for next visit: continue with current treatment.

## 2022-08-02 ENCOUNTER — OCCUPATIONAL THERAPY (OUTPATIENT)
Dept: OCCUPATIONAL THERAPY | Facility: REHABILITATION | Age: 57
End: 2022-08-02
Attending: STUDENT IN AN ORGANIZED HEALTH CARE EDUCATION/TRAINING PROGRAM
Payer: COMMERCIAL

## 2022-08-02 DIAGNOSIS — I69.30 LATE EFFECTS OF CEREBRAL ISCHEMIC STROKE: ICD-10-CM

## 2022-08-02 PROCEDURE — 97535 SELF CARE MNGMENT TRAINING: CPT

## 2022-08-02 PROCEDURE — 97112 NEUROMUSCULAR REEDUCATION: CPT

## 2022-08-02 NOTE — OP THERAPY DAILY TREATMENT
"  Outpatient Occupational Therapy  DAILY TREATMENT     [facility] Occupational Therapy [address].  [address]  [place] [address]  Phone:  [phone]  Fax:  [phone]    Date: 08/02/2022    Patient: Alistair Putnma  YOB: 1965  MRN: [id number]     Time Calculation  Start time: 0845  Stop time: 0930 Time Calculation (min): 45 minutes         Chief Complaint: Extremity Weakness and Self Care Duties    Visit #: 7    SUBJECTIVE:  I submitted my car for [place] [employer].  I will be doing a lot of walking for the next few days.      OBJECTIVE:  Current objective measures:   Upper extremity (left):     All left upper extremity active range of motion: All within functional limits  Upper extremity (right):     All right upper extremity active range of motion: All within functional limits  Active range of motion comments: Patient has a \"clicking\" sensation in left shoulder during abduction and internal/external rotation on abduction; which he did not have prior to stroke.  He feels it may have been due to being in a prolonged position when in the hospital bed.          Strength:     Right upper extremity strength within functional limits.    Upper extremity strength (left):     Shoulder flexion: 4    Shoulder extension:  4    Shoulder abduction: 3+    Shoulder adduction: 4    Shoulder external rotation:  4-    Elbow flexion: 4    Elbow extension: 4    Forearm pronation: 4    Forearm supination: 4    Wrist flexion: 4    Wrist extension: 4    Fingers flexion: 4    Fingers extension: 4    Fingers abduction: 4    Fingers adduction: 4     Strength Comments:   strength:  R=104 pounds  L103 pounds     Tone, Sensation and Coordination:   Tone:     Left upper extremity muscle tone: Normal    Right upper extremity muscle tone: Normal     Modified Carolyn:      Tone comments:   9 hole peg test:  R=26 seconds and L= 24 seconds     Sensation   Upper extremity (left):     Light touch: " "Intact    Sharp/dull: Intact     Stereognosis: Intact     Proprioception: Intact   Upper extremity (right):     Light touch: Intact    Sharp/dull: Intact     Stereognosis: Intact     Proprioception: Intact      Coordination   Upper extremity (left):     Fine motor: Within functional limits    Gross motor: Within functional limits    Slow alternating movements: Within functional limits    Rapid alternating movements: Within functional limits    Finger to finger: Within functional limits    Finger touch to nose: Within functional limits  Upper extremity (right):     Fine motor: Within functional limits    Gross motor: Within functional limits    Slow alternating movements: Within functional limits    Rapid alternating movements: Within functional limits    Finger to finger: Within functional limits    Finger touch to nose: Within functional limits     Cognition:     Orientation: normal to situation, normal to person, normal to place and normal to time    Direction following: three step    Short term memory: intact    Long term memory: intact    Attention span: intact     Cognition Comments:  Patient stated he feels he has \"brain fog\" and sometimes a bit forgetful.       Vision/Perception:     Formal vision perception assessment needed.     Vision/Perception Comments:   [place] test = 35/35 in 2 minutes  Maze task test= 22 seconds     Postural Control (Balance)     Sitting (static): Normal    Sitting (dynamic): Normal    Standing (static): Good    Standing (dynamic): Fair +    Weight shift (sitting): Normal    Weight shift (standing): Good     Ambulation: Level Surfaces   Ambulation with assistive device: independent  Ambulation without assistive device: contact guard assist     ADLs:  Now back to practice driving with wife as passenger.  To continue with this and then transition to independent driving.         Therapeutic Treatments and Modalities:    1. Neuromuscular Re-education (CPT 37363)    2. Self Care ADL " Training (CPT 41349)    Therapeutic Treatments and Modalities Summary: Focus on dynamic standing balance/endurance and left UE strengthening:  UBE while standing on resistance 3 for 5 minutes  Pull down exercise while standing on 20# 3 x 10 to enhance lower trap strength  Soft tissue stretching of sternocleidomastoid and scapular depression and retraction exercises completed.    Arm elevation using ball against wall to work lower traps  While seated UE strengthening with 3# for bicep/triceps, external/internal rotation at 0 degrees.  2# for FF/Ext, overhead reach, horizontal abd/add    On mat in quadraped position weight bearing through both arms in FF stretch and forward reaching for each arm with weight shifting 10 x   Self care:  Work simulated activity lifting weighted box from floor to 2 heights of 23 inches (2 feet) and 41 inches (3 feet and 5 inches).  Able to left 20 pounds from floor to 60 inch height 10 x but unable to lift empty box to the 106 inches due to pain in left shoulder with abduction while lifting.    OTR to liaise with PT wether dry needling intervention may help with pain.      Time-based treatments/modalities:  Self-care/ADL training minutes (CPT 80077): 15 minutes  Neuromusc re-ed minutes (CPT 62410): 30 minutes        Pain rating before treatment: 0  Pain rating after treatment: 7  Left shoulder  ASSESSMENT:   Response to treatment: cont?inues to improve with therapy; however shoulder pain continues.  Possibly a rotator cuff tendonitis?    PLAN/RECOMMENDATIONS:   Plan for treatment: therapy treatment to continue next visit.  Planned interventions for next visit: continue with current treatment, neuromuscular re-education (CPT 87385), self care ADL training (CPT 11375), therapeutic activities (CPT 11900) and therapeutic exercise (CPT 93014)

## 2022-08-03 ENCOUNTER — PHYSICAL THERAPY (OUTPATIENT)
Dept: PHYSICAL THERAPY | Facility: REHABILITATION | Age: 57
End: 2022-08-03
Attending: STUDENT IN AN ORGANIZED HEALTH CARE EDUCATION/TRAINING PROGRAM
Payer: COMMERCIAL

## 2022-08-03 DIAGNOSIS — R53.1 WEAKNESS: ICD-10-CM

## 2022-08-03 DIAGNOSIS — R26.89 LOSS OF BALANCE: ICD-10-CM

## 2022-08-03 DIAGNOSIS — I63.541 ACUTE STROKE DUE TO OCCLUSION OF RIGHT CEREBELLAR ARTERY (HCC): ICD-10-CM

## 2022-08-03 DIAGNOSIS — Z74.09 IMPAIRED MOBILITY: ICD-10-CM

## 2022-08-03 PROCEDURE — 97112 NEUROMUSCULAR REEDUCATION: CPT

## 2022-08-03 PROCEDURE — 97110 THERAPEUTIC EXERCISES: CPT

## 2022-08-03 NOTE — OP THERAPY DAILY TREATMENT
Outpatient Physical Therapy  DAILY TREATMENT     [address]  [address]  [place] [address]  Phone:  [phone]  Fax:  [phone]    Date: 08/03/2022    Patient: Alistair Putnam  YOB: 1965  MRN: [id number]     Time Calculation    Start time: 0745  Stop time: 0826 Time Calculation (min): 41 minutes         Chief Complaint: Weakness and Loss Of Balance    Visit #: 7    SUBJECTIVE:  Pt reports significant fatigue after being up/walking at [place] nights yesterday. Reports plan to return to work 9/12. Worried mostly about endurance aspects of return to work. Off balance in shower with EC.    OBJECTIVE:  Current objective measures:           Therapeutic Exercises (CPT 20787):     1. Endurance circuit training, x 2 2 min seated rest between attempt    2. Weighted walk 10# B 250 feet, 2nd rep cues for horizontal head turns with dec in gait speed by 30 ec    3. Mini squat holds, 5 x 10 sec, mod fatigue end of circuit      Therapeutic Exercise Summary: Education for endurance/job training. Encouraged ambulation around [employer] with times laps as able x 15+ min. Rest when needed.     Therapeutic Treatments and Modalities:     1. Neuromuscular Re-education (CPT 62197)    Therapeutic Treatment and Modalities Summary: Half kneeling reaching outside FARHANA/cross body 5# weight at 10, 2, 2 o clock positions SBA x 5 each    M/L weight shifting on wobble board with cues for upright trunk position and eccentric control. X 3 min KARIS, occasional touch A    Progressed to EC to simulate unstable surfaces ie shower x 3 min with 2 finger touch A. Decreased control noted with trunk compensation whoever improved with cues and reps. 1 min A, SBA throughout.     Time-based treatments/modalities:    Physical Therapy Timed Treatment Charges  Neuromusc re-ed, balance, coor, post minutes (CPT 38498): 16 minutes  Therapeutic exercise minutes (CPT 04741): 25  minutes          ASSESSMENT:   Discussed endurance training opportunities including walking around [employer] during the day time. Pt currently walking in the evenings due to heat but to better simulate work day encouraged ambulating in a store. Felt a little dizzy after circuit training that improved with rest.   PLAN/RECOMMENDATIONS:   Plan for treatment: therapy treatment to continue next visit.  Planned interventions for next visit: continue with current treatment.

## 2022-08-04 ENCOUNTER — OCCUPATIONAL THERAPY (OUTPATIENT)
Dept: OCCUPATIONAL THERAPY | Facility: REHABILITATION | Age: 57
End: 2022-08-04
Attending: STUDENT IN AN ORGANIZED HEALTH CARE EDUCATION/TRAINING PROGRAM
Payer: COMMERCIAL

## 2022-08-04 DIAGNOSIS — I69.30 LATE EFFECTS OF CEREBRAL ISCHEMIC STROKE: ICD-10-CM

## 2022-08-04 PROCEDURE — 97112 NEUROMUSCULAR REEDUCATION: CPT

## 2022-08-04 NOTE — OP THERAPY DAILY TREATMENT
"  Outpatient Occupational Therapy  DAILY TREATMENT     [facility] Occupational Therapy [address].  [address]  [place] [address]  Phone:  [phone]  Fax:  [phone]    Date: 08/04/2022    Patient: Alistair Putnam  YOB: 1965  MRN: [id number]     Time Calculation  Start time: 0845  Stop time: 0930 Time Calculation (min): 45 minutes         Chief Complaint: Extremity Weakness and Self Care Duties    Visit #: 8    SUBJECTIVE:  I think I overdid it the other day so I am making sure I pace myself better so I don't get exhausted.     OBJECTIVE:  Current objective measures:   Upper extremity (left):     All left upper extremity active range of motion: All within functional limits  Upper extremity (right):     All right upper extremity active range of motion: All within functional limits  Active range of motion comments: Patient has a \"clicking\" sensation in left shoulder during abduction and internal/external rotation on abduction; which he did not have prior to stroke.  He feels it may have been due to being in a prolonged position when in the hospital bed.          Strength:     Right upper extremity strength within functional limits.    Upper extremity strength (left):     Shoulder flexion: 4    Shoulder extension:  4    Shoulder abduction: 3+    Shoulder adduction: 4    Shoulder external rotation:  4-    Elbow flexion: 4    Elbow extension: 4    Forearm pronation: 4    Forearm supination: 4    Wrist flexion: 4    Wrist extension: 4    Fingers flexion: 4    Fingers extension: 4    Fingers abduction: 4    Fingers adduction: 4     Strength Comments:   strength:  R=104 pounds  L103 pounds     Tone, Sensation and Coordination:   Tone:     Left upper extremity muscle tone: Normal    Right upper extremity muscle tone: Normal     Modified Carolyn:      Tone comments:   9 hole peg test:  R=26 seconds and L= 24 seconds     Sensation   Upper extremity (left):     Light touch: " "Intact    Sharp/dull: Intact     Stereognosis: Intact     Proprioception: Intact   Upper extremity (right):     Light touch: Intact    Sharp/dull: Intact     Stereognosis: Intact     Proprioception: Intact      Coordination   Upper extremity (left):     Fine motor: Within functional limits    Gross motor: Within functional limits    Slow alternating movements: Within functional limits    Rapid alternating movements: Within functional limits    Finger to finger: Within functional limits    Finger touch to nose: Within functional limits  Upper extremity (right):     Fine motor: Within functional limits    Gross motor: Within functional limits    Slow alternating movements: Within functional limits    Rapid alternating movements: Within functional limits    Finger to finger: Within functional limits    Finger touch to nose: Within functional limits     Cognition:     Orientation: normal to situation, normal to person, normal to place and normal to time    Direction following: three step    Short term memory: intact    Long term memory: intact    Attention span: intact     Cognition Comments:  Patient stated he feels he has \"brain fog\" and sometimes a bit forgetful.       Vision/Perception:     Formal vision perception assessment needed.     Vision/Perception Comments:   [place] test = 35/35 in 2 minutes  Maze task test= 22 seconds     Postural Control (Balance)     Sitting (static): Normal    Sitting (dynamic): Normal    Standing (static): Good    Standing (dynamic): Fair +    Weight shift (sitting): Normal    Weight shift (standing): Good     Ambulation: Level Surfaces   Ambulation with assistive device: independent  Ambulation without assistive device: contact guard assist     ADLs:  Now back to practice driving with wife as passenger.  To continue with this and then transition to independent driving.         Therapeutic Treatments and Modalities:    1. Neuromuscular Re-education (CPT 86461)    Therapeutic Treatments " and Modalities Summary: Focus on dynamic standing balance/endurance and left UE strengthening:  UBE while standing on resistance 3 for 5 minutes  Pull down exercise while standing on 20# 3 x 10 to enhance lower trap strength  Soft tissue stretching of sternocleidomastoid and scapular depression and retraction exercises completed.    Arm elevation using ball against wall to work lower traps  While seated UE strengthening with 3# for bicep/triceps, external/internal rotation at 0 degrees.  2# for FF/Ext, overhead reach, horizontal abd/add    On mat in quadraped position weight bearing through both arms in FF stretch and forward reaching for each arm with weight shifting 10 x   Self care:        Time-based treatments/modalities:  Neuromusc re-ed minutes (CPT 25399): 45 minutes        Pain rating before treatment: 2  Pain rating after treatment: 6    ASSESSMENT:   Response to treatment: Progressing well with therapy.  Increasing standing tolerance and endurance    PLAN/RECOMMENDATIONS:   Plan for treatment: therapy treatment to continue next visit.  Planned interventions for next visit: continue with current treatment, neuromuscular re-education (CPT 12970), self care ADL training (CPT 54744), therapeutic activities (CPT 83925) and therapeutic exercise (CPT 00503)

## 2022-08-08 NOTE — OP THERAPY DAILY TREATMENT
Outpatient Physical Therapy  DAILY TREATMENT     [facility] Physical [address]  [address]  [place] [address]  Phone:  [phone]  Fax:  [phone]    Date: 08/09/2022    Patient: Alistair Putnam  YOB: 1965  MRN: [id number]     Time Calculation    Start time: 0830  Stop time: 0912 Time Calculation (min): 42 minutes         Chief Complaint: Weakness and Loss Of Balance    Visit #: 8    SUBJECTIVE:  Walked 3 laps around [employer] approx 15-20 min with very significant fatigue after. Still has periods of dizziness/light headed with changes in level ie STS or floor to stand, on occasion.   States 12 pack soda is approx 10#. Hopes to return top work after 9/12  OBJECTIVE:  Current objective measures:           Therapeutic Exercises (CPT 81451):     1. Endurance circuit training    2. Weighted walk 10# B 250 feet, 2nd rep cues for horizontal head turns with dec in gait speed by 30 ec    3. Floor to stand, x 5 reps x 3 rounds, final round completed no UE    4. 1/2 kneeling reaching outside FARHANA diagnoal bilateral and fwd, 15 reps each direction, 5#, 13 min approx      Therapeutic Exercise Summary: Seated rests 3 min between circuits.   Unable to perform tall kneeling reaching with 10# d/t shoulder pain    Therapeutic Treatments and Modalities:     1. Neuromuscular Re-education (CPT 86075)    Therapeutic Treatment and Modalities Summary: Half kneeling reaching outside FARHANA/cross body 5# weight at 10, 2, 2 o clock positions SBA x 5 each    M/L weight shifting on wobble board with cues for upright trunk position and eccentric control. X 3 min KARIS, occasional touch A    Progressed to EC to simulate unstable surfaces ie shower x 3 min with 2 finger touch A. Decreased control noted with trunk compensation whoever improved with cues and reps. 1 min A, SBA throughout.     Time-based treatments/modalities:    Physical Therapy Timed Treatment Charges  Therapeutic exercise minutes (CPT  37521): 42 minutes          ASSESSMENT:   Emphasis on job targeted circuit training today. Demod good strength with ability to stand from floor in half kneeling without UE x 2 reps SBA. Education on breathing during changes in position as his symptoms described likely orthostatic. One bout of significant light headed/dizzy after floor to stand without UE lasting approx 15 seconds.Reminded pt importance of safety if attempting to walk long distance later today due to fatigue from PT session.   PLAN/RECOMMENDATIONS:   Plan for treatment: therapy treatment to continue next visit.  Planned interventions for next visit: continue with current treatment.

## 2022-08-09 ENCOUNTER — PHYSICAL THERAPY (OUTPATIENT)
Dept: PHYSICAL THERAPY | Facility: REHABILITATION | Age: 57
End: 2022-08-09
Attending: STUDENT IN AN ORGANIZED HEALTH CARE EDUCATION/TRAINING PROGRAM
Payer: COMMERCIAL

## 2022-08-09 ENCOUNTER — APPOINTMENT (OUTPATIENT)
Dept: OCCUPATIONAL THERAPY | Facility: REHABILITATION | Age: 57
End: 2022-08-09
Attending: STUDENT IN AN ORGANIZED HEALTH CARE EDUCATION/TRAINING PROGRAM
Payer: COMMERCIAL

## 2022-08-09 DIAGNOSIS — R53.1 WEAKNESS: ICD-10-CM

## 2022-08-09 DIAGNOSIS — R26.2 DIFFICULTY WALKING: ICD-10-CM

## 2022-08-09 DIAGNOSIS — I63.541 ACUTE STROKE DUE TO OCCLUSION OF RIGHT CEREBELLAR ARTERY (HCC): ICD-10-CM

## 2022-08-09 DIAGNOSIS — Z74.09 IMPAIRED MOBILITY: ICD-10-CM

## 2022-08-09 DIAGNOSIS — R26.89 LOSS OF BALANCE: ICD-10-CM

## 2022-08-09 PROCEDURE — 97110 THERAPEUTIC EXERCISES: CPT

## 2022-08-11 ENCOUNTER — PHYSICAL THERAPY (OUTPATIENT)
Dept: PHYSICAL THERAPY | Facility: REHABILITATION | Age: 57
End: 2022-08-11
Attending: STUDENT IN AN ORGANIZED HEALTH CARE EDUCATION/TRAINING PROGRAM
Payer: COMMERCIAL

## 2022-08-11 ENCOUNTER — OCCUPATIONAL THERAPY (OUTPATIENT)
Dept: OCCUPATIONAL THERAPY | Facility: REHABILITATION | Age: 57
End: 2022-08-11
Attending: STUDENT IN AN ORGANIZED HEALTH CARE EDUCATION/TRAINING PROGRAM
Payer: COMMERCIAL

## 2022-08-11 DIAGNOSIS — Z74.09 IMPAIRED MOBILITY: ICD-10-CM

## 2022-08-11 DIAGNOSIS — R26.89 LOSS OF BALANCE: ICD-10-CM

## 2022-08-11 DIAGNOSIS — R26.2 DIFFICULTY WALKING: ICD-10-CM

## 2022-08-11 DIAGNOSIS — I69.30 LATE EFFECTS OF CEREBRAL ISCHEMIC STROKE: ICD-10-CM

## 2022-08-11 DIAGNOSIS — I63.541 ACUTE STROKE DUE TO OCCLUSION OF RIGHT CEREBELLAR ARTERY (HCC): ICD-10-CM

## 2022-08-11 DIAGNOSIS — R53.1 WEAKNESS: ICD-10-CM

## 2022-08-11 PROCEDURE — 97112 NEUROMUSCULAR REEDUCATION: CPT

## 2022-08-11 PROCEDURE — 97110 THERAPEUTIC EXERCISES: CPT

## 2022-08-11 NOTE — OP THERAPY DAILY TREATMENT
"  Outpatient Occupational Therapy  DAILY TREATMENT     [facility] Occupational Therapy [address]  [address]  [place] [address]  Phone:  [phone]  Fax:  [phone]    Date: 08/11/2022    Patient: Alistair Putnam  YOB: 1965  MRN: [id number]     Time Calculation  Start time: 0315  Stop time: 0400 Time Calculation (min): 45 minutes         Chief Complaint: Extremity Weakness and Self Care Duties    Visit #: 9    SUBJECTIVE:  I am back to driving totally on my own now    OBJECTIVE:  Current objective measures:   Upper extremity (left):     All left upper extremity active range of motion: All within functional limits  Upper extremity (right):     All right upper extremity active range of motion: All within functional limits  Active range of motion comments: Patient has a \"clicking\" sensation in left shoulder during abduction and internal/external rotation on abduction; which he did not have prior to stroke.  He feels it may have been due to being in a prolonged position when in the hospital bed.          Strength:     Right upper extremity strength within functional limits.    Upper extremity strength (left):     Shoulder flexion: 5    Shoulder extension:  5    Shoulder abduction: 4    Shoulder adduction: 5    Shoulder external rotation:  4    Elbow flexion: 5    Elbow extension: 5    Forearm pronation: 5    Forearm supination: 5    Wrist flexion: 5    Wrist extension: 5    Fingers flexion: 5    Fingers extension: 5    Fingers abduction: 5    Fingers adduction: 5     Strength Comments:   strength:  R=104 pounds  L103 pounds     Tone, Sensation and Coordination:   Tone:     Left upper extremity muscle tone: Normal    Right upper extremity muscle tone: Normal     Modified Carolyn:      Tone comments:   9 hole peg test:  R=26 seconds and L= 24 seconds     Sensation   Upper extremity (left):     Light touch: Intact    Sharp/dull: Intact     Stereognosis: Intact     " "Proprioception: Intact   Upper extremity (right):     Light touch: Intact    Sharp/dull: Intact     Stereognosis: Intact     Proprioception: Intact      Coordination   Upper extremity (left):     Fine motor: Within functional limits    Gross motor: Within functional limits    Slow alternating movements: Within functional limits    Rapid alternating movements: Within functional limits    Finger to finger: Within functional limits    Finger touch to nose: Within functional limits  Upper extremity (right):     Fine motor: Within functional limits    Gross motor: Within functional limits    Slow alternating movements: Within functional limits    Rapid alternating movements: Within functional limits    Finger to finger: Within functional limits    Finger touch to nose: Within functional limits     Cognition:     Orientation: normal to situation, normal to person, normal to place and normal to time    Direction following: three step    Short term memory: intact    Long term memory: intact    Attention span: intact     Cognition Comments:  Patient stated he feels he has \"brain fog\" and sometimes a bit forgetful.       Vision/Perception:     Formal vision perception assessment needed.     Vision/Perception Comments:   [place] test = 35/35 in 2 minutes  Maze task test= 22 seconds     Postural Control (Balance)     Sitting (static): Normal    Sitting (dynamic): Normal    Standing (static): Good    Standing (dynamic): Fair +    Weight shift (sitting): Normal    Weight shift (standing): Good     Ambulation: Level Surfaces   Ambulation with assistive device: independent  Ambulation without assistive device: contact guard assist     ADLs:  Independent with all personal and domestic ADLs.  Hoping to return to work mid September        Therapeutic Treatments and Modalities:    1. Neuromuscular Re-education (CPT 40043)    Therapeutic Treatments and Modalities Summary: Focus on dynamic standing balance/endurance and left UE " strengthening:  UBE while standing on resistance 4 for 5 minutes  Soft tissue stretching of sternocleidomastoid and scapular depression and retraction exercises completed.    Arm elevation using ball against wall to work lower traps  While seated UE strengthening with 3# for bicep/triceps, external/internal rotation at 0 degrees.  2# for FF/Ext, overhead reach, horizontal abd/add    On mat in quadraped position weight bearing through both arms in FF stretch and forward reaching for each arm with weight shifting 10 x   Work simulated task of lifting weighted box of 15 pounds from floor to 3 ft height 5 x utilizing PLB technique.        Time-based treatments/modalities:  Neuromusc re-ed minutes (CPT 10268): 45 minutes        Pain rating before treatment: 3  Pain rating after treatment: 6  Left shoulder (anterior and middle deltoid)  ASSESSMENT:   Response to treatment: increased muscle strength, improved standing tolerance, increased tolerance for work related tasks and functionally reaching prior level.      PLAN/RECOMMENDATIONS:   Plan for treatment: therapy treatment to continue next visit.  Planned interventions for next visit: continue with current treatment, neuromuscular re-education (CPT 97844), self care ADL training (CPT 32967), therapeutic activities (CPT 42391), and therapeutic exercise (CPT 75298)

## 2022-08-11 NOTE — OP THERAPY DAILY TREATMENT
"  Outpatient Physical Therapy  DAILY TREATMENT     [facility] Physical Therapy [address].  [address]  [place] [address]  Phone:  [phone]  Fax:  [phone]    Date: 08/11/2022    Patient: Alistair Putnam  YOB: 1965  MRN: [id number]     Time Calculation    Start time: 0915  Stop time: 0957 Time Calculation (min): 42 minutes         Chief Complaint: Weakness and Loss Of Balance    Visit #: 9    SUBJECTIVE:  Pt reports he was very very sore last session and still sore in HS today and would like a stretching/warm up today.    Reports feeling very off balance in the shower with EC.     Pt also reports feelings of \"off\" in the afternoons consistently as well as headaches that are noticeable but not nagging. He notes when at home after sitting and other position changes getting very lightheaded. Has not mentioned these symptoms to his doctors but states got a call the other day that recent CT did not show any changes.     Also describes pre CVA periods of dizziness when rolling under his car.  OBJECTIVE:  Current objective measures:     BPPV bilat negative       Therapeutic Exercises (CPT 85846):     1. Upright bike, 5 min, lvl 2    2. long sit HS stretch, 2 x 1 min B    3. KB 35# squat, x 8, max cues, poor mechanics    4. body weight squat, 2 x 15, mod cues hip hinge      Therapeutic Exercise Summary: Seated rests 3 min between circuits.   Unable to perform tall kneeling reaching with 10# d/t shoulder pain    Therapeutic Treatments and Modalities:     1. Neuromuscular Re-education (CPT 94669)    Therapeutic Treatment and Modalities Summary: Seated head circles EC 5 x 5 (mod symptoms) min cues full ROM CW/CCW  Seated horizontal head turns EC x 5 mod symptoms  Standing head circles EO x 10 each direction, min symptoms  Tandem walking fwd/bwd 10m SBA  BPPV assessment    Time-based treatments/modalities:    Physical Therapy Timed Treatment Charges  Neuromusc re-ed, balance, coor, " "post minutes (CPT 13284): 17 minutes  Therapeutic exercise minutes (CPT 99884): 25 minutes          ASSESSMENT:   Educated pt on physiologic changes related to orthostatic hypotension as possible cause of his symptoms with position changes at home. Heavily encouraged pt to document BP with changes in position as well as documenting his head aches symptoms/feeling off etc in order to inform physician. His feeling of \"off: may be related to vestibular or possibly more endurance related but it may have a more medical cause therefore highly emphasized discussing with MD.  Due to subjective (pre CVA) of rolling did briefly clear BPPV.  PLAN/RECOMMENDATIONS:   Plan for treatment: therapy treatment to continue next visit.  Planned interventions for next visit: continue with current treatment.       "

## 2022-08-12 ENCOUNTER — APPOINTMENT (OUTPATIENT)
Dept: OCCUPATIONAL THERAPY | Facility: REHABILITATION | Age: 57
End: 2022-08-12
Attending: STUDENT IN AN ORGANIZED HEALTH CARE EDUCATION/TRAINING PROGRAM
Payer: COMMERCIAL

## 2022-08-15 ENCOUNTER — PHYSICAL THERAPY (OUTPATIENT)
Dept: PHYSICAL THERAPY | Facility: REHABILITATION | Age: 57
End: 2022-08-15
Attending: STUDENT IN AN ORGANIZED HEALTH CARE EDUCATION/TRAINING PROGRAM
Payer: COMMERCIAL

## 2022-08-15 DIAGNOSIS — R53.1 WEAKNESS: ICD-10-CM

## 2022-08-15 DIAGNOSIS — R26.89 LOSS OF BALANCE: ICD-10-CM

## 2022-08-15 DIAGNOSIS — I63.541 ACUTE STROKE DUE TO OCCLUSION OF RIGHT CEREBELLAR ARTERY (HCC): ICD-10-CM

## 2022-08-15 DIAGNOSIS — Z74.09 IMPAIRED MOBILITY: ICD-10-CM

## 2022-08-15 PROCEDURE — 97110 THERAPEUTIC EXERCISES: CPT

## 2022-08-15 PROCEDURE — 97112 NEUROMUSCULAR REEDUCATION: CPT

## 2022-08-15 NOTE — OP THERAPY PROGRESS SUMMARY
Outpatient Physical Therapy  PROGRESS SUMMARY NOTE      [facility] Physical Therapy [facility]  [address]  [address]  [place] [address]  Phone:  [phone]  Fax:  [phone]    Date of Visit: 08/15/2022    Patient: Alistair Putnam  YOB: 1965  MRN: [id number]     Referring Provider: Steve Miguel M.D.  [address] [facility] [facility] [address] [facility] [place] [address]   Referring Diagnosis Cerebral infarction due to unspecified occlusion or stenosis of right cerebellar artery [I63.541]     Visit Diagnoses     ICD-10-CM   1. Impaired mobility  Z74.09   2. Acute stroke due to occlusion of right cerebellar artery (HCC)  I63.541   3. Weakness  R53.1   4. Loss of balance  R26.89       Rehab Potential: excellent    Progress Report Period: 7/11-8/15    Functional Assessment Used          Objective Findings and Assessment:   Patient progression towards goals: Pt has completed 9 PT visits plus evaluation related to physical impairments secondary to CVA. Pat has demonstrated excellent progress towards goals including functional mobility, gait speed and balance as well as moderate progress towards functional LE strength and endurance. Pt scored 25/28 on mini Best demonstrating some impairments associated in sensory balance which supports patient complaints of instability while showering with head positions. Will also complete FGA next sisit for further balance assessment. At this time pt will benefit from skilled PT 1-2x per week x 6 weeks for further balance, strength and endurance training as well as some training for work specific tasks in order for pt to return to work and PLOF.       Short Term Goals:   1. Pt will be compliant with HEP daily for improving strength and stabilty.Progressing-continuing to progress HEP.  2. Pt will complete floor transfer assessment. Met  3. Pt will complete most appropriate balance outcome measure. Met, will continue and complete FGA next session     Short term goal time span:  4-6  weeks      Long Term Goals:    1. Pt will demonstrate improved functional mobility with TUG score 12 sec or better.Met  2. Pt will demonstrate improved functional LE strength with 5STS 15 sec or better.  3. Pt will demonstrate improved CV endurance and gait speed with 6MWT score 1200 feet or better. Met  Progressed: Pt will demonstrate improved CV endurance and gait speed with 6MWT score 1500 feet or better.  4. Pt will demonstrate improved balance scoring low fall risk on most appropriate outcome measure.  Met  5. Pt will demonstrate improved gait speed with 10MWT of 1-1.2m/s or better to dec fall risk. Met  6. Pt will be independent with floor transfer to perform work specific tasks. Met  7. Pt will demonstrate ability to perform floor to waist lift 20# x 10 reps to perform work specific functions. Met  New Goal: Pt will report ability to shower without modifications or LOB to perform at PLOF.   New goal: pt will demonstrate ability to perform farmers carry x 10# x 8 min to simulate work specific task.   Long term goal time span:  2-4 months    Objective findings and assessment details: Current objective measures:   10MWT: 7.86  Gait speed:.1.27 m/s     5STS No UE support 16.72  6MWT: 1281 feet. PRE 5/10  TU.18  TUG cog 9.89      MiniBest Test 25/    Floor transfer independent     Goals:   Short Term Goals:   1. Pt will be compliant with HEP daily for improving strength and stabilty.  2. Pt will complete FGA for dynamic balance/gait assessment.   Short term goal time span:  2-4 weeks      Long Term Goals:    1. Pt will demonstrate improved functional LE strength with 5STS 15 sec or better.  2. Pt will demonstrate improved CV endurance and gait speed with 6MWT score 1500 feet or better.  3. Pt will report ability to shower without modifications or LOB to perform at PLOF.   4. Pt will demonstrate ability to perform farmers carry x 10# x 8 min to simulate work specific task.   Long term goal time span:  4-6  weeks    Plan:   Planned therapy interventions:  Neuromuscular Re-education (CPT 21013), Therapeutic Exercise (CPT 60262), Therapeutic Activities (CPT 32157) and Gait Training (CPT 94112)  Frequency:  2x week  Duration in weeks:  6    Referring provider co-signature:  I have reviewed this plan of care and my co-signature certifies the need for services.     Certification Period: 08/15/2022 to 09/26/22    Physician Signature: ________________________________ Date: ______________

## 2022-08-15 NOTE — OP THERAPY DAILY TREATMENT
Outpatient Physical Therapy  DAILY TREATMENT     [facility] Physical [address].  [address]  [place] [address]  Phone:  [phone]  Fax:  [phone]    Date: 08/15/2022    Patient: Alistair Putnam  YOB: 1965  MRN: [id number]     Time Calculation                   Chief Complaint: No chief complaint on file.    Visit #: 10    SUBJECTIVE:  Able to mow the lawn, fix a sprinkler, and get in/out of the pool.  Pt reported one instance of dizziness with immediate standing. Otherwise good weekend. Still dizzy/off balance in the shower.     OBJECTIVE:  Current objective measures:       10MWT: 7.86  Gait speed:.1.27 m/s    5STS No UE support 16.72  6MWT: 1281 feet. PRE 5/10  TU.18  TUG cog 9.89     MiniBest Test    Anticipatory  STS:2  Rise to Toes:2  SLS: Left 1. 3 2. 10 Right. 1. 10 2. 10  Subscore 6/6    Reactive Postural Control  Compensatory Stepping Correction-Forward:2  Compensatory Stepping Correction-Backwards:2  Compensatory Stepping Correction-Lateral: Left: 2 Right:2  Subscore 6/6    Sensory Orientation:  Stance (feet together) Eyes open, Firm surface:2  Stance (feet together) Eyes closed, Foam Surface:1  Incline -eyes closed: 1  Subscore  4/6    Dynamic Gait  Changes in gait speed:2  Walk with head turns-horizontal:1  Walk with pivot turns:2  Step over obstacles:2  TU.18 Tug CO.89  score 2    Subscore 9 /10    25/28    Less than 21 indicates increased risk of falling     Therapeutic Exercises (CPT 50191):     1. 5STS, 10MWT, 6MWT, TUG, TUG cog    20. 7/12-10/11, 30 day 8/15      Therapeutic Exercise Summary: Seated rests 3 min between circuits.   Unable to perform tall kneeling reaching with 10# d/t shoulder pain    Therapeutic Treatments and Modalities:     1. Neuromuscular Re-education (CPT 18704)    Therapeutic Treatment and Modalities Summary: Mini Best test assessment and analysis.   Standing normal FARHANA on airex   -Head circles EO CW/CCW SBA. 2  instances mod LOB self corrected. Cues to dec speed, good ankle strategy x 20 each  -EC x 1 min. Good ankle strategy, mod sway  -EC vertical head turns to simulate shower. X 10 sec holds 3 x 1 min, excessive body sway without LOB, 3 instances touch a due to sway. Mod symptoms   Time-based treatments/modalities:               ASSESSMENT:   Pt has completed 9 PT visits plus evaluation related to physical impairments secondary to CVA. Pat has demonstrated excellent progress towards goals including functional mobility, gait speed and balance as well as moderate progress towards functional LE strength and endurance. Pt scored 25/28 on mini Best demonstrating some impairments associated in sensory balance which supports patient complaints of instability while showering with head positions. Will also complete FGA next sisit for further balance assessment. At this time pt will benefit from skilled PT 1-2x per week x 6 weeks for further balance, strength and endurance training as well as some training for work specific tasks in order for pt to return to work and PLOF.       Short Term Goals:   1. Pt will be compliant with HEP daily for improving strength and stabilty.Progressing-continuing to progress HEP.  2. Pt will complete floor transfer assessment. Met  3. Pt will complete most appropriate balance outcome measure. Met, will continue and complete FGA next session     Short term goal time span:  4-6 weeks      Long Term Goals:    1. Pt will demonstrate improved functional mobility with TUG score 12 sec or better.Met  2. Pt will demonstrate improved functional LE strength with 5STS 15 sec or better.  3. Pt will demonstrate improved CV endurance and gait speed with 6MWT score 1200 feet or better. Met  Progressed: Pt will demonstrate improved CV endurance and gait speed with 6MWT score 1500 feet or better.  4. Pt will demonstrate improved balance scoring low fall risk on most appropriate outcome measure.  Met  5. Pt will  demonstrate improved gait speed with 10MWT of 1-1.2m/s or better to dec fall risk. Met  6. Pt will be independent with floor transfer to perform work specific tasks. Met  7. Pt will demonstrate ability to perform floor to waist lift 20# x 10 reps to perform work specific functions. Met  New Goal: Pt will report ability to shower without modifications or LOB to perform at PLOF.   New goal: pt will demonstrate ability to perform farmers carry x 10# x 8 min to simulate work specific task.   Long term goal time span:  2-4 months  PLAN/RECOMMENDATIONS:   Plan for treatment: therapy treatment to continue next visit.  Planned interventions for next visit: continue with current treatment.

## 2022-08-18 ENCOUNTER — PHYSICAL THERAPY (OUTPATIENT)
Dept: PHYSICAL THERAPY | Facility: REHABILITATION | Age: 57
End: 2022-08-18
Attending: STUDENT IN AN ORGANIZED HEALTH CARE EDUCATION/TRAINING PROGRAM
Payer: COMMERCIAL

## 2022-08-18 DIAGNOSIS — R53.1 WEAKNESS: ICD-10-CM

## 2022-08-18 DIAGNOSIS — I63.541 ACUTE STROKE DUE TO OCCLUSION OF RIGHT CEREBELLAR ARTERY (HCC): ICD-10-CM

## 2022-08-18 DIAGNOSIS — R26.89 LOSS OF BALANCE: ICD-10-CM

## 2022-08-18 DIAGNOSIS — Z74.09 IMPAIRED MOBILITY: ICD-10-CM

## 2022-08-18 PROCEDURE — 97112 NEUROMUSCULAR REEDUCATION: CPT

## 2022-08-18 NOTE — OP THERAPY DAILY TREATMENT
Outpatient Physical Therapy  DAILY TREATMENT     [address]  [address]  [place] [address]  Phone:  [phone]  Fax:  [phone]    Date: 2022    Patient: Alistair Putnam  YOB: 1965  MRN: [id number]     Time Calculation    Start time: 915  Stop time: 957 Time Calculation (min): 42 minutes         Chief Complaint: Loss Of Balance    Visit #: 11    SUBJECTIVE:  Pt reports less dizziness symptoms with position changes.      OBJECTIVE:  Current objective measures:       10MWT: 7.86  Gait speed:.1.27 m/s    5STS No UE support 16.72  6MWT: 1281 feet. PRE 5/10  TU.18  TUG cog 9.89     MiniBest Test     FGA   2, 5.9 sec  1 (sig difficulty with speeding up)  2  3  3  3  2 (staggering 10 steps)  2 8.47 sec  3  2        Therapeutic Exercises (CPT 31755):     20. 7/12-10/11, 30 day 8/15      Therapeutic Exercise Summary: Seated rests 3 min between circuits.   Unable to perform tall kneeling reaching with 10# d/t shoulder pain    Therapeutic Treatments and Modalities:     1. Neuromuscular Re-education (CPT 20344)    Therapeutic Treatment and Modalities Summary: FGA assessment and analysis       For improving core stabilty and dynamic balance   Tall kneeling on BOSU x 2 min  -horizontal head turns x 2 min total, frequent UE use, 2 mod A  -vertical head turns x 2 min  -EC frequent UE, 3 max A for LOB x 3 min total    1/2 kneeling on BOSU x 2 min each  -vertical head turns  -Horizontal head turns  -EC   Time-based treatments/modalities:    Physical Therapy Timed Treatment Charges  Neuromusc re-ed, balance, coor, post minutes (CPT 03311): 42 minutes          ASSESSMENT:   Pt demod increased difficulty with head turns for FGA not observed with minibest. Otherwise increased time for backwards ambulation. Continues to have significant difficulty with EC.   PLAN/RECOMMENDATIONS:   Plan for treatment: therapy treatment to continue next  visit.  Planned interventions for next visit: continue with current treatment.

## 2022-08-19 ENCOUNTER — APPOINTMENT (OUTPATIENT)
Dept: PHYSICAL THERAPY | Facility: REHABILITATION | Age: 57
End: 2022-08-19
Attending: STUDENT IN AN ORGANIZED HEALTH CARE EDUCATION/TRAINING PROGRAM
Payer: COMMERCIAL

## 2022-08-19 ENCOUNTER — OCCUPATIONAL THERAPY (OUTPATIENT)
Dept: OCCUPATIONAL THERAPY | Facility: REHABILITATION | Age: 57
End: 2022-08-19
Attending: STUDENT IN AN ORGANIZED HEALTH CARE EDUCATION/TRAINING PROGRAM
Payer: COMMERCIAL

## 2022-08-19 DIAGNOSIS — I69.30 LATE EFFECTS OF CEREBRAL ISCHEMIC STROKE: ICD-10-CM

## 2022-08-19 PROCEDURE — 97112 NEUROMUSCULAR REEDUCATION: CPT

## 2022-08-19 NOTE — OP THERAPY DISCHARGE SUMMARY
Outpatient Occupational Therapy  DISCHARGE SUMMARY NOTE    [facility] Therapy [place] [address]  [address]  [facility] [address]  Phone:  [phone]  Fax:  [phone]    Date of Visit: 08/19/2022    Patient: Alistair Putnam  YOB: 1965  MRN: [id number]     Referring Provider: Steve Miguel M.D.  [address] [place] [address]   Referring Diagnosis: Cerebral infarction due to unspecified occlusion or stenosis of right cerebellar artery [I63.541]         Functional Assessment Used:  UEFI = 73/80        Your patient is being discharged from Occupational Therapy with the following comments:   Goals met  Patient will complete pre-driving training to ascertain safety regarding transitioning back to driving  Patient will enhance dynamic standing balance to Normal to enhance leisure and work related tasks  Patient will improve dynamic standing tolerance to at least 20 minutes without rest.   Patient will have 5/5 upper extremity strength to enhance leisure and work related tasks  Patient will score at least 65/80 on the UEFI.   Comments:  Patient is now independent with all ADLs and driving.  Planning to return to work mid September    Limitations Remaining:  Left shoulder pain-currently seeing Chiropracter    Recommendations:  Continue with HEP    EMRE Craven/SANYA    Date: 8/19/2022

## 2022-08-19 NOTE — OP THERAPY DAILY TREATMENT
"  Outpatient Occupational Therapy  DAILY TREATMENT     [facility] Occupational Therapy [address].  [address]  [place] [address]  Phone:  [phone]  Fax:  [phone]    Date: 08/19/2022    Patient: Alistair Putnam  YOB: 1965  MRN: [id number]     Time Calculation  Start time: 0800  Stop time: 0845 Time Calculation (min): 45 minutes         Chief Complaint: Extremity Weakness and Self Care Duties    Visit #: 10    SUBJECTIVE:  I saw my Chiropractor and she believes I have a pinched nerve and we have started treatment on it.      OBJECTIVE:  Current objective measures:   Upper extremity (left):     All left upper extremity active range of motion: All within functional limits  Upper extremity (right):     All right upper extremity active range of motion: All within functional limits  Active range of motion comments: Patient has a \"clicking\" sensation in left shoulder during abduction and internal/external rotation on abduction; which he did not have prior to stroke.  He feels it may have been due to being in a prolonged position when in the hospital bed.          Strength:     Right upper extremity strength within functional limits.    Upper extremity strength (left):     Shoulder flexion: 5    Shoulder extension:  5    Shoulder abduction: 4+    Shoulder adduction: 5    Shoulder external rotation:  4+    Elbow flexion: 5    Elbow extension: 5    Forearm pronation: 5    Forearm supination: 5    Wrist flexion: 5    Wrist extension: 5    Fingers flexion: 5    Fingers extension: 5    Fingers abduction: 5    Fingers adduction: 5     Strength Comments:   strength:  R=106 pounds  L103 pounds     Tone, Sensation and Coordination:   Tone:     Left upper extremity muscle tone: Normal    Right upper extremity muscle tone: Normal     Modified Carolyn:      Tone comments:   9 hole peg test:  R=17 seconds and L= 18 seconds     Sensation   Upper extremity (left):     Light touch: Intact   " " Sharp/dull: Intact     Stereognosis: Intact     Proprioception: Intact   Upper extremity (right):     Light touch: Intact    Sharp/dull: Intact     Stereognosis: Intact     Proprioception: Intact      Coordination   Upper extremity (left):     Fine motor: Within functional limits    Gross motor: Within functional limits    Slow alternating movements: Within functional limits    Rapid alternating movements: Within functional limits    Finger to finger: Within functional limits    Finger touch to nose: Within functional limits  Upper extremity (right):     Fine motor: Within functional limits    Gross motor: Within functional limits    Slow alternating movements: Within functional limits    Rapid alternating movements: Within functional limits    Finger to finger: Within functional limits    Finger touch to nose: Within functional limits     Cognition:     Orientation: normal to situation, normal to person, normal to place and normal to time    Direction following: three step    Short term memory: intact    Long term memory: intact    Attention span: intact     Cognition Comments:  Patient stated he feels he has \"brain fog\" and sometimes a bit forgetful.       Vision/Perception:     Formal vision perception assessment needed.     Vision/Perception Comments:   Jefferson test = 35/35 in 2 minutes  Maze task test= 22 seconds     Postural Control (Balance)     Sitting (static): Normal    Sitting (dynamic): Normal    Standing (static): Normal    Standing (dynamic): Normal    Weight shift (sitting): Normal    Weight shift (standing): Normal     Ambulation: Level Surfaces   Ambulation with assistive device: independent  Ambulation without assistive device: contact guard assist     ADLs:  Independent with all personal and domestic ADLs.  Hoping to return to work mid September           Therapeutic Treatments and Modalities:    1. Neuromuscular Re-education (CPT 91804)    Therapeutic Treatments and Modalities Summary: Focus on " dynamic standing balance/endurance and left UE strengthening:  UBE while standing on resistance 4 for 5 minutes  Soft tissue stretching of sternocleidomastoid and scapular depression and retraction exercises completed.    Arm elevation using ball against wall to work lower traps  While seated UE strengthening with 3# for bicep/triceps, external/internal rotation at 0 degrees.  2# for FF/Ext, overhead reach, horizontal abd/add    On mat in quadraped position weight bearing through both arms in FF stretch and forward reaching for each arm with weight shifting 10 x   Work simulated task of lifting weighted box of 15 pounds from floor to 3 ft height 5 x utilizing PLB technique.        Time-based treatments/modalities:  Neuromusc re-ed minutes (CPT 95247): 45 minutes        Pain rating before treatment: 0  Pain rating after treatment: 2  Left shoulder     ASSESSMENT:   Response to treatment: Patient successfully  completed OT, meeting all set goals.      PLAN/RECOMMENDATIONS:   Plan for treatment: no further treatment needed.  Planned interventions for next visit: Last therapy session today

## 2022-08-22 NOTE — OP THERAPY DAILY TREATMENT
Outpatient Physical Therapy  DAILY TREATMENT     [facility] Physical [address]  [address]  [place] [address]  Phone:  [phone]  Fax:  [phone]    Date: 2022    Patient: Alistair Putnam  YOB: 1965  MRN: [id number]     Time Calculation    Start time: 0830  Stop time: 0912 Time Calculation (min): 42 minutes         Chief Complaint: Weakness and Loss Of Balance    Visit #: 12    SUBJECTIVE:  Was able to spend day in the pool Sat at a friends house. Notes improved ability to shower/EC without severe LOB.    OBJECTIVE:  Current objective measures:       10MWT: 7.86  Gait speed:.1.27 m/s    5STS No UE support 16.72  6MWT: 1281 feet. PRE 5/10  TU.18  TUG cog 9.89     MiniBest Test     FGA   2, 5.9 sec  1 (sig difficulty with speeding up)  2  3  3  3  2 (staggering 10 steps)  2 8.47 sec  3  2        Therapeutic Exercises (CPT 87333):     20. 7/12-10/11, 30 day 8/15    Therapeutic Treatments and Modalities:     1. Neuromuscular Re-education (CPT 03905)    Therapeutic Treatment and Modalities Summary:   For improving core stabilty and dynamic balance   Tall kneeling airex resistance band chest press 8# Rt UE, no weight Lt UE d/t shoulder pain 2 x 20.  Tall kneeling on BOSU x 2 min  -horizontal head turns x 2 min total, frequent UE use  -vertical head turns x 2 min  -Head circles x 2 min CCW/CW frequent UE assist  -EC x 2 min    1/2 kneeling on BOSU   Bilateral chops(Lt<>R) x 15 5#DB B LE position   Time-based treatments/modalities:    Physical Therapy Timed Treatment Charges  Neuromusc re-ed, balance, coor, post minutes (CPT 41198): 42 minutes          ASSESSMENT:   Continues to have dynamic balance deficits with dec in visual input on dynamic surfaces. Utilized tall/facundo kneeling for increased hip/quad demand while performing with good difficulty and success. Improved 1/2 kneeling stabilty on dynamic surface.  PLAN/RECOMMENDATIONS:   Plan for  treatment: therapy treatment to continue next visit.  Planned interventions for next visit: continue with current treatment.

## 2022-08-23 ENCOUNTER — PHYSICAL THERAPY (OUTPATIENT)
Dept: PHYSICAL THERAPY | Facility: REHABILITATION | Age: 57
End: 2022-08-23
Attending: STUDENT IN AN ORGANIZED HEALTH CARE EDUCATION/TRAINING PROGRAM
Payer: COMMERCIAL

## 2022-08-23 DIAGNOSIS — R53.1 WEAKNESS: ICD-10-CM

## 2022-08-23 DIAGNOSIS — Z74.09 IMPAIRED MOBILITY: ICD-10-CM

## 2022-08-23 DIAGNOSIS — I63.541 ACUTE STROKE DUE TO OCCLUSION OF RIGHT CEREBELLAR ARTERY (HCC): ICD-10-CM

## 2022-08-23 DIAGNOSIS — R26.2 DIFFICULTY WALKING: ICD-10-CM

## 2022-08-23 DIAGNOSIS — R26.89 LOSS OF BALANCE: ICD-10-CM

## 2022-08-23 PROCEDURE — 97112 NEUROMUSCULAR REEDUCATION: CPT

## 2022-08-25 ENCOUNTER — PHYSICAL THERAPY (OUTPATIENT)
Dept: PHYSICAL THERAPY | Facility: REHABILITATION | Age: 57
End: 2022-08-25
Attending: STUDENT IN AN ORGANIZED HEALTH CARE EDUCATION/TRAINING PROGRAM
Payer: COMMERCIAL

## 2022-08-25 ENCOUNTER — APPOINTMENT (OUTPATIENT)
Dept: OCCUPATIONAL THERAPY | Facility: REHABILITATION | Age: 57
End: 2022-08-25
Attending: STUDENT IN AN ORGANIZED HEALTH CARE EDUCATION/TRAINING PROGRAM
Payer: COMMERCIAL

## 2022-08-25 DIAGNOSIS — I63.541 ACUTE STROKE DUE TO OCCLUSION OF RIGHT CEREBELLAR ARTERY (HCC): ICD-10-CM

## 2022-08-25 DIAGNOSIS — Z74.09 IMPAIRED MOBILITY: ICD-10-CM

## 2022-08-25 DIAGNOSIS — R53.1 WEAKNESS: ICD-10-CM

## 2022-08-25 PROCEDURE — 97112 NEUROMUSCULAR REEDUCATION: CPT

## 2022-08-25 PROCEDURE — 97116 GAIT TRAINING THERAPY: CPT

## 2022-08-25 NOTE — OP THERAPY DAILY TREATMENT
Outpatient Physical Therapy  DAILY TREATMENT     [address]  [address]  [place] [address]  Phone:  [phone]  Fax:  [phone]    Date: 2022    Patient: Alistair Putnam  YOB: 1965  MRN: [id number]     Time Calculation    Start time: 1130  Stop time: 1210 Time Calculation (min): 40 minutes         Chief Complaint: Weakness and Loss Of Balance    Visit #: 13    SUBJECTIVE:  A little leary about return to work due to fatigue.    OBJECTIVE:  Current objective measures:       10MWT: 7.86  Gait speed:.1.27 m/s    5STS No UE support 16.72  6MWT: 1281 feet. PRE 5/10  TU.18  TUG cog 9.89     MiniBest Test     FGA   2, 5.9 sec  1 (sig difficulty with speeding up)  2  3  3  3  2 (staggering 10 steps)  2 8.47 sec  3  2        Therapeutic Exercises (CPT 42291):     20. 7/12-10, 30 day 8/15    Therapeutic Treatments and Modalities:     1. Neuromuscular Re-education (CPT 05797)    2. Gait Training (CPT 60990)    Therapeutic Treatment and Modalities Summary:   Gait training: Ue of lite gait for patient safety  Backwards incline walking .8mph, incline 4 x 8 min. Max cues for hip ext for further glute activation.PRE 6  Lateral incline walking .6mph incline 4 x 4 min each. Min cues for to dec foot drag. Sig fatigue PRE 7    NMR:  For improving core stabilty and dynamic balance   Horizontal head turns x 3 min, mod fatigue and instability  EC 2 x 2 min B   Time-based treatments/modalities:    Physical Therapy Timed Treatment Charges  Gait training minutes (CPT 88812): 30 minutes  Neuromusc re-ed, balance, coor, post minutes (CPT 54538): 10 minutes          ASSESSMENT:   Significant fatigue with dynamic walking tasks. Continues to have some endurance limitations impacting function/return to work. Demonstrating imprved stabilty  PLAN/RECOMMENDATIONS:   Plan for treatment: therapy treatment to continue next visit.  Planned interventions for  next visit: continue with current treatment.

## 2022-08-29 ENCOUNTER — PHYSICAL THERAPY (OUTPATIENT)
Dept: PHYSICAL THERAPY | Facility: REHABILITATION | Age: 57
End: 2022-08-29
Attending: STUDENT IN AN ORGANIZED HEALTH CARE EDUCATION/TRAINING PROGRAM
Payer: COMMERCIAL

## 2022-08-29 ENCOUNTER — OFFICE VISIT (OUTPATIENT)
Dept: NEUROLOGY | Facility: MEDICAL CENTER | Age: 57
End: 2022-08-29
Attending: NURSE PRACTITIONER
Payer: COMMERCIAL

## 2022-08-29 VITALS
RESPIRATION RATE: 14 BRPM | SYSTOLIC BLOOD PRESSURE: 100 MMHG | OXYGEN SATURATION: 94 % | WEIGHT: 213.41 LBS | HEART RATE: 85 BPM | HEIGHT: 71 IN | DIASTOLIC BLOOD PRESSURE: 64 MMHG | BODY MASS INDEX: 29.88 KG/M2

## 2022-08-29 DIAGNOSIS — Z74.09 IMPAIRED MOBILITY: ICD-10-CM

## 2022-08-29 DIAGNOSIS — I10 ESSENTIAL HYPERTENSION: ICD-10-CM

## 2022-08-29 DIAGNOSIS — R53.1 WEAKNESS: ICD-10-CM

## 2022-08-29 DIAGNOSIS — I63.541 ACUTE STROKE DUE TO OCCLUSION OF RIGHT CEREBELLAR ARTERY (HCC): ICD-10-CM

## 2022-08-29 DIAGNOSIS — E78.2 MIXED HYPERLIPIDEMIA: ICD-10-CM

## 2022-08-29 DIAGNOSIS — R73.03 PREDIABETES: ICD-10-CM

## 2022-08-29 DIAGNOSIS — R26.2 DIFFICULTY WALKING: ICD-10-CM

## 2022-08-29 DIAGNOSIS — I69.30 LATE EFFECT OF STROKE: ICD-10-CM

## 2022-08-29 DIAGNOSIS — R26.89 LOSS OF BALANCE: ICD-10-CM

## 2022-08-29 PROBLEM — S46.019A TRAUMATIC ROTATOR CUFF TEAR: Status: ACTIVE | Noted: 2017-11-17

## 2022-08-29 PROBLEM — S40.019A CONTUSION OF SHOULDER REGION: Status: ACTIVE | Noted: 2017-10-12

## 2022-08-29 PROBLEM — M75.100 SUPRASPINATUS SYNDROME: Status: ACTIVE | Noted: 2017-11-02

## 2022-08-29 PROCEDURE — 99215 OFFICE O/P EST HI 40 MIN: CPT | Performed by: NURSE PRACTITIONER

## 2022-08-29 PROCEDURE — 99212 OFFICE O/P EST SF 10 MIN: CPT | Performed by: NURSE PRACTITIONER

## 2022-08-29 PROCEDURE — 97110 THERAPEUTIC EXERCISES: CPT

## 2022-08-29 PROCEDURE — 99417 PROLNG OP E/M EACH 15 MIN: CPT | Performed by: NURSE PRACTITIONER

## 2022-08-29 RX ORDER — LOSARTAN POTASSIUM 25 MG/1
25 TABLET ORAL DAILY
Qty: 90 TABLET | Refills: 0 | Status: SHIPPED | OUTPATIENT
Start: 2022-08-29 | End: 2023-08-29

## 2022-08-29 RX ORDER — VITAMIN B COMPLEX
4000 TABLET ORAL DAILY
COMMUNITY

## 2022-08-29 ASSESSMENT — ENCOUNTER SYMPTOMS
BLOOD IN STOOL: 0
TINGLING: 0
SPEECH CHANGE: 0
WEAKNESS: 1
HEADACHES: 1
SHORTNESS OF BREATH: 1
DIZZINESS: 1
TREMORS: 0
BLURRED VISION: 0
SENSORY CHANGE: 0
FOCAL WEAKNESS: 0
COUGH: 1
DOUBLE VISION: 0
NERVOUS/ANXIOUS: 1
PALPITATIONS: 1
BRUISES/BLEEDS EASILY: 0
DEPRESSION: 0

## 2022-08-29 ASSESSMENT — FIBROSIS 4 INDEX: FIB4 SCORE: 0.74

## 2022-08-29 NOTE — PATIENT INSTRUCTIONS
If any new signs of stroke:  sudden weakness, numbness, speech difficulty (slurring or difficulty finding words), imbalance, incoordination, worse headache of life or vision loss occur, call 911.      Take all medications as prescribed unless instructed by your provider.      We will stop Lisinopril and start Losartan 25mg, primary care to refill.

## 2022-08-29 NOTE — PROGRESS NOTES
Subjective       HPI      Alistair Putnam is a 57 y.o.  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   He presented to [facility] on 6/26/2022 with sudden onset of dizziness, headaches and BUE/BLE numbness and tingling. He was found to have a large right PICA infarct with  effacement of 4th ventricle and hemorrhagic transformation.  He had an ED placed. NIHSS on admission 3.    Discharged on ASA, Atorvastatin, ZIO patch    PMH :  right shoulder pain  Social History:  Denies smoking, alcohol rare beer before stroke, smokes marijuana      HE had COVID May 24th, [address]80.      He is here with his wife today. He still has some lightheadedness, no vertigo.  He is still a little bit tired. He is not working.  He is getting rehab (PT) @ [facility]. He passed the OT driving test.  He is not having any difficulty swallowing, he has a dry a dry cough.         Review of Systems   Constitutional:  Positive for malaise/fatigue.   HENT:  Negative for nosebleeds.    Eyes:  Negative for blurred vision and double vision.   Respiratory:  Positive for cough and shortness of breath.         Dry cough since discharge   Cardiovascular:  Positive for palpitations. Negative for chest pain.   Gastrointestinal:  Negative for blood in stool.   Genitourinary:  Negative for hematuria.   Musculoskeletal:  Positive for joint pain.        Left shoulder pain   Neurological:  Positive for dizziness, weakness and headaches. Negative for tingling, tremors, sensory change, speech change and focal weakness.        Left shoulder weakness   Endo/Heme/Allergies:  Does not bruise/bleed easily.   Psychiatric/Behavioral:  Negative for depression. The patient is nervous/anxious.         He has a little anxiety about going back to work later this month.            Objective       I personally reviewed imaging below and agree with the findings    MRI brain 6/27/2022  1.  Large  "acute right posterior inferior cerebellar artery territory infarct with associated edema/swelling and localized mass effect, partially effacing the fourth ventricle. The lateral ventricles are now mildly dilated suggesting developing   obstructive hydrocephalus.  2.  Small area of petechial hemorrhage within the infarct without other significant acute intracranial hemorrhage.  3.  FLAIR hyperintensity in the right vertebral artery suggesting sluggish flow, less likely thrombus formation.  CT head 6/26/2022  Head CT without contrast within normal limits. No evidence of acute cerebral infarction, hemorrhage or mass lesion.     CTA head 6/26/2022  No hemodynamically significant narrowing of the major intracranial vessels.    CTA neck 6/26/2022  No evidence of flow-limiting stenosis in the cervical carotid or cervical vertebral arteries.    CT head 6/28/2022  Postprocedural changes of ventriculostomy placement with minimal hyperdense hemorrhage tracking along the ventriculostomy tract.  Small anterior foci of right pneumocephalus.  Low-density evolving stroke within the right cerebellar hemisphere effacing the fourth ventricle.  Atherosclerosis  CT head 7/6/2022  No acute intracranial hemorrhage.    Evolution of the right cerebellar infarct with decreased edema and less mass effect on the fourth ventricle.    CT head 7/27/2022  Further evolution of right cerebellar infarct    No hydronephrosis is identified    14 day ZIO patch negative for Afib.      TTE 6/27/2022:   LVEF 65-70% bubble negative, LA size WNL,     Stroke Labs:  Creat 0.68, A1C 6.2,     Encounter Vitals  Standard Vitals  Vitals  Blood Pressure: 100/64  Pulse: 85  Respiration: 14  Pulse Oximetry: 94 %  Height: 180.3 cm (5' 11\")  Weight: 96.8 kg (213 lb 6.5 oz)  Encounter Vitals  Blood Pressure: 100/64  Pulse: 85  Respiration: 14  Pulse Oximetry: 94 %  Weight: 96.8 kg (213 lb 6.5 oz)  Height: 180.3 cm (5' 11\")  BMI (Calculated): 29.76    PHYSICAL " ASSESSMENT  Constitutional:  Alert, no apparent distress,  Psych:   mood and affect WNL  Muskuloskeletal:  Moves all extremities equally, strength 5/5  BUE/BLE flexors/extensors, no drift  NEUROLOGICAL ASSESSMENT  Oriented X 4, speech fluent, naming and memory intact  CN II: Visual fields are full to confrontation. Fundoscopic exam is normal with sharp discs and no vascular changes. Pupils are 3 mm and briskly reactive to light.   CN III: IV, VI  EOMs intact, no ptosis  CN V: Facial sensation is intact to pinprick in all 3 divisions bilaterally. Corneal responses are intact.  CN VII: Slight right facial droop  CN VIII Hearing is normal to rubbing fingers  CN IX, X: Palate elevates symmetrically. Phonation is normal.  CN XI: Head turning and shoulder shrug are intact  CN XII: Tongue is midline with normal movements and no atrophy.                           Sensation to PP equal bilaterally                 No limb ataxia with finger to nose and heel to shin                 Ambulates with antalgic gait                 Rhomberg negative                Biceps,brachioradialis, tricep, and patellar reflexes all 2+     Cardiovascular:    S1S2, no abnormal rhythm auscultated, no peripheral edema  Neck:                     No carotid bruits noted   Pulmonary:            Respirations easy, lungs clear to auscultation all fields.     Skin:                     No obvious rashes.      Iniital NIHSS 3      Current NIHSS    1a. LOC: 0  1b. LOC Questions: 0  1c. LOC Commands: 0  2. Best Gaze:0  3. Visual Fields: 0  4. Facial Paresis: 1  5a. Motor arm left: 0  5b. Motor arm right: 0  6a. Motor leg left: 0  6b. Motor leg right: 0  7. Sensory: 0  8. Best Language: 0  9. Limb Ataxia: 0  10. Dysarthria: 0  11. Extinction/Inattention: 0    Total Score Current 1        Current mRS 3        Assessment & Plan     1. Late effect of stroke  Right PICA infarct with hemorrhagic transformation and effacement of 4th ventricle, s/p EVD.     He is  recovering well.       Presumed Mechanism by TOAST  __  Large Artery Atherosclerosis  __  Small Vessel (lacunar)  __   Cardioembolic  __   Other (Sickle cell, vasculitis, hypercoagulable)  XX__   Unknown      Embolic stroke, unknown source.  HE does not really have risk factors for Afib, he had a negative 14 day heart monitor.   He did have COVID 3-4 weeks prior, he may have been hypercoagulable. He does not have significant risk factors for Afib, but can consider long term monitoring as we do not know the source of this embolic stroke. He has an apt with cardiology in late September.      Stroke risk factors:  prediabetes, HLD  Blood pressure goal less than 130/80, current 100/64.    Continue ASA 81mg PO daily for stroke prevention, discussed signs of bleeding.          2. Mixed hyperlipidemia    LDL goal < 70, current 129, continue Atorvastatin 80mg per SPARCL trial, if cholesterol is better in the future, the minimum statin dose I would recommend is Atorvastatin 40mg, discussed medication side effects, will need follow up with primary care evaluate liver function at intervals and refill  Exercise at least 30 minutes daily, avoid/minimize red meat, fried foods, butter, cheese.   Eat 5-6 servings of vegetables and fruits daily, choose lean white meat without skin (chicken, turkey, white fish)--baked, broiled or grilled.      3. Prediabetes  A1C 6.2     If any new signs of stroke:  sudden weakness, numbness, speech difficulty (slurring or difficulty finding words), imbalance, incoordination, worse headache of life or vision loss occur, call 911.        4. Essential Hypertension     He is currently on 10mg lisinopril, blood pressure in low 100s, he is complaining of constant dry cough,     We will stop Lisinopril and start Losartan 25mg, primary care to refill.    Take all medications as prescribed unless instructed by your provider.        I spent a total of 56 minutes caring for patient,  my time includes  counseling, review of systems, HPI and assessment, review of images, labs and testing as above.  I reviewed the hospital records, PMH, social and family history.   I have counseled patient on stroke prevention strategies, stroke symptoms and mimics.  Diet and exercise modifications.  We discussed medication side effects and instructions.       I have provided patient a written personalized stroke prevention plan,  it is filed under the media tab under '[facility]”.    Follow up PRN

## 2022-08-29 NOTE — OP THERAPY DAILY TREATMENT
Outpatient Physical Therapy  DAILY TREATMENT     [facility] [address]  [address]  [place] [address]  Phone:  [phone]  Fax:  [phone]    Date: 2022    Patient: Alistair Putnam  YOB: 1965  MRN: [id number]     Time Calculation    Start time: 1415  Stop time: 1457 Time Calculation (min): 42 minutes         Chief Complaint: Weakness    Visit #: 14    SUBJECTIVE:  Pt had good follow up with doctor today. Is changing BP meds.    OBJECTIVE:  Current objective measures:       10MWT: 7.86  Gait speed:.1.27 m/s    5STS No UE support 16.72  6MWT: 1281 feet. PRE 5/10  TU.18  TUG cog 9.89     MiniBest Test     FGA   2, 5.9 sec  1 (sig difficulty with speeding up)  2  3  3  3  2 (staggering 10 steps)  2 8.47 sec  3  2        Therapeutic Exercises (CPT 23651):     1. plank on knees, 10 x 20 sec    2. SL bridge, x 8 B, Rt lumbar cramp-DC    3. Ecc. Bridge up on 2 down on 1, 2 x 12 B    4. monster walks GTB @ ankle, 3 min    5. lateral stepping GTB @ ankle, 3 min    20. -10/11, 30 day 8/15    Therapeutic Treatments and Modalities:     1. Neuromuscular Re-education (CPT 84690)    2. Gait Training (CPT 87017)    Therapeutic Treatment and Modalities Summary:   NMR:  For improving core stabilty and dynamic balance   Horizontal head turns x 3 min, mod fatigue and instability  EC 2 x 2 min B   Time-based treatments/modalities:    Physical Therapy Timed Treatment Charges  Therapeutic exercise minutes (CPT 03499): 42 minutes          ASSESSMENT:   Emphasis on core/hip stabilty and endurance. Reiterated physician instructions related to cv endurance/exercise pt was previously completing.   PLAN/RECOMMENDATIONS:   Plan for treatment: therapy treatment to continue next visit.  Planned interventions for next visit: continue with current treatment.

## 2022-09-01 ENCOUNTER — PHYSICAL THERAPY (OUTPATIENT)
Dept: PHYSICAL THERAPY | Facility: REHABILITATION | Age: 57
End: 2022-09-01
Attending: STUDENT IN AN ORGANIZED HEALTH CARE EDUCATION/TRAINING PROGRAM
Payer: COMMERCIAL

## 2022-09-01 DIAGNOSIS — Z74.09 IMPAIRED MOBILITY: ICD-10-CM

## 2022-09-01 DIAGNOSIS — R53.1 WEAKNESS: ICD-10-CM

## 2022-09-01 DIAGNOSIS — I63.541 ACUTE STROKE DUE TO OCCLUSION OF RIGHT CEREBELLAR ARTERY (HCC): ICD-10-CM

## 2022-09-01 DIAGNOSIS — R26.2 DIFFICULTY WALKING: ICD-10-CM

## 2022-09-01 DIAGNOSIS — R26.89 LOSS OF BALANCE: ICD-10-CM

## 2022-09-01 PROCEDURE — 97110 THERAPEUTIC EXERCISES: CPT

## 2022-09-01 PROCEDURE — 97112 NEUROMUSCULAR REEDUCATION: CPT

## 2022-09-01 NOTE — OP THERAPY DAILY TREATMENT
Outpatient Physical Therapy  DAILY TREATMENT     [address]  [address]  [place] [address]  Phone:  [phone]  Fax:  [phone]    Date: 2022    Patient: Alistair Putnam  YOB: 1965  MRN: [id number]     Time Calculation    Start time: 0745  Stop time: 0826 Time Calculation (min): 41 minutes         Chief Complaint: Weakness and Loss Of Balance    Visit #: 15    SUBJECTIVE:  Sees ortho today about shoulder but called stroke  and he can't have surgery for at least 6 months    OBJECTIVE:  Current objective measures:       10MWT: 7.86  Gait speed:.1.27 m/s    5STS No UE support 16.72  6MWT: 1281 feet. PRE 5/10  TU.18  TUG cog 9.89     MiniBest Test     FGA   2, 5.9 sec  1 (sig difficulty with speeding up)  2  3  3  3  2 (staggering 10 steps)  2 8.47 sec  3  2        Therapeutic Exercises (CPT 43969):     3. Ecc. Bridge up on 2 down on 1, 3x12  B, BOSU ball side down    4. tall kneeling on BOSU [employer] press, x 15 B BTB, shoulde rpain    6. Bird dogs    7. Bridge holds on BOSU ball side down, 2 x 45 sec, 2 x 35 sec    20. -10/11, 30 day 8/15    Therapeutic Treatments and Modalities:     1. Neuromuscular Re-education (CPT 74206)    2. Gait Training (CPT 09404)    Therapeutic Treatment and Modalities Summary: 1/2 kneeling on BOSU ball bilateral x 1 min each  EC, EC horizontal head turns no physical asssit needed, improved stabilty  1/2 kneeling chops x 15 B  Tandem gait on balance board fwd/bwd 4 x 10 feet each. 1 mod A for LOB. Fait use of ankle hip, and stepping strategies.   Tandem gait on airex beam fwd/bwd 3 x 6 feet SBA   Time-based treatments/modalities:    Physical Therapy Timed Treatment Charges  Neuromusc re-ed, balance, coor, post minutes (CPT 31920): 25 minutes  Therapeutic exercise minutes (CPT 33258): 16 minutes          ASSESSMENT:   Pt demonstrates improved stabilty on 1/2 kneeling on dynamic surface as well  as improved ability to perform tandem gait on dynamic surfaces. Discussed POC with likely DC 2-3 weeks  PLAN/RECOMMENDATIONS:   Plan for treatment: therapy treatment to continue next visit.  Planned interventions for next visit: continue with current treatment.

## 2022-09-02 NOTE — OP THERAPY DAILY TREATMENT
Outpatient Physical Therapy  DAILY TREATMENT     [facility] [address].  [address]  [place] [address]  Phone:  [phone]  Fax:  [phone]    Date: 2022    Patient: Alistair Putnam  YOB: 1965  MRN: [id number]     Time Calculation    Start time: 915  Stop time: 957 Time Calculation (min): 42 minutes         Chief Complaint: Weakness and Loss Of Balance    Visit #: 16    SUBJECTIVE:  Getting MRI for shoulder. Returning to work in a few weeks.    OBJECTIVE:  Current objective measures:       10MWT: 7.86  Gait speed:.1.27 m/s    5STS No UE support 16.72  6MWT: 1281 feet. PRE 5/10  TU.18  TUG cog 9.89     MiniBest Test     FGA   2, 5.9 sec  1 (sig difficulty with speeding up)  2  3  3  3  2 (staggering 10 steps)  2 8.47 sec  3  2        Therapeutic Exercises (CPT 97719):     1. plank on knees, 3 x 1 min    3. Ecc. Bridge up on 2 down on 1, 3x12  B, BOSU ball side down    6. Bird dogs, 3x15 B    20. -10/11, 30 day 8/15    Therapeutic Treatments and Modalities:     1. Neuromuscular Re-education (CPT 87680)    Therapeutic Treatment and Modalities Summary: Single LE cone taps cone at 9, 11, 1, and 3. X 10 each including cross body. Demod good stabilty with occasional UE use, no severe LOB.  Progressed to airex x 8 each increased difficulty good amira control x 5 each  On airex Sl balance with horizontal  and vertical head turns x 1 min each   Time-based treatments/modalities:    Physical Therapy Timed Treatment Charges  Neuromusc re-ed, balance, coor, post minutes (CPT 31594): 12 minutes  Therapeutic exercise minutes (CPT 55140): 30 minutes          ASSESSMENT:   Continues to demonstrate improved strength and stabilty. Will likely DC in 1- 2 visits with current emphasis on HEP independence with progressions and regressions as needed.  PLAN/RECOMMENDATIONS:   Plan for treatment: therapy treatment to continue next visit.  Planned  interventions for next visit: continue with current treatment.

## 2022-09-06 ENCOUNTER — PHYSICAL THERAPY (OUTPATIENT)
Dept: PHYSICAL THERAPY | Facility: REHABILITATION | Age: 57
End: 2022-09-06
Attending: STUDENT IN AN ORGANIZED HEALTH CARE EDUCATION/TRAINING PROGRAM
Payer: COMMERCIAL

## 2022-09-06 DIAGNOSIS — R26.89 LOSS OF BALANCE: ICD-10-CM

## 2022-09-06 DIAGNOSIS — R53.1 WEAKNESS: ICD-10-CM

## 2022-09-06 DIAGNOSIS — I63.541 ACUTE STROKE DUE TO OCCLUSION OF RIGHT CEREBELLAR ARTERY (HCC): ICD-10-CM

## 2022-09-06 PROCEDURE — 97110 THERAPEUTIC EXERCISES: CPT

## 2022-09-06 PROCEDURE — 97112 NEUROMUSCULAR REEDUCATION: CPT

## 2022-09-09 ENCOUNTER — APPOINTMENT (OUTPATIENT)
Dept: PHYSICAL THERAPY | Facility: REHABILITATION | Age: 57
End: 2022-09-09
Attending: STUDENT IN AN ORGANIZED HEALTH CARE EDUCATION/TRAINING PROGRAM
Payer: COMMERCIAL

## 2022-09-12 NOTE — OP THERAPY DAILY TREATMENT
Outpatient Physical Therapy  DAILY TREATMENT     [facility] [address]  [address]  [place] [address]  Phone:  [phone]  Fax:  [phone]    Date: 2022    Patient: Alistair Putnam  YOB: 1965  MRN: [id number]     Time Calculation    Start time: 0745  Stop time: 0828 Time Calculation (min): 43 minutes         Chief Complaint: Weakness    Visit #: 17    SUBJECTIVE:  Pt reports he is planning for surgery and hopes soon for Lt shoulder impairments. Agreeable to DC.     OBJECTIVE:  Current objective measures:       10MWT: 7.86  Gait speed:.1.27 m/s    5STS No UE support 11.98  6MWT: 1281 feet. PRE 5/10  TU.18  TUG cog 9.89     MiniBest Test     FGA   3,  3  2  3  3  3  3  3 6.97  3  3        Therapeutic Exercises (CPT 95756):     1. Goal reassessment 6MWT, 5STS    2. HEP review-safe progression balance HEP including use of airex, 1/2 kneeling and tall kneeling. Perform quadruped only as instructed by ortho sx as pt considering surgical intervention.    20. -10/11, 30 day 8/15    Therapeutic Treatments and Modalities:     1. Neuromuscular Re-education (CPT 06489)    Therapeutic Treatment and Modalities Summary: FGA reassessment    Time-based treatments/modalities:    Physical Therapy Timed Treatment Charges  Neuromusc re-ed, balance, coor, post minutes (CPT 44539): 13 minutes  Therapeutic exercise minutes (CPT 28345): 30 minutes          ASSESSMENT:   Pt has completed 17 PT visits related to impairments secondary to CVA. HE has demonstrated excellent progress and met almost all goals. Pt scoring normal for 5STS, gait speed, and scoring 29/30 on Functional gait assessment. Pt still has some endurance limitations in which time is needed and his continued dedication to his HEP. Patient is appropriate to DC with most goals met and expectation of HEP continuation to assist with return to PLOF. Pt verbalized agreement to DC.       Short  Term Goals:   1. Pt will be compliant with HEP daily for improving strength and stabilty. Met  2. Pt will complete FGA for dynamic balance/gait assessment. Met  Short term goal time span:  2-4 weeks      Long Term Goals:    1. Pt will demonstrate improved functional LE strength with 5STS 15 sec or better. Met  2. Pt will demonstrate improved CV endurance and gait speed with 6MWT score 1500 feet or better. Partially met 1492  3. Pt will report ability to shower without modifications or LOB to perform at PLOF. Met  4. Pt will demonstrate ability to perform farmers carry x 10# x 8 min to simulate work specific task. Met Rt UE. Unable on Left d/t Ortho surgeon restrictions       PLAN/RECOMMENDATIONS:   Plan for treatment: therapy treatment to continue next visit.  Planned interventions for next visit: continue with current treatment.

## 2022-09-13 ENCOUNTER — PHYSICAL THERAPY (OUTPATIENT)
Dept: PHYSICAL THERAPY | Facility: REHABILITATION | Age: 57
End: 2022-09-13
Attending: STUDENT IN AN ORGANIZED HEALTH CARE EDUCATION/TRAINING PROGRAM
Payer: COMMERCIAL

## 2022-09-13 DIAGNOSIS — R26.89 LOSS OF BALANCE: ICD-10-CM

## 2022-09-13 DIAGNOSIS — R53.1 WEAKNESS: ICD-10-CM

## 2022-09-13 DIAGNOSIS — Z74.09 IMPAIRED MOBILITY: ICD-10-CM

## 2022-09-13 DIAGNOSIS — I63.541 ACUTE STROKE DUE TO OCCLUSION OF RIGHT CEREBELLAR ARTERY (HCC): ICD-10-CM

## 2022-09-13 PROCEDURE — 97112 NEUROMUSCULAR REEDUCATION: CPT

## 2022-09-13 PROCEDURE — 97110 THERAPEUTIC EXERCISES: CPT

## 2022-09-13 NOTE — OP THERAPY DISCHARGE SUMMARY
Outpatient Physical Therapy  DISCHARGE SUMMARY NOTE      [place] [id number] [facility]  [address]  [facility] [address]  Phone:  [phone]  Fax:  [phone]    Date of Visit: 09/13/2022    Patient: Alistair Putnam  YOB: 1965  MRN: [id number]     Referring Provider: Steve Miguel M.D.  [address] [facility] [place] [address]   Referring Diagnosis Cerebral infarction due to unspecified occlusion or stenosis of right cerebellar artery [I63.541]         Functional Assessment Used        Your patient is being discharged from Physical Therapy with the following comments:   Goals met    Comments:  Pt has completed 17 PT visits related to impairments secondary to CVA. HE has demonstrated excellent progress and met almost all goals. Pt scoring normal for 5STS, gait speed, and scoring 29/30 on Functional gait assessment. Pt still has some endurance limitations in which time is needed and his continued dedication to his HEP. Patient is appropriate to DC with most goals met and expectation of HEP continuation to assist with return to PLOF. Pt verbalized agreement to DC.       Short Term Goals:   1. Pt will be compliant with HEP daily for improving strength and stabilty. Met  2. Pt will complete FGA for dynamic balance/gait assessment. Met  Short term goal time span:  2-4 weeks      Long Term Goals:    1. Pt will demonstrate improved functional LE strength with 5STS 15 sec or better. Met  2. Pt will demonstrate improved CV endurance and gait speed with 6MWT score 1500 feet or better. Partially met 1492  3. Pt will report ability to shower without modifications or LOB to perform at PLOF. Met  4. Pt will demonstrate ability to perform farmers carry x 10# x 8 min to simulate work specific task. Met Rt UE. Unable on Left d/t Ortho surgeon restrictions     Katie Mcwilliams, PT, DPT    Date: 9/13/2022

## 2022-09-16 ENCOUNTER — APPOINTMENT (OUTPATIENT)
Dept: PHYSICAL THERAPY | Facility: REHABILITATION | Age: 57
End: 2022-09-16
Attending: STUDENT IN AN ORGANIZED HEALTH CARE EDUCATION/TRAINING PROGRAM
Payer: COMMERCIAL

## 2022-09-20 ENCOUNTER — APPOINTMENT (RX ONLY)
Dept: URBAN - METROPOLITAN AREA CLINIC 22 | Facility: CLINIC | Age: 57
Setting detail: DERMATOLOGY
End: 2022-09-20

## 2022-09-20 DIAGNOSIS — L81.4 OTHER MELANIN HYPERPIGMENTATION: ICD-10-CM

## 2022-09-20 DIAGNOSIS — L57.0 ACTINIC KERATOSIS: ICD-10-CM

## 2022-09-20 DIAGNOSIS — Z85.828 PERSONAL HISTORY OF OTHER MALIGNANT NEOPLASM OF SKIN: ICD-10-CM

## 2022-09-20 DIAGNOSIS — L82.1 OTHER SEBORRHEIC KERATOSIS: ICD-10-CM

## 2022-09-20 DIAGNOSIS — D22 MELANOCYTIC NEVI: ICD-10-CM

## 2022-09-20 PROBLEM — D22.5 MELANOCYTIC NEVI OF TRUNK: Status: ACTIVE | Noted: 2022-09-20

## 2022-09-20 PROCEDURE — ? COUNSELING

## 2022-09-20 PROCEDURE — 17000 DESTRUCT PREMALG LESION: CPT

## 2022-09-20 PROCEDURE — ? LIQUID NITROGEN

## 2022-09-20 PROCEDURE — ? SUNSCREEN TREATMENT REGIMEN

## 2022-09-20 PROCEDURE — 99213 OFFICE O/P EST LOW 20 MIN: CPT | Mod: 25

## 2022-09-20 ASSESSMENT — LOCATION DETAILED DESCRIPTION DERM
LOCATION DETAILED: RIGHT VENTRAL PROXIMAL FOREARM
LOCATION DETAILED: SUPERIOR THORACIC SPINE
LOCATION DETAILED: LEFT INFERIOR MEDIAL FOREHEAD
LOCATION DETAILED: LEFT LATERAL TRAPEZIAL NECK
LOCATION DETAILED: RIGHT CLAVICULAR NECK
LOCATION DETAILED: LEFT VENTRAL PROXIMAL FOREARM
LOCATION DETAILED: STERNAL NOTCH
LOCATION DETAILED: RIGHT MEDIAL UPPER BACK
LOCATION DETAILED: RIGHT CLAVICULAR SKIN
LOCATION DETAILED: RIGHT POSTERIOR SHOULDER
LOCATION DETAILED: RIGHT INFERIOR FOREHEAD

## 2022-09-20 ASSESSMENT — LOCATION SIMPLE DESCRIPTION DERM
LOCATION SIMPLE: RIGHT UPPER BACK
LOCATION SIMPLE: LEFT FOREHEAD
LOCATION SIMPLE: RIGHT FOREARM
LOCATION SIMPLE: UPPER BACK
LOCATION SIMPLE: RIGHT FOREHEAD
LOCATION SIMPLE: LEFT FOREARM
LOCATION SIMPLE: POSTERIOR NECK
LOCATION SIMPLE: RIGHT CLAVICULAR SKIN
LOCATION SIMPLE: RIGHT SHOULDER
LOCATION SIMPLE: CHEST
LOCATION SIMPLE: RIGHT ANTERIOR NECK

## 2022-09-20 ASSESSMENT — LOCATION ZONE DERM
LOCATION ZONE: NECK
LOCATION ZONE: TRUNK
LOCATION ZONE: ARM
LOCATION ZONE: FACE

## 2022-09-20 NOTE — PROCEDURE: LIQUID NITROGEN
Show Aperture Variable?: Yes
Post-Care Instructions: I reviewed with the patient in detail post-care instructions. Patient is to wear sunprotection, and avoid picking at any of the treated lesions. Pt may apply Vaseline to crusted or scabbing areas.
Duration Of Freeze Thaw-Cycle (Seconds): 0
Render Note In Bullet Format When Appropriate: No
Number Of Freeze-Thaw Cycles: 1 freeze-thaw cycle
Detail Level: Detailed
Consent: The patient's consent was obtained including but not limited to risks of crusting, scabbing, blistering, scarring, darker or lighter pigmentary change, recurrence, incomplete removal and infection.

## 2022-09-27 ENCOUNTER — PRE-ADMISSION TESTING (OUTPATIENT)
Dept: ADMISSIONS | Facility: MEDICAL CENTER | Age: 57
End: 2022-09-27
Attending: ORTHOPAEDIC SURGERY
Payer: COMMERCIAL

## 2022-09-27 DIAGNOSIS — Z01.812 PRE-OPERATIVE LABORATORY EXAMINATION: ICD-10-CM

## 2022-09-27 LAB
ANION GAP SERPL CALC-SCNC: 10 MMOL/L (ref 7–16)
BUN SERPL-MCNC: 18 MG/DL (ref 8–22)
CALCIUM SERPL-MCNC: 9.3 MG/DL (ref 8.4–10.2)
CHLORIDE SERPL-SCNC: 105 MMOL/L (ref 96–112)
CO2 SERPL-SCNC: 24 MMOL/L (ref 20–33)
CREAT SERPL-MCNC: 0.7 MG/DL (ref 0.5–1.4)
ERYTHROCYTE [DISTWIDTH] IN BLOOD BY AUTOMATED COUNT: 41.1 FL (ref 35.9–50)
GFR SERPLBLD CREATININE-BSD FMLA CKD-EPI: 108 ML/MIN/1.73 M 2
GLUCOSE SERPL-MCNC: 89 MG/DL (ref 65–99)
HCT VFR BLD AUTO: 46.4 % (ref 42–52)
HGB BLD-MCNC: 14.8 G/DL (ref 14–18)
MCH RBC QN AUTO: 26.5 PG (ref 27–33)
MCHC RBC AUTO-ENTMCNC: 31.9 G/DL (ref 33.7–35.3)
MCV RBC AUTO: 83 FL (ref 81.4–97.8)
PLATELET # BLD AUTO: 269 K/UL (ref 164–446)
PMV BLD AUTO: 9.7 FL (ref 9–12.9)
POTASSIUM SERPL-SCNC: 4.4 MMOL/L (ref 3.6–5.5)
RBC # BLD AUTO: 5.59 M/UL (ref 4.7–6.1)
SODIUM SERPL-SCNC: 139 MMOL/L (ref 135–145)
WBC # BLD AUTO: 5.7 K/UL (ref 4.8–10.8)

## 2022-09-27 PROCEDURE — 36415 COLL VENOUS BLD VENIPUNCTURE: CPT

## 2022-09-27 PROCEDURE — 80048 BASIC METABOLIC PNL TOTAL CA: CPT

## 2022-09-27 PROCEDURE — 85027 COMPLETE CBC AUTOMATED: CPT

## 2022-09-27 ASSESSMENT — FIBROSIS 4 INDEX: FIB4 SCORE: 0.74

## 2022-09-27 NOTE — PREPROCEDURE INSTRUCTIONS
Reviewed patient's medical history and medications with Dr Llanos, based upon information available, Dr Llanos indicates that the patient is a candidate to have the procedure at [facility] as scheduled on 10/17 pending medical clearance requirement.     Faxed and called Dr. Martinez's office, spoke with Gloria COPELAND) who states he is scheduled to obtain clearance about 1 week prior to procedure.

## 2022-09-27 NOTE — PREPROCEDURE INSTRUCTIONS
Pre admit appointment completed. History and medications reviewed. Pre-op instructions given, hand outs reviewed and questions answered. Pre admit video watched.    Anesthesia fasting guidelines reviewed. Patient instructed to continue regularly prescribed medications through the day before surgery. Per anesthesia protocol, patient instructed to take these medications with a sip of water the day of surgery: none.

## 2022-09-29 ENCOUNTER — OFFICE VISIT (OUTPATIENT)
Dept: CARDIOLOGY | Facility: MEDICAL CENTER | Age: 57
End: 2022-09-29
Payer: COMMERCIAL

## 2022-09-29 VITALS
OXYGEN SATURATION: 95 % | BODY MASS INDEX: 30.66 KG/M2 | HEIGHT: 71 IN | SYSTOLIC BLOOD PRESSURE: 118 MMHG | DIASTOLIC BLOOD PRESSURE: 70 MMHG | WEIGHT: 219 LBS | HEART RATE: 80 BPM | RESPIRATION RATE: 16 BRPM

## 2022-09-29 DIAGNOSIS — I49.1 PREMATURE ATRIAL COMPLEXES: ICD-10-CM

## 2022-09-29 DIAGNOSIS — I10 ESSENTIAL HYPERTENSION: ICD-10-CM

## 2022-09-29 DIAGNOSIS — Z01.810 PREOPERATIVE CARDIOVASCULAR EXAMINATION: ICD-10-CM

## 2022-09-29 DIAGNOSIS — I63.9 STROKE OF UNKNOWN ETIOLOGY (HCC): ICD-10-CM

## 2022-09-29 PROCEDURE — 99204 OFFICE O/P NEW MOD 45 MIN: CPT | Performed by: INTERNAL MEDICINE

## 2022-09-29 RX ORDER — PREDNISONE 20 MG/1
TABLET ORAL
COMMUNITY
End: 2022-09-29

## 2022-09-29 RX ORDER — PENICILLIN V POTASSIUM 500 MG/1
TABLET ORAL
COMMUNITY
End: 2022-09-29

## 2022-09-29 RX ORDER — FLUDROCORTISONE ACETATE 0.1 MG/1
TABLET ORAL
COMMUNITY
End: 2022-09-29

## 2022-09-29 RX ORDER — AZITHROMYCIN 500 MG/1
TABLET, FILM COATED ORAL
COMMUNITY
End: 2022-09-29

## 2022-09-29 RX ORDER — LISINOPRIL 10 MG/1
TABLET ORAL
COMMUNITY
End: 2022-09-29

## 2022-09-29 ASSESSMENT — FIBROSIS 4 INDEX: FIB4 SCORE: 0.79

## 2022-09-29 NOTE — PROGRESS NOTES
CARDIOLOGY OUTPATIENT FOLLOWUP    PCP: Joel Cho M.D.    1. Stroke of unknown etiology (HCC)        2. Essential hypertension        3. Premature atrial complexes        4.  Pre-Operative cardiovascular evaluation    Alistair Putnam is recovering well following stroke in June.  Advised continue on aspirin as well as atorvastatin and per his report LDL has achieved the appropriate goal.  We also discussed further evaluation for secondary stroke sources including a PATY as well as a implanted loop recorder.  Given the relatively normal initial work-up, my suspicion for occult pathology is low.  He wishes to delay decision until after shoulder surgery in October.  I will contact him in 6 weeks to see what he is feeling like doing.    Follow up: as needed    Chief Complaint   Patient presents with    Hypertension     NP Dx: Essential hypertension    Hyperlipidemia     NP Dx: Mixed hyperlipidemia       History: Alistair Putnam is a 57 y.o. male with history of dyslipidemia and hypertension presenting for assessment of stroke.  In June he was hospitalized with dizziness and diagnosed with right cerebellar stroke.  He has since made an excellent recovery with minimal residual effects.  Zio patch showed 5% PAC burden but no A. fib, and echocardiogram was normal including a bubble study, CT of the great vessels was unremarkable    We also reviewed the general guidelines following elective surgery after stroke.  Given that he is a native shoulder surgery, I think his risk of proceeding is reasonably low.  If at all possible I would prefer that he remain on aspirin during the perioperative period.  We did discuss the possibility of delaying surgery for several more months, which is not feasible.    ROS:   10 point review systems is otherwise negative except as per the HPI    PE:  /70 (BP Location: Left arm, Patient Position: Sitting, BP Cuff Size: Adult)   Pulse 80   Resp 16   Ht 1.803 m (5'  "11\")   Wt 99.3 kg (219 lb)   SpO2 95%   BMI 30.54 kg/m²   Gen: no acute distress  HEENT: Symmetric face. Anicteric sclerae. Moist mucus membranes  NECK: No JVD. No lymphadenopathy  CARDIAC: Regular, Normal S1, S2, No murmur  VASCULATURE: carotids are normal bilaterally without bruit  RESP: Clear to auscultation bilaterally  ABD: Soft, non-tender, non-distended  EXT: No edema, no clubbing or cyanosis  SKIN: Warm and dry  NEURO: No gross deficits  PSYCH: Appropriate affect, participates in conversation    The ASCVD Risk score ([place] IVELSISE Jr, et al., 2013) failed to calculate.    Past Medical History:   Diagnosis Date    COVID-19     Dec 2021, May 2022    Heart murmur 12/2017    \"As a child\"    Hyperlipidemia     Hypertension     Pain 09/27/2022    Left shoulder pain    Prediabetes     Stroke (HCC) 06/26/2022    embolic, Right cerebellar, no residual deficits (had facial droop/double vision/general weakness)     No Known Allergies  Outpatient Encounter Medications as of 9/29/2022   Medication Sig Dispense Refill    vitamin D3 (CHOLECALCIFEROL) 1000 Unit (25 mcg) Tab Take 4,000 Units by mouth every day.      NON SPECIFIED Take 3 Capsules by mouth every day. Corn husk fiber      losartan (COZAAR) 25 MG Tab Take 1 Tablet by mouth every day. 90 Tablet 0    aspirin (ASA) 81 MG Chew Tab chewable tablet Chew 1 Tablet every day. 100 Tablet 0    atorvastatin (LIPITOR) 80 MG tablet Take 1 Tablet by mouth every evening. 30 Tablet 0    multivitamin (THERAGRAN) Tab Take 1 Tablet by mouth every day.      Omega-3 Fatty Acids (FISH OIL) 1000 MG Cap capsule Take 1,000 mg by mouth every day.      [DISCONTINUED] azithromycin (ZITHROMAX) 500 MG tablet azithromycin 500 mg tablet   TAKE 1 TABLET BY MOUTH DAILY FOR 5 DAYS (Patient not taking: Reported on 9/29/2022)      [DISCONTINUED] fludrocortisone (FLORINEF) 0.1 MG Tab fludrocortisone 0.1 mg tablet      [DISCONTINUED] lisinopril (PRINIVIL) 10 MG Tab lisinopril 10 mg tablet   TAKE 1 TABLET " BY MOUTH ONCE DAILY (Patient not taking: Reported on 9/29/2022)      [DISCONTINUED] penicillin v potassium (VEETID) 500 MG Tab penicillin V potassium 500 mg tablet   TAKE 1 TABLET BY MOUTH 4 TIMES DAILY (Patient not taking: Reported on 9/29/2022)      [DISCONTINUED] predniSONE (DELTASONE) 20 MG Tab prednisone 20 mg tablet   TAKE 1 TABLET BY MOUTH TWICE DAILY FOR 7 DAYS (Patient not taking: Reported on 9/29/2022)       No facility-administered encounter medications on file as of 9/29/2022.     Social History     Socioeconomic History    Marital status:      Spouse name: Not on file    Number of children: Not on file    Years of education: Not on file    Highest education level: Not on file   Occupational History    Not on file   Tobacco Use    Smoking status: Never    Smokeless tobacco: Never   Vaping Use    Vaping Use: Never used   Substance and Sexual Activity    Alcohol use: Not Currently     Comment: 2 per month    Drug use: Not Currently     Types: Marijuana, Inhaled     Comment: Infrequently, approximately 1-2 times per month    Sexual activity: Not on file   Other Topics Concern    Not on file   Social History Narrative    Not on file     Social Determinants of Health     Financial Resource Strain: Not on file   Food Insecurity: Not on file   Transportation Needs: Not on file   Physical Activity: Not on file   Stress: Not on file   Social Connections: Not on file   Intimate Partner Violence: Not on file   Housing Stability: Not on file       Studies  Lab Results   Component Value Date/Time    CHOLSTRLTOT 186 06/27/2022 04:26 PM     (H) 06/27/2022 04:26 PM    HDL 41 06/27/2022 04:26 PM    TRIGLYCERIDE 79 06/27/2022 04:26 PM       Lab Results   Component Value Date/Time    SODIUM 139 09/27/2022 09:22 AM    POTASSIUM 4.4 09/27/2022 09:22 AM    CHLORIDE 105 09/27/2022 09:22 AM    CO2 24 09/27/2022 09:22 AM    GLUCOSE 89 09/27/2022 09:22 AM    BUN 18 09/27/2022 09:22 AM    CREATININE 0.70 09/27/2022  09:22 AM     Lab Results   Component Value Date/Time    ALKPHOSPHAT 70 06/26/2022 03:35 PM    ASTSGOT 16 06/26/2022 03:35 PM    ALTSGPT 18 06/26/2022 03:35 PM    TBILIRUBIN 0.2 06/26/2022 03:35 PM        For this encounter I reviewed the following  (neurology) notes, Recent ER/hospitalization Notes, BMP, Lipid profile, and LFT   For this encounter I directly reviewed Echo images.  Negative bubble study-somewhat limited opacification of the intra-atrial septum. I otherwise agree with the interpretation in the EHR.

## 2022-10-17 ENCOUNTER — ANESTHESIA (OUTPATIENT)
Dept: SURGERY | Facility: MEDICAL CENTER | Age: 57
End: 2022-10-17
Payer: COMMERCIAL

## 2022-10-17 ENCOUNTER — HOSPITAL ENCOUNTER (OUTPATIENT)
Facility: MEDICAL CENTER | Age: 57
End: 2022-10-17
Attending: ORTHOPAEDIC SURGERY | Admitting: ORTHOPAEDIC SURGERY
Payer: COMMERCIAL

## 2022-10-17 ENCOUNTER — ANESTHESIA EVENT (OUTPATIENT)
Dept: SURGERY | Facility: MEDICAL CENTER | Age: 57
End: 2022-10-17
Payer: COMMERCIAL

## 2022-10-17 VITALS
TEMPERATURE: 97.5 F | RESPIRATION RATE: 16 BRPM | OXYGEN SATURATION: 92 % | HEART RATE: 84 BPM | BODY MASS INDEX: 30.25 KG/M2 | WEIGHT: 216.05 LBS | SYSTOLIC BLOOD PRESSURE: 155 MMHG | HEIGHT: 71 IN | DIASTOLIC BLOOD PRESSURE: 95 MMHG

## 2022-10-17 PROCEDURE — 160036 HCHG PACU - EA ADDL 30 MINS PHASE I: Performed by: ORTHOPAEDIC SURGERY

## 2022-10-17 PROCEDURE — 64415 NJX AA&/STRD BRCH PLXS IMG: CPT | Performed by: ORTHOPAEDIC SURGERY

## 2022-10-17 PROCEDURE — 160025 RECOVERY II MINUTES (STATS): Performed by: ORTHOPAEDIC SURGERY

## 2022-10-17 PROCEDURE — 160009 HCHG ANES TIME/MIN: Performed by: ORTHOPAEDIC SURGERY

## 2022-10-17 PROCEDURE — 160041 HCHG SURGERY MINUTES - EA ADDL 1 MIN LEVEL 4: Performed by: ORTHOPAEDIC SURGERY

## 2022-10-17 PROCEDURE — 160048 HCHG OR STATISTICAL LEVEL 1-5: Performed by: ORTHOPAEDIC SURGERY

## 2022-10-17 PROCEDURE — 700105 HCHG RX REV CODE 258: Performed by: ORTHOPAEDIC SURGERY

## 2022-10-17 PROCEDURE — 76942 ECHO GUIDE FOR BIOPSY: CPT | Mod: 26 | Performed by: ANESTHESIOLOGY

## 2022-10-17 PROCEDURE — 160046 HCHG PACU - 1ST 60 MINS PHASE II: Performed by: ORTHOPAEDIC SURGERY

## 2022-10-17 PROCEDURE — 700111 HCHG RX REV CODE 636 W/ 250 OVERRIDE (IP): Performed by: ANESTHESIOLOGY

## 2022-10-17 PROCEDURE — 160035 HCHG PACU - 1ST 60 MINS PHASE I: Performed by: ORTHOPAEDIC SURGERY

## 2022-10-17 PROCEDURE — 160029 HCHG SURGERY MINUTES - 1ST 30 MINS LEVEL 4: Performed by: ORTHOPAEDIC SURGERY

## 2022-10-17 PROCEDURE — 64415 NJX AA&/STRD BRCH PLXS IMG: CPT | Mod: 59,LT | Performed by: ANESTHESIOLOGY

## 2022-10-17 PROCEDURE — 700101 HCHG RX REV CODE 250: Performed by: ORTHOPAEDIC SURGERY

## 2022-10-17 PROCEDURE — 700101 HCHG RX REV CODE 250: Performed by: ANESTHESIOLOGY

## 2022-10-17 PROCEDURE — C1713 ANCHOR/SCREW BN/BN,TIS/BN: HCPCS | Performed by: ORTHOPAEDIC SURGERY

## 2022-10-17 PROCEDURE — 160002 HCHG RECOVERY MINUTES (STAT): Performed by: ORTHOPAEDIC SURGERY

## 2022-10-17 PROCEDURE — 01630 ANES OPN/ARTHR PX SHO JT NOS: CPT | Performed by: ANESTHESIOLOGY

## 2022-10-17 DEVICE — ANCHOR KNOTLESS REELX STT 4.5MM (5EA/BX): Type: IMPLANTABLE DEVICE | Site: SHOULDER | Status: FUNCTIONAL

## 2022-10-17 DEVICE — SUTURE 2MM ANCHOR TAPE ICONIX SPEED (5EA/BX): Type: IMPLANTABLE DEVICE | Site: SHOULDER | Status: FUNCTIONAL

## 2022-10-17 RX ORDER — HYDRALAZINE HYDROCHLORIDE 20 MG/ML
5 INJECTION INTRAMUSCULAR; INTRAVENOUS
Status: DISCONTINUED | OUTPATIENT
Start: 2022-10-17 | End: 2022-10-18 | Stop reason: HOSPADM

## 2022-10-17 RX ORDER — LIDOCAINE HYDROCHLORIDE 40 MG/ML
SOLUTION TOPICAL PRN
Status: DISCONTINUED | OUTPATIENT
Start: 2022-10-17 | End: 2022-10-17 | Stop reason: SURG

## 2022-10-17 RX ORDER — MIDAZOLAM HYDROCHLORIDE 1 MG/ML
INJECTION INTRAMUSCULAR; INTRAVENOUS PRN
Status: DISCONTINUED | OUTPATIENT
Start: 2022-10-17 | End: 2022-10-17 | Stop reason: SURG

## 2022-10-17 RX ORDER — LABETALOL HYDROCHLORIDE 5 MG/ML
5 INJECTION, SOLUTION INTRAVENOUS
Status: DISCONTINUED | OUTPATIENT
Start: 2022-10-17 | End: 2022-10-18 | Stop reason: HOSPADM

## 2022-10-17 RX ORDER — MEPERIDINE HYDROCHLORIDE 25 MG/ML
12.5 INJECTION INTRAMUSCULAR; INTRAVENOUS; SUBCUTANEOUS
Status: DISCONTINUED | OUTPATIENT
Start: 2022-10-17 | End: 2022-10-18 | Stop reason: HOSPADM

## 2022-10-17 RX ORDER — LIDOCAINE HYDROCHLORIDE 20 MG/ML
INJECTION, SOLUTION EPIDURAL; INFILTRATION; INTRACAUDAL; PERINEURAL PRN
Status: DISCONTINUED | OUTPATIENT
Start: 2022-10-17 | End: 2022-10-17 | Stop reason: SURG

## 2022-10-17 RX ORDER — DIPHENHYDRAMINE HYDROCHLORIDE 50 MG/ML
12.5 INJECTION INTRAMUSCULAR; INTRAVENOUS
Status: DISCONTINUED | OUTPATIENT
Start: 2022-10-17 | End: 2022-10-18 | Stop reason: HOSPADM

## 2022-10-17 RX ORDER — HYDROMORPHONE HYDROCHLORIDE 1 MG/ML
0.1 INJECTION, SOLUTION INTRAMUSCULAR; INTRAVENOUS; SUBCUTANEOUS
Status: DISCONTINUED | OUTPATIENT
Start: 2022-10-17 | End: 2022-10-18 | Stop reason: HOSPADM

## 2022-10-17 RX ORDER — CEFAZOLIN SODIUM 1 G/3ML
INJECTION, POWDER, FOR SOLUTION INTRAMUSCULAR; INTRAVENOUS PRN
Status: DISCONTINUED | OUTPATIENT
Start: 2022-10-17 | End: 2022-10-17 | Stop reason: SURG

## 2022-10-17 RX ORDER — OXYCODONE HCL 5 MG/5 ML
5 SOLUTION, ORAL ORAL
Status: DISCONTINUED | OUTPATIENT
Start: 2022-10-17 | End: 2022-10-18 | Stop reason: HOSPADM

## 2022-10-17 RX ORDER — DEXAMETHASONE SODIUM PHOSPHATE 4 MG/ML
INJECTION, SOLUTION INTRA-ARTICULAR; INTRALESIONAL; INTRAMUSCULAR; INTRAVENOUS; SOFT TISSUE PRN
Status: DISCONTINUED | OUTPATIENT
Start: 2022-10-17 | End: 2022-10-17 | Stop reason: SURG

## 2022-10-17 RX ORDER — ROPIVACAINE HYDROCHLORIDE 5 MG/ML
INJECTION, SOLUTION EPIDURAL; INFILTRATION; PERINEURAL PRN
Status: DISCONTINUED | OUTPATIENT
Start: 2022-10-17 | End: 2022-10-17 | Stop reason: SURG

## 2022-10-17 RX ORDER — SODIUM CHLORIDE, SODIUM LACTATE, POTASSIUM CHLORIDE, CALCIUM CHLORIDE 600; 310; 30; 20 MG/100ML; MG/100ML; MG/100ML; MG/100ML
INJECTION, SOLUTION INTRAVENOUS CONTINUOUS
Status: DISCONTINUED | OUTPATIENT
Start: 2022-10-17 | End: 2022-10-18 | Stop reason: HOSPADM

## 2022-10-17 RX ORDER — HYDROMORPHONE HYDROCHLORIDE 1 MG/ML
0.4 INJECTION, SOLUTION INTRAMUSCULAR; INTRAVENOUS; SUBCUTANEOUS
Status: DISCONTINUED | OUTPATIENT
Start: 2022-10-17 | End: 2022-10-18 | Stop reason: HOSPADM

## 2022-10-17 RX ORDER — ONDANSETRON 2 MG/ML
4 INJECTION INTRAMUSCULAR; INTRAVENOUS
Status: DISCONTINUED | OUTPATIENT
Start: 2022-10-17 | End: 2022-10-18 | Stop reason: HOSPADM

## 2022-10-17 RX ORDER — HYDROMORPHONE HYDROCHLORIDE 1 MG/ML
0.2 INJECTION, SOLUTION INTRAMUSCULAR; INTRAVENOUS; SUBCUTANEOUS
Status: DISCONTINUED | OUTPATIENT
Start: 2022-10-17 | End: 2022-10-18 | Stop reason: HOSPADM

## 2022-10-17 RX ORDER — HALOPERIDOL 5 MG/ML
1 INJECTION INTRAMUSCULAR
Status: DISCONTINUED | OUTPATIENT
Start: 2022-10-17 | End: 2022-10-18 | Stop reason: HOSPADM

## 2022-10-17 RX ORDER — MIDAZOLAM HYDROCHLORIDE 1 MG/ML
1 INJECTION INTRAMUSCULAR; INTRAVENOUS
Status: DISCONTINUED | OUTPATIENT
Start: 2022-10-17 | End: 2022-10-18 | Stop reason: HOSPADM

## 2022-10-17 RX ORDER — OXYCODONE HCL 5 MG/5 ML
10 SOLUTION, ORAL ORAL
Status: DISCONTINUED | OUTPATIENT
Start: 2022-10-17 | End: 2022-10-18 | Stop reason: HOSPADM

## 2022-10-17 RX ORDER — SODIUM CHLORIDE, SODIUM LACTATE, POTASSIUM CHLORIDE, CALCIUM CHLORIDE 600; 310; 30; 20 MG/100ML; MG/100ML; MG/100ML; MG/100ML
INJECTION, SOLUTION INTRAVENOUS CONTINUOUS
Status: ACTIVE | OUTPATIENT
Start: 2022-10-17 | End: 2022-10-17

## 2022-10-17 RX ORDER — ROCURONIUM BROMIDE 10 MG/ML
INJECTION, SOLUTION INTRAVENOUS PRN
Status: DISCONTINUED | OUTPATIENT
Start: 2022-10-17 | End: 2022-10-17 | Stop reason: SURG

## 2022-10-17 RX ADMIN — FENTANYL CITRATE 100 MCG: 50 INJECTION, SOLUTION INTRAMUSCULAR; INTRAVENOUS at 16:12

## 2022-10-17 RX ADMIN — SUGAMMADEX 200 MG: 100 INJECTION, SOLUTION INTRAVENOUS at 17:39

## 2022-10-17 RX ADMIN — ROCURONIUM BROMIDE 20 MG: 10 INJECTION, SOLUTION INTRAVENOUS at 17:05

## 2022-10-17 RX ADMIN — CEFAZOLIN 3 G: 330 INJECTION, POWDER, FOR SOLUTION INTRAMUSCULAR; INTRAVENOUS at 16:27

## 2022-10-17 RX ADMIN — MIDAZOLAM HYDROCHLORIDE 2 MG: 1 INJECTION, SOLUTION INTRAMUSCULAR; INTRAVENOUS at 15:52

## 2022-10-17 RX ADMIN — DEXAMETHASONE SODIUM PHOSPHATE 1 MG: 4 INJECTION, SOLUTION INTRA-ARTICULAR; INTRALESIONAL; INTRAMUSCULAR; INTRAVENOUS; SOFT TISSUE at 16:03

## 2022-10-17 RX ADMIN — LIDOCAINE HYDROCHLORIDE 4 ML: 40 SOLUTION TOPICAL at 16:15

## 2022-10-17 RX ADMIN — ROCURONIUM BROMIDE 50 MG: 10 INJECTION, SOLUTION INTRAVENOUS at 16:15

## 2022-10-17 RX ADMIN — LIDOCAINE HYDROCHLORIDE 25 MG: 20 INJECTION, SOLUTION EPIDURAL; INFILTRATION; INTRACAUDAL at 16:15

## 2022-10-17 RX ADMIN — PROPOFOL 200 MG: 10 INJECTION, EMULSION INTRAVENOUS at 16:15

## 2022-10-17 RX ADMIN — SODIUM CHLORIDE, POTASSIUM CHLORIDE, SODIUM LACTATE AND CALCIUM CHLORIDE: 600; 310; 30; 20 INJECTION, SOLUTION INTRAVENOUS at 15:25

## 2022-10-17 RX ADMIN — ROPIVACAINE HYDROCHLORIDE 30 ML: 5 INJECTION, SOLUTION EPIDURAL; INFILTRATION; PERINEURAL at 16:03

## 2022-10-17 RX ADMIN — LIDOCAINE HYDROCHLORIDE 0.5 ML: 10 INJECTION, SOLUTION EPIDURAL; INFILTRATION; INTRACAUDAL; PERINEURAL at 15:25

## 2022-10-17 ASSESSMENT — PAIN DESCRIPTION - PAIN TYPE
TYPE: ACUTE PAIN
TYPE: SURGICAL PAIN

## 2022-10-17 ASSESSMENT — PAIN SCALES - GENERAL: PAIN_LEVEL: 0

## 2022-10-17 NOTE — OR NURSING
Pt allergies and NPO status verified, home meds reconcilled. Belongings secured. Pt verbalizes understanding of the pain scale, expected course of stay, and plan of care.  Surgical site verified with pt.  IV access established.  Sequentials  and TEDs placed on legs.

## 2022-10-17 NOTE — ANESTHESIA PREPROCEDURE EVALUATION
Case: [id number] Date/Time: 10/17/22 1445    Procedures:       LEFT SHOULDER ARTHROSCOPY WITH DEBRIDEMENT, ROTATOR CUFF REPAIR, SUBACROMIAL DECOMPRESSION AND PROBABLE BICEPS TENOTOMY      ARTHROSCOPY, SHOULDER, WITH ROTATOR CUFF REPAIR      DECOMPRESSION, SHOULDER, ARTHROSCOPIC      ARTHROSCOPY, SHOULDER, WITH BICEPS TENOTOMY    Anesthesia type: General    Pre-op diagnosis: SUPRASPINATUS SYNDROME, LEFT    Location:  OR 04 / SURGERY [facility]    Surgeons: Rl Martinez M.D.          Relevant Problems   NEURO   (positive) Acute stroke due to occlusion of right cerebellar artery (HCC)   (positive) Stroke of unknown etiology (HCC)      CARDIAC   (positive) Essential hypertension   (positive) Premature atrial complexes       Physical Exam    Airway   Mallampati: II  TM distance: >3 FB  Neck ROM: full       Cardiovascular - normal exam  Rhythm: regular  Rate: normal  (-) murmur     Dental - normal exam           Pulmonary - normal exam  Breath sounds clear to auscultation     Abdominal    Neurological - normal exam                 Anesthesia Plan    ASA 2       Plan - general and peripheral nerve block     Peripheral nerve block will be post-op pain control  Airway plan will be ETT          Induction: intravenous    Postoperative Plan: Postoperative administration of opioids is intended.    Pertinent diagnostic labs and testing reviewed    Informed Consent:    Anesthetic plan and risks discussed with patient.    Use of blood products discussed with: patient whom consented to blood products.

## 2022-10-17 NOTE — ANESTHESIA PROCEDURE NOTES
Airway    Date/Time: 10/17/2022 4:16 PM  Performed by: Shimon Galdamez M.D.  Authorized by: Shimon Galdamez M.D.     Location:  OR  Urgency:  Elective  Difficult Airway: No    Indications for Airway Management:  Anesthesia      Spontaneous Ventilation: absent    Sedation Level:  Deep  Preoxygenated: Yes    Patient Position:  Sniffing  Final Airway Type:  Endotracheal airway  Final Endotracheal Airway:  ETT  Cuffed: Yes    Technique Used for Successful ETT Placement:  Direct laryngoscopy    Insertion Site:  Oral  Blade Type:  Ligia  Laryngoscope Blade/Videolaryngoscope Blade Size:  3  ETT Size (mm):  8.0  Measured from:  Lips  ETT to Lips (cm):  24  Placement Verified by: auscultation and capnometry    Cormack-Lehane Classification:  Grade IIb - view of arytenoids or posterior of glottis only  Number of Attempts at Approach:  1  Number of Other Approaches Attempted:  0          
Peripheral Block    Date/Time: 10/17/2022 3:53 PM  Performed by: Shimon Galdamez M.D.  Authorized by: Shimon Galdamez M.D.     Start Time:  10/17/2022 3:53 PM  End Time:  10/17/2022 4:03 PM  Reason for Block: at surgeon's request and post-op pain management ONLY    patient identified, IV checked, site marked, risks and benefits discussed, surgical consent, monitors and equipment checked, pre-op evaluation and timeout performed    Patient Position:  Supine  Prep: ChloraPrep    Monitoring:  Heart rate, continuous pulse ox and cardiac monitor  Block Region:  Upper Extremity  Upper Extremity - Block Type:  BRACHIAL PLEXUS block, Interscalene approach    Laterality:  Left  Procedures: ultrasound guided  Image captured, interpreted and electronically stored.  Local Infiltration:  Lidocaine  Strength:  1 %  Dose:  3 ml  Block Type:  Single-shot  Needle Length:  100mm  Needle Gauge:  21 G  Needle Localization:  Ultrasound guidance  Injection Assessment:  Negative aspiration for heme, no paresthesia on injection, incremental injection and local visualized surrounding nerve on ultrasound  Evidence of intravascular injection: No          
Admitted

## 2022-10-18 NOTE — DISCHARGE INSTRUCTIONS
ACTIVITY: Rest and take it easy for the first 24 hours.  A responsible adult is recommended to remain with you during that time.  It is normal to feel sleepy.  We encourage you to not do anything that requires balance, judgment or coordination.    MILD FLU-LIKE SYMPTOMS ARE NORMAL. YOU MAY EXPERIENCE GENERALIZED MUSCLE ACHES, THROAT IRRITATION, HEADACHE AND/OR SOME NAUSEA.    FOR 24 HOURS DO NOT:  Drive, operate machinery or run household appliances.  Drink beer or alcoholic beverages.   Make important decisions or sign legal documents.    SPECIAL INSTRUCTIONS: Non weight bearing to left arm. Do not lift anything with your left arm until you follow up with Dr. Martinez. Wear your immobilizer at all times, unless previously directed by your provider. Elevate extremity and use ice packs to help with swelling.     DIET: To avoid nausea, slowly advance diet as tolerated, avoiding spicy or greasy foods for the first day.  Add more substantial food to your diet according to your physician's instructions.  INCREASE FLUIDS AND FIBER TO AVOID CONSTIPATION.    SURGICAL DRESSING/BATHING: Keep dressing dry and intact. Follow up with Dr. Martinez's office on when you may remove the dressing and shower.     FOLLOW-UP APPOINTMENT:  A follow-up appointment should be arranged with your doctor; call to schedule.    You should CALL YOUR PHYSICIAN if you develop:  Fever greater than 101 degrees F.  Pain not relieved by medication, or persistent nausea or vomiting.  Excessive bleeding (blood soaking through dressing) or unexpected drainage from the wound.  Extreme redness or swelling around the incision site, drainage of pus or foul smelling drainage.  Inability to urinate or empty your bladder within 8 hours.  Problems with breathing or chest pain.    You should call 911 if you develop problems with breathing or chest pain.  If you are unable to contact your doctor or surgical center, you should go to the nearest emergency room or urgent  [facility] center.  Physician's telephone #: [phone]    If any questions arise, call your doctor.  If your doctor is not available, please feel free to call the Surgical Center at [phone].     A registered nurse may call you a few days after your surgery to see how you are doing after your procedure.    MEDICATIONS: Resume taking daily medication.  Take prescribed pain medication with food.  If no medication is prescribed, you may take non-aspirin pain medication if needed.  PAIN MEDICATION CAN BE VERY CONSTIPATING.  Take a stool softener or laxative such as senokot, pericolace, or milk of magnesia if needed.    Last pain medication given at .    If your physician has prescribed pain medication that includes Acetaminophen (Tylenol), do not take additional Acetaminophen (Tylenol) while taking the prescribed medication.

## 2022-10-18 NOTE — OR NURSING
1900: Report received from Jen CLAYTON. [place] Stage 2 [place]. Assisted to get dressed by CNA. VSS.     1920: DC instructions reviewed with pt and pt's wife.     1935: PIV DC'd by RN. Pt discharged via wheelchair by RN. All belongings with pt.

## 2022-10-18 NOTE — ANESTHESIA POSTPROCEDURE EVALUATION
Patient: Alistair Putnam    Procedure Summary     Date: 10/17/22 Room / Location:  OR  / SURGERY [facility]    Anesthesia Start: 1609 Anesthesia Stop: 1751    Procedures:       LEFT SHOULDER ARTHROSCOPY WITH DEBRIDEMENT, ROTATOR CUFF REPAIR, SUBACROMIAL DECOMPRESSION AND BICEPS TENOTOMY (Left: Shoulder)      ARTHROSCOPY, SHOULDER, WITH ROTATOR CUFF REPAIR (Left: Shoulder)      DECOMPRESSION, SHOULDER, ARTHROSCOPIC (Left: Shoulder)      ARTHROSCOPY, SHOULDER, WITH BICEPS TENOTOMY (Left: Shoulder) Diagnosis: (SUPRASPINATUS SYNDROME, LEFT)    Surgeons: Rl Martinez M.D. Responsible Provider: Shimon Galdamez M.D.    Anesthesia Type: general, peripheral nerve block ASA Status: 2          Final Anesthesia Type: general, peripheral nerve block  Last vitals  BP   Blood Pressure: (!) 150/90    Temp   36.1 °C (97 °F)    Pulse   82   Resp   16    SpO2   97 %      Anesthesia Post Evaluation    Patient location during evaluation: PACU  Patient participation: complete - patient participated  Level of consciousness: awake and alert  Pain score: 0    Airway patency: patent  Anesthetic complications: no  Cardiovascular status: hemodynamically stable  Respiratory status: acceptable  Hydration status: euvolemic    PONV: none          No notable events documented.     Nurse Pain Score: 0 (NPRS)

## 2022-10-18 NOTE — OP REPORT
DATE OF SERVICE:  10/17/2022     PREOPERATIVE DIAGNOSIS:  Left rotator cuff tear.     POSTOPERATIVE DIAGNOSES:    1.  Left rotator cuff tear.  2.  Left partial biceps tendon tear.     PROCEDURES:    1.  Left shoulder arthroscopy with extensive debridement of labrum,   supraspinatus, synovium and bursa.  2.  Left shoulder arthroscopy with rotator cuff repair.  3.  Left shoulder arthroscopy with biceps tenotomy.  4.  Left shoulder arthroscopy with subacromial decompression.     SURGEON:  Rl Martinez MD     ASSISTANT:  Hilario Meier CST/KATHERINE.     ANESTHESIA:  General with regional block.     ESTIMATED BLOOD LOSS:  Minimal.     INDICATIONS:  The patient is a 57-year-old gentleman who sustained a left   rotator cuff tear 4 months ago.  He is indicated for repair.     FINDINGS:  Exam under anesthesia revealed a full range of motion with no   evidence of instability.  Arthroscopic examination of the glenohumeral joint   revealed the articular surfaces to be intact.  There was circumferential   fraying of the labrum.  There was partial tearing and fraying of the biceps   tendon proximally.  Subscapularis was intact.  There was a full-thickness   anterior supraspinatus tear with some loose flaps.  Examination of the   subacromial space revealed a full-thickness rotator cuff tear site, which was   a small to medium sized U-shaped tear with minimal retraction.  This was   directly under a focal anterior acromial hook.     DESCRIPTION OF PROCEDURE:  Following induction of general anesthesia, time-out   was performed confirming patient, site and procedure.  A 2 grams of Ancef IV   were ordered and administered.  The patient was positioned in the lateral   decubitus position with the left shoulder up.  The left shoulder and upper   extremity were prepped and draped in the usual fashion and suspended from 8   pounds longitudinal traction.  Standard posterior, anterior and lateral   portals were established and  diagnostic arthroscopy was performed with   findings as above.  The shaver was used to debride the labrum   circumferentially.  The proximal biceps tendon was debrided.  The biceps was   transected at its attachment to the superior labrum and allowed to retract out   of the joint.  Anterior synovitis was debrided.  The rotator cuff tear site   was debrided and the medial footprint was debrided down to bleeding bone.     Next, the subacromial space was entered.  Bursa was debrided with the shaver.    A working cannula was placed anterolaterally adjacent to the tear.  The tear   site was further debrided and the lateral footprint was debrided down to   bleeding bone.  A standard [place] bridge type repair was done with 2 medial   Iconix anchors and 2 lateral ReelX anchors.  The anteromedial Iconix was   placed and was left single loaded with the tape.  The tape ends were passed in   the 1 and 3 position.  Extra posteromedial Iconix was placed and was left   single loaded with the tape.  The tape ends were passed in the 2 and 4   positions.  The tapes were appropriately crossed and incorporated into a   posterolateral ReelX and an anterolateral ReelX completing a good solid double   row repair.     The coracoacromial ligament was released from the anterior acromial hook.    Anterior acromial hook was resected with the shaver, leaving a smooth flat   undersurface of the acromion.  Remaining debris and bursa was debrided with   the shaver.  The arthroscope was withdrawn.  Portals were closed with nylon   sutures.  Sterile dressing was applied followed by an UltraSling.  The patient   was awakened and extubated in the operating room and transferred to recovery   room in stable condition.        ______________________________  MD ROWAN Sarkar/ANAYA    DD:  10/17/2022 17:35  DT:  10/17/2022 20:39    Job#:  [id number]

## 2022-11-17 ENCOUNTER — APPOINTMENT (OUTPATIENT)
Dept: RADIOLOGY | Facility: MEDICAL CENTER | Age: 57
End: 2022-11-17
Attending: EMERGENCY MEDICINE
Payer: COMMERCIAL

## 2022-11-17 ENCOUNTER — HOSPITAL ENCOUNTER (EMERGENCY)
Facility: MEDICAL CENTER | Age: 57
End: 2022-11-17
Attending: EMERGENCY MEDICINE
Payer: COMMERCIAL

## 2022-11-17 VITALS
HEART RATE: 63 BPM | WEIGHT: 223.11 LBS | BODY MASS INDEX: 31.23 KG/M2 | DIASTOLIC BLOOD PRESSURE: 68 MMHG | RESPIRATION RATE: 15 BRPM | TEMPERATURE: 98.5 F | HEIGHT: 71 IN | OXYGEN SATURATION: 94 % | SYSTOLIC BLOOD PRESSURE: 104 MMHG

## 2022-11-17 DIAGNOSIS — M54.16 LUMBAR RADICULOPATHY: ICD-10-CM

## 2022-11-17 DIAGNOSIS — M79.604 PAIN OF RIGHT LOWER EXTREMITY: ICD-10-CM

## 2022-11-17 PROCEDURE — 93971 EXTREMITY STUDY: CPT | Mod: RT

## 2022-11-17 PROCEDURE — 96372 THER/PROPH/DIAG INJ SC/IM: CPT

## 2022-11-17 PROCEDURE — 93971 EXTREMITY STUDY: CPT | Mod: 26,RT | Performed by: INTERNAL MEDICINE

## 2022-11-17 PROCEDURE — 99283 EMERGENCY DEPT VISIT LOW MDM: CPT

## 2022-11-17 PROCEDURE — 700111 HCHG RX REV CODE 636 W/ 250 OVERRIDE (IP): Performed by: EMERGENCY MEDICINE

## 2022-11-17 RX ORDER — KETOROLAC TROMETHAMINE 30 MG/ML
60 INJECTION, SOLUTION INTRAMUSCULAR; INTRAVENOUS ONCE
Status: COMPLETED | OUTPATIENT
Start: 2022-11-17 | End: 2022-11-17

## 2022-11-17 RX ADMIN — KETOROLAC TROMETHAMINE 60 MG: 30 INJECTION, SOLUTION INTRAMUSCULAR at 13:24

## 2022-11-17 ASSESSMENT — FIBROSIS 4 INDEX: FIB4 SCORE: 0.79

## 2022-11-17 NOTE — ED TRIAGE NOTES
Chief Complaint   Patient presents with    Leg Pain     Pt reports R leg pain, began on Tuesday night. Pain to top of thigh. Also reports numbness to the area. CMS intact.      Pt ambulates to triage for above.     Hx stroke in June. Takes baby ASA.     Stroke scale negative. No facial droop/speech changes or gait changes. CMS intact.

## 2022-11-17 NOTE — ED PROVIDER NOTES
ED Provider Note        Primary care provider: Joel Cho M.D.    I verified that the patient was wearing a mask and I was wearing appropriate PPE every time I entered the room. The patient's mask was on the patient at all times during my encounter except for a brief view of the oropharynx.      CHIEF COMPLAINT  Chief Complaint   Patient presents with    Leg Pain     Pt reports R leg pain, began on Tuesday night. Pain to top of thigh. Also reports numbness to the area. CMS intact.        HPI  Alistair Putnam is a 57 y.o. male who presents to the Emergency Department with chief complaint of leg pain.  Patient with history of previous stroke has recently had left upper extremity surgery yesterday evening he reports that he had acute onset of a dense numbness in his right thigh.  He stated that he had absolutely no feeling in the mid section of his right anterior thigh and following the resolution of that he had severe pain in this distribution.  No lower back pain no saddle anesthesia no fecal incontinence no urinary retention he still has moderate pain in this area and was concerned that this may have been caused by a blood clot as he has had problems with hypercoagulability in the past.  Currently takes a baby aspirin no other blood thinner he is still has some numbness but is improving no headache no altered mental status no cough congestion chest pain shortness of breath no abdominal pain no other acute symptom change or concern at this time.    REVIEW OF SYSTEMS  10 systems reviewed and otherwise negative, pertinent positives and negatives listed in the history of present illness.    PAST MEDICAL HISTORY   has a past medical history of COVID-19, Heart murmur (12/2017), Hyperlipidemia, Hypertension, Pain (09/27/2022), Prediabetes, and Stroke ([facility]) (06/26/2022).    SURGICAL HISTORY   has a past surgical history that includes knee arthroscopy (Right, 1989); tonsillectomy (1976); orif, fracture,  "tibia (Right, 1976); sinus obliteration frontal (2010); shoulder arthroscopy w/ rotator cuff repair (Right, 1/8/2018); shoulder decompression arthroscopic (Right, 1/8/2018); shoulder arthroscopy w/ bicipital tenodesis repair (Right, 1/8/2018); shldr arthroscop exten debride 3+ (Left, 10/17/2022); shldr arthroscop,surg,w/rotat cuff repr (Left, 10/17/2022); shldr arthroscop,part acromioplas (Left, 10/17/2022); and unlisted proc, arthroscopy (Left, 10/17/2022).    SOCIAL HISTORY  Social History     Tobacco Use    Smoking status: Never    Smokeless tobacco: Never   Vaping Use    Vaping Use: Never used   Substance Use Topics    Alcohol use: Not Currently     Comment: 2 per month    Drug use: Not Currently     Types: Marijuana, Inhaled     Comment: Infrequently, approximately 1-2 times per month      Social History     Substance and Sexual Activity   Drug Use Not Currently    Types: Marijuana, Inhaled    Comment: Infrequently, approximately 1-2 times per month       FAMILY HISTORY  Non-Contributory    CURRENT MEDICATIONS  Home Medications       Reviewed by Latrice Mills R.N. (Registered Nurse) on 11/17/22 at 1156  Med List Status: Partial     Medication Last Dose Status   aspirin (ASA) 81 MG Chew Tab chewable tablet  Active   atorvastatin (LIPITOR) 80 MG tablet  Active   losartan (COZAAR) 25 MG Tab  Active   multivitamin (THERAGRAN) Tab  Active   NON SPECIFIED  Active   Omega-3 Fatty Acids (FISH OIL) 1000 MG Cap capsule  Active   vitamin D3 (CHOLECALCIFEROL) 1000 Unit (25 mcg) Tab  Active                    ALLERGIES  No Known Allergies    PHYSICAL EXAM  VITAL SIGNS: BP (!) 127/90   Pulse 88   Temp 37.3 °C (99.1 °F) (Temporal)   Resp 16   Ht 1.803 m (5' 11\")   Wt 101 kg (223 lb 1.7 oz)   SpO2 96%   BMI 31.12 kg/m²   Pulse ox interpretation: I interpret this pulse ox as normal.  Constitutional: Alert and oriented x 3, Distress  HEENT: Atraumatic normocephalic, pupils are equal round, extraocular movements are " "intact. The nares is clear, external ears are normal, mouth shows moist mucous membranes  Neck: no obvious JVD or tracheal deviation  Cardiovascular: Regular rate and rhythm no murmur rub or gallop   Thorax & Lungs: No respiratory distress, no wheezes rales or rhonchi, No chest tenderness.   GI: Soft nontender nondistended positive bowel sounds, no peritoneal signs  Skin: Warm dry no obvious acute rash or lesion  Musculoskeletal: Moving all extremities with normal range strength, no acute  deformity, slight perceived numbness in the right anterior thigh there is no swelling no palpable cord, minimal tenderness in the popliteal fossa no calf tenderness no distal edema all other extremities unremarkable, no midline back pain no paraspinal pain in the lumbar distribution  Neurologic: Cranial nerves III through XII are grossly intact, no sensory deficit, no cerebellar dysfunction   Psychiatric: Appropriate affect for situation at this time      DIAGNOSTIC STUDIES / PROCEDURES    RADIOLOGY  US-EXTREMITY VENOUS LOWER UNILAT RIGHT   Final Result          COURSE & MEDICAL DECISION MAKING  Pertinent Labs & Imaging studies reviewed. (See chart for details)    12:42 PM - Patient seen and examined at bedside.       Medical Decision Making: No clots on ultrasound of the right lower extremity patient's symptoms are improving at this time discussed that I thought this was likely a result of some lumbar radiculopathy.  He is given instructions to follow-up with spine surgery in the next available time to return here for worsening pain numbness tingling weakness problems urination bowel movements any other acute symptom change or concern otherwise discharged stable and improved condition.    BP (!) 127/90   Pulse 88   Temp 37.3 °C (99.1 °F) (Temporal)   Resp 16   Ht 1.803 m (5' 11\")   Wt 101 kg (223 lb 1.7 oz)   SpO2 96%   BMI 31.12 kg/m²     Joel Cho M.D.  [address]  [address]  [place] " [id number]  [phone]    In 1 week      [facility]  [address]  [facility] [address]  [phone]  Schedule an appointment as soon as possible for a visit       [facility], Emergency Dept  [address] [id number] [phone]    in 12-24 hours if symptoms persist, immediately If symptoms worsen, or if you develop any other symptoms or concerns    Discharge Medication List as of 11/17/2022  2:28 PM          FINAL IMPRESSION  1. Pain of right lower extremity Active   2. Lumbar radiculopathy Active          This dictation has been created using voice recognition software and/or scribes. The accuracy of the dictation is limited by the abilities of the software and the expertise of the scribes. I expect there may be some errors of grammar and possibly content. I made every attempt to manually correct the errors within my dictation. However, errors related to voice recognition software and/or scribes may still exist and should be interpreted within the appropriate context.

## 2023-04-03 NOTE — ED NOTES
April 3, 2023       Adma Falk MD  [address] [facility] [address]  Via In Basket      Patient: Farzad Euceda   YOB: 1983   Date of Visit: 4/3/2023       Dear Dr. Falk:    I saw your patient, Farzad Euceda, for an evaluation. Below are my notes for this visit with her.    If you have questions, please do not hesitate to call me.      Sincerely,        Renetta Alatorre MD        CC: No Recipients  Renetta Alatorre MD  4/3/2023  4:47 PM  Signed  Chief Complaint   Patient presents with   • Office Visit   • Diabetes Mellitus     Fbg x 126  Smbg x 1  Eye exam Dr. Vear Tabiona 01/2023       Ms. Euceda is a 39 year old female  with PMHx of DM type 2, Hx preeclampsia, HTN presenting for follow up and management of DM type 2. Pt is here with her  and child.  is interpreting. Declined . Last seen in 2/2023.  per pt. BG average 120-130. Sometimes BG may be 103, 105 lowest. . Highest - this is rare. She is fasting. Taking MF when she breaks her fast in the evening. Feels like her BG drops if taking metformin with breakfast. Not on OCPs. Her period came early this month, 5 days earlier, on 3/30/2023. No plans for pregnancy. Watches her salt intake. Doesn't f/u with cardiologist.  SMBG: every other day. The pt denies polyuria or polydipsia. Pt does not have numbness and tingling in their feet. No abdominal complaints. No CP or SOB. The pt denies depression and anxiety. No blurry vision. Had eye exam in 12/2022. No issues with the feet.    HTN: Pt denies any spells of HA, diaphoresis, and palpitations.      General: no fatigue, no fever, no weight loss, no appetite changes   Resp: no dyspnea, no cough   GI: no abdominal pain, no diarrhea, no nausea, no vomiting           Vitals  Visit Vitals  /70 (BP Location: LUE - Left upper extremity, Patient Position: Sitting, Cuff Size: Regular)   Pulse (!) 111   Ht 4' 11.02\" (1.499 m)   Wt 58.7 kg (129 lb 6.6 oz)   LMP  Report from Aroldo CLAYTON  Pt sleeping, NAD, wife remains at BS   07/11/2022   BMI 26.12 kg/m²       Physical Exam  General: No apparent distress, well appearing, not obese   HEENT: EOMI, no proptosis, no thyromegaly, no nodules   Chest: CTA, no crackles or rhonchi   CVS: S1 S2, no murmur   Abd: Soft, non tender   Ext: No edema   CNS: Conscious, alert, oriented  Skin: No rash      Discussion/Summary  Endocrine Impression: 39 year old female  with PMHx of DM type 2, Hx preeclampsia, HTN presenting for follow up and management of DM type 2. Pt has three kids. Last delivered in 2021.    DM type 2, controlled, FHx in father, brother, sister, DM Dx in 2019  - Denies Hx of gestational DM prior to Dx of DM   Lab Results   Component Value Date    HGBA1C 6.6 (H) 07/26/2022    HGBA1C 6.0 (H) 03/30/2022    CREATININE 0.68 09/15/2022    GFRESTIMATE >90 09/15/2022    TSH 1.333 03/30/2022    MALBCR 15.1 07/26/2022     HGB (g/dL)   Date Value   07/26/2022 15.1   - Regimen: Metformin 500 mg daily with breakfast, continue same   -  per pt. BG average 120-130. Sometimes BG may be 103, 105 lowest. . Highest - this is rare.   - She is fasting. Taking MF when she breaks her fast in the evening. Feels like her BG drops if taking metformin with breakfast. Recommend to take MF with biggest meal  - Before pregnancy, pt was on metformin. Pt resumed metformin after pregnancy. Pt was on insulin during pregnancy   - Pt no longer taking birth control since 7/2022 due to s/e. Did not see gynecologist. Previously Explained the need to be on contraception as she is on multiple medications and pregnancy would be contraindicated. Her period came early this month, 5 days earlier, on 3/30/2023. No plans for pregnancy.   - Eye exam from 12/2022 reported stable DM, no ocular complications   - weight: 129 lbs in 4/2023  - Continue healthy diet and active lifestyle  - check a1c, TSH     HTN, controlled, screened for secondary causes including primary hyperaldosteronism, pheochromocytoma, cushing's, So  far, workup negative  - She had HTN in pregnancy during her oldest child's pregnancy and youngest. Dx with HTN in 2020. Was taking BP mediation when pregnant.   - /70  - Pt taking labetalol 200 mg BID  - Pt stopped triameterene-hydrochlorothiazide as she had myalgias and arthralgias. Pt denies rash. No allergic reaction.  - US vasc renal duplex from 3/2023 reported No evidence of renal artery stenosis.  - CT adrenal glands from 3/2023 reported Adrenal glands have an unremarkable noncontrast appearance. No discrete adrenal nodule  Lab Results   Component Value Date    MALBCR 15.1 07/26/2022    POTASSIUM 4.0 09/15/2022    JOSE <0.1 02/15/2023    ALDREN See Note 02/15/2023    ALDOS 6.9 02/15/2023    UFCUGD 15.0 03/08/2023    24CREAT 0.86 03/08/2023    HCGQUAL Negative 02/15/2023   - saliva cortisol specimen contaminated  - metanephrine 0.18 and normetanephrine 0.33 both wnl in 9/2022  - DW pt about imaging and lab results.   - so far workup negative, if there are any further issues, can consider further testing  - defer management of HTN to PCP      DLD associated with DM type 2  Lab Results   Component Value Date    CHOLESTEROL 214 (H) 07/26/2022    CALCLDL 149 (H) 07/26/2022    HDL 42 (L) 07/26/2022    TRIGLYCERIDE 116 07/26/2022     Lab Results   Component Value Date    ALKPT 62 07/26/2022    AST 27 07/26/2022    GPT 51 07/26/2022   - not on statin.   - rec dietary modifications. Avoid fried foods   - check lipid panel next visit     I thank Dr. Adam Falk for allowing me to participate in the care of the patient If any of the questions are unanswered please don't hesitate to give me a call.    Plan  Management Plan and Instructions:        Rotate injection sites, Limit the amount of sugar sweetened drinks like soda pop and juice. , For Blood Glucose < 80 mg/dl treat with 4 ounces of juice or 4 glucose tablets (15g). Recheck Blood Glucose in 15 minutes. Repeat until blood glucose is > 80. , Continue lifestyle  modifications, Call if sugars consistently less than 70.   and Bring your sugar log and meter to each visit    - be sure to follow up with primary doctor regularly for monitoring of blood pressure and any adjustments necessary   - Continue Metformin 500 mg daily   - Call the office or send a message through the portal if blood sugars are under 70 or consistently over 300  - Continue healthy diet and active lifestyle   - Complete blood work as per orders at an ACL lab  - Follow up in 6 months      Scribe Attestation:  On 4/3/2023, [facility] the services personally performed by Dr. Renetta Alatorre MD Attestation:The documentation recorded by the scribe accurately and completely reflects the service(s) I personally performed and the decisions made by me.               Allergies  ALLERGIES:  No Known Allergies    MEDICATIONS  Current Outpatient Medications   Medication Sig Dispense Refill   • labetalol (NORMODYNE) 200 MG tablet TAKE 1 TABLET BY MOUTH TWICE DAILY 90 tablet 0   • metFORMIN (GLUCOPHAGE) 500 MG tablet Take 1 tablet by mouth daily (with breakfast). 90 tablet 1   • OneTouch Verio test strip      • norethindrone-ethinyl estradiol-FE (Loestrin Fe ) 1-20 MG-MCG per tablet Take 1 tablet by mouth daily. 90 tablet 3   • VITAMIN D PO        No current facility-administered medications for this visit.       HISTORIES  Active Problems  Patient Active Problem List   Diagnosis   • Pre-existing type 2 diabetes affecting pregnancy, antepartum   • AMA (advanced maternal age) multigravida 35+   • Hx of preeclampsia, prior pregnancy, currently pregnant   •  death in prior pregnancy, currently pregnant   • Hx of  section x 2   • Left breast lump   • Benign essential hypertension   • History of prior pregnancy with IUGR    • Abnormal MSAFP (maternal serum alpha-fetoprotein), elevated   • Suspected fetal anomaly, antepartum   • Poor fetal growth affecting management of mother in third  trimester   • Single umbilical artery   • Delivered by  delivery following previous  delivery   • Severe pre-eclampsia in third trimester   • Type 2 diabetes mellitus without complication, without long-term current use of insulin (CMD)   • Dyslipidemia       Past Medical History  Past Medical History:   Diagnosis Date   • Diabetes mellitus (CMD)     Type II   • Essential (primary) hypertension        Surgical History  Past Surgical History:   Procedure Laterality Date   •  section, low transverse     •  section, low transverse     • Dilation and curettage of uterus      Missed ab   • Dilation and curettage of uterus      Missed ab   • Polypectomy         Family History  Family History   Problem Relation Age of Onset   • Hypertension Mother    • Diabetes Father    • Stroke Father    • Diabetes Brother    • Diabetes Sister        Social History  Social History     Socioeconomic History   • Marital status: /Civil Union     Spouse name: Dickson   • Number of children: 2   • Years of education: Not on file   • Highest education level: Not on file   Occupational History   • Occupation: Stay at home mom   Tobacco Use   • Smoking status: Never   • Smokeless tobacco: Never   Vaping Use   • Vaping Use: never used   Substance and Sexual Activity   • Alcohol use: Never   • Drug use: Never   • Sexual activity: Yes     Partners: Male     Birth control/protection: None, Coitus Interruptus   Other Topics Concern   • Not on file   Social History Narrative   • Not on file     Social Determinants of Health     Financial Resource Strain: Not on file   Food Insecurity: Not on file   Transportation Needs: Not on file   Physical Activity: Not on file   Stress: Low Risk    • Social Determinants: Stress: Not at all   Social Connections: Not on file   Intimate Partner Violence: Not At Risk   • Social Determinants: Intimate Partner Violence Past Fear: No   • Social Determinants: Intimate Partner  Violence Current Fear: No       Review  Past medical history, problem list, family medical history, surgical history and social history reviewed.

## 2023-09-12 NOTE — ED NOTES
Called and spoke with patient. Pt agreeable to see Dr Tamayo on 10/05/2023, appointment/date time reviewed. Appointment was switched to an hour  (new amyloid) as requested by MD. Patient stated she was able to enroll in an assistance  program hence will be getting Vyndamax for free, now waiting to have a second opinion with Dr Tamayo prior to start taking it. Patient has no further question.    Pt still complaining of nausea. Per Dr. Miguel, give compazine & meclizine. Medication orders received.

## 2024-02-23 ENCOUNTER — APPOINTMENT (RX ONLY)
Dept: URBAN - METROPOLITAN AREA CLINIC 22 | Facility: CLINIC | Age: 59
Setting detail: DERMATOLOGY
End: 2024-02-23

## 2024-02-23 DIAGNOSIS — Z85.828 PERSONAL HISTORY OF OTHER MALIGNANT NEOPLASM OF SKIN: ICD-10-CM

## 2024-02-23 DIAGNOSIS — L82.0 INFLAMED SEBORRHEIC KERATOSIS: ICD-10-CM

## 2024-02-23 DIAGNOSIS — L82.1 OTHER SEBORRHEIC KERATOSIS: ICD-10-CM

## 2024-02-23 DIAGNOSIS — L81.4 OTHER MELANIN HYPERPIGMENTATION: ICD-10-CM

## 2024-02-23 DIAGNOSIS — D22 MELANOCYTIC NEVI: ICD-10-CM

## 2024-02-23 DIAGNOSIS — Z71.89 OTHER SPECIFIED COUNSELING: ICD-10-CM

## 2024-02-23 PROBLEM — D22.5 MELANOCYTIC NEVI OF TRUNK: Status: ACTIVE | Noted: 2024-02-23

## 2024-02-23 PROBLEM — D48.5 NEOPLASM OF UNCERTAIN BEHAVIOR OF SKIN: Status: ACTIVE | Noted: 2024-02-23

## 2024-02-23 PROCEDURE — 99213 OFFICE O/P EST LOW 20 MIN: CPT | Mod: 25

## 2024-02-23 PROCEDURE — ? SUNSCREEN RECOMMENDATIONS

## 2024-02-23 PROCEDURE — 11102 TANGNTL BX SKIN SINGLE LES: CPT

## 2024-02-23 PROCEDURE — ? SUNSCREEN TREATMENT REGIMEN

## 2024-02-23 PROCEDURE — ? BIOPSY BY SHAVE METHOD

## 2024-02-23 PROCEDURE — ? COUNSELING

## 2024-02-23 ASSESSMENT — LOCATION SIMPLE DESCRIPTION DERM
LOCATION SIMPLE: POSTERIOR NECK
LOCATION SIMPLE: RIGHT ANTERIOR NECK
LOCATION SIMPLE: RIGHT CLAVICULAR SKIN
LOCATION SIMPLE: RIGHT SHOULDER
LOCATION SIMPLE: LEFT FOREHEAD
LOCATION SIMPLE: RIGHT FOREARM
LOCATION SIMPLE: RIGHT UPPER BACK
LOCATION SIMPLE: CHEST
LOCATION SIMPLE: LEFT SCALP
LOCATION SIMPLE: SCALP
LOCATION SIMPLE: LEFT SHOULDER
LOCATION SIMPLE: LEFT CHEEK
LOCATION SIMPLE: UPPER BACK
LOCATION SIMPLE: LEFT FOREARM

## 2024-02-23 ASSESSMENT — LOCATION DETAILED DESCRIPTION DERM
LOCATION DETAILED: SUPERIOR THORACIC SPINE
LOCATION DETAILED: STERNAL NOTCH
LOCATION DETAILED: RIGHT CLAVICULAR NECK
LOCATION DETAILED: LEFT POSTERIOR SHOULDER
LOCATION DETAILED: RIGHT CENTRAL FRONTAL SCALP
LOCATION DETAILED: RIGHT MEDIAL UPPER BACK
LOCATION DETAILED: LEFT LATERAL TRAPEZIAL NECK
LOCATION DETAILED: RIGHT VENTRAL PROXIMAL FOREARM
LOCATION DETAILED: LEFT VENTRAL PROXIMAL FOREARM
LOCATION DETAILED: RIGHT CLAVICULAR SKIN
LOCATION DETAILED: RIGHT POSTERIOR SHOULDER
LOCATION DETAILED: LEFT LATERAL MALAR CHEEK
LOCATION DETAILED: RIGHT INFERIOR UPPER BACK
LOCATION DETAILED: LEFT CENTRAL FRONTAL SCALP
LOCATION DETAILED: LEFT INFERIOR MEDIAL FOREHEAD

## 2024-02-23 ASSESSMENT — LOCATION ZONE DERM
LOCATION ZONE: SCALP
LOCATION ZONE: FACE
LOCATION ZONE: ARM
LOCATION ZONE: TRUNK
LOCATION ZONE: NECK

## 2024-02-23 NOTE — PROCEDURE: SUNSCREEN RECOMMENDATIONS

## 2024-02-23 NOTE — PROCEDURE: BIOPSY BY SHAVE METHOD
Detail Level: Detailed
Depth Of Biopsy: dermis
Was A Bandage Applied: Yes
Size Of Lesion In Cm: 1.1
X Size Of Lesion In Cm: 0
Biopsy Type: H and E
Biopsy Method: Personna blade
Anesthesia Type: 0.5% lidocaine without epinephrine
Anesthesia Volume In Cc: 3
Hemostasis: Aluminum Chloride and Electrocautery
Wound Care: Petrolatum
Dressing: pressure dressing with telfa
Destruction After The Procedure: No
Type Of Destruction Used: Curettage
Curettage Text: The wound bed was treated with curettage after the biopsy was performed.
Cryotherapy Text: The wound bed was treated with cryotherapy after the biopsy was performed.
Electrodesiccation Text: The wound bed was treated with electrodesiccation after the biopsy was performed.
Electrodesiccation And Curettage Text: The wound bed was treated with electrodesiccation and curettage after the biopsy was performed.
Silver Nitrate Text: The wound bed was treated with silver nitrate after the biopsy was performed.
Lab: [id number]
Lab Facility: 
Consent: Written consent was obtained and risks were reviewed including but not limited to scarring, infection, bleeding, scabbing, incomplete removal, nerve damage and allergy to anesthesia.
Post-Care Instructions: I reviewed with the patient in detail post-care instructions. Patient is to keep the biopsy site dry overnight. Gentle cleansing daily.  Apply petroleum ointment daily until healed. Patient may apply hydrogen peroxide soaks to remove any crusting.
Notification Instructions: Patient will be notified of biopsy results. However, patient instructed to call the office if not contacted within 2 weeks.
Billing Type: Third-Party Bill
Information: Selecting Yes will display possible errors in your note based on the variables you have selected. This validation is only offered as a suggestion for you. PLEASE NOTE THAT THE VALIDATION TEXT WILL BE REMOVED WHEN YOU FINALIZE YOUR NOTE. IF YOU WANT TO FAX A PRELIMINARY NOTE YOU WILL NEED TO TOGGLE THIS TO 'NO' IF YOU DO NOT WANT IT IN YOUR FAXED NOTE.

## 2024-10-22 ENCOUNTER — APPOINTMENT (RX ONLY)
Dept: URBAN - METROPOLITAN AREA CLINIC 22 | Facility: CLINIC | Age: 59
Setting detail: DERMATOLOGY
End: 2024-10-22

## 2024-10-22 DIAGNOSIS — L72.0 EPIDERMAL CYST: ICD-10-CM

## 2024-10-22 PROBLEM — D48.5 NEOPLASM OF UNCERTAIN BEHAVIOR OF SKIN: Status: ACTIVE | Noted: 2024-10-22

## 2024-10-22 PROCEDURE — ? BIOPSY BY PUNCH METHOD

## 2024-10-22 PROCEDURE — 11104 PUNCH BX SKIN SINGLE LESION: CPT

## 2024-10-22 ASSESSMENT — LOCATION SIMPLE DESCRIPTION DERM: LOCATION SIMPLE: GENITALIA

## 2024-10-22 ASSESSMENT — LOCATION ZONE DERM: LOCATION ZONE: GENITALIA

## 2024-10-22 ASSESSMENT — LOCATION DETAILED DESCRIPTION DERM: LOCATION DETAILED: GENITALIA

## 2024-10-22 NOTE — HPI: SKIN LESION
Is This A New Presentation, Or A Follow-Up?: Skin Lesion
What Type Of Note Output Would You Prefer (Optional)?: Standard Output
How Severe Is Your Skin Lesion?: moderate
Has Your Skin Lesion Been Treated?: not been treated
Attending Attestation (For Attendings USE Only)...

## 2024-10-22 NOTE — PROCEDURE: BIOPSY BY PUNCH METHOD
Detail Level: Detailed
Was A Bandage Applied: Yes
Punch Size In Mm: 5
Size Of Lesion In Cm (Optional): 0.5
X Size Of Lesion In Cm (Optional): 0
Depth Of Punch Biopsy: dermis
Biopsy Type: H and E
Anesthesia Type: 1% lidocaine with 1:100,000 epinephrine and a 1:10 solution of 8.4% sodium bicarbonate
Anesthesia Volume In Cc: 2.5
Hemostasis: Aluminum Chloride
Epidermal Sutures: 4-0 Surgipro
Number Of Epidermal Sutures (Optional): 2
Wound Care: Petrolatum
Dressing: bandage
Suture Removal: 10 days
Patient Will Remove Sutures At Home?: No
Lab: [id number]
Lab Facility: 
Consent: Written consent was obtained and risks were reviewed including but not limited to scarring, infection, bleeding, scabbing, incomplete removal, nerve damage and allergy to anesthesia.
Post-Care Instructions: I reviewed with the patient in detail post-care instructions. Patient is to keep the biopsy site dry overnight, and then apply bacitracin twice daily until healed. Patient may apply hydrogen peroxide soaks to remove any crusting.
Home Suture Removal Text: Patient was provided a home suture removal kit and will remove their sutures at home.  If they have any questions or difficulties they will call the office.
Notification Instructions: Patient will be notified of biopsy results. However, patient instructed to call the office if not contacted within 2 weeks.
Billing Type: Third-Party Bill
Information: Selecting Yes will display possible errors in your note based on the variables you have selected. This validation is only offered as a suggestion for you. PLEASE NOTE THAT THE VALIDATION TEXT WILL BE REMOVED WHEN YOU FINALIZE YOUR NOTE. IF YOU WANT TO FAX A PRELIMINARY NOTE YOU WILL NEED TO TOGGLE THIS TO 'NO' IF YOU DO NOT WANT IT IN YOUR FAXED NOTE.

## 2024-11-01 ENCOUNTER — APPOINTMENT (RX ONLY)
Dept: URBAN - METROPOLITAN AREA CLINIC 22 | Facility: CLINIC | Age: 59
Setting detail: DERMATOLOGY
End: 2024-11-01

## 2024-11-01 DIAGNOSIS — Z48.02 ENCOUNTER FOR REMOVAL OF SUTURES: ICD-10-CM

## 2024-11-01 PROCEDURE — ? SUTURE REMOVAL (GLOBAL PERIOD)

## 2024-11-01 ASSESSMENT — LOCATION DETAILED DESCRIPTION DERM: LOCATION DETAILED: SUPRAPUBIC SKIN

## 2024-11-01 ASSESSMENT — LOCATION SIMPLE DESCRIPTION DERM: LOCATION SIMPLE: GROIN

## 2024-11-01 ASSESSMENT — LOCATION ZONE DERM: LOCATION ZONE: TRUNK

## 2024-11-01 NOTE — PROCEDURE: SUTURE REMOVAL (GLOBAL PERIOD)
Body Location Override (Optional - Billing Will Still Be Based On Selected Body Map Location If Applicable): genitalia
Detail Level: Detailed
Add 00411 Cpt? (Important Note: In 2017 The Use Of 66352 Is Being Tracked By Cms To Determine Future Global Period Reimbursement For Global Periods): no
Suture Removal Completed By (Optional): Roya Blake MA

## 2025-02-28 ENCOUNTER — APPOINTMENT (OUTPATIENT)
Dept: URBAN - METROPOLITAN AREA CLINIC 22 | Facility: CLINIC | Age: 60
Setting detail: DERMATOLOGY
End: 2025-02-28

## 2025-02-28 DIAGNOSIS — Z87.2 PERSONAL HISTORY OF DISEASES OF THE SKIN AND SUBCUTANEOUS TISSUE: ICD-10-CM

## 2025-02-28 DIAGNOSIS — L81.4 OTHER MELANIN HYPERPIGMENTATION: ICD-10-CM

## 2025-02-28 DIAGNOSIS — D22 MELANOCYTIC NEVI: ICD-10-CM

## 2025-02-28 DIAGNOSIS — L82.1 OTHER SEBORRHEIC KERATOSIS: ICD-10-CM

## 2025-02-28 DIAGNOSIS — Z85.828 PERSONAL HISTORY OF OTHER MALIGNANT NEOPLASM OF SKIN: ICD-10-CM

## 2025-02-28 DIAGNOSIS — D18.0 HEMANGIOMA: ICD-10-CM

## 2025-02-28 PROBLEM — D22.61 MELANOCYTIC NEVI OF RIGHT UPPER LIMB, INCLUDING SHOULDER: Status: ACTIVE | Noted: 2025-02-28

## 2025-02-28 PROBLEM — D22.62 MELANOCYTIC NEVI OF LEFT UPPER LIMB, INCLUDING SHOULDER: Status: ACTIVE | Noted: 2025-02-28

## 2025-02-28 PROBLEM — D18.01 HEMANGIOMA OF SKIN AND SUBCUTANEOUS TISSUE: Status: ACTIVE | Noted: 2025-02-28

## 2025-02-28 PROBLEM — D22.5 MELANOCYTIC NEVI OF TRUNK: Status: ACTIVE | Noted: 2025-02-28

## 2025-02-28 PROCEDURE — 99213 OFFICE O/P EST LOW 20 MIN: CPT

## 2025-02-28 PROCEDURE — ? SUNSCREEN RECOMMENDATIONS

## 2025-02-28 PROCEDURE — ? COUNSELING

## 2025-02-28 ASSESSMENT — LOCATION DETAILED DESCRIPTION DERM
LOCATION DETAILED: RIGHT PROXIMAL POSTERIOR UPPER ARM
LOCATION DETAILED: LEFT DISTAL POSTERIOR UPPER ARM
LOCATION DETAILED: RIGHT CLAVICULAR NECK
LOCATION DETAILED: PERIUMBILICAL SKIN
LOCATION DETAILED: RIGHT VENTRAL PROXIMAL FOREARM
LOCATION DETAILED: STERNAL NOTCH
LOCATION DETAILED: LEFT MEDIAL INFERIOR CHEST
LOCATION DETAILED: SUPERIOR THORACIC SPINE
LOCATION DETAILED: LEFT VENTRAL PROXIMAL FOREARM
LOCATION DETAILED: SUPERIOR LUMBAR SPINE
LOCATION DETAILED: LEFT INFERIOR MEDIAL FOREHEAD
LOCATION DETAILED: EPIGASTRIC SKIN
LOCATION DETAILED: STERNUM
LOCATION DETAILED: POSTERIOR MID-PARIETAL SCALP
LOCATION DETAILED: LEFT POSTERIOR SHOULDER
LOCATION DETAILED: RIGHT POSTERIOR SHOULDER
LOCATION DETAILED: RIGHT MEDIAL UPPER BACK
LOCATION DETAILED: RIGHT INFERIOR UPPER BACK

## 2025-02-28 ASSESSMENT — LOCATION ZONE DERM
LOCATION ZONE: NECK
LOCATION ZONE: SCALP
LOCATION ZONE: ARM
LOCATION ZONE: TRUNK
LOCATION ZONE: FACE

## 2025-02-28 ASSESSMENT — LOCATION SIMPLE DESCRIPTION DERM
LOCATION SIMPLE: LEFT FOREARM
LOCATION SIMPLE: POSTERIOR SCALP
LOCATION SIMPLE: ABDOMEN
LOCATION SIMPLE: RIGHT FOREARM
LOCATION SIMPLE: RIGHT ANTERIOR NECK
LOCATION SIMPLE: LEFT FOREHEAD
LOCATION SIMPLE: LEFT SHOULDER
LOCATION SIMPLE: LOWER BACK
LOCATION SIMPLE: RIGHT SHOULDER
LOCATION SIMPLE: RIGHT UPPER ARM
LOCATION SIMPLE: LEFT UPPER ARM
LOCATION SIMPLE: UPPER BACK
LOCATION SIMPLE: CHEST
LOCATION SIMPLE: RIGHT UPPER BACK

## 2025-02-28 NOTE — PROCEDURE: SUNSCREEN RECOMMENDATIONS
Detail Level: Zone
Products Recommended: Juan Joshua \\nElta md UV clear
General Sunscreen Counseling: I recommended a broad spectrum sunscreen with a SPF of 30 or higher.  I explained that SPF 30 sunscreens block approximately 97 percent of the sun's harmful rays.  Sunscreens should be applied at least 15 minutes prior to expected sun exposure and then every 2 hours after that as long as sun exposure continues. If swimming or exercising sunscreen should be reapplied every 45 minutes to an hour after getting wet or sweating.  One ounce, or the equivalent of a shot glass full of sunscreen, is adequate to protect the skin not covered by a bathing suit. I also recommended a lip balm with a sunscreen as well. Sun protective clothing can be used in lieu of sunscreen but must be worn the entire time you are exposed to the sun's rays.

## 2025-05-04 ENCOUNTER — APPOINTMENT (OUTPATIENT)
Dept: RADIOLOGY | Facility: MEDICAL CENTER | Age: 60
End: 2025-05-04
Attending: EMERGENCY MEDICINE
Payer: COMMERCIAL

## 2025-05-04 ENCOUNTER — HOSPITAL ENCOUNTER (EMERGENCY)
Facility: MEDICAL CENTER | Age: 60
End: 2025-05-04
Attending: EMERGENCY MEDICINE
Payer: COMMERCIAL

## 2025-05-04 VITALS
OXYGEN SATURATION: 97 % | DIASTOLIC BLOOD PRESSURE: 79 MMHG | RESPIRATION RATE: 16 BRPM | WEIGHT: 218.26 LBS | HEIGHT: 71 IN | TEMPERATURE: 99.4 F | BODY MASS INDEX: 30.56 KG/M2 | HEART RATE: 57 BPM | SYSTOLIC BLOOD PRESSURE: 142 MMHG

## 2025-05-04 DIAGNOSIS — S86.912A STRAIN OF LEFT KNEE, INITIAL ENCOUNTER: ICD-10-CM

## 2025-05-04 DIAGNOSIS — S86.812A STRAIN OF CALF MUSCLE, LEFT, INITIAL ENCOUNTER: ICD-10-CM

## 2025-05-04 PROCEDURE — 73564 X-RAY EXAM KNEE 4 OR MORE: CPT | Mod: RT

## 2025-05-04 PROCEDURE — 99283 EMERGENCY DEPT VISIT LOW MDM: CPT

## 2025-05-04 PROCEDURE — 93971 EXTREMITY STUDY: CPT | Mod: LT

## 2025-05-04 PROCEDURE — 700111 HCHG RX REV CODE 636 W/ 250 OVERRIDE (IP): Mod: JZ

## 2025-05-04 PROCEDURE — 96372 THER/PROPH/DIAG INJ SC/IM: CPT

## 2025-05-04 RX ORDER — KETOROLAC TROMETHAMINE 15 MG/ML
15 INJECTION, SOLUTION INTRAMUSCULAR; INTRAVENOUS ONCE
Status: DISCONTINUED | OUTPATIENT
Start: 2025-05-04 | End: 2025-05-04

## 2025-05-04 RX ORDER — KETOROLAC TROMETHAMINE 15 MG/ML
15 INJECTION, SOLUTION INTRAMUSCULAR; INTRAVENOUS ONCE
Status: COMPLETED | OUTPATIENT
Start: 2025-05-04 | End: 2025-05-04

## 2025-05-04 RX ADMIN — KETOROLAC TROMETHAMINE 15 MG: 15 INJECTION, SOLUTION INTRAMUSCULAR; INTRAVENOUS at 14:19

## 2025-05-04 NOTE — ED NOTES
Pt aox4, skin pink warm and dry, airway patent, rr even and unlabored, NAD noted. No new complaints. Pt vss. Pt given discharge instructions without further questions. Ambulates to wheelchair with standby assist upon discharge with steady gait and wheeled to lobby without new incident or distress in stable condition.

## 2025-05-04 NOTE — DISCHARGE INSTRUCTIONS
Your knee x-ray did not show any significant abnormalities.  There is a little bit of swelling in the knee joint.  Your ultrasound did not show DVT.  There is some swelling associated with muscle strain and likely a few torn muscle fibers.  It sounds like you have a good follow-up plan.  It is reasonable to go back to your previous orthopedist, Dr. Lopez.  Use compression, heat, and continued anti-inflammatory medications.

## 2025-05-04 NOTE — ED PROVIDER NOTES
"ER Provider Note    Scribed for Jermaine Hess M.d. by Selma Mcmillan. 5/4/2025  1:51 PM    Primary Care Provider: Joel Cho M.D.    CHIEF COMPLAINT  Chief Complaint   Patient presents with    Leg Pain     L leg pain     EXTERNAL RECORDS REVIEWED  Outpatient Notes Patient was seen at urgent care on 11/03/2024 for conjunctivitis of both eyes. At the time he was prescribed Polytrim.     HPI/ROS  LIMITATION TO HISTORY   Select: : None    OUTSIDE HISTORIAN(S):  None.    Alistair Putnam is a 60 y.o. male who presents to the ED for left leg pain that started yesterday. Patient states that he has been dealing with pain in his left knee from a prior incident for awhile now. His pain had reportedly been improving but yesterday he accidentally slipped off a tall chair and hyperextended his left leg subsequently injuring it again. Today he is reporting swelling to his left knee and calf area. Patient denies trauma/injury to head, fever, chills, and any other symptoms or injuries at this time.     PAST MEDICAL HISTORY  Past Medical History:   Diagnosis Date    COVID-19     Dec 2021, May 2022    Heart murmur 12/2017    \"As a child\"    Hyperlipidemia     Hypertension     Pain 09/27/2022    Left shoulder pain    Prediabetes     Stroke (HCC) 06/26/2022    embolic, Right cerebellar, no residual deficits (had facial droop/double vision/general weakness)     SURGICAL HISTORY  Past Surgical History:   Procedure Laterality Date    [facility] LDR ARTHROSCOP EXTEN DEBRIDE 3+ Left 10/17/2022    Procedure: LEFT SHOULDER ARTHROSCOPY WITH DEBRIDEMENT, ROTATOR CUFF REPAIR, SUBACROMIAL DECOMPRESSION AND BICEPS TENOTOMY;  Surgeon: Rl Martinez M.D.;  Location: SURGERY [facility];  Service: Orthopedics    [facility]    Procedure: ARTHROSCOPY, SHOULDER, WITH ROTATOR CUFF REPAIR;  Surgeon: Rl Martinez M.D.;  Location: SURGERY [facility];  Service: Orthopedics    HI LDR " ARTHROSCOP,PART ACROMIOPLAS Left 10/17/2022    Procedure: DECOMPRESSION, SHOULDER, ARTHROSCOPIC;  Surgeon: Rl Martinez M.D.;  Location: SURGERY [facility];  Service: Orthopedics    VT UNLISTED PROC, ARTHROSCOPY Left 10/17/2022    Procedure: ARTHROSCOPY, SHOULDER, WITH BICEPS TENOTOMY;  Surgeon: Rl Martinez M.D.;  Location: SURGERY [facility];  Service: Orthopedics    SHOULDER ARTHROSCOPY W/ ROTATOR CUFF REPAIR Right 1/8/2018    Procedure: SHOULDER ARTHROSCOPY W/ ROTATOR CUFF REPAIR- WITH EXTENSIVE DEBRIDEMENT;  Surgeon: Rl Martinez M.D.;  Location: [facility];  Service: Orthopedics    SHOULDER DECOMPRESSION ARTHROSCOPIC Right 1/8/2018    Procedure: SHOULDER DECOMPRESSION ARTHROSCOPIC- SUBACROMIAL;  Surgeon: Rl Martinez M.D.;  Location: [facility];  Service: Orthopedics    SHOULDER ARTHROSCOPY W/ BICIPITAL TENODESIS REPAIR Right 1/8/2018    Procedure: SHOULDER ARTHROSCOPY W/ BICIPITAL TENODESIS REPAIR;  Surgeon: Rl Martinez M.D.;  Location: [facility];  Service: Orthopedics    SINUS OBLITERATION FRONTAL  2010    KNEE ARTHROSCOPY Right 1989    TONSILLECTOMY  1976    ORIF, FRACTURE, TIBIA Right 1976    with skin graft     FAMILY HISTORY  Family History   Problem Relation Age of Onset    Cancer Mother      SOCIAL HISTORY   reports that he has never smoked. He has never used smokeless tobacco. He reports that he does not currently use alcohol. He reports that he does not currently use drugs after having used the following drugs: Marijuana and Inhaled.    CURRENT MEDICATIONS  Discharge Medication List as of 5/4/2025  3:37 PM        CONTINUE these medications which have NOT CHANGED    Details   vitamin D3 (CHOLECALCIFEROL) 1000 Unit (25 mcg) Tab Take 4,000 Units by mouth every day., Historical Med      NON SPECIFIED Take 3 Capsules by mouth every day. Corn husk fiber, Historical Med      aspirin (ASA) 81 MG Chew Tab chewable tablet Chew 1 Tablet every day.,  "Disp-100 Tablet, R-0, Normal      atorvastatin (LIPITOR) 80 MG tablet Take 1 Tablet by mouth every evening., Disp-30 Tablet, R-0, Normal      multivitamin (THERAGRAN) Tab Take 1 Tablet by mouth every day., Historical Med      Omega-3 Fatty Acids (FISH OIL) 1000 MG Cap capsule Take 1,000 mg by mouth every day., Historical Med             ALLERGIES  Patient has no known allergies noted.     PHYSICAL EXAM  VITAL SIGNS: /73   Pulse 70   Temp 37.2 °C (99 °F) (Temporal)   Resp 16   Ht 1.803 m (5' 11\")   Wt 99 kg (218 lb 4.1 oz)   SpO2 97%   BMI 30.44 kg/m²   PHYSICAL EXAM  VITAL SIGNS: /73   Pulse 70   Temp 37.2 °C (99 °F) (Temporal)   Resp 16   Ht 1.803 m (5' 11\")   Wt 99 kg (218 lb 4.1 oz)   SpO2 97%   BMI 30.44 kg/m²   Pulse ox interpretation: I interpret this pulse ox as normal.  Constitutional: Alert in no apparent distress.  HENT: No signs of trauma, Bilateral external ears normal, Nose normal.   Eyes: Pupils are equal and reactive, Conjunctiva normal, Non-icteric.   Neck: Normal range of motion, Supple, No stridor.    Cardiovascular: Normal peripheral perfusion  Thorax & Lungs: Unlabored respirations, equal chest expansion, no accessory muscle use  Abdomen: Non-distended  Skin:  No erythema, No rash.   Back: Normal alignment and ROM  Extremities: No gross deformity  Musculoskeletal: Normal range of motion in all joints, small left knee effusion, left calf is firm and slightly swollen with posterior bruising. Negative Homans  Neurologic: Alert, Normal motor function, No focal deficits noted.   Psychiatric: Affect normal, Judgment normal, Mood normal.    DIAGNOSTIC STUDIES    Radiology:   The attending emergency physician has independently interpreted the diagnostic imaging associated with this visit and am waiting the final reading from the radiologist.     Preliminary interpretation is a follows: No DVT.  Some soft tissue swelling     Radiologist interpretation:   US-EXTREMITY VENOUS LOWER " UNILAT LEFT   Final Result      DX-KNEE COMPLETE 4+ RIGHT   Final Result      No acute osseous abnormality.          COURSE & MEDICAL DECISION MAKING     INITIAL ASSESSMENT, COURSE AND PLAN  Care Narrative:     1:51 PM Patient presents to the ED for left leg pain following injury that occurred yesterday. Patient evaluated at bedside and discussed plan of care, including obtaining imaging and ordering medication. Patient will be treated with Toradol 15 mg IM. Ordered for DX-Knee Complete 4+ right and US-extremity venous lower unilat right to evaluate his symptoms. Differential diagnoses include but not limited to: Knee sprain, much less likely fracture, calf muscle partial tear, much less likely DVT.     2:53 - Patient was reevaluated at bedside. I discussed with patient X-ray results and informed them that they were normal and that we are still waiting on ultrasound at this time. Patient understands and agrees. All questions answered at this time.     3:00 PM - I have ordered for US-Extremity Venous Lower Unilat Left to evaluate.     3:31 PM - Patient was reevaluated at bedside. Discussed imaging results with patient and plan for discharge home.  The x-ray is not abnormal.  Ultrasound shows some soft tissue swelling consistent with strain, no DVT.  Patient has had an ongoing reported work related concern with this affected knee.  This more recent injury is not work-related, the patient plans to either see his orthopedist through his employer, or on his own if necessary.  Patient was given the opportunity for questions which were answered appropriately.  Patient is to return to the ED for new or worsening symptoms or any additional concerns.  Patient has verbalized understanding and agreement to this plan.  Patient is stable for discharge at this time.      ADDITIONAL PROBLEM MANAGED  Prior knee injury    DISPOSITION AND DISCUSSIONS    Escalation of care considered, and ultimately not performed: Laboratory analysis.   Blood work considered, but not indicated, as there is no evidence of DVT, or fracture requiring preoperative testing.    Decision tools and prescription drugs considered including, but not limited to: Medication modification considered, but patient will take OTC medications for this discomfort .    DISPOSITION:  Patient will be discharged home in improved condition.    FOLLOW UP:  Rl Martinez M.D.  [address]  [address]  [facility] [address]  [phone]    Schedule an appointment as soon as possible for a visit       FINAL DIAGNOSIS  1. Strain of calf muscle, left, initial encounter    2. Strain of left knee, initial encounter            Selma FISH (Scribe), am scribing for, and in the presence of, Jermaine Hess M.D..    Electronically signed by: Selma Mcmillan (Scribe), 5/4/2025    Jermaine FISH M.D. personally performed the services described in this documentation, as scribed by Selma Mcmillan in my presence, and it is both accurate and complete.

## 2025-05-04 NOTE — ED TRIAGE NOTES
"Chief Complaint   Patient presents with    Leg Pain     L leg pain     Pt states he slipped off a tall chair and landed on his L leg. He states that he hyperextended his knee and now has swelling to L knee and L calf area. Pt denies HS. Event happened Friday. Pt Ax0x4, ambulatory to triage, educated on wait time and triage process.       /73   Pulse 70   Temp 37.2 °C (99 °F) (Temporal)   Resp 16   Ht 1.803 m (5' 11\")   Wt 99 kg (218 lb 4.1 oz)   SpO2 97%   BMI 30.44 kg/m²     "

## 2025-05-20 ENCOUNTER — HOSPITAL ENCOUNTER (OUTPATIENT)
Dept: RADIOLOGY | Facility: MEDICAL CENTER | Age: 60
End: 2025-05-20
Attending: ORTHOPAEDIC SURGERY
Payer: COMMERCIAL

## 2025-05-20 DIAGNOSIS — R60.9 IDIOPATHIC EDEMA: ICD-10-CM

## 2025-05-20 PROCEDURE — 93971 EXTREMITY STUDY: CPT | Mod: 26,LT | Performed by: INTERNAL MEDICINE

## 2025-05-20 PROCEDURE — 93971 EXTREMITY STUDY: CPT | Mod: LT

## (undated) DEVICE — GOWN WARMING STANDARD FLEX - (30/CA)

## (undated) DEVICE — GLOVE, LITE (PAIR)

## (undated) DEVICE — SENSOR OXIMETER ADULT SPO2 RD SET (20EA/BX)

## (undated) DEVICE — DRAPE LARGE 3 QUARTER - (20/CA)

## (undated) DEVICE — PACK SHOULDER ARTHROSCOPY SM - (2EA/CA)

## (undated) DEVICE — DRAPE U ORTHOPEDIC - (10/BX)

## (undated) DEVICE — NEEDLE, SCORPION

## (undated) DEVICE — GLOVE BIOGEL SZ 6.5 SURGICAL PF LTX (50PR/BX 4BX/CA)

## (undated) DEVICE — GLOVE BIOGEL PI INDICATOR SZ 7.0 SURGICAL PF LF - (50/BX 4BX/CA)

## (undated) DEVICE — GLOVE BIOGEL PI ORTHO SZ 6 1/2 SURGICAL PF LF (40PR/BX)

## (undated) DEVICE — TUBING PATIENT W/CONNECTOR REDEUCE (1EA)

## (undated) DEVICE — GUIDE TRACHE TUBE INTUBATING STYLET 5.0-10.0MM 14FR (20EA/PK)

## (undated) DEVICE — GLOVE BIOGEL SZ 8 SURGICAL PF LTX - (50PR/BX 4BX/CA)

## (undated) DEVICE — SHAVER4.0 RESECTOR FORMULA - (5EA/BX)

## (undated) DEVICE — CHLORAPREP 26 ML APPLICATOR - ORANGE TINT(25/CA)

## (undated) DEVICE — PROTECTOR ULNA NERVE - (36PR/CA)

## (undated) DEVICE — TAPE XBRAID TT 2.0MM (12EA/BX)

## (undated) DEVICE — ELECTRODE DUAL RETURN W/ CORD - (50/PK)

## (undated) DEVICE — SHAVER4.0 AGGRESSIVE + FORMLA (5EA/BX)

## (undated) DEVICE — ELECTRODE 850 FOAM ADHESIVE - HYDROGEL RADIOTRNSPRNT (50/PK)

## (undated) DEVICE — SLEEVE STERILE  A599T - 30/BX 2BX/CS

## (undated) DEVICE — SENSOR SPO2 NEO LNCS ADHESIVE (20/BX) SEE USER NOTES

## (undated) DEVICE — GLOVE BIOGEL PI INDICATOR SZ 7.5 SURGICAL PF LF -(50/BX 4BX/CA)

## (undated) DEVICE — KIT ROOM DECONTAMINATION

## (undated) DEVICE — GLOVE SURGICAL PROTEXIS PI 8.0 LF - (50PR/BX)

## (undated) DEVICE — CANNULA THREADED 5X75 (5EA/BX)

## (undated) DEVICE — SODIUM CHL. IRRIGATION 0.9% 3000ML (4EA/CA 65CA/PF)

## (undated) DEVICE — SUCTION INSTRUMENT YANKAUER BULBOUS TIP W/O VENT (50EA/CA)

## (undated) DEVICE — NEPTUNE 4 PORT MANIFOLD - (20/PK)

## (undated) DEVICE — TUBING CASSETTE CROSSFLOW INTEGRATED (10EA/CA)

## (undated) DEVICE — COVER LIGHT HANDLE FLEXIBLE - SOFT (2EA/PK 80PK/CA)

## (undated) DEVICE — SODIUM CHL IRRIGATION 0.9% 1000ML (12EA/CA)

## (undated) DEVICE — HUMID-VENT HEAT AND MOISTURE EXCHANGE- (50/BX)

## (undated) DEVICE — CANISTER SUCTION RIGID RED 1500CC (40EA/CA)

## (undated) DEVICE — CANNULA FULLY THREADED 8 X 75 (5EA/BX)

## (undated) DEVICE — NEEDLE MULTIFIRE (5EA/BX)

## (undated) DEVICE — SLEEVE SHOULDER DISP(ARTHREX) - (6/BX)

## (undated) DEVICE — WATER IRRIGATION STERILE 1000ML (12EA/CA)

## (undated) DEVICE — TOWEL STOP TIMEOUT SAFETY FLAG (40EA/CA)

## (undated) DEVICE — BAG, SPONGE COUNT 50600

## (undated) DEVICE — DRAPE SHOULDER FLUID CONTROL - 77 X 85 (10/CA)

## (undated) DEVICE — SUTURE 3-0 ETHILON FS-1 - (36/BX) 30 INCH

## (undated) DEVICE — TUBING PUMP WITH CONNECTOR REDEUCE (1EA)

## (undated) DEVICE — TUBE CONNECTING SUCTION - CLEAR PLASTIC STERILE 72 IN (50EA/CA)

## (undated) DEVICE — BAG SPONGE COUNT 10.25 X 32 - BLUE (250/CA)

## (undated) DEVICE — TUBE E-T HI-LO CUFF 7.5MM (10EA/PK)

## (undated) DEVICE — SUTURE GENERAL

## (undated) DEVICE — GLOVE BIOGEL PI INDICATOR SZ 6.5 SURGICAL PF LF - (50/BX 4BX/CA)

## (undated) DEVICE — TAPE XBRAID TT 1.2MM (12EA/BX)

## (undated) DEVICE — GATEWAY CANNULA 8MM X 30MM

## (undated) DEVICE — HEAD HOLDER JUNIOR/ADULT

## (undated) DEVICE — PROBE VULCAN INTEGRA ABLATOR - E-FLEX

## (undated) DEVICE — CANNULA PASSPORT 8MM X 4CM - (5/BX)

## (undated) DEVICE — MASK ANESTHESIA ADULT  - (100/CA)

## (undated) DEVICE — KIT ANESTHESIA W/CIRCUIT & 3/LT BAG W/FILTER (20EA/CA)

## (undated) DEVICE — GLOVE BIOGEL ECLIPSE PF LATEX SIZE 8.0  (50PR/BX)

## (undated) DEVICE — LACTATED RINGERS INJ 1000 ML - (14EA/CA 60CA/PF)

## (undated) DEVICE — GLOVE BIOGEL PI ULTRATOUCH SZ 6.5 SURGICAL PF LF - (50/BX)